# Patient Record
Sex: FEMALE | Race: BLACK OR AFRICAN AMERICAN | NOT HISPANIC OR LATINO | Employment: FULL TIME | URBAN - METROPOLITAN AREA
[De-identification: names, ages, dates, MRNs, and addresses within clinical notes are randomized per-mention and may not be internally consistent; named-entity substitution may affect disease eponyms.]

---

## 2018-09-05 ENCOUNTER — HOSPITAL ENCOUNTER (EMERGENCY)
Facility: HOSPITAL | Age: 33
Discharge: HOME | End: 2018-09-05
Attending: SURGERY

## 2018-09-05 VITALS
BODY MASS INDEX: 44.27 KG/M2 | WEIGHT: 282.06 LBS | SYSTOLIC BLOOD PRESSURE: 137 MMHG | DIASTOLIC BLOOD PRESSURE: 73 MMHG | HEART RATE: 105 BPM | TEMPERATURE: 99 F | HEIGHT: 67 IN | OXYGEN SATURATION: 100 % | RESPIRATION RATE: 20 BRPM

## 2018-09-05 DIAGNOSIS — R42 DIZZINESS: ICD-10-CM

## 2018-09-05 DIAGNOSIS — Z86.2 HISTORY OF ANEMIA: Primary | ICD-10-CM

## 2018-09-05 LAB
ANISOCYTOSIS BLD QL SMEAR: ABNORMAL
B-HCG UR QL: NEGATIVE
BACTERIA #/AREA URNS AUTO: ABNORMAL /HPF
BASOPHILS # BLD AUTO: 0.03 K/UL
BASOPHILS NFR BLD: 0.4 %
BILIRUB UR QL STRIP: NEGATIVE
CLARITY UR REFRACT.AUTO: CLEAR
COLOR UR AUTO: YELLOW
DIFFERENTIAL METHOD: ABNORMAL
EOSINOPHIL # BLD AUTO: 0.1 K/UL
EOSINOPHIL NFR BLD: 1.8 %
ERYTHROCYTE [DISTWIDTH] IN BLOOD BY AUTOMATED COUNT: 22.2 %
GLUCOSE UR QL STRIP: NEGATIVE
HCT VFR BLD AUTO: 26.3 %
HGB BLD-MCNC: 7.2 G/DL
HGB UR QL STRIP: ABNORMAL
HYALINE CASTS UR QL AUTO: 0 /LPF
HYPOCHROMIA BLD QL SMEAR: ABNORMAL
KETONES UR QL STRIP: ABNORMAL
LEUKOCYTE ESTERASE UR QL STRIP: ABNORMAL
LYMPHOCYTES # BLD AUTO: 2.8 K/UL
LYMPHOCYTES NFR BLD: 36.5 %
MCH RBC QN AUTO: 15.5 PG
MCHC RBC AUTO-ENTMCNC: 27.4 G/DL
MCV RBC AUTO: 57 FL
MICROSCOPIC COMMENT: ABNORMAL
MONOCYTES # BLD AUTO: 0.6 K/UL
MONOCYTES NFR BLD: 7.1 %
NEUTROPHILS # BLD AUTO: 4.2 K/UL
NEUTROPHILS NFR BLD: 54.2 %
NITRITE UR QL STRIP: NEGATIVE
OVALOCYTES BLD QL SMEAR: ABNORMAL
PH UR STRIP: 5 [PH] (ref 5–8)
PLATELET # BLD AUTO: 589 K/UL
PLATELET BLD QL SMEAR: ABNORMAL
PMV BLD AUTO: 9.3 FL
POIKILOCYTOSIS BLD QL SMEAR: ABNORMAL
PROT UR QL STRIP: ABNORMAL
RBC # BLD AUTO: 4.65 M/UL
RBC #/AREA URNS AUTO: 100 /HPF (ref 0–4)
SP GR UR STRIP: 1.02 (ref 1–1.03)
URN SPEC COLLECT METH UR: ABNORMAL
UROBILINOGEN UR STRIP-ACNC: NEGATIVE EU/DL
WBC # BLD AUTO: 7.72 K/UL
WBC #/AREA URNS AUTO: 4 /HPF (ref 0–5)

## 2018-09-05 PROCEDURE — 85025 COMPLETE CBC W/AUTO DIFF WBC: CPT

## 2018-09-05 PROCEDURE — 99283 EMERGENCY DEPT VISIT LOW MDM: CPT | Mod: 25

## 2018-09-05 PROCEDURE — 96361 HYDRATE IV INFUSION ADD-ON: CPT

## 2018-09-05 PROCEDURE — 81000 URINALYSIS NONAUTO W/SCOPE: CPT

## 2018-09-05 PROCEDURE — 81025 URINE PREGNANCY TEST: CPT

## 2018-09-05 PROCEDURE — 96360 HYDRATION IV INFUSION INIT: CPT

## 2018-09-05 PROCEDURE — 25000003 PHARM REV CODE 250: Performed by: PHYSICIAN ASSISTANT

## 2018-09-05 RX ADMIN — SODIUM CHLORIDE 1000 ML: 0.9 INJECTION, SOLUTION INTRAVENOUS at 05:09

## 2018-09-05 RX ADMIN — SODIUM CHLORIDE 1000 ML: 0.9 INJECTION, SOLUTION INTRAVENOUS at 06:09

## 2018-09-05 NOTE — ED PROVIDER NOTES
"Encounter Date: 9/5/2018       History     Chief Complaint   Patient presents with    Dysmenorrhea     "I've usesd 11 pads today, Kayleen been changed about 2-3 pads about an hour and my period been like this for the past 3 days"; dizziness and lightheadedness     Patient presents with heavy menstrual cycle which is typical for her but reports today is worse. She reports she tried to go to her OB/GYN but was told she needed to come to the ED due to the fact that she was complaining about feeling light headed and dizzy. Patient reports going through 12 pads in the past 8 hours. She denies syncope, nausea, or vomiting.           Review of patient's allergies indicates:  No Known Allergies  History reviewed. No pertinent past medical history.  History reviewed. No pertinent surgical history.  History reviewed. No pertinent family history.  Social History     Tobacco Use    Smoking status: Never Smoker    Smokeless tobacco: Never Used   Substance Use Topics    Alcohol use: No     Frequency: Never    Drug use: No     Review of Systems   Constitutional: Negative for chills and fever.   Respiratory: Negative for cough and shortness of breath.    Cardiovascular: Negative for chest pain.   Gastrointestinal: Negative for abdominal pain, nausea and vomiting.   Genitourinary: Positive for menstrual problem. Negative for dysuria, hematuria, vaginal bleeding and vaginal discharge.        Heavy menstruation   Musculoskeletal: Negative for back pain.   Neurological: Positive for dizziness and light-headedness. Negative for syncope.       Physical Exam     Initial Vitals [09/05/18 1705]   BP Pulse Resp Temp SpO2   (!) 166/81 103 20 98.5 °F (36.9 °C) --      MAP       --         Physical Exam    Nursing note and vitals reviewed.  Constitutional: She appears well-developed and well-nourished.   HENT:   Head: Normocephalic and atraumatic.   Nose: Nose normal.   Mouth/Throat: Oropharynx is clear and moist.   Eyes: Conjunctivae and EOM " are normal. Pupils are equal, round, and reactive to light.   Neck: Normal range of motion. Neck supple.   Cardiovascular: Normal rate, regular rhythm and normal heart sounds.   Pulmonary/Chest: Breath sounds normal. No respiratory distress.   Abdominal: Soft. Bowel sounds are normal. There is no tenderness.   Musculoskeletal: Normal range of motion.   Neurological: She is alert and oriented to person, place, and time. She has normal strength. No cranial nerve deficit or sensory deficit. GCS score is 15. GCS eye subscore is 4. GCS verbal subscore is 5. GCS motor subscore is 6.   Skin: Skin is warm. Capillary refill takes less than 2 seconds.         ED Course   Procedures  Labs Reviewed - No data to display       Imaging Results    None          Medical Decision Making:   Initial Assessment:   Patient presents with heavy menstrual cycle which is typical for her but reports today is worse. She reports she tried to go to her OB/GYN but was told she needed to come to the ED due to the fact that she was complaining about feeling light headed and dizzy. Patient reports going through 12 pads in the past 8 hours. She denies syncope, nausea, or vomiting. On exam, patient is well appearing in NAD. No pain to back or abdomen upon palpation.   Differential Diagnosis:   Dizziness, anemia  ED Management:  Orthostatics negative after 2L fluids. Patient reports dizziness has resolved. Will discharge and instructed to follow up with PCP in 1-2 days. Patient states she has an appointment with her OB/GYN in 2 days. Encouraged to follow up. Patient is ambulatory without ataxia. She is in NAD with VSS, non toxic in appearance.                       Clinical Impression:   The primary encounter diagnosis was History of anemia. A diagnosis of Dizziness was also pertinent to this visit.                             Janine Corona PA-C  09/05/18 1952

## 2018-10-19 ENCOUNTER — CLINICAL SUPPORT (OUTPATIENT)
Dept: FAMILY MEDICINE | Facility: CLINIC | Age: 33
End: 2018-10-19

## 2018-10-19 DIAGNOSIS — Z00.00 ROUTINE GENERAL MEDICAL EXAMINATION AT A HEALTH CARE FACILITY: ICD-10-CM

## 2018-10-19 PROCEDURE — 99201 PR OFFICE/OUTPT VISIT,NEW,LEVL I: CPT | Mod: S$GLB,,, | Performed by: FAMILY MEDICINE

## 2018-10-19 PROCEDURE — 80305 DRUG TEST PRSMV DIR OPT OBS: CPT | Mod: S$GLB,,, | Performed by: FAMILY MEDICINE

## 2018-10-25 NOTE — PROGRESS NOTES
Ursula has presented today for Pre-Hire screening on behalf of [facility]. Ursula Smallwood has completed Non-DOT Physical. Ursula has presented today for Pre-Hire screening on behalf of [facility]. Ursula Smallwood has completed Non-DOT Drug Screen .     $60.00 physical   $55.00 Drug screening     Acacia Lan

## 2018-12-29 ENCOUNTER — HOSPITAL ENCOUNTER (EMERGENCY)
Facility: HOSPITAL | Age: 33
Discharge: HOME | End: 2018-12-29
Attending: EMERGENCY MEDICINE
Payer: MEDICAID

## 2018-12-29 VITALS
BODY MASS INDEX: 47.46 KG/M2 | RESPIRATION RATE: 20 BRPM | OXYGEN SATURATION: 98 % | DIASTOLIC BLOOD PRESSURE: 85 MMHG | SYSTOLIC BLOOD PRESSURE: 179 MMHG | HEIGHT: 64 IN | WEIGHT: 278 LBS | TEMPERATURE: 99 F | HEART RATE: 92 BPM

## 2018-12-29 DIAGNOSIS — J06.9 UPPER RESPIRATORY TRACT INFECTION, UNSPECIFIED TYPE: ICD-10-CM

## 2018-12-29 DIAGNOSIS — H10.9 CONJUNCTIVITIS, UNSPECIFIED CONJUNCTIVITIS TYPE, UNSPECIFIED LATERALITY: Primary | ICD-10-CM

## 2018-12-29 LAB
B-HCG UR QL: NEGATIVE
CTP QC/QA: YES
DEPRECATED S PYO AG THROAT QL EIA: NEGATIVE

## 2018-12-29 PROCEDURE — 25000003 PHARM REV CODE 250: Performed by: NURSE PRACTITIONER

## 2018-12-29 PROCEDURE — 63600175 PHARM REV CODE 636 W HCPCS: Performed by: NURSE PRACTITIONER

## 2018-12-29 PROCEDURE — 99284 EMERGENCY DEPT VISIT MOD MDM: CPT | Mod: 25

## 2018-12-29 PROCEDURE — 81025 URINE PREGNANCY TEST: CPT | Performed by: NURSE PRACTITIONER

## 2018-12-29 PROCEDURE — 87147 CULTURE TYPE IMMUNOLOGIC: CPT | Mod: 59

## 2018-12-29 PROCEDURE — 63600175 PHARM REV CODE 636 W HCPCS: Performed by: EMERGENCY MEDICINE

## 2018-12-29 PROCEDURE — 87081 CULTURE SCREEN ONLY: CPT

## 2018-12-29 PROCEDURE — 87880 STREP A ASSAY W/OPTIC: CPT | Mod: 59

## 2018-12-29 PROCEDURE — 96372 THER/PROPH/DIAG INJ SC/IM: CPT

## 2018-12-29 RX ORDER — ERYTHROMYCIN 5 MG/G
OINTMENT OPHTHALMIC
Qty: 1 TUBE | Refills: 0 | Status: SHIPPED | OUTPATIENT
Start: 2018-12-29 | End: 2021-03-25 | Stop reason: CLARIF

## 2018-12-29 RX ORDER — FLUTICASONE PROPIONATE 50 MCG
1 SPRAY, SUSPENSION (ML) NASAL 2 TIMES DAILY PRN
Qty: 15 G | Refills: 0 | Status: ON HOLD | OUTPATIENT
Start: 2018-12-29 | End: 2021-03-25

## 2018-12-29 RX ORDER — FLUORESCEIN SODIUM AND PROPARACAINE HYDROCHLORIDE 2.5; 5 MG/ML; MG/ML
1 SOLUTION/ DROPS OPHTHALMIC
Status: COMPLETED | OUTPATIENT
Start: 2018-12-29 | End: 2018-12-29

## 2018-12-29 RX ORDER — DEXAMETHASONE SODIUM PHOSPHATE 4 MG/ML
8 INJECTION, SOLUTION INTRA-ARTICULAR; INTRALESIONAL; INTRAMUSCULAR; INTRAVENOUS; SOFT TISSUE
Status: COMPLETED | OUTPATIENT
Start: 2018-12-29 | End: 2018-12-29

## 2018-12-29 RX ORDER — IBUPROFEN 600 MG/1
600 TABLET ORAL
Status: COMPLETED | OUTPATIENT
Start: 2018-12-29 | End: 2018-12-29

## 2018-12-29 RX ORDER — PROPARACAINE HYDROCHLORIDE 5 MG/ML
1 SOLUTION/ DROPS OPHTHALMIC
Status: DISCONTINUED | OUTPATIENT
Start: 2018-12-29 | End: 2018-12-29

## 2018-12-29 RX ORDER — CETIRIZINE HYDROCHLORIDE 10 MG/1
10 TABLET ORAL DAILY
Qty: 14 TABLET | Refills: 0 | Status: ON HOLD | OUTPATIENT
Start: 2018-12-29 | End: 2021-03-26

## 2018-12-29 RX ADMIN — IBUPROFEN 600 MG: 600 TABLET, FILM COATED ORAL at 11:12

## 2018-12-29 RX ADMIN — DEXAMETHASONE SODIUM PHOSPHATE 8 MG: 4 INJECTION, SOLUTION INTRAMUSCULAR; INTRAVENOUS at 10:12

## 2018-12-29 RX ADMIN — FLUORESCEIN SODIUM AND PROPARACAINE HYDROCHLORIDE 1 DROP: 2.5; 5 SOLUTION/ DROPS OPHTHALMIC at 10:12

## 2018-12-30 NOTE — DISCHARGE INSTRUCTIONS
Take prescribed medications as labeled as needed.  Increase oral intake.  Wash hands thoroughly.  Take Tylenol or ibuprofen as labeled as needed for pain or fever.  Follow up with PCP on Monday and return to ED for any concerns or worsening symptoms.

## 2018-12-30 NOTE — ED NOTES
Patient educated on medication at this time and to f/u with PCP Monday; patient verbalized understanding.

## 2018-12-30 NOTE — ED PROVIDER NOTES
Encounter Date: 12/29/2018       History     Chief Complaint   Patient presents with    Sore Throat     Patient presents to the ED with reports of having sore throat and left side eye redness and draining.     Conjunctivitis     Patient is a 33-year-old female who denies past medical history who presents to ED for evaluation of sore throat and left eye redness, itching, and draining since yesterday.  Patient reports that her children have recently had pinkeye.  Patient associates appetite change, cough/congestion, and postnasal drip.  Patient reports taking TheraFlu with no improvement in symptoms.  Patient denies difficulty breathing or tolerating secretions.  Patient denies any alleviating or exacerbating factors.  Patient denies fever, chills, neck pain/stiffness, drooling, ear pain, facial swelling, mouth sores, voice change, photophobia, visual disturbance, nausea, vomiting, diarrhea, and abdominal pain. Denies any complaints at this time.      The history is provided by the patient.     Review of patient's allergies indicates:  No Known Allergies  History reviewed. No pertinent past medical history.  History reviewed. No pertinent surgical history.  History reviewed. No pertinent family history.  Social History     Tobacco Use    Smoking status: Never Smoker    Smokeless tobacco: Never Used   Substance Use Topics    Alcohol use: No     Frequency: Never    Drug use: No     Review of Systems   Constitutional: Positive for appetite change. Negative for chills and fever.   HENT: Positive for congestion, postnasal drip and sore throat. Negative for drooling, ear discharge, ear pain, facial swelling, mouth sores, nosebleeds, rhinorrhea, sinus pain, sneezing, trouble swallowing and voice change.    Eyes: Positive for discharge, redness and itching. Negative for photophobia, pain and visual disturbance.   Respiratory: Positive for cough. Negative for shortness of breath.    Cardiovascular: Negative for chest  pain.   Gastrointestinal: Negative for abdominal pain, diarrhea, nausea and vomiting.   Musculoskeletal: Negative for back pain, neck pain and neck stiffness.   Skin: Negative for rash.   Neurological: Negative for weakness.   Hematological: Does not bruise/bleed easily.   All other systems reviewed and are negative.      Physical Exam     Initial Vitals [12/29/18 2122]   BP Pulse Resp Temp SpO2   (!) 158/93 98 20 98.8 °F (37.1 °C) 100 %      MAP       --         Physical Exam    Vitals reviewed.  Constitutional: She appears well-developed and well-nourished. She is not diaphoretic. She is cooperative.  Non-toxic appearance. She does not have a sickly appearance.   HENT:   Head: Atraumatic.   Right Ear: Hearing normal.   Left Ear: Hearing normal.   Nose: Mucosal edema present.   MM moist.  Oropharynx with mild erythema and edema.  No exudate.  Uvula midline.  No Giuseppe's or trismus.  Tolerating secretions.   Eyes: Conjunctivae, EOM and lids are normal. Pupils are equal, round, and reactive to light.   No corneal abrasion or ulcer noted.  No foreign body noted.  Erythematous left conjunctiva.   Neck: Normal range of motion, full passive range of motion without pain and phonation normal. Neck supple.   No meningismus.   Cardiovascular: Regular rhythm.   Asymptomatic hypertension.   Pulmonary/Chest: No respiratory distress.   Abdominal: Bowel sounds are normal.   Neurological: She is alert and oriented to person, place, and time.   Skin: Skin is warm, dry and intact. No rash noted.   Psychiatric: She has a normal mood and affect.         ED Course   Procedures  Labs Reviewed   THROAT SCREEN, RAPID   CULTURE, STREP A,  THROAT   POCT URINE PREGNANCY          Imaging Results    None          Medical Decision Making:   History:   Old Medical Records: I decided to obtain old medical records.  Initial Assessment:   Patient presents to ED for evaluation sore throat and left eye redness, itching, and draining since yesterday.   Patient associates appetite change, cough/congestion, and postnasal drip.  Appears well, nontoxic.  Afebrile.  No meningismus.  Erythematous left conjunctiva.  No corneal abrasion or ulcer noted. No foreign body noted.  Mucosal edema present.  Mm moist.  Oropharynx with mild erythema and edema.  No exudate.  Uvula midline. No Giuseppe's or trismus.  Tolerating secretions.  Clinical Tests:   Lab Tests: Reviewed and Ordered  ED Management:  UPT pregnant, strep swab, eye exam with Woods lamp, visual acuity, p.o. Ibuprofen, IM Decadron  Other:   I discussed test(s) with the performing physician.       <> Summary of the Findings: Care this patient discussed with Dr. Gallagher who agrees with this patient's ED course and disposition.   UPT negative.  Strep negative.  Asymptomatic hypertension.  Patient's signs and symptoms most likely due to conjunctivitis and upper respiratory infection.  Patient is hemodynamically stable and will be DC home with prescriptions for erythromycin ointment, Flonase, and Zyrtec.  Patient instructed to take prescribed medications as labeled as needed, increase oral intake, wash hands thoroughly, and take Tylenol or ibuprofen as labeled as needed for pain or fever.  Instructed to follow up with PCP on Monday and return to ED for any concerns or worsening symptoms.  Patient verbalized understanding, compliance, and agreement with treatment plan.                      Clinical Impression:   The primary encounter diagnosis was Conjunctivitis, unspecified conjunctivitis type, unspecified laterality. A diagnosis of Upper respiratory tract infection, unspecified type was also pertinent to this visit.                             Buddy Shultz NP  12/31/18 0044

## 2018-12-31 LAB
BACTERIA THROAT CULT: NORMAL
BACTERIA THROAT CULT: NORMAL

## 2019-07-16 ENCOUNTER — HOSPITAL ENCOUNTER (EMERGENCY)
Facility: HOSPITAL | Age: 34
Discharge: HOME | End: 2019-07-16
Attending: SURGERY
Payer: MEDICAID

## 2019-07-16 VITALS
WEIGHT: 265 LBS | SYSTOLIC BLOOD PRESSURE: 150 MMHG | DIASTOLIC BLOOD PRESSURE: 88 MMHG | HEIGHT: 67 IN | OXYGEN SATURATION: 99 % | HEART RATE: 96 BPM | RESPIRATION RATE: 18 BRPM | BODY MASS INDEX: 41.59 KG/M2 | TEMPERATURE: 99 F

## 2019-07-16 DIAGNOSIS — J02.0 ACUTE STREPTOCOCCAL PHARYNGITIS: Primary | ICD-10-CM

## 2019-07-16 LAB
DEPRECATED S PYO AG THROAT QL EIA: POSITIVE
INFLUENZA A, MOLECULAR: NEGATIVE
INFLUENZA B, MOLECULAR: NEGATIVE
SPECIMEN SOURCE: NORMAL

## 2019-07-16 PROCEDURE — 99284 EMERGENCY DEPT VISIT MOD MDM: CPT | Mod: 25,ER

## 2019-07-16 PROCEDURE — 87880 STREP A ASSAY W/OPTIC: CPT | Mod: ER

## 2019-07-16 PROCEDURE — 63600175 PHARM REV CODE 636 W HCPCS: Mod: ER | Performed by: PHYSICIAN ASSISTANT

## 2019-07-16 PROCEDURE — 87502 INFLUENZA DNA AMP PROBE: CPT | Mod: ER

## 2019-07-16 PROCEDURE — 96372 THER/PROPH/DIAG INJ SC/IM: CPT | Mod: ER

## 2019-07-16 RX ORDER — AMOXICILLIN 875 MG/1
875 TABLET, FILM COATED ORAL 2 TIMES DAILY
Qty: 14 TABLET | Refills: 0 | Status: SHIPPED | OUTPATIENT
Start: 2019-07-16 | End: 2021-03-25 | Stop reason: CLARIF

## 2019-07-16 RX ORDER — KETOROLAC TROMETHAMINE 30 MG/ML
30 INJECTION, SOLUTION INTRAMUSCULAR; INTRAVENOUS
Status: COMPLETED | OUTPATIENT
Start: 2019-07-16 | End: 2019-07-16

## 2019-07-16 RX ADMIN — KETOROLAC TROMETHAMINE 30 MG: 30 INJECTION, SOLUTION INTRAMUSCULAR at 10:07

## 2019-07-16 NOTE — ED TRIAGE NOTES
Pt presents to ED c/o sore throat x 2 days with ear pain, dry cough, sinus headache. Taking Dayquil with no relief-last dose last night. Pt with subjective fever. Breath sounds clear. Tonsils +3 and reddened. No exudate or visible pus. Airway clear/patent.

## 2019-07-16 NOTE — ED PROVIDER NOTES
Encounter Date: 7/16/2019       History     Chief Complaint   Patient presents with    Sore Throat     Pt c/o congestion, fever and sore throat x 3 days.      34-year-old female presents to the emergency department for evaluation of 2 day history of sore throat, nasal congestion, mild cough and generalized body aches.  She reports that the symptoms began gradually yesterday morning and have been constant since onset.  She reports that she has felt as though she has had a fever, but does not have a thermometer at home.  She reports that she has associated nasal congestion with thick yellow discharge. She reports that she has had a mild nonproductive cough.  She denies any headache, dizziness, vision changes, neck pain, chest pain, abdominal pain, nausea, vomiting or dysuria.  She has been attempting treatment at home with DayQuil, last taken yesterday.  She reports her last menstrual period was on 06/20/2019.  She denies risk of pregnancy.        Review of patient's allergies indicates:  No Known Allergies  History reviewed. No pertinent past medical history.  History reviewed. No pertinent surgical history.  History reviewed. No pertinent family history.  Social History     Tobacco Use    Smoking status: Never Smoker    Smokeless tobacco: Never Used   Substance Use Topics    Alcohol use: No     Frequency: Never    Drug use: No     Review of Systems   Constitutional: Positive for fever. Negative for activity change and appetite change.   HENT: Positive for congestion, rhinorrhea, sinus pressure and sore throat. Negative for ear discharge, ear pain, sinus pain, trouble swallowing and voice change.    Eyes: Negative for photophobia and visual disturbance.   Respiratory: Positive for cough. Negative for chest tightness, shortness of breath and wheezing.    Cardiovascular: Negative for chest pain.   Gastrointestinal: Negative for abdominal pain, diarrhea, nausea and vomiting.   Genitourinary: Negative for decreased  urine volume, dysuria, vaginal bleeding and vaginal discharge.   Musculoskeletal: Negative for back pain and neck pain.   Skin: Negative for rash.   Neurological: Negative for dizziness, syncope, weakness, light-headedness, numbness and headaches.       Physical Exam     Initial Vitals [07/16/19 0901]   BP Pulse Resp Temp SpO2   (!) 170/92 (!) 112 18 98.5 °F (36.9 °C) 98 %      MAP       --         Physical Exam    Nursing note and vitals reviewed.  Constitutional: She appears well-developed and well-nourished. She is not diaphoretic. No distress.   HENT:   Head: Normocephalic and atraumatic.   Right Ear: Tympanic membrane, external ear and ear canal normal.   Left Ear: Tympanic membrane, external ear and ear canal normal.   Nose: Nose normal. Right sinus exhibits no maxillary sinus tenderness and no frontal sinus tenderness. Left sinus exhibits no maxillary sinus tenderness and no frontal sinus tenderness.   Mouth/Throat: Uvula is midline. No uvula swelling. Posterior oropharyngeal erythema present. No oropharyngeal exudate or posterior oropharyngeal edema.   Eyes: Conjunctivae and EOM are normal. Pupils are equal, round, and reactive to light.   Neck: Normal range of motion. Neck supple.   Cardiovascular: Normal rate, regular rhythm and normal heart sounds.   Pulmonary/Chest: Breath sounds normal. No respiratory distress. She has no wheezes. She has no rhonchi. She has no rales. She exhibits no tenderness.   Abdominal: Soft. There is no tenderness.   Lymphadenopathy:     She has no cervical adenopathy.   Neurological: She is alert and oriented to person, place, and time.   Skin: Skin is warm and dry.   Psychiatric: She has a normal mood and affect.         ED Course   Procedures  Labs Reviewed   THROAT SCREEN, RAPID - Abnormal; Notable for the following components:       Result Value    Rapid Strep A Screen Positive (*)     All other components within normal limits   INFLUENZA A & B BY MOLECULAR          Imaging  Results    None          Medical Decision Making:   Initial Assessment:   34-year-old female presents for evaluation of congestion, subjective fever, nasal congestion, nonproductive cough and pharyngitis.  Physical exam reveals a nontoxic-appearing female in no acute distress.  Patient is afebrile, mildly tachycardic but other vital signs are within normal limits.  Neurological exam reveals an alert and oriented patient.  TMs reveal no erythema.  Posterior pharynx reveals mild erythema and edema, no tonsillar exudate noted.  No uvular edema or deviation noted. No trismus, stridor or drooling noted. No anterior cervical adenopathy noted.  Neck is supple, nontender to palpation.  Lungs clear to auscultation bilaterally. No respiratory distress or accessory muscle use noted.   Differential Diagnosis:   Streptococcal pharyngitis  Viral URI  Influenza  I carefully considered but doubt serious intrathoracic etiology including pneumonia or consolidation.  ED Management:  Influenza negative. Rapid strep test positive. Discussed this finding at length patient verbalizes understanding and agreement with course of treatment.  Instructed the patient to follow up with her primary care provider for re-evaluation within 3 days.                      Clinical Impression:       ICD-10-CM ICD-9-CM   1. Acute streptococcal pharyngitis J02.0 034.0                                Deb Wells PA-C  07/16/19 1004

## 2019-07-16 NOTE — DISCHARGE INSTRUCTIONS
YouTested positive for strep throat.   You are advised to follow up with your primary care provider for re-evaluation within 3 days.  You are instructed to return to the emergency department immediately for any new or worsening symptoms.

## 2021-03-25 ENCOUNTER — HOSPITAL ENCOUNTER (OUTPATIENT)
Facility: HOSPITAL | Age: 36
Discharge: HOME | End: 2021-03-26
Attending: EMERGENCY MEDICINE | Admitting: EMERGENCY MEDICINE
Payer: COMMERCIAL

## 2021-03-25 DIAGNOSIS — B37.49 CANDIDA UTI: ICD-10-CM

## 2021-03-25 DIAGNOSIS — R06.02 SOB (SHORTNESS OF BREATH): ICD-10-CM

## 2021-03-25 DIAGNOSIS — D64.9 SYMPTOMATIC ANEMIA: Primary | ICD-10-CM

## 2021-03-25 DIAGNOSIS — R00.0 TACHYCARDIA: ICD-10-CM

## 2021-03-25 LAB
ALBUMIN SERPL BCP-MCNC: 4.2 G/DL (ref 3.5–5.2)
ALP SERPL-CCNC: 66 U/L (ref 38–126)
ALT SERPL W/O P-5'-P-CCNC: 9 U/L (ref 10–44)
ANION GAP SERPL CALC-SCNC: 8 MMOL/L (ref 8–16)
AST SERPL-CCNC: 12 U/L (ref 15–46)
B-HCG UR QL: NEGATIVE
BASOPHILS # BLD AUTO: 0.05 K/UL (ref 0–0.2)
BASOPHILS NFR BLD: 0.5 % (ref 0–1.9)
BILIRUB SERPL-MCNC: 0.2 MG/DL (ref 0.1–1)
BILIRUB UR QL STRIP: NEGATIVE
CALCIUM SERPL-MCNC: 9.2 MG/DL (ref 8.7–10.5)
CHLORIDE SERPL-SCNC: 107 MMOL/L (ref 95–110)
CLARITY UR REFRACT.AUTO: ABNORMAL
CO2 SERPL-SCNC: 26 MMOL/L (ref 23–29)
COLOR UR AUTO: YELLOW
CREAT SERPL-MCNC: 0.93 MG/DL (ref 0.5–1.4)
CTP QC/QA: YES
D DIMER PPP IA.FEU-MCNC: 0.62 MG/L FEU
DIFFERENTIAL METHOD: ABNORMAL
EOSINOPHIL # BLD AUTO: 0.2 K/UL (ref 0–0.5)
EOSINOPHIL NFR BLD: 1.8 % (ref 0–8)
ERYTHROCYTE [DISTWIDTH] IN BLOOD BY AUTOMATED COUNT: 22.7 % (ref 11.5–14.5)
EST. GFR  (AFRICAN AMERICAN): >60 ML/MIN/1.73 M^2
EST. GFR  (NON AFRICAN AMERICAN): >60 ML/MIN/1.73 M^2
GLUCOSE SERPL-MCNC: 109 MG/DL (ref 70–110)
GLUCOSE UR QL STRIP: NEGATIVE
HCT VFR BLD AUTO: 25.4 % (ref 37–48.5)
HGB BLD-MCNC: 6.3 G/DL (ref 12–16)
HGB UR QL STRIP: NEGATIVE
IMM GRANULOCYTES # BLD AUTO: 0.06 K/UL (ref 0–0.04)
IMM GRANULOCYTES NFR BLD AUTO: 0.6 % (ref 0–0.5)
KETONES UR QL STRIP: NEGATIVE
LEUKOCYTE ESTERASE UR QL STRIP: ABNORMAL
LYMPHOCYTES # BLD AUTO: 3 K/UL (ref 1–4.8)
LYMPHOCYTES NFR BLD: 31.2 % (ref 18–48)
MCH RBC QN AUTO: 14 PG (ref 27–31)
MCHC RBC AUTO-ENTMCNC: 24.8 G/DL (ref 32–36)
MCV RBC AUTO: 57 FL (ref 82–98)
MICROSCOPIC COMMENT: ABNORMAL
MONOCYTES # BLD AUTO: 0.6 K/UL (ref 0.3–1)
MONOCYTES NFR BLD: 6.1 % (ref 4–15)
NEUTROPHILS # BLD AUTO: 5.7 K/UL (ref 1.8–7.7)
NEUTROPHILS NFR BLD: 59.8 % (ref 38–73)
NITRITE UR QL STRIP: NEGATIVE
NRBC BLD-RTO: 0 /100 WBC
PH UR STRIP: 7 [PH] (ref 5–8)
PLATELET # BLD AUTO: 454 K/UL (ref 150–350)
PMV BLD AUTO: 8.7 FL (ref 9.2–12.9)
POCT GLUCOSE: 117 MG/DL (ref 70–110)
POTASSIUM SERPL-SCNC: 3.7 MMOL/L (ref 3.5–5.1)
PROT SERPL-MCNC: 8 G/DL (ref 6–8.4)
PROT UR QL STRIP: NEGATIVE
RBC # BLD AUTO: 4.49 M/UL (ref 4–5.4)
SARS-COV-2 RDRP RESP QL NAA+PROBE: NEGATIVE
SODIUM SERPL-SCNC: 141 MMOL/L (ref 136–145)
SP GR UR STRIP: 1 (ref 1–1.03)
TROPONIN I SERPL-MCNC: <0.012 NG/ML (ref 0.01–0.03)
TSH SERPL DL<=0.005 MIU/L-ACNC: 1.87 UIU/ML (ref 0.4–4)
URN SPEC COLLECT METH UR: ABNORMAL
UROBILINOGEN UR STRIP-ACNC: NEGATIVE EU/DL
UUN UR-MCNC: 9 MG/DL (ref 7–17)
WBC # BLD AUTO: 9.49 K/UL (ref 3.9–12.7)
WBC #/AREA URNS AUTO: 6 /HPF (ref 0–5)
YEAST UR QL AUTO: ABNORMAL

## 2021-03-25 PROCEDURE — 85379 FIBRIN DEGRADATION QUANT: CPT | Mod: ER | Performed by: PHYSICIAN ASSISTANT

## 2021-03-25 PROCEDURE — U0002 COVID-19 LAB TEST NON-CDC: HCPCS | Mod: ER | Performed by: PHYSICIAN ASSISTANT

## 2021-03-25 PROCEDURE — 84484 ASSAY OF TROPONIN QUANT: CPT | Mod: ER | Performed by: PHYSICIAN ASSISTANT

## 2021-03-25 PROCEDURE — G0378 HOSPITAL OBSERVATION PER HR: HCPCS

## 2021-03-25 PROCEDURE — 93005 ELECTROCARDIOGRAM TRACING: CPT | Mod: ER

## 2021-03-25 PROCEDURE — 94760 N-INVAS EAR/PLS OXIMETRY 1: CPT | Mod: ER

## 2021-03-25 PROCEDURE — 99285 EMERGENCY DEPT VISIT HI MDM: CPT | Mod: 25,ER

## 2021-03-25 PROCEDURE — 94761 N-INVAS EAR/PLS OXIMETRY MLT: CPT

## 2021-03-25 PROCEDURE — 84443 ASSAY THYROID STIM HORMONE: CPT | Mod: ER | Performed by: PHYSICIAN ASSISTANT

## 2021-03-25 PROCEDURE — 93010 EKG 12-LEAD: ICD-10-PCS | Mod: ,,, | Performed by: INTERNAL MEDICINE

## 2021-03-25 PROCEDURE — 80053 COMPREHEN METABOLIC PANEL: CPT | Mod: ER | Performed by: PHYSICIAN ASSISTANT

## 2021-03-25 PROCEDURE — 81025 URINE PREGNANCY TEST: CPT | Mod: ER | Performed by: PHYSICIAN ASSISTANT

## 2021-03-25 PROCEDURE — 85025 COMPLETE CBC W/AUTO DIFF WBC: CPT | Mod: ER | Performed by: PHYSICIAN ASSISTANT

## 2021-03-25 PROCEDURE — 81000 URINALYSIS NONAUTO W/SCOPE: CPT | Mod: ER | Performed by: PHYSICIAN ASSISTANT

## 2021-03-25 PROCEDURE — 93010 ELECTROCARDIOGRAM REPORT: CPT | Mod: ,,, | Performed by: INTERNAL MEDICINE

## 2021-03-25 RX ORDER — CETIRIZINE HYDROCHLORIDE 10 MG/1
10 TABLET ORAL DAILY
Status: DISCONTINUED | OUTPATIENT
Start: 2021-03-26 | End: 2021-03-26 | Stop reason: HOSPADM

## 2021-03-25 RX ORDER — HYDROCODONE BITARTRATE AND ACETAMINOPHEN 500; 5 MG/1; MG/1
TABLET ORAL
Status: DISCONTINUED | OUTPATIENT
Start: 2021-03-26 | End: 2021-03-26 | Stop reason: HOSPADM

## 2021-03-25 RX ORDER — SODIUM CHLORIDE 0.9 % (FLUSH) 0.9 %
10 SYRINGE (ML) INJECTION
Status: DISCONTINUED | OUTPATIENT
Start: 2021-03-25 | End: 2021-03-26 | Stop reason: HOSPADM

## 2021-03-25 RX ORDER — TALC
6 POWDER (GRAM) TOPICAL NIGHTLY PRN
Status: DISCONTINUED | OUTPATIENT
Start: 2021-03-25 | End: 2021-03-26 | Stop reason: HOSPADM

## 2021-03-25 RX ORDER — GLUCAGON 1 MG
1 KIT INJECTION
Status: DISCONTINUED | OUTPATIENT
Start: 2021-03-25 | End: 2021-03-26 | Stop reason: HOSPADM

## 2021-03-25 RX ORDER — IBUPROFEN 200 MG
24 TABLET ORAL
Status: DISCONTINUED | OUTPATIENT
Start: 2021-03-25 | End: 2021-03-26 | Stop reason: HOSPADM

## 2021-03-25 RX ORDER — IBUPROFEN 200 MG
16 TABLET ORAL
Status: DISCONTINUED | OUTPATIENT
Start: 2021-03-25 | End: 2021-03-26 | Stop reason: HOSPADM

## 2021-03-26 ENCOUNTER — TELEPHONE (OUTPATIENT)
Dept: OBSTETRICS AND GYNECOLOGY | Facility: CLINIC | Age: 36
End: 2021-03-26

## 2021-03-26 VITALS
BODY MASS INDEX: 46.86 KG/M2 | OXYGEN SATURATION: 99 % | DIASTOLIC BLOOD PRESSURE: 80 MMHG | WEIGHT: 274.5 LBS | TEMPERATURE: 99 F | RESPIRATION RATE: 18 BRPM | HEIGHT: 64 IN | HEART RATE: 96 BPM | SYSTOLIC BLOOD PRESSURE: 131 MMHG

## 2021-03-26 LAB
ABO + RH BLD: NORMAL
ALBUMIN SERPL BCP-MCNC: 3.4 G/DL (ref 3.5–5.2)
ALP SERPL-CCNC: 58 U/L (ref 55–135)
ALT SERPL W/O P-5'-P-CCNC: 7 U/L (ref 10–44)
ANION GAP SERPL CALC-SCNC: 10 MMOL/L (ref 8–16)
ANISOCYTOSIS BLD QL SMEAR: SLIGHT
AST SERPL-CCNC: 7 U/L (ref 10–40)
BASOPHILS # BLD AUTO: 0.05 K/UL (ref 0–0.2)
BASOPHILS # BLD AUTO: 0.06 K/UL (ref 0–0.2)
BASOPHILS NFR BLD: 0.4 % (ref 0–1.9)
BASOPHILS NFR BLD: 0.6 % (ref 0–1.9)
BILIRUB SERPL-MCNC: 1.1 MG/DL (ref 0.1–1)
BLD GP AB SCN CELLS X3 SERPL QL: NORMAL
BLD PROD TYP BPU: NORMAL
BLD PROD TYP BPU: NORMAL
BLOOD UNIT EXPIRATION DATE: NORMAL
BLOOD UNIT EXPIRATION DATE: NORMAL
BLOOD UNIT TYPE CODE: 5100
BLOOD UNIT TYPE CODE: 5100
BLOOD UNIT TYPE: NORMAL
BLOOD UNIT TYPE: NORMAL
BUN SERPL-MCNC: 9 MG/DL (ref 6–20)
CALCIUM SERPL-MCNC: 8.3 MG/DL (ref 8.7–10.5)
CHLORIDE SERPL-SCNC: 108 MMOL/L (ref 95–110)
CO2 SERPL-SCNC: 20 MMOL/L (ref 23–29)
CODING SYSTEM: NORMAL
CODING SYSTEM: NORMAL
CREAT SERPL-MCNC: 0.9 MG/DL (ref 0.5–1.4)
DACRYOCYTES BLD QL SMEAR: ABNORMAL
DIFFERENTIAL METHOD: ABNORMAL
DIFFERENTIAL METHOD: ABNORMAL
DISPENSE STATUS: NORMAL
DISPENSE STATUS: NORMAL
EOSINOPHIL # BLD AUTO: 0.1 K/UL (ref 0–0.5)
EOSINOPHIL # BLD AUTO: 0.2 K/UL (ref 0–0.5)
EOSINOPHIL NFR BLD: 1 % (ref 0–8)
EOSINOPHIL NFR BLD: 1.9 % (ref 0–8)
ERYTHROCYTE [DISTWIDTH] IN BLOOD BY AUTOMATED COUNT: 22.5 % (ref 11.5–14.5)
ERYTHROCYTE [DISTWIDTH] IN BLOOD BY AUTOMATED COUNT: 30.1 % (ref 11.5–14.5)
EST. GFR  (AFRICAN AMERICAN): >60 ML/MIN/1.73 M^2
EST. GFR  (NON AFRICAN AMERICAN): >60 ML/MIN/1.73 M^2
FERRITIN SERPL-MCNC: 1 NG/ML (ref 20–300)
FOLATE SERPL-MCNC: 6.8 NG/ML (ref 4–24)
GLUCOSE SERPL-MCNC: 88 MG/DL (ref 70–110)
HCT VFR BLD AUTO: 23 % (ref 37–48.5)
HCT VFR BLD AUTO: 27 % (ref 37–48.5)
HGB BLD-MCNC: 5.8 G/DL (ref 12–16)
HGB BLD-MCNC: 7.6 G/DL (ref 12–16)
HYPOCHROMIA BLD QL SMEAR: ABNORMAL
IMM GRANULOCYTES # BLD AUTO: 0.05 K/UL (ref 0–0.04)
IMM GRANULOCYTES # BLD AUTO: 0.06 K/UL (ref 0–0.04)
IMM GRANULOCYTES NFR BLD AUTO: 0.5 % (ref 0–0.5)
IMM GRANULOCYTES NFR BLD AUTO: 0.5 % (ref 0–0.5)
IRON SERPL-MCNC: 11 UG/DL (ref 30–160)
LYMPHOCYTES # BLD AUTO: 1.8 K/UL (ref 1–4.8)
LYMPHOCYTES # BLD AUTO: 3.3 K/UL (ref 1–4.8)
LYMPHOCYTES NFR BLD: 13.6 % (ref 18–48)
LYMPHOCYTES NFR BLD: 30.1 % (ref 18–48)
MAGNESIUM SERPL-MCNC: 1.9 MG/DL (ref 1.6–2.6)
MCH RBC QN AUTO: 14.1 PG (ref 27–31)
MCH RBC QN AUTO: 17.1 PG (ref 27–31)
MCHC RBC AUTO-ENTMCNC: 25.2 G/DL (ref 32–36)
MCHC RBC AUTO-ENTMCNC: 28.1 G/DL (ref 32–36)
MCV RBC AUTO: 56 FL (ref 82–98)
MCV RBC AUTO: 61 FL (ref 82–98)
MONOCYTES # BLD AUTO: 0.5 K/UL (ref 0.3–1)
MONOCYTES # BLD AUTO: 0.8 K/UL (ref 0.3–1)
MONOCYTES NFR BLD: 4.8 % (ref 4–15)
MONOCYTES NFR BLD: 6 % (ref 4–15)
NEUTROPHILS # BLD AUTO: 10.4 K/UL (ref 1.8–7.7)
NEUTROPHILS # BLD AUTO: 6.8 K/UL (ref 1.8–7.7)
NEUTROPHILS NFR BLD: 62.1 % (ref 38–73)
NEUTROPHILS NFR BLD: 78.5 % (ref 38–73)
NRBC BLD-RTO: 0 /100 WBC
NRBC BLD-RTO: 0 /100 WBC
OVALOCYTES BLD QL SMEAR: ABNORMAL
PHOSPHATE SERPL-MCNC: 3.3 MG/DL (ref 2.7–4.5)
PLATELET # BLD AUTO: 395 K/UL (ref 150–350)
PLATELET # BLD AUTO: 487 K/UL (ref 150–350)
PLATELET BLD QL SMEAR: ABNORMAL
PMV BLD AUTO: 9.4 FL (ref 9.2–12.9)
PMV BLD AUTO: 9.6 FL (ref 9.2–12.9)
POIKILOCYTOSIS BLD QL SMEAR: SLIGHT
POTASSIUM SERPL-SCNC: 4 MMOL/L (ref 3.5–5.1)
PROT SERPL-MCNC: 7 G/DL (ref 6–8.4)
RBC # BLD AUTO: 4.12 M/UL (ref 4–5.4)
RBC # BLD AUTO: 4.44 M/UL (ref 4–5.4)
SATURATED IRON: 2 % (ref 20–50)
SODIUM SERPL-SCNC: 138 MMOL/L (ref 136–145)
SPHEROCYTES BLD QL SMEAR: ABNORMAL
TARGETS BLD QL SMEAR: ABNORMAL
TOTAL IRON BINDING CAPACITY: 478 UG/DL (ref 250–450)
TRANS ERYTHROCYTES VOL PATIENT: NORMAL ML
TRANS ERYTHROCYTES VOL PATIENT: NORMAL ML
TRANSFERRIN SERPL-MCNC: 323 MG/DL (ref 200–375)
VIT B12 SERPL-MCNC: 268 PG/ML (ref 210–950)
WBC # BLD AUTO: 10.88 K/UL (ref 3.9–12.7)
WBC # BLD AUTO: 13.23 K/UL (ref 3.9–12.7)

## 2021-03-26 PROCEDURE — 85025 COMPLETE CBC W/AUTO DIFF WBC: CPT | Performed by: STUDENT IN AN ORGANIZED HEALTH CARE EDUCATION/TRAINING PROGRAM

## 2021-03-26 PROCEDURE — 86920 COMPATIBILITY TEST SPIN: CPT | Performed by: STUDENT IN AN ORGANIZED HEALTH CARE EDUCATION/TRAINING PROGRAM

## 2021-03-26 PROCEDURE — 96372 THER/PROPH/DIAG INJ SC/IM: CPT

## 2021-03-26 PROCEDURE — 86900 BLOOD TYPING SEROLOGIC ABO: CPT | Performed by: STUDENT IN AN ORGANIZED HEALTH CARE EDUCATION/TRAINING PROGRAM

## 2021-03-26 PROCEDURE — 94761 N-INVAS EAR/PLS OXIMETRY MLT: CPT

## 2021-03-26 PROCEDURE — 82728 ASSAY OF FERRITIN: CPT | Performed by: STUDENT IN AN ORGANIZED HEALTH CARE EDUCATION/TRAINING PROGRAM

## 2021-03-26 PROCEDURE — 84100 ASSAY OF PHOSPHORUS: CPT | Performed by: INTERNAL MEDICINE

## 2021-03-26 PROCEDURE — 82607 VITAMIN B-12: CPT | Performed by: STUDENT IN AN ORGANIZED HEALTH CARE EDUCATION/TRAINING PROGRAM

## 2021-03-26 PROCEDURE — P9021 RED BLOOD CELLS UNIT: HCPCS | Performed by: STUDENT IN AN ORGANIZED HEALTH CARE EDUCATION/TRAINING PROGRAM

## 2021-03-26 PROCEDURE — 83540 ASSAY OF IRON: CPT | Performed by: STUDENT IN AN ORGANIZED HEALTH CARE EDUCATION/TRAINING PROGRAM

## 2021-03-26 PROCEDURE — 83735 ASSAY OF MAGNESIUM: CPT | Performed by: INTERNAL MEDICINE

## 2021-03-26 PROCEDURE — 36415 COLL VENOUS BLD VENIPUNCTURE: CPT | Performed by: STUDENT IN AN ORGANIZED HEALTH CARE EDUCATION/TRAINING PROGRAM

## 2021-03-26 PROCEDURE — 99243 OFF/OP CNSLTJ NEW/EST LOW 30: CPT | Mod: ,,, | Performed by: OBSTETRICS & GYNECOLOGY

## 2021-03-26 PROCEDURE — 80053 COMPREHEN METABOLIC PANEL: CPT | Performed by: INTERNAL MEDICINE

## 2021-03-26 PROCEDURE — 82746 ASSAY OF FOLIC ACID SERUM: CPT | Performed by: STUDENT IN AN ORGANIZED HEALTH CARE EDUCATION/TRAINING PROGRAM

## 2021-03-26 PROCEDURE — 63600175 PHARM REV CODE 636 W HCPCS: Performed by: OBSTETRICS & GYNECOLOGY

## 2021-03-26 PROCEDURE — 36415 COLL VENOUS BLD VENIPUNCTURE: CPT | Performed by: INTERNAL MEDICINE

## 2021-03-26 PROCEDURE — G0378 HOSPITAL OBSERVATION PER HR: HCPCS

## 2021-03-26 PROCEDURE — 85025 COMPLETE CBC W/AUTO DIFF WBC: CPT | Mod: 91 | Performed by: INTERNAL MEDICINE

## 2021-03-26 PROCEDURE — 36430 TRANSFUSION BLD/BLD COMPNT: CPT

## 2021-03-26 PROCEDURE — 99243 PR OFFICE CONSULTATION,LEVEL III: ICD-10-PCS | Mod: ,,, | Performed by: OBSTETRICS & GYNECOLOGY

## 2021-03-26 RX ORDER — FERROUS SULFATE 325(65) MG
325 TABLET ORAL
Qty: 90 TABLET | Refills: 3 | Status: SHIPPED | OUTPATIENT
Start: 2021-03-26 | End: 2022-02-26

## 2021-03-26 RX ORDER — FLUCONAZOLE 100 MG/1
100 TABLET ORAL
Qty: 3 TABLET | Refills: 0 | Status: SHIPPED | OUTPATIENT
Start: 2021-03-26 | End: 2021-04-04

## 2021-03-26 RX ORDER — MEDROXYPROGESTERONE ACETATE 150 MG/ML
150 INJECTION, SUSPENSION INTRAMUSCULAR ONCE
Status: COMPLETED | OUTPATIENT
Start: 2021-03-26 | End: 2021-03-26

## 2021-03-26 RX ORDER — HYDROCODONE BITARTRATE AND ACETAMINOPHEN 500; 5 MG/1; MG/1
TABLET ORAL
Status: DISCONTINUED | OUTPATIENT
Start: 2021-03-26 | End: 2021-03-26 | Stop reason: HOSPADM

## 2021-03-26 RX ORDER — CLOTRIMAZOLE AND BETAMETHASONE DIPROPIONATE 10; .64 MG/G; MG/G
CREAM TOPICAL
Qty: 15 G | Refills: 0 | Status: SHIPPED | OUTPATIENT
Start: 2021-03-26 | End: 2021-04-22

## 2021-03-26 RX ADMIN — MEDROXYPROGESTERONE ACETATE 150 MG: 150 INJECTION, SUSPENSION, EXTENDED RELEASE INTRAMUSCULAR at 04:03

## 2021-04-06 ENCOUNTER — HOSPITAL ENCOUNTER (EMERGENCY)
Facility: HOSPITAL | Age: 36
Discharge: HOME | End: 2021-04-06
Attending: EMERGENCY MEDICINE
Payer: COMMERCIAL

## 2021-04-06 VITALS
RESPIRATION RATE: 22 BRPM | HEART RATE: 110 BPM | DIASTOLIC BLOOD PRESSURE: 73 MMHG | SYSTOLIC BLOOD PRESSURE: 134 MMHG | OXYGEN SATURATION: 100 % | BODY MASS INDEX: 47.03 KG/M2 | WEIGHT: 274 LBS

## 2021-04-06 DIAGNOSIS — I47.10 SVT (SUPRAVENTRICULAR TACHYCARDIA): Primary | ICD-10-CM

## 2021-04-06 DIAGNOSIS — R00.2 PALPITATIONS: ICD-10-CM

## 2021-04-06 DIAGNOSIS — R00.0 TACHYCARDIA: ICD-10-CM

## 2021-04-06 LAB
ALBUMIN SERPL BCP-MCNC: 4 G/DL (ref 3.5–5.2)
ALP SERPL-CCNC: 73 U/L (ref 38–126)
ALT SERPL W/O P-5'-P-CCNC: 11 U/L (ref 10–44)
ANION GAP SERPL CALC-SCNC: 12 MMOL/L (ref 8–16)
ANISOCYTOSIS BLD QL SMEAR: SLIGHT
AST SERPL-CCNC: 12 U/L (ref 15–46)
BASOPHILS # BLD AUTO: 0.06 K/UL (ref 0–0.2)
BASOPHILS NFR BLD: 0.6 % (ref 0–1.9)
BILIRUB SERPL-MCNC: 0.4 MG/DL (ref 0.1–1)
CALCIUM SERPL-MCNC: 9 MG/DL (ref 8.7–10.5)
CHLORIDE SERPL-SCNC: 113 MMOL/L (ref 95–110)
CO2 SERPL-SCNC: 17 MMOL/L (ref 23–29)
CREAT SERPL-MCNC: 0.97 MG/DL (ref 0.5–1.4)
D DIMER PPP IA.FEU-MCNC: 0.67 MG/L FEU
DACRYOCYTES BLD QL SMEAR: ABNORMAL
DIFFERENTIAL METHOD: ABNORMAL
EOSINOPHIL # BLD AUTO: 0.1 K/UL (ref 0–0.5)
EOSINOPHIL NFR BLD: 1 % (ref 0–8)
ERYTHROCYTE [DISTWIDTH] IN BLOOD BY AUTOMATED COUNT: 29.7 % (ref 11.5–14.5)
EST. GFR  (AFRICAN AMERICAN): >60 ML/MIN/1.73 M^2
EST. GFR  (NON AFRICAN AMERICAN): >60 ML/MIN/1.73 M^2
GLUCOSE SERPL-MCNC: 117 MG/DL (ref 70–110)
HCT VFR BLD AUTO: 34 % (ref 37–48.5)
HGB BLD-MCNC: 9.3 G/DL (ref 12–16)
HYPOCHROMIA BLD QL SMEAR: ABNORMAL
IMM GRANULOCYTES # BLD AUTO: 0.03 K/UL (ref 0–0.04)
IMM GRANULOCYTES NFR BLD AUTO: 0.3 % (ref 0–0.5)
INR PPP: 1.1 (ref 0.8–1.2)
LYMPHOCYTES # BLD AUTO: 3 K/UL (ref 1–4.8)
LYMPHOCYTES NFR BLD: 28.5 % (ref 18–48)
MCH RBC QN AUTO: 16.7 PG (ref 27–31)
MCHC RBC AUTO-ENTMCNC: 27.4 G/DL (ref 32–36)
MCV RBC AUTO: 61 FL (ref 82–98)
MONOCYTES # BLD AUTO: 0.7 K/UL (ref 0.3–1)
MONOCYTES NFR BLD: 6.9 % (ref 4–15)
NEUTROPHILS # BLD AUTO: 6.7 K/UL (ref 1.8–7.7)
NEUTROPHILS NFR BLD: 62.7 % (ref 38–73)
NRBC BLD-RTO: 0 /100 WBC
OVALOCYTES BLD QL SMEAR: ABNORMAL
PLATELET # BLD AUTO: 872 K/UL (ref 150–450)
PMV BLD AUTO: 9.2 FL (ref 9.2–12.9)
POIKILOCYTOSIS BLD QL SMEAR: SLIGHT
POLYCHROMASIA BLD QL SMEAR: ABNORMAL
POTASSIUM SERPL-SCNC: 3.9 MMOL/L (ref 3.5–5.1)
PROT SERPL-MCNC: 7.6 G/DL (ref 6–8.4)
PROTHROMBIN TIME: 11.9 SEC (ref 9–12.5)
RBC # BLD AUTO: 5.56 M/UL (ref 4–5.4)
SCHISTOCYTES BLD QL SMEAR: PRESENT
SODIUM SERPL-SCNC: 142 MMOL/L (ref 136–145)
TARGETS BLD QL SMEAR: ABNORMAL
TROPONIN I SERPL-MCNC: 0.03 NG/ML (ref 0.01–0.03)
UUN UR-MCNC: 11 MG/DL (ref 7–17)
WBC # BLD AUTO: 10.62 K/UL (ref 3.9–12.7)

## 2021-04-06 PROCEDURE — 93005 ELECTROCARDIOGRAM TRACING: CPT | Mod: ER

## 2021-04-06 PROCEDURE — 80053 COMPREHEN METABOLIC PANEL: CPT | Mod: ER | Performed by: EMERGENCY MEDICINE

## 2021-04-06 PROCEDURE — 85379 FIBRIN DEGRADATION QUANT: CPT | Mod: ER | Performed by: EMERGENCY MEDICINE

## 2021-04-06 PROCEDURE — 96361 HYDRATE IV INFUSION ADD-ON: CPT | Mod: ER

## 2021-04-06 PROCEDURE — 99285 EMERGENCY DEPT VISIT HI MDM: CPT | Mod: 25,ER

## 2021-04-06 PROCEDURE — 99291 CRITICAL CARE FIRST HOUR: CPT | Mod: 25,ER

## 2021-04-06 PROCEDURE — 93010 ELECTROCARDIOGRAM REPORT: CPT | Mod: ,,, | Performed by: INTERNAL MEDICINE

## 2021-04-06 PROCEDURE — 93010 EKG 12-LEAD: ICD-10-PCS | Mod: ,,, | Performed by: INTERNAL MEDICINE

## 2021-04-06 PROCEDURE — 84484 ASSAY OF TROPONIN QUANT: CPT | Mod: ER | Performed by: EMERGENCY MEDICINE

## 2021-04-06 PROCEDURE — 96374 THER/PROPH/DIAG INJ IV PUSH: CPT | Mod: ER

## 2021-04-06 PROCEDURE — 85610 PROTHROMBIN TIME: CPT | Mod: ER | Performed by: EMERGENCY MEDICINE

## 2021-04-06 PROCEDURE — 25500020 PHARM REV CODE 255: Mod: ER | Performed by: EMERGENCY MEDICINE

## 2021-04-06 PROCEDURE — 25000003 PHARM REV CODE 250: Mod: ER | Performed by: EMERGENCY MEDICINE

## 2021-04-06 PROCEDURE — 93041 RHYTHM ECG TRACING: CPT | Mod: ER

## 2021-04-06 PROCEDURE — 63600175 PHARM REV CODE 636 W HCPCS: Mod: ER | Performed by: EMERGENCY MEDICINE

## 2021-04-06 PROCEDURE — 85025 COMPLETE CBC W/AUTO DIFF WBC: CPT | Mod: ER | Performed by: EMERGENCY MEDICINE

## 2021-04-06 RX ORDER — SODIUM CHLORIDE 9 MG/ML
INJECTION, SOLUTION INTRAVENOUS
Status: COMPLETED | OUTPATIENT
Start: 2021-04-06 | End: 2021-04-06

## 2021-04-06 RX ORDER — ADENOSINE 3 MG/ML
6 INJECTION, SOLUTION INTRAVENOUS
Status: COMPLETED | OUTPATIENT
Start: 2021-04-06 | End: 2021-04-06

## 2021-04-06 RX ORDER — METOPROLOL SUCCINATE 25 MG/1
12.5 TABLET, EXTENDED RELEASE ORAL DAILY
Qty: 15 TABLET | Refills: 0 | OUTPATIENT
Start: 2021-04-06 | End: 2021-04-16

## 2021-04-06 RX ADMIN — SODIUM CHLORIDE 1000 ML: 0.9 INJECTION, SOLUTION INTRAVENOUS at 11:04

## 2021-04-06 RX ADMIN — IOHEXOL 100 ML: 350 INJECTION, SOLUTION INTRAVENOUS at 01:04

## 2021-04-06 RX ADMIN — ADENOSINE 6 MG: 3 INJECTION, SOLUTION INTRAVENOUS at 02:04

## 2021-04-06 RX ADMIN — SODIUM CHLORIDE 500 ML/HR: 0.9 INJECTION, SOLUTION INTRAVENOUS at 02:04

## 2021-04-14 DIAGNOSIS — I47.10 SVT (SUPRAVENTRICULAR TACHYCARDIA): ICD-10-CM

## 2021-04-16 ENCOUNTER — HOSPITAL ENCOUNTER (EMERGENCY)
Facility: HOSPITAL | Age: 36
Discharge: HOME | End: 2021-04-16
Attending: EMERGENCY MEDICINE
Payer: COMMERCIAL

## 2021-04-16 VITALS
HEIGHT: 64 IN | SYSTOLIC BLOOD PRESSURE: 152 MMHG | HEART RATE: 96 BPM | BODY MASS INDEX: 45.93 KG/M2 | WEIGHT: 269 LBS | DIASTOLIC BLOOD PRESSURE: 93 MMHG | OXYGEN SATURATION: 100 % | RESPIRATION RATE: 16 BRPM | TEMPERATURE: 99 F

## 2021-04-16 DIAGNOSIS — I47.10 SVT (SUPRAVENTRICULAR TACHYCARDIA): ICD-10-CM

## 2021-04-16 DIAGNOSIS — R00.0 TACHYCARDIA: ICD-10-CM

## 2021-04-16 LAB
ALBUMIN SERPL BCP-MCNC: 4.4 G/DL (ref 3.5–5.2)
ALP SERPL-CCNC: 69 U/L (ref 38–126)
ALT SERPL W/O P-5'-P-CCNC: 11 U/L (ref 10–44)
ANION GAP SERPL CALC-SCNC: 10 MMOL/L (ref 8–16)
ANISOCYTOSIS BLD QL SMEAR: SLIGHT
AST SERPL-CCNC: 16 U/L (ref 15–46)
B-HCG UR QL: NEGATIVE
BASOPHILS # BLD AUTO: 0.04 K/UL (ref 0–0.2)
BASOPHILS NFR BLD: 0.5 % (ref 0–1.9)
BILIRUB SERPL-MCNC: 0.7 MG/DL (ref 0.1–1)
CALCIUM SERPL-MCNC: 9.5 MG/DL (ref 8.7–10.5)
CHLORIDE SERPL-SCNC: 107 MMOL/L (ref 95–110)
CO2 SERPL-SCNC: 26 MMOL/L (ref 23–29)
CREAT SERPL-MCNC: 1.07 MG/DL (ref 0.5–1.4)
DIFFERENTIAL METHOD: ABNORMAL
EOSINOPHIL # BLD AUTO: 0.1 K/UL (ref 0–0.5)
EOSINOPHIL NFR BLD: 1.4 % (ref 0–8)
ERYTHROCYTE [DISTWIDTH] IN BLOOD BY AUTOMATED COUNT: 31.3 % (ref 11.5–14.5)
EST. GFR  (AFRICAN AMERICAN): >60 ML/MIN/1.73 M^2
EST. GFR  (NON AFRICAN AMERICAN): >60 ML/MIN/1.73 M^2
GLUCOSE SERPL-MCNC: 132 MG/DL (ref 70–110)
HCT VFR BLD AUTO: 31.6 % (ref 37–48.5)
HGB BLD-MCNC: 8.4 G/DL (ref 12–16)
IMM GRANULOCYTES # BLD AUTO: 0.03 K/UL (ref 0–0.04)
IMM GRANULOCYTES NFR BLD AUTO: 0.4 % (ref 0–0.5)
LYMPHOCYTES # BLD AUTO: 2.5 K/UL (ref 1–4.8)
LYMPHOCYTES NFR BLD: 29.1 % (ref 18–48)
MCH RBC QN AUTO: 16.8 PG (ref 27–31)
MCHC RBC AUTO-ENTMCNC: 26.6 G/DL (ref 32–36)
MCV RBC AUTO: 63 FL (ref 82–98)
MONOCYTES # BLD AUTO: 0.7 K/UL (ref 0.3–1)
MONOCYTES NFR BLD: 7.7 % (ref 4–15)
NEUTROPHILS # BLD AUTO: 5.2 K/UL (ref 1.8–7.7)
NEUTROPHILS NFR BLD: 60.9 % (ref 38–73)
NRBC BLD-RTO: 0 /100 WBC
OVALOCYTES BLD QL SMEAR: ABNORMAL
PLATELET # BLD AUTO: 697 K/UL (ref 150–450)
PMV BLD AUTO: 9.4 FL (ref 9.2–12.9)
POIKILOCYTOSIS BLD QL SMEAR: ABNORMAL
POTASSIUM SERPL-SCNC: 3.5 MMOL/L (ref 3.5–5.1)
PROT SERPL-MCNC: 8.2 G/DL (ref 6–8.4)
RBC # BLD AUTO: 5 M/UL (ref 4–5.4)
SODIUM SERPL-SCNC: 143 MMOL/L (ref 136–145)
TARGETS BLD QL SMEAR: ABNORMAL
TROPONIN I SERPL-MCNC: <0.012 NG/ML (ref 0.01–0.03)
UUN UR-MCNC: 14 MG/DL (ref 7–17)
WBC # BLD AUTO: 8.49 K/UL (ref 3.9–12.7)

## 2021-04-16 PROCEDURE — 93010 ELECTROCARDIOGRAM REPORT: CPT | Mod: ,,, | Performed by: INTERNAL MEDICINE

## 2021-04-16 PROCEDURE — 99291 CRITICAL CARE FIRST HOUR: CPT | Mod: 25,ER

## 2021-04-16 PROCEDURE — 96361 HYDRATE IV INFUSION ADD-ON: CPT | Mod: ER

## 2021-04-16 PROCEDURE — 81025 URINE PREGNANCY TEST: CPT | Mod: ER | Performed by: EMERGENCY MEDICINE

## 2021-04-16 PROCEDURE — 85025 COMPLETE CBC W/AUTO DIFF WBC: CPT | Mod: ER | Performed by: EMERGENCY MEDICINE

## 2021-04-16 PROCEDURE — 80053 COMPREHEN METABOLIC PANEL: CPT | Mod: ER | Performed by: EMERGENCY MEDICINE

## 2021-04-16 PROCEDURE — 93005 ELECTROCARDIOGRAM TRACING: CPT | Mod: ER

## 2021-04-16 PROCEDURE — 93005 ELECTROCARDIOGRAM TRACING: CPT | Mod: PO,ER

## 2021-04-16 PROCEDURE — 96374 THER/PROPH/DIAG INJ IV PUSH: CPT | Mod: ER

## 2021-04-16 PROCEDURE — 93010 RHYTHM STRIP: ICD-10-PCS | Mod: ,,, | Performed by: INTERNAL MEDICINE

## 2021-04-16 PROCEDURE — 99285 EMERGENCY DEPT VISIT HI MDM: CPT | Mod: 25,ER

## 2021-04-16 PROCEDURE — 84484 ASSAY OF TROPONIN QUANT: CPT | Mod: ER | Performed by: EMERGENCY MEDICINE

## 2021-04-16 PROCEDURE — 25000003 PHARM REV CODE 250: Mod: ER | Performed by: EMERGENCY MEDICINE

## 2021-04-16 PROCEDURE — 93010 EKG 12-LEAD: ICD-10-PCS | Mod: ,,, | Performed by: INTERNAL MEDICINE

## 2021-04-16 PROCEDURE — 63600175 PHARM REV CODE 636 W HCPCS: Mod: ER | Performed by: EMERGENCY MEDICINE

## 2021-04-16 RX ORDER — ADENOSINE 3 MG/ML
6 INJECTION, SOLUTION INTRAVENOUS
Status: COMPLETED | OUTPATIENT
Start: 2021-04-16 | End: 2021-04-16

## 2021-04-16 RX ORDER — METOPROLOL SUCCINATE 25 MG/1
25 TABLET, EXTENDED RELEASE ORAL DAILY
Qty: 30 TABLET | Refills: 0 | Status: SHIPPED | OUTPATIENT
Start: 2021-04-16 | End: 2022-01-23

## 2021-04-16 RX ADMIN — SODIUM CHLORIDE 500 ML: 0.9 INJECTION, SOLUTION INTRAVENOUS at 11:04

## 2021-04-16 RX ADMIN — ADENOSINE 6 MG: 3 INJECTION, SOLUTION INTRAVENOUS at 11:04

## 2021-04-22 ENCOUNTER — TELEPHONE (OUTPATIENT)
Dept: CARDIOLOGY | Facility: CLINIC | Age: 36
End: 2021-04-22

## 2021-04-22 ENCOUNTER — OFFICE VISIT (OUTPATIENT)
Dept: CARDIOLOGY | Facility: CLINIC | Age: 36
End: 2021-04-22
Payer: COMMERCIAL

## 2021-04-22 VITALS
HEIGHT: 64 IN | OXYGEN SATURATION: 99 % | DIASTOLIC BLOOD PRESSURE: 79 MMHG | SYSTOLIC BLOOD PRESSURE: 130 MMHG | WEIGHT: 266.56 LBS | BODY MASS INDEX: 45.51 KG/M2 | HEART RATE: 100 BPM

## 2021-04-22 DIAGNOSIS — D64.9 SYMPTOMATIC ANEMIA: ICD-10-CM

## 2021-04-22 DIAGNOSIS — I47.10 SVT (SUPRAVENTRICULAR TACHYCARDIA): Primary | ICD-10-CM

## 2021-04-22 DIAGNOSIS — E66.01 MORBID OBESITY: Chronic | ICD-10-CM

## 2021-04-22 PROCEDURE — 3008F BODY MASS INDEX DOCD: CPT | Mod: CPTII,S$GLB,, | Performed by: INTERNAL MEDICINE

## 2021-04-22 PROCEDURE — 99204 PR OFFICE/OUTPT VISIT, NEW, LEVL IV, 45-59 MIN: ICD-10-PCS | Mod: S$GLB,,, | Performed by: INTERNAL MEDICINE

## 2021-04-22 PROCEDURE — 1126F PR PAIN SEVERITY QUANTIFIED, NO PAIN PRESENT: ICD-10-PCS | Mod: S$GLB,,, | Performed by: INTERNAL MEDICINE

## 2021-04-22 PROCEDURE — 99204 OFFICE O/P NEW MOD 45 MIN: CPT | Mod: S$GLB,,, | Performed by: INTERNAL MEDICINE

## 2021-04-22 PROCEDURE — 3008F PR BODY MASS INDEX (BMI) DOCUMENTED: ICD-10-PCS | Mod: CPTII,S$GLB,, | Performed by: INTERNAL MEDICINE

## 2021-04-22 PROCEDURE — 99999 PR PBB SHADOW E&M-EST. PATIENT-LVL IV: ICD-10-PCS | Mod: PBBFAC,,, | Performed by: INTERNAL MEDICINE

## 2021-04-22 PROCEDURE — 99999 PR PBB SHADOW E&M-EST. PATIENT-LVL IV: CPT | Mod: PBBFAC,,, | Performed by: INTERNAL MEDICINE

## 2021-04-22 PROCEDURE — 1126F AMNT PAIN NOTED NONE PRSNT: CPT | Mod: S$GLB,,, | Performed by: INTERNAL MEDICINE

## 2021-04-26 ENCOUNTER — OFFICE VISIT (OUTPATIENT)
Dept: ELECTROPHYSIOLOGY | Facility: CLINIC | Age: 36
End: 2021-04-26
Payer: COMMERCIAL

## 2021-04-26 ENCOUNTER — HOSPITAL ENCOUNTER (OUTPATIENT)
Dept: CARDIOLOGY | Facility: CLINIC | Age: 36
Discharge: HOME | End: 2021-04-26
Payer: COMMERCIAL

## 2021-04-26 VITALS
HEART RATE: 85 BPM | BODY MASS INDEX: 45.81 KG/M2 | HEIGHT: 64 IN | SYSTOLIC BLOOD PRESSURE: 130 MMHG | DIASTOLIC BLOOD PRESSURE: 84 MMHG | WEIGHT: 268.31 LBS

## 2021-04-26 DIAGNOSIS — I47.10 SVT (SUPRAVENTRICULAR TACHYCARDIA): Primary | ICD-10-CM

## 2021-04-26 DIAGNOSIS — N92.1 MENORRHAGIA WITH IRREGULAR CYCLE: ICD-10-CM

## 2021-04-26 DIAGNOSIS — D50.0 IRON DEFICIENCY ANEMIA DUE TO CHRONIC BLOOD LOSS: ICD-10-CM

## 2021-04-26 DIAGNOSIS — I47.10 SVT (SUPRAVENTRICULAR TACHYCARDIA): ICD-10-CM

## 2021-04-26 DIAGNOSIS — E66.01 CLASS 3 SEVERE OBESITY WITH BODY MASS INDEX (BMI) OF 40.0 TO 44.9 IN ADULT, UNSPECIFIED OBESITY TYPE, UNSPECIFIED WHETHER SERIOUS COMORBIDITY PRESENT: ICD-10-CM

## 2021-04-26 DIAGNOSIS — D25.9 UTERINE LEIOMYOMA, UNSPECIFIED LOCATION: ICD-10-CM

## 2021-04-26 PROCEDURE — 1126F PR PAIN SEVERITY QUANTIFIED, NO PAIN PRESENT: ICD-10-PCS | Mod: S$GLB,,, | Performed by: STUDENT IN AN ORGANIZED HEALTH CARE EDUCATION/TRAINING PROGRAM

## 2021-04-26 PROCEDURE — 3008F BODY MASS INDEX DOCD: CPT | Mod: CPTII,S$GLB,, | Performed by: STUDENT IN AN ORGANIZED HEALTH CARE EDUCATION/TRAINING PROGRAM

## 2021-04-26 PROCEDURE — 93010 RHYTHM STRIP: ICD-10-PCS | Mod: S$GLB,,, | Performed by: INTERNAL MEDICINE

## 2021-04-26 PROCEDURE — 99999 PR PBB SHADOW E&M-EST. PATIENT-LVL III: CPT | Mod: PBBFAC,,, | Performed by: STUDENT IN AN ORGANIZED HEALTH CARE EDUCATION/TRAINING PROGRAM

## 2021-04-26 PROCEDURE — 99999 PR PBB SHADOW E&M-EST. PATIENT-LVL III: ICD-10-PCS | Mod: PBBFAC,,, | Performed by: STUDENT IN AN ORGANIZED HEALTH CARE EDUCATION/TRAINING PROGRAM

## 2021-04-26 PROCEDURE — 99214 OFFICE O/P EST MOD 30 MIN: CPT | Mod: S$GLB,,, | Performed by: STUDENT IN AN ORGANIZED HEALTH CARE EDUCATION/TRAINING PROGRAM

## 2021-04-26 PROCEDURE — 99214 PR OFFICE/OUTPT VISIT, EST, LEVL IV, 30-39 MIN: ICD-10-PCS | Mod: S$GLB,,, | Performed by: STUDENT IN AN ORGANIZED HEALTH CARE EDUCATION/TRAINING PROGRAM

## 2021-04-26 PROCEDURE — 93005 ELECTROCARDIOGRAM TRACING: CPT | Mod: S$GLB,,, | Performed by: STUDENT IN AN ORGANIZED HEALTH CARE EDUCATION/TRAINING PROGRAM

## 2021-04-26 PROCEDURE — 3008F PR BODY MASS INDEX (BMI) DOCUMENTED: ICD-10-PCS | Mod: CPTII,S$GLB,, | Performed by: STUDENT IN AN ORGANIZED HEALTH CARE EDUCATION/TRAINING PROGRAM

## 2021-04-26 PROCEDURE — 1126F AMNT PAIN NOTED NONE PRSNT: CPT | Mod: S$GLB,,, | Performed by: STUDENT IN AN ORGANIZED HEALTH CARE EDUCATION/TRAINING PROGRAM

## 2021-04-26 PROCEDURE — 93005 RHYTHM STRIP: ICD-10-PCS | Mod: S$GLB,,, | Performed by: STUDENT IN AN ORGANIZED HEALTH CARE EDUCATION/TRAINING PROGRAM

## 2021-04-26 PROCEDURE — 93010 ELECTROCARDIOGRAM REPORT: CPT | Mod: S$GLB,,, | Performed by: INTERNAL MEDICINE

## 2021-04-30 DIAGNOSIS — I47.10 SVT (SUPRAVENTRICULAR TACHYCARDIA): Primary | ICD-10-CM

## 2021-05-03 ENCOUNTER — HOSPITAL ENCOUNTER (OUTPATIENT)
Dept: CARDIOLOGY | Facility: HOSPITAL | Age: 36
Discharge: HOME | End: 2021-05-03
Attending: INTERNAL MEDICINE
Payer: COMMERCIAL

## 2021-05-03 VITALS — HEIGHT: 64 IN | BODY MASS INDEX: 45.75 KG/M2 | WEIGHT: 268 LBS

## 2021-05-03 DIAGNOSIS — I47.10 SVT (SUPRAVENTRICULAR TACHYCARDIA): ICD-10-CM

## 2021-05-03 LAB
AORTIC ROOT ANNULUS: 3.11 CM
AV INDEX (PROSTH): 0.49
AV MEAN GRADIENT: 7 MMHG
AV PEAK GRADIENT: 11 MMHG
AV VALVE AREA: 2.23 CM2
AV VELOCITY RATIO: 0.43
BSA FOR ECHO PROCEDURE: 2.34 M2
CV ECHO LV RWT: 0.47 CM
DOP CALC AO PEAK VEL: 1.69 M/S
DOP CALC AO VTI: 27.88 CM
DOP CALC LVOT AREA: 4.6 CM2
DOP CALC LVOT DIAMETER: 2.41 CM
DOP CALC LVOT PEAK VEL: 0.72 M/S
DOP CALC LVOT STROKE VOLUME: 62.1 CM3
DOP CALCLVOT PEAK VEL VTI: 13.62 CM
E WAVE DECELERATION TIME: 208.72 MSEC
E/A RATIO: 1.27
E/E' RATIO: 11 M/S
ECHO LV POSTERIOR WALL: 1.32 CM (ref 0.6–1.1)
EJECTION FRACTION: 45 %
FRACTIONAL SHORTENING: 25 % (ref 28–44)
INTERVENTRICULAR SEPTUM: 1.18 CM (ref 0.6–1.1)
LA MAJOR: 5.92 CM
LA MINOR: 4.29 CM
LA WIDTH: 4.26 CM
LEFT ATRIUM SIZE: 3.92 CM
LEFT ATRIUM VOLUME INDEX MOD: 24.1 ML/M2
LEFT ATRIUM VOLUME INDEX: 31.8 ML/M2
LEFT ATRIUM VOLUME MOD: 53.57 CM3
LEFT ATRIUM VOLUME: 70.62 CM3
LEFT INTERNAL DIMENSION IN SYSTOLE: 4.22 CM (ref 2.1–4)
LEFT VENTRICLE DIASTOLIC VOLUME INDEX: 69.57 ML/M2
LEFT VENTRICLE DIASTOLIC VOLUME: 154.45 ML
LEFT VENTRICLE MASS INDEX: 134 G/M2
LEFT VENTRICLE SYSTOLIC VOLUME INDEX: 35.8 ML/M2
LEFT VENTRICLE SYSTOLIC VOLUME: 79.5 ML
LEFT VENTRICULAR INTERNAL DIMENSION IN DIASTOLE: 5.61 CM (ref 3.5–6)
LEFT VENTRICULAR MASS: 297.5 G
LV LATERAL E/E' RATIO: 9.9 M/S
LV SEPTAL E/E' RATIO: 12.38 M/S
MV A" WAVE DURATION": 8.85 MSEC
MV MEAN GRADIENT: 1 MMHG
MV PEAK A VEL: 0.78 M/S
MV PEAK E VEL: 0.99 M/S
MV PEAK GRADIENT: 4 MMHG
MV STENOSIS PRESSURE HALF TIME: 60.53 MS
MV VALVE AREA P 1/2 METHOD: 3.63 CM2
PISA TR MAX VEL: 2.66 M/S
PULM VEIN S/D RATIO: 1.38
PV PEAK D VEL: 0.47 M/S
PV PEAK S VEL: 0.65 M/S
RA MAJOR: 4.2 CM
RA PRESSURE: 3 MMHG
RA WIDTH: 3.28 CM
RIGHT VENTRICULAR END-DIASTOLIC DIMENSION: 2.76 CM
TDI LATERAL: 0.1 M/S
TDI SEPTAL: 0.08 M/S
TDI: 0.09 M/S
TR MAX PG: 28 MMHG
TV REST PULMONARY ARTERY PRESSURE: 31 MMHG

## 2021-05-03 PROCEDURE — 93306 TTE W/DOPPLER COMPLETE: CPT | Mod: 26,,, | Performed by: INTERNAL MEDICINE

## 2021-05-03 PROCEDURE — 93306 TTE W/DOPPLER COMPLETE: CPT

## 2021-05-03 PROCEDURE — 93306 ECHO (CUPID ONLY): ICD-10-PCS | Mod: 26,,, | Performed by: INTERNAL MEDICINE

## 2021-05-05 DIAGNOSIS — I42.8 OTHER CARDIOMYOPATHIES: Primary | ICD-10-CM

## 2021-05-05 RX ORDER — LISINOPRIL 5 MG/1
5 TABLET ORAL DAILY
Qty: 90 TABLET | Refills: 3 | Status: SHIPPED | OUTPATIENT
Start: 2021-05-05 | End: 2022-03-02

## 2021-05-06 ENCOUNTER — TELEPHONE (OUTPATIENT)
Dept: CARDIOLOGY | Facility: CLINIC | Age: 36
End: 2021-05-06

## 2021-05-10 ENCOUNTER — PATIENT MESSAGE (OUTPATIENT)
Dept: ELECTROPHYSIOLOGY | Facility: CLINIC | Age: 36
End: 2021-05-10

## 2021-05-12 ENCOUNTER — TELEPHONE (OUTPATIENT)
Dept: ELECTROPHYSIOLOGY | Facility: CLINIC | Age: 36
End: 2021-05-12

## 2021-05-13 ENCOUNTER — TELEPHONE (OUTPATIENT)
Dept: ELECTROPHYSIOLOGY | Facility: CLINIC | Age: 36
End: 2021-05-13

## 2021-05-17 ENCOUNTER — PATIENT MESSAGE (OUTPATIENT)
Dept: ELECTROPHYSIOLOGY | Facility: CLINIC | Age: 36
End: 2021-05-17

## 2021-05-19 ENCOUNTER — TELEPHONE (OUTPATIENT)
Dept: CARDIOLOGY | Facility: CLINIC | Age: 36
End: 2021-05-19

## 2021-05-21 ENCOUNTER — HOSPITAL ENCOUNTER (OUTPATIENT)
Dept: CARDIOLOGY | Facility: HOSPITAL | Age: 36
Discharge: HOME | End: 2021-05-21
Attending: INTERNAL MEDICINE
Payer: COMMERCIAL

## 2021-05-21 VITALS — WEIGHT: 268 LBS | BODY MASS INDEX: 45.75 KG/M2 | HEIGHT: 64 IN

## 2021-05-21 DIAGNOSIS — I42.8 OTHER CARDIOMYOPATHIES: ICD-10-CM

## 2021-05-21 LAB
CFR FLOW - ANTERIOR: 1.37
CFR FLOW - INFERIOR: 1.36
CFR FLOW - LATERAL: 1.35
CFR FLOW - MAX: 2.14
CFR FLOW - MIN: 0.86
CFR FLOW - SEPTAL: 1.43
CFR FLOW - WHOLE HEART: 1.38
CV PHARM DOSE: 60 MG
CV STRESS BASE HR: 92 BPM
DIASTOLIC BLOOD PRESSURE: 85 MMHG
END DIASTOLIC INDEX-HIGH: 170 ML/M2
END SYSTOLIC INDEX-HIGH: 70 ML/M2
NUC REST DIASTOLIC VOLUME INDEX: 140
NUC REST EJECTION FRACTION: 39
NUC REST SYSTOLIC VOLUME INDEX: 85
NUC STRESS DIASTOLIC VOLUME INDEX: 110
NUC STRESS EJECTION FRACTION: 42 %
NUC STRESS SYSTOLIC VOLUME INDEX: 64
OHS CV CPX 85 PERCENT MAX PREDICTED HEART RATE MALE: 147
OHS CV CPX MAX PREDICTED HEART RATE: 173
OHS CV CPX PATIENT IS FEMALE: 1
OHS CV CPX PATIENT IS MALE: 0
OHS CV CPX PEAK DIASTOLIC BLOOD PRESSURE: 71 MMHG
OHS CV CPX PEAK HEAR RATE: 99 BPM
OHS CV CPX PEAK RATE PRESSURE PRODUCT: NORMAL
OHS CV CPX PEAK SYSTOLIC BLOOD PRESSURE: 128 MMHG
OHS CV CPX PERCENT MAX PREDICTED HEART RATE ACHIEVED: 57
OHS CV CPX RATE PRESSURE PRODUCT PRESENTING: NORMAL
REST FLOW - ANTERIOR: 1.27 CC/MIN/G
REST FLOW - INFERIOR: 1.3 CC/MIN/G
REST FLOW - LATERAL: 1.68 CC/MIN/G
REST FLOW - MAX: 2.13 CC/MIN/G
REST FLOW - MIN: 0.77 CC/MIN/G
REST FLOW - SEPTAL: 1.18 CC/MIN/G
REST FLOW - WHOLE HEART: 1.36 CC/MIN/G
RETIRED EF AND QEF - SEE NOTES: 51 %
STRESS FLOW - ANTERIOR: 1.72 CC/MIN/G
STRESS FLOW - INFERIOR: 1.75 CC/MIN/G
STRESS FLOW - LATERAL: 2.26 CC/MIN/G
STRESS FLOW - MAX: 2.77 CC/MIN/G
STRESS FLOW - MIN: 0.83 CC/MIN/G
STRESS FLOW - SEPTAL: 1.67 CC/MIN/G
STRESS FLOW - WHOLE HEART: 1.85 CC/MIN/G
SYSTOLIC BLOOD PRESSURE: 121 MMHG

## 2021-05-21 PROCEDURE — 78431 PR PET, MYOCARDIAL IMG, PERFUSION, MULT STUDIES, REST/STRESS, W/CONCURRENT CT: ICD-10-PCS | Mod: 26,,, | Performed by: INTERNAL MEDICINE

## 2021-05-21 PROCEDURE — 93016 CV STRESS TEST SUPVJ ONLY: CPT | Mod: ,,, | Performed by: INTERNAL MEDICINE

## 2021-05-21 PROCEDURE — 93016 CARDIAC PET SCAN STRESS (CUPID ONLY): ICD-10-PCS | Mod: ,,, | Performed by: INTERNAL MEDICINE

## 2021-05-21 PROCEDURE — 93018 PR CARDIAC STRESS TST,INTERP/REPT ONLY: ICD-10-PCS | Mod: ,,, | Performed by: INTERNAL MEDICINE

## 2021-05-21 PROCEDURE — 93018 CV STRESS TEST I&R ONLY: CPT | Mod: ,,, | Performed by: INTERNAL MEDICINE

## 2021-05-21 PROCEDURE — 78431 MYOCRD IMG PET RST&STRS CT: CPT | Mod: 26,,, | Performed by: INTERNAL MEDICINE

## 2021-05-21 PROCEDURE — 63600175 PHARM REV CODE 636 W HCPCS: Performed by: INTERNAL MEDICINE

## 2021-05-21 PROCEDURE — 78434 AQMBF PET REST & RX STRESS: CPT

## 2021-05-21 PROCEDURE — 78434 AQMBF PET REST & RX STRESS: CPT | Mod: 26,,, | Performed by: INTERNAL MEDICINE

## 2021-05-21 PROCEDURE — 78434 PR PET, ABS QUANT MYOCARD BLOOD FLOW, REST/STRESS: ICD-10-PCS | Mod: 26,,, | Performed by: INTERNAL MEDICINE

## 2021-05-21 RX ORDER — DIPYRIDAMOLE 5 MG/ML
60 INJECTION INTRAVENOUS ONCE
Status: COMPLETED | OUTPATIENT
Start: 2021-05-21 | End: 2021-05-21

## 2021-05-21 RX ADMIN — DIPYRIDAMOLE 60 MG: 5 INJECTION INTRAVENOUS at 09:05

## 2021-06-01 ENCOUNTER — TELEPHONE (OUTPATIENT)
Dept: ELECTROPHYSIOLOGY | Facility: CLINIC | Age: 36
End: 2021-06-01

## 2021-06-01 ENCOUNTER — LAB VISIT (OUTPATIENT)
Dept: LAB | Facility: HOSPITAL | Age: 36
End: 2021-06-01
Attending: STUDENT IN AN ORGANIZED HEALTH CARE EDUCATION/TRAINING PROGRAM
Payer: COMMERCIAL

## 2021-06-01 ENCOUNTER — LAB VISIT (OUTPATIENT)
Dept: FAMILY MEDICINE | Facility: CLINIC | Age: 36
End: 2021-06-01
Payer: COMMERCIAL

## 2021-06-01 DIAGNOSIS — I47.10 SVT (SUPRAVENTRICULAR TACHYCARDIA): ICD-10-CM

## 2021-06-01 DIAGNOSIS — Z01.818 PRE-OP TESTING: ICD-10-CM

## 2021-06-01 LAB
ANION GAP SERPL CALC-SCNC: 6 MMOL/L (ref 8–16)
APTT BLDCRRT: 25.7 SEC (ref 21–32)
BASOPHILS # BLD AUTO: 0.03 K/UL (ref 0–0.2)
BASOPHILS NFR BLD: 0.3 % (ref 0–1.9)
BUN SERPL-MCNC: 11 MG/DL (ref 6–30)
BUN SERPL-MCNC: 7 MG/DL (ref 6–20)
CALCIUM SERPL-MCNC: 8.3 MG/DL (ref 8.7–10.5)
CHLORIDE SERPL-SCNC: 107 MMOL/L (ref 95–110)
CHLORIDE SERPL-SCNC: 110 MMOL/L (ref 95–110)
CO2 SERPL-SCNC: 22 MMOL/L (ref 23–29)
CREAT SERPL-MCNC: 0.9 MG/DL (ref 0.5–1.4)
CREAT SERPL-MCNC: 1.1 MG/DL (ref 0.5–1.4)
DIFFERENTIAL METHOD: ABNORMAL
EOSINOPHIL # BLD AUTO: 0.2 K/UL (ref 0–0.5)
EOSINOPHIL NFR BLD: 2.1 % (ref 0–8)
ERYTHROCYTE [DISTWIDTH] IN BLOOD BY AUTOMATED COUNT: 24.6 % (ref 11.5–14.5)
EST. GFR  (AFRICAN AMERICAN): >60 ML/MIN/1.73 M^2
EST. GFR  (NON AFRICAN AMERICAN): >60 ML/MIN/1.73 M^2
GLUCOSE SERPL-MCNC: 112 MG/DL (ref 70–110)
GLUCOSE SERPL-MCNC: 91 MG/DL (ref 70–110)
HCT VFR BLD AUTO: 23.9 % (ref 37–48.5)
HCT VFR BLD CALC: 23 %PCV (ref 36–54)
HGB BLD-MCNC: 6.5 G/DL (ref 12–16)
IMM GRANULOCYTES # BLD AUTO: 0.04 K/UL (ref 0–0.04)
IMM GRANULOCYTES NFR BLD AUTO: 0.4 % (ref 0–0.5)
INR PPP: 1.1 (ref 0.8–1.2)
LYMPHOCYTES # BLD AUTO: 2.8 K/UL (ref 1–4.8)
LYMPHOCYTES NFR BLD: 31.8 % (ref 18–48)
MCH RBC QN AUTO: 16.5 PG (ref 27–31)
MCHC RBC AUTO-ENTMCNC: 27.2 G/DL (ref 32–36)
MCV RBC AUTO: 61 FL (ref 82–98)
MONOCYTES # BLD AUTO: 0.5 K/UL (ref 0.3–1)
MONOCYTES NFR BLD: 5.8 % (ref 4–15)
NEUTROPHILS # BLD AUTO: 5.3 K/UL (ref 1.8–7.7)
NEUTROPHILS NFR BLD: 59.6 % (ref 38–73)
NRBC BLD-RTO: 0 /100 WBC
PLATELET # BLD AUTO: 577 K/UL (ref 150–450)
PMV BLD AUTO: 8.7 FL (ref 9.2–12.9)
POC IONIZED CALCIUM: 1.24 MMOL/L (ref 1.06–1.42)
POC TCO2 (MEASURED): 23 MMOL/L (ref 23–29)
POTASSIUM BLD-SCNC: 3.9 MMOL/L (ref 3.5–5.1)
POTASSIUM SERPL-SCNC: 4.1 MMOL/L (ref 3.5–5.1)
PROTHROMBIN TIME: 11.8 SEC (ref 9–12.5)
RBC # BLD AUTO: 3.93 M/UL (ref 4–5.4)
SAMPLE: ABNORMAL
SODIUM BLD-SCNC: 141 MMOL/L (ref 136–145)
SODIUM SERPL-SCNC: 138 MMOL/L (ref 136–145)
WBC # BLD AUTO: 8.93 K/UL (ref 3.9–12.7)

## 2021-06-01 PROCEDURE — 99285 EMERGENCY DEPT VISIT HI MDM: CPT | Mod: ,,, | Performed by: EMERGENCY MEDICINE

## 2021-06-01 PROCEDURE — 85610 PROTHROMBIN TIME: CPT | Performed by: STUDENT IN AN ORGANIZED HEALTH CARE EDUCATION/TRAINING PROGRAM

## 2021-06-01 PROCEDURE — 93005 ELECTROCARDIOGRAM TRACING: CPT

## 2021-06-01 PROCEDURE — 99284 EMERGENCY DEPT VISIT MOD MDM: CPT | Mod: 25

## 2021-06-01 PROCEDURE — 99285 PR EMERGENCY DEPT VISIT,LEVEL V: ICD-10-PCS | Mod: ,,, | Performed by: EMERGENCY MEDICINE

## 2021-06-01 PROCEDURE — 86905 BLOOD TYPING RBC ANTIGENS: CPT | Mod: 91 | Performed by: NURSE PRACTITIONER

## 2021-06-01 PROCEDURE — 36415 COLL VENOUS BLD VENIPUNCTURE: CPT | Performed by: STUDENT IN AN ORGANIZED HEALTH CARE EDUCATION/TRAINING PROGRAM

## 2021-06-01 PROCEDURE — 85025 COMPLETE CBC W/AUTO DIFF WBC: CPT | Mod: 91 | Performed by: EMERGENCY MEDICINE

## 2021-06-01 PROCEDURE — 86870 RBC ANTIBODY IDENTIFICATION: CPT | Performed by: NURSE PRACTITIONER

## 2021-06-01 PROCEDURE — U0003 INFECTIOUS AGENT DETECTION BY NUCLEIC ACID (DNA OR RNA); SEVERE ACUTE RESPIRATORY SYNDROME CORONAVIRUS 2 (SARS-COV-2) (CORONAVIRUS DISEASE [COVID-19]), AMPLIFIED PROBE TECHNIQUE, MAKING USE OF HIGH THROUGHPUT TECHNOLOGIES AS DESCRIBED BY CMS-2020-01-R: HCPCS | Performed by: STUDENT IN AN ORGANIZED HEALTH CARE EDUCATION/TRAINING PROGRAM

## 2021-06-01 PROCEDURE — 85025 COMPLETE CBC W/AUTO DIFF WBC: CPT | Performed by: STUDENT IN AN ORGANIZED HEALTH CARE EDUCATION/TRAINING PROGRAM

## 2021-06-01 PROCEDURE — 36430 TRANSFUSION BLD/BLD COMPNT: CPT

## 2021-06-01 PROCEDURE — U0005 INFEC AGEN DETEC AMPLI PROBE: HCPCS | Performed by: STUDENT IN AN ORGANIZED HEALTH CARE EDUCATION/TRAINING PROGRAM

## 2021-06-01 PROCEDURE — 85730 THROMBOPLASTIN TIME PARTIAL: CPT | Performed by: STUDENT IN AN ORGANIZED HEALTH CARE EDUCATION/TRAINING PROGRAM

## 2021-06-01 PROCEDURE — 93010 EKG 12-LEAD: ICD-10-PCS | Mod: ,,, | Performed by: INTERNAL MEDICINE

## 2021-06-01 PROCEDURE — 86900 BLOOD TYPING SEROLOGIC ABO: CPT | Performed by: NURSE PRACTITIONER

## 2021-06-01 PROCEDURE — 93010 ELECTROCARDIOGRAM REPORT: CPT | Mod: ,,, | Performed by: INTERNAL MEDICINE

## 2021-06-01 PROCEDURE — 80048 BASIC METABOLIC PNL TOTAL CA: CPT | Performed by: STUDENT IN AN ORGANIZED HEALTH CARE EDUCATION/TRAINING PROGRAM

## 2021-06-01 PROCEDURE — 86922 COMPATIBILITY TEST ANTIGLOB: CPT | Performed by: EMERGENCY MEDICINE

## 2021-06-02 ENCOUNTER — HOSPITAL ENCOUNTER (EMERGENCY)
Facility: HOSPITAL | Age: 36
Discharge: HOME | End: 2021-06-02
Attending: EMERGENCY MEDICINE
Payer: COMMERCIAL

## 2021-06-02 VITALS
DIASTOLIC BLOOD PRESSURE: 80 MMHG | TEMPERATURE: 98 F | BODY MASS INDEX: 46 KG/M2 | HEART RATE: 95 BPM | WEIGHT: 268 LBS | RESPIRATION RATE: 16 BRPM | SYSTOLIC BLOOD PRESSURE: 138 MMHG | OXYGEN SATURATION: 99 %

## 2021-06-02 DIAGNOSIS — R00.0 TACHYCARDIA: ICD-10-CM

## 2021-06-02 DIAGNOSIS — R07.9 CHEST PAIN: ICD-10-CM

## 2021-06-02 DIAGNOSIS — D64.9 ANEMIA, UNSPECIFIED TYPE: Primary | ICD-10-CM

## 2021-06-02 LAB
ABO + RH BLD: NORMAL
BASOPHILS # BLD AUTO: 0.07 K/UL (ref 0–0.2)
BASOPHILS NFR BLD: 0.6 % (ref 0–1.9)
BLD GP AB SCN CELLS X3 SERPL QL: NORMAL
BLD PROD TYP BPU: NORMAL
BLD PROD TYP BPU: NORMAL
BLOOD GROUP ANTIBODIES SERPL: NORMAL
BLOOD UNIT EXPIRATION DATE: NORMAL
BLOOD UNIT EXPIRATION DATE: NORMAL
BLOOD UNIT TYPE CODE: 5100
BLOOD UNIT TYPE CODE: 5100
BLOOD UNIT TYPE: NORMAL
BLOOD UNIT TYPE: NORMAL
CODING SYSTEM: NORMAL
CODING SYSTEM: NORMAL
DIFFERENTIAL METHOD: ABNORMAL
DISPENSE STATUS: NORMAL
DISPENSE STATUS: NORMAL
EOSINOPHIL # BLD AUTO: 0.2 K/UL (ref 0–0.5)
EOSINOPHIL NFR BLD: 1.4 % (ref 0–8)
ERYTHROCYTE [DISTWIDTH] IN BLOOD BY AUTOMATED COUNT: 24.9 % (ref 11.5–14.5)
HCT VFR BLD AUTO: 24 % (ref 37–48.5)
HGB BLD-MCNC: 6.5 G/DL (ref 12–16)
IMM GRANULOCYTES # BLD AUTO: 0.04 K/UL (ref 0–0.04)
IMM GRANULOCYTES NFR BLD AUTO: 0.4 % (ref 0–0.5)
LYMPHOCYTES # BLD AUTO: 3.2 K/UL (ref 1–4.8)
LYMPHOCYTES NFR BLD: 28.2 % (ref 18–48)
MCH RBC QN AUTO: 16.5 PG (ref 27–31)
MCHC RBC AUTO-ENTMCNC: 27.1 G/DL (ref 32–36)
MCV RBC AUTO: 61 FL (ref 82–98)
MONOCYTES # BLD AUTO: 0.5 K/UL (ref 0.3–1)
MONOCYTES NFR BLD: 4.6 % (ref 4–15)
NEUTROPHILS # BLD AUTO: 7.4 K/UL (ref 1.8–7.7)
NEUTROPHILS NFR BLD: 64.8 % (ref 38–73)
NRBC BLD-RTO: 0 /100 WBC
PLATELET # BLD AUTO: 597 K/UL (ref 150–450)
PMV BLD AUTO: 9.3 FL (ref 9.2–12.9)
RBC # BLD AUTO: 3.94 M/UL (ref 4–5.4)
SARS-COV-2 RNA RESP QL NAA+PROBE: NOT DETECTED
TRANS ERYTHROCYTES VOL PATIENT: NORMAL ML
TRANS ERYTHROCYTES VOL PATIENT: NORMAL ML
WBC # BLD AUTO: 11.33 K/UL (ref 3.9–12.7)

## 2021-06-02 PROCEDURE — P9021 RED BLOOD CELLS UNIT: HCPCS | Performed by: EMERGENCY MEDICINE

## 2021-06-02 PROCEDURE — 86902 BLOOD TYPE ANTIGEN DONOR EA: CPT | Performed by: NURSE PRACTITIONER

## 2021-06-02 RX ORDER — HYDROCODONE BITARTRATE AND ACETAMINOPHEN 500; 5 MG/1; MG/1
TABLET ORAL
Status: DISCONTINUED | OUTPATIENT
Start: 2021-06-02 | End: 2021-06-02 | Stop reason: HOSPADM

## 2021-06-02 RX ORDER — TRANEXAMIC ACID 650 MG/1
1300 TABLET ORAL 3 TIMES DAILY
Qty: 30 TABLET | Refills: 0 | Status: SHIPPED | OUTPATIENT
Start: 2021-06-02 | End: 2021-06-07

## 2021-06-03 ENCOUNTER — TELEPHONE (OUTPATIENT)
Dept: ELECTROPHYSIOLOGY | Facility: CLINIC | Age: 36
End: 2021-06-03

## 2021-06-04 ENCOUNTER — HOSPITAL ENCOUNTER (OUTPATIENT)
Facility: HOSPITAL | Age: 36
Discharge: HOME | End: 2021-06-04
Attending: STUDENT IN AN ORGANIZED HEALTH CARE EDUCATION/TRAINING PROGRAM | Admitting: STUDENT IN AN ORGANIZED HEALTH CARE EDUCATION/TRAINING PROGRAM
Payer: COMMERCIAL

## 2021-06-04 ENCOUNTER — ANESTHESIA EVENT (OUTPATIENT)
Dept: MEDSURG UNIT | Facility: HOSPITAL | Age: 36
End: 2021-06-04
Payer: COMMERCIAL

## 2021-06-04 ENCOUNTER — ANESTHESIA (OUTPATIENT)
Dept: MEDSURG UNIT | Facility: HOSPITAL | Age: 36
End: 2021-06-04
Payer: COMMERCIAL

## 2021-06-04 VITALS
SYSTOLIC BLOOD PRESSURE: 161 MMHG | RESPIRATION RATE: 18 BRPM | HEIGHT: 64 IN | OXYGEN SATURATION: 97 % | HEART RATE: 100 BPM | TEMPERATURE: 98 F | BODY MASS INDEX: 45.07 KG/M2 | DIASTOLIC BLOOD PRESSURE: 94 MMHG | WEIGHT: 264 LBS

## 2021-06-04 DIAGNOSIS — N92.1 MENORRHAGIA WITH IRREGULAR CYCLE: Primary | ICD-10-CM

## 2021-06-04 DIAGNOSIS — I49.9 ARRHYTHMIA: ICD-10-CM

## 2021-06-04 DIAGNOSIS — I47.10 SVT (SUPRAVENTRICULAR TACHYCARDIA): ICD-10-CM

## 2021-06-04 LAB
B-HCG UR QL: NEGATIVE
CTP QC/QA: YES
HCT VFR BLD AUTO: 27 % (ref 37–48.5)
HGB BLD-MCNC: 7.8 G/DL (ref 12–16)

## 2021-06-04 PROCEDURE — 63600175 PHARM REV CODE 636 W HCPCS: Performed by: NURSE ANESTHETIST, CERTIFIED REGISTERED

## 2021-06-04 PROCEDURE — D9220A PRA ANESTHESIA: Mod: CRNA,,, | Performed by: NURSE ANESTHETIST, CERTIFIED REGISTERED

## 2021-06-04 PROCEDURE — 93613 PR INTRACARD ELECTROPHYS 3-DIMENS MAPPING: ICD-10-PCS | Mod: ,,, | Performed by: STUDENT IN AN ORGANIZED HEALTH CARE EDUCATION/TRAINING PROGRAM

## 2021-06-04 PROCEDURE — 93010 ELECTROCARDIOGRAM REPORT: CPT | Mod: ,,, | Performed by: INTERNAL MEDICINE

## 2021-06-04 PROCEDURE — 37000009 HC ANESTHESIA EA ADD 15 MINS: Performed by: STUDENT IN AN ORGANIZED HEALTH CARE EDUCATION/TRAINING PROGRAM

## 2021-06-04 PROCEDURE — 25000003 PHARM REV CODE 250: Performed by: NURSE ANESTHETIST, CERTIFIED REGISTERED

## 2021-06-04 PROCEDURE — 93613 INTRACARDIAC EPHYS 3D MAPG: CPT | Performed by: STUDENT IN AN ORGANIZED HEALTH CARE EDUCATION/TRAINING PROGRAM

## 2021-06-04 PROCEDURE — 93613 INTRACARDIAC EPHYS 3D MAPG: CPT | Mod: ,,, | Performed by: STUDENT IN AN ORGANIZED HEALTH CARE EDUCATION/TRAINING PROGRAM

## 2021-06-04 PROCEDURE — 93621 COMP EP EVL L PAC&REC C SINS: CPT | Mod: 26,,, | Performed by: STUDENT IN AN ORGANIZED HEALTH CARE EDUCATION/TRAINING PROGRAM

## 2021-06-04 PROCEDURE — 93623 PR STIM/PACING HEART POST IV DRUG INFU: ICD-10-PCS | Mod: 26,,, | Performed by: STUDENT IN AN ORGANIZED HEALTH CARE EDUCATION/TRAINING PROGRAM

## 2021-06-04 PROCEDURE — 93010 EKG 12-LEAD: ICD-10-PCS | Mod: ,,, | Performed by: INTERNAL MEDICINE

## 2021-06-04 PROCEDURE — 93005 ELECTROCARDIOGRAM TRACING: CPT

## 2021-06-04 PROCEDURE — D9220A PRA ANESTHESIA: ICD-10-PCS | Mod: CRNA,,, | Performed by: NURSE ANESTHETIST, CERTIFIED REGISTERED

## 2021-06-04 PROCEDURE — 51798 US URINE CAPACITY MEASURE: CPT | Performed by: STUDENT IN AN ORGANIZED HEALTH CARE EDUCATION/TRAINING PROGRAM

## 2021-06-04 PROCEDURE — 93623 PRGRMD STIMJ&PACG IV RX NFS: CPT | Mod: 26,,, | Performed by: STUDENT IN AN ORGANIZED HEALTH CARE EDUCATION/TRAINING PROGRAM

## 2021-06-04 PROCEDURE — D9220A PRA ANESTHESIA: Mod: ANES,,, | Performed by: ANESTHESIOLOGY

## 2021-06-04 PROCEDURE — 93653 COMPRE EP EVAL TX SVT: CPT | Performed by: STUDENT IN AN ORGANIZED HEALTH CARE EDUCATION/TRAINING PROGRAM

## 2021-06-04 PROCEDURE — 93653 COMPRE EP EVAL TX SVT: CPT | Mod: ,,, | Performed by: STUDENT IN AN ORGANIZED HEALTH CARE EDUCATION/TRAINING PROGRAM

## 2021-06-04 PROCEDURE — 37000008 HC ANESTHESIA 1ST 15 MINUTES: Performed by: STUDENT IN AN ORGANIZED HEALTH CARE EDUCATION/TRAINING PROGRAM

## 2021-06-04 PROCEDURE — C1894 INTRO/SHEATH, NON-LASER: HCPCS | Performed by: STUDENT IN AN ORGANIZED HEALTH CARE EDUCATION/TRAINING PROGRAM

## 2021-06-04 PROCEDURE — D9220A PRA ANESTHESIA: ICD-10-PCS | Mod: ANES,,, | Performed by: ANESTHESIOLOGY

## 2021-06-04 PROCEDURE — C1732 CATH, EP, DIAG/ABL, 3D/VECT: HCPCS | Performed by: STUDENT IN AN ORGANIZED HEALTH CARE EDUCATION/TRAINING PROGRAM

## 2021-06-04 PROCEDURE — 25000003 PHARM REV CODE 250: Performed by: STUDENT IN AN ORGANIZED HEALTH CARE EDUCATION/TRAINING PROGRAM

## 2021-06-04 PROCEDURE — 93005 ELECTROCARDIOGRAM TRACING: CPT | Mod: 59

## 2021-06-04 PROCEDURE — C1730 CATH, EP, 19 OR FEW ELECT: HCPCS | Performed by: STUDENT IN AN ORGANIZED HEALTH CARE EDUCATION/TRAINING PROGRAM

## 2021-06-04 PROCEDURE — 93653 PR ELECTROPHYS EVAL, COMPREHEN, W/SUPRAVENT TACHYCARD TRMT: ICD-10-PCS | Mod: ,,, | Performed by: STUDENT IN AN ORGANIZED HEALTH CARE EDUCATION/TRAINING PROGRAM

## 2021-06-04 PROCEDURE — 93621: ICD-10-PCS | Mod: 26,,, | Performed by: STUDENT IN AN ORGANIZED HEALTH CARE EDUCATION/TRAINING PROGRAM

## 2021-06-04 PROCEDURE — 85014 HEMATOCRIT: CPT | Performed by: STUDENT IN AN ORGANIZED HEALTH CARE EDUCATION/TRAINING PROGRAM

## 2021-06-04 PROCEDURE — 93621 COMP EP EVL L PAC&REC C SINS: CPT | Performed by: STUDENT IN AN ORGANIZED HEALTH CARE EDUCATION/TRAINING PROGRAM

## 2021-06-04 PROCEDURE — 85018 HEMOGLOBIN: CPT | Performed by: STUDENT IN AN ORGANIZED HEALTH CARE EDUCATION/TRAINING PROGRAM

## 2021-06-04 PROCEDURE — 81025 URINE PREGNANCY TEST: CPT | Performed by: NURSE PRACTITIONER

## 2021-06-04 PROCEDURE — 93623 PRGRMD STIMJ&PACG IV RX NFS: CPT | Performed by: STUDENT IN AN ORGANIZED HEALTH CARE EDUCATION/TRAINING PROGRAM

## 2021-06-04 PROCEDURE — 27201423 OPTIME MED/SURG SUP & DEVICES STERILE SUPPLY: Performed by: STUDENT IN AN ORGANIZED HEALTH CARE EDUCATION/TRAINING PROGRAM

## 2021-06-04 RX ORDER — HEPARIN SOD,PORCINE/0.9 % NACL 1000/500ML
INTRAVENOUS SOLUTION INTRAVENOUS
Status: DISCONTINUED | OUTPATIENT
Start: 2021-06-04 | End: 2021-06-04 | Stop reason: HOSPADM

## 2021-06-04 RX ORDER — SUCCINYLCHOLINE CHLORIDE 20 MG/ML
INJECTION INTRAMUSCULAR; INTRAVENOUS
Status: DISCONTINUED | OUTPATIENT
Start: 2021-06-04 | End: 2021-06-04

## 2021-06-04 RX ORDER — PHENYLEPHRINE HYDROCHLORIDE 10 MG/ML
INJECTION INTRAVENOUS CONTINUOUS PRN
Status: DISCONTINUED | OUTPATIENT
Start: 2021-06-04 | End: 2021-06-04

## 2021-06-04 RX ORDER — ONDANSETRON 2 MG/ML
INJECTION INTRAMUSCULAR; INTRAVENOUS
Status: DISCONTINUED | OUTPATIENT
Start: 2021-06-04 | End: 2021-06-04

## 2021-06-04 RX ORDER — FENTANYL CITRATE 50 UG/ML
INJECTION, SOLUTION INTRAMUSCULAR; INTRAVENOUS
Status: DISCONTINUED | OUTPATIENT
Start: 2021-06-04 | End: 2021-06-04

## 2021-06-04 RX ORDER — ISOPROTERENOL HYDROCHLORIDE 0.2 MG/ML
INJECTION, SOLUTION INTRAVENOUS CONTINUOUS PRN
Status: DISCONTINUED | OUTPATIENT
Start: 2021-06-04 | End: 2021-06-04

## 2021-06-04 RX ORDER — FENTANYL CITRATE 50 UG/ML
25 INJECTION, SOLUTION INTRAMUSCULAR; INTRAVENOUS EVERY 5 MIN PRN
Status: DISCONTINUED | OUTPATIENT
Start: 2021-06-04 | End: 2021-06-04 | Stop reason: HOSPADM

## 2021-06-04 RX ORDER — PROPOFOL 10 MG/ML
VIAL (ML) INTRAVENOUS
Status: DISCONTINUED | OUTPATIENT
Start: 2021-06-04 | End: 2021-06-04

## 2021-06-04 RX ORDER — PHENYLEPHRINE HCL IN 0.9% NACL 1 MG/10 ML
SYRINGE (ML) INTRAVENOUS
Status: DISCONTINUED | OUTPATIENT
Start: 2021-06-04 | End: 2021-06-04

## 2021-06-04 RX ORDER — ROCURONIUM BROMIDE 10 MG/ML
INJECTION, SOLUTION INTRAVENOUS
Status: DISCONTINUED | OUTPATIENT
Start: 2021-06-04 | End: 2021-06-04

## 2021-06-04 RX ORDER — ONDANSETRON 2 MG/ML
4 INJECTION INTRAMUSCULAR; INTRAVENOUS ONCE AS NEEDED
Status: DISCONTINUED | OUTPATIENT
Start: 2021-06-04 | End: 2021-06-04 | Stop reason: HOSPADM

## 2021-06-04 RX ORDER — IBUPROFEN 600 MG/1
600 TABLET ORAL EVERY 6 HOURS PRN
Status: DISCONTINUED | OUTPATIENT
Start: 2021-06-04 | End: 2021-06-04 | Stop reason: HOSPADM

## 2021-06-04 RX ORDER — LIDOCAINE HYDROCHLORIDE 20 MG/ML
INJECTION, SOLUTION INFILTRATION; PERINEURAL
Status: DISCONTINUED | OUTPATIENT
Start: 2021-06-04 | End: 2021-06-04 | Stop reason: HOSPADM

## 2021-06-04 RX ADMIN — SUCCINYLCHOLINE CHLORIDE 200 MG: 20 INJECTION, SOLUTION INTRAMUSCULAR; INTRAVENOUS; PARENTERAL at 10:06

## 2021-06-04 RX ADMIN — FENTANYL CITRATE 100 MCG: 50 INJECTION INTRAMUSCULAR; INTRAVENOUS at 10:06

## 2021-06-04 RX ADMIN — ISOPROTERENOL HYDROCHLORIDE 2 MCG/MIN: 0.2 INJECTION, SOLUTION INTRACARDIAC; INTRAMUSCULAR; INTRAVENOUS; SUBCUTANEOUS at 11:06

## 2021-06-04 RX ADMIN — PROPOFOL 30 MG: 10 INJECTION, EMULSION INTRAVENOUS at 10:06

## 2021-06-04 RX ADMIN — PROPOFOL 50 MG: 10 INJECTION, EMULSION INTRAVENOUS at 10:06

## 2021-06-04 RX ADMIN — FENTANYL CITRATE 25 MCG: 50 INJECTION INTRAMUSCULAR; INTRAVENOUS at 12:06

## 2021-06-04 RX ADMIN — PROPOFOL 20 MG: 10 INJECTION, EMULSION INTRAVENOUS at 10:06

## 2021-06-04 RX ADMIN — ROCURONIUM BROMIDE 5 MG: 10 INJECTION, SOLUTION INTRAVENOUS at 10:06

## 2021-06-04 RX ADMIN — PHENYLEPHRINE HYDROCHLORIDE 0.2 MCG/KG/MIN: 10 INJECTION, SOLUTION INTRAMUSCULAR; INTRAVENOUS; SUBCUTANEOUS at 11:06

## 2021-06-04 RX ADMIN — Medication 100 MCG: at 11:06

## 2021-06-04 RX ADMIN — Medication 100 MCG: at 10:06

## 2021-06-04 RX ADMIN — PROPOFOL 200 MG: 10 INJECTION, EMULSION INTRAVENOUS at 10:06

## 2021-06-04 RX ADMIN — SODIUM CHLORIDE: 0.9 INJECTION, SOLUTION INTRAVENOUS at 09:06

## 2021-06-04 RX ADMIN — FENTANYL CITRATE 25 MCG: 50 INJECTION INTRAMUSCULAR; INTRAVENOUS at 01:06

## 2021-06-04 RX ADMIN — ONDANSETRON 4 MG: 2 INJECTION INTRAMUSCULAR; INTRAVENOUS at 01:06

## 2021-06-08 ENCOUNTER — TELEPHONE (OUTPATIENT)
Dept: ELECTROPHYSIOLOGY | Facility: CLINIC | Age: 36
End: 2021-06-08

## 2021-06-15 ENCOUNTER — HOSPITAL ENCOUNTER (OUTPATIENT)
Dept: RADIOLOGY | Facility: HOSPITAL | Age: 36
Discharge: HOME | End: 2021-06-15
Attending: OBSTETRICS & GYNECOLOGY
Payer: COMMERCIAL

## 2021-06-15 ENCOUNTER — OFFICE VISIT (OUTPATIENT)
Dept: OBSTETRICS AND GYNECOLOGY | Facility: CLINIC | Age: 36
End: 2021-06-15
Payer: COMMERCIAL

## 2021-06-15 ENCOUNTER — TELEPHONE (OUTPATIENT)
Dept: OBSTETRICS AND GYNECOLOGY | Facility: CLINIC | Age: 36
End: 2021-06-15

## 2021-06-15 VITALS
WEIGHT: 268.88 LBS | SYSTOLIC BLOOD PRESSURE: 140 MMHG | BODY MASS INDEX: 46.16 KG/M2 | DIASTOLIC BLOOD PRESSURE: 80 MMHG

## 2021-06-15 DIAGNOSIS — N92.1 MENORRHAGIA WITH IRREGULAR CYCLE: Primary | ICD-10-CM

## 2021-06-15 DIAGNOSIS — D25.0 SUBMUCOUS LEIOMYOMA OF UTERUS: ICD-10-CM

## 2021-06-15 DIAGNOSIS — D50.0 ANEMIA DUE TO BLOOD LOSS: ICD-10-CM

## 2021-06-15 DIAGNOSIS — N92.1 MENORRHAGIA WITH IRREGULAR CYCLE: ICD-10-CM

## 2021-06-15 PROCEDURE — 58100 BIOPSY OF UTERUS LINING: CPT | Mod: S$GLB,,, | Performed by: OBSTETRICS & GYNECOLOGY

## 2021-06-15 PROCEDURE — 99214 PR OFFICE/OUTPT VISIT, EST, LEVL IV, 30-39 MIN: ICD-10-PCS | Mod: 25,S$GLB,, | Performed by: OBSTETRICS & GYNECOLOGY

## 2021-06-15 PROCEDURE — 76856 US EXAM PELVIC COMPLETE: CPT | Mod: TC,PO

## 2021-06-15 PROCEDURE — 88305 TISSUE EXAM BY PATHOLOGIST: ICD-10-PCS | Mod: 26,,, | Performed by: PATHOLOGY

## 2021-06-15 PROCEDURE — 3008F PR BODY MASS INDEX (BMI) DOCUMENTED: ICD-10-PCS | Mod: CPTII,S$GLB,, | Performed by: OBSTETRICS & GYNECOLOGY

## 2021-06-15 PROCEDURE — 88305 TISSUE EXAM BY PATHOLOGIST: CPT | Mod: 26,,, | Performed by: PATHOLOGY

## 2021-06-15 PROCEDURE — 58100 PR BIOPSY OF UTERUS LINING: ICD-10-PCS | Mod: S$GLB,,, | Performed by: OBSTETRICS & GYNECOLOGY

## 2021-06-15 PROCEDURE — 3008F BODY MASS INDEX DOCD: CPT | Mod: CPTII,S$GLB,, | Performed by: OBSTETRICS & GYNECOLOGY

## 2021-06-15 PROCEDURE — 99999 PR PBB SHADOW E&M-EST. PATIENT-LVL II: CPT | Mod: PBBFAC,,, | Performed by: OBSTETRICS & GYNECOLOGY

## 2021-06-15 PROCEDURE — 99999 PR PBB SHADOW E&M-EST. PATIENT-LVL II: ICD-10-PCS | Mod: PBBFAC,,, | Performed by: OBSTETRICS & GYNECOLOGY

## 2021-06-15 PROCEDURE — 99214 OFFICE O/P EST MOD 30 MIN: CPT | Mod: 25,S$GLB,, | Performed by: OBSTETRICS & GYNECOLOGY

## 2021-06-15 PROCEDURE — 88305 TISSUE EXAM BY PATHOLOGIST: CPT | Performed by: PATHOLOGY

## 2021-06-16 ENCOUNTER — OFFICE VISIT (OUTPATIENT)
Dept: OBSTETRICS AND GYNECOLOGY | Facility: CLINIC | Age: 36
End: 2021-06-16
Payer: COMMERCIAL

## 2021-06-16 ENCOUNTER — LAB VISIT (OUTPATIENT)
Dept: LAB | Facility: HOSPITAL | Age: 36
End: 2021-06-16
Attending: OBSTETRICS & GYNECOLOGY
Payer: COMMERCIAL

## 2021-06-16 DIAGNOSIS — N92.1 MENORRHAGIA WITH IRREGULAR CYCLE: ICD-10-CM

## 2021-06-16 DIAGNOSIS — D50.0 ANEMIA DUE TO BLOOD LOSS: Primary | ICD-10-CM

## 2021-06-16 DIAGNOSIS — D50.0 ANEMIA DUE TO BLOOD LOSS: ICD-10-CM

## 2021-06-16 LAB
ANISOCYTOSIS BLD QL SMEAR: SLIGHT
BASOPHILS # BLD AUTO: 0.03 K/UL (ref 0–0.2)
BASOPHILS NFR BLD: 0.4 % (ref 0–1.9)
DACRYOCYTES BLD QL SMEAR: ABNORMAL
DIFFERENTIAL METHOD: ABNORMAL
EOSINOPHIL # BLD AUTO: 0.1 K/UL (ref 0–0.5)
EOSINOPHIL NFR BLD: 1.6 % (ref 0–8)
ERYTHROCYTE [DISTWIDTH] IN BLOOD BY AUTOMATED COUNT: 27.9 % (ref 11.5–14.5)
GIANT PLATELETS BLD QL SMEAR: PRESENT
HCT VFR BLD AUTO: 25.6 % (ref 37–48.5)
HGB BLD-MCNC: 6.6 G/DL (ref 12–16)
HYPOCHROMIA BLD QL SMEAR: ABNORMAL
IMM GRANULOCYTES # BLD AUTO: 0.05 K/UL (ref 0–0.04)
IMM GRANULOCYTES NFR BLD AUTO: 0.7 % (ref 0–0.5)
LYMPHOCYTES # BLD AUTO: 2.4 K/UL (ref 1–4.8)
LYMPHOCYTES NFR BLD: 35.5 % (ref 18–48)
MCH RBC QN AUTO: 17.2 PG (ref 27–31)
MCHC RBC AUTO-ENTMCNC: 25.8 G/DL (ref 32–36)
MCV RBC AUTO: 67 FL (ref 82–98)
MONOCYTES # BLD AUTO: 0.6 K/UL (ref 0.3–1)
MONOCYTES NFR BLD: 8.9 % (ref 4–15)
NEUTROPHILS # BLD AUTO: 3.6 K/UL (ref 1.8–7.7)
NEUTROPHILS NFR BLD: 52.9 % (ref 38–73)
NRBC BLD-RTO: 1 /100 WBC
OVALOCYTES BLD QL SMEAR: ABNORMAL
PLATELET # BLD AUTO: 482 K/UL (ref 150–450)
PLATELET BLD QL SMEAR: ABNORMAL
PMV BLD AUTO: 9.4 FL (ref 9.2–12.9)
POIKILOCYTOSIS BLD QL SMEAR: SLIGHT
POLYCHROMASIA BLD QL SMEAR: ABNORMAL
RBC # BLD AUTO: 3.84 M/UL (ref 4–5.4)
TARGETS BLD QL SMEAR: ABNORMAL
TSH SERPL DL<=0.005 MIU/L-ACNC: 2.19 UIU/ML (ref 0.4–4)
WBC # BLD AUTO: 6.88 K/UL (ref 3.9–12.7)

## 2021-06-16 PROCEDURE — 36415 COLL VENOUS BLD VENIPUNCTURE: CPT | Performed by: OBSTETRICS & GYNECOLOGY

## 2021-06-16 PROCEDURE — 99215 OFFICE O/P EST HI 40 MIN: CPT | Mod: S$GLB,,, | Performed by: OBSTETRICS & GYNECOLOGY

## 2021-06-16 PROCEDURE — 99215 PR OFFICE/OUTPT VISIT, EST, LEVL V, 40-54 MIN: ICD-10-PCS | Mod: S$GLB,,, | Performed by: OBSTETRICS & GYNECOLOGY

## 2021-06-16 PROCEDURE — 84443 ASSAY THYROID STIM HORMONE: CPT | Performed by: OBSTETRICS & GYNECOLOGY

## 2021-06-16 PROCEDURE — 85025 COMPLETE CBC W/AUTO DIFF WBC: CPT | Performed by: OBSTETRICS & GYNECOLOGY

## 2021-06-17 ENCOUNTER — TELEPHONE (OUTPATIENT)
Dept: OBSTETRICS AND GYNECOLOGY | Facility: CLINIC | Age: 36
End: 2021-06-17

## 2021-06-23 LAB
FINAL PATHOLOGIC DIAGNOSIS: NORMAL
GROSS: NORMAL
Lab: NORMAL

## 2021-07-07 ENCOUNTER — TELEPHONE (OUTPATIENT)
Dept: ELECTROPHYSIOLOGY | Facility: CLINIC | Age: 36
End: 2021-07-07

## 2021-07-18 ENCOUNTER — HOSPITAL ENCOUNTER (EMERGENCY)
Facility: HOSPITAL | Age: 36
Discharge: HOME | End: 2021-07-19
Attending: EMERGENCY MEDICINE
Payer: COMMERCIAL

## 2021-07-18 DIAGNOSIS — D64.9 SYMPTOMATIC ANEMIA: ICD-10-CM

## 2021-07-18 DIAGNOSIS — R06.02 SHORTNESS OF BREATH: ICD-10-CM

## 2021-07-18 DIAGNOSIS — N93.8 DUB (DYSFUNCTIONAL UTERINE BLEEDING): Primary | ICD-10-CM

## 2021-07-18 LAB
ABO + RH BLD: NORMAL
ALBUMIN SERPL BCP-MCNC: 3.5 G/DL (ref 3.5–5.2)
ALP SERPL-CCNC: 62 U/L (ref 55–135)
ALT SERPL W/O P-5'-P-CCNC: 7 U/L (ref 10–44)
ANION GAP SERPL CALC-SCNC: 9 MMOL/L (ref 8–16)
ANISOCYTOSIS BLD QL SMEAR: SLIGHT
APTT BLDCRRT: 25.4 SEC (ref 21–32)
AST SERPL-CCNC: 5 U/L (ref 10–40)
BACTERIA #/AREA URNS HPF: ABNORMAL /HPF
BASOPHILS # BLD AUTO: 0.05 K/UL (ref 0–0.2)
BASOPHILS NFR BLD: 0.6 % (ref 0–1.9)
BILIRUB SERPL-MCNC: 0.2 MG/DL (ref 0.1–1)
BILIRUB UR QL STRIP: NEGATIVE
BLD GP AB SCN CELLS X3 SERPL QL: NORMAL
BUN SERPL-MCNC: 8 MG/DL (ref 6–20)
CALCIUM SERPL-MCNC: 9 MG/DL (ref 8.7–10.5)
CHLORIDE SERPL-SCNC: 106 MMOL/L (ref 95–110)
CLARITY UR: CLEAR
CO2 SERPL-SCNC: 21 MMOL/L (ref 23–29)
COLOR UR: YELLOW
CREAT SERPL-MCNC: 1 MG/DL (ref 0.5–1.4)
DAT IGG-SP REAG RBC-IMP: NORMAL
DIFFERENTIAL METHOD: ABNORMAL
EOSINOPHIL # BLD AUTO: 0.2 K/UL (ref 0–0.5)
EOSINOPHIL NFR BLD: 2.4 % (ref 0–8)
ERYTHROCYTE [DISTWIDTH] IN BLOOD BY AUTOMATED COUNT: 22.2 % (ref 11.5–14.5)
EST. GFR  (AFRICAN AMERICAN): >60 ML/MIN/1.73 M^2
EST. GFR  (NON AFRICAN AMERICAN): >60 ML/MIN/1.73 M^2
GLUCOSE SERPL-MCNC: 113 MG/DL (ref 70–110)
GLUCOSE UR QL STRIP: NEGATIVE
HCT VFR BLD AUTO: 25.6 % (ref 37–48.5)
HGB BLD-MCNC: 6.7 G/DL (ref 12–16)
HGB UR QL STRIP: ABNORMAL
HYALINE CASTS #/AREA URNS LPF: 10 /LPF
HYPOCHROMIA BLD QL SMEAR: ABNORMAL
IMM GRANULOCYTES # BLD AUTO: 0.01 K/UL (ref 0–0.04)
IMM GRANULOCYTES NFR BLD AUTO: 0.1 % (ref 0–0.5)
INR PPP: 1.1 (ref 0.8–1.2)
KETONES UR QL STRIP: NEGATIVE
LEUKOCYTE ESTERASE UR QL STRIP: ABNORMAL
LYMPHOCYTES # BLD AUTO: 2.6 K/UL (ref 1–4.8)
LYMPHOCYTES NFR BLD: 31.3 % (ref 18–48)
MCH RBC QN AUTO: 16 PG (ref 27–31)
MCHC RBC AUTO-ENTMCNC: 26.2 G/DL (ref 32–36)
MCV RBC AUTO: 61 FL (ref 82–98)
MICROSCOPIC COMMENT: ABNORMAL
MONOCYTES # BLD AUTO: 0.7 K/UL (ref 0.3–1)
MONOCYTES NFR BLD: 7.9 % (ref 4–15)
NEUTROPHILS # BLD AUTO: 4.7 K/UL (ref 1.8–7.7)
NEUTROPHILS NFR BLD: 57.7 % (ref 38–73)
NITRITE UR QL STRIP: NEGATIVE
NRBC BLD-RTO: 0 /100 WBC
PH UR STRIP: 6 [PH] (ref 5–8)
PLATELET # BLD AUTO: 672 K/UL (ref 150–450)
PLATELET BLD QL SMEAR: ABNORMAL
PMV BLD AUTO: 9.3 FL (ref 9.2–12.9)
POIKILOCYTOSIS BLD QL SMEAR: SLIGHT
POLYCHROMASIA BLD QL SMEAR: ABNORMAL
POTASSIUM SERPL-SCNC: 3.4 MMOL/L (ref 3.5–5.1)
PROT SERPL-MCNC: 7.5 G/DL (ref 6–8.4)
PROT UR QL STRIP: ABNORMAL
PROTHROMBIN TIME: 11.6 SEC (ref 9–12.5)
RBC # BLD AUTO: 4.19 M/UL (ref 4–5.4)
RBC #/AREA URNS HPF: >100 /HPF (ref 0–4)
SODIUM SERPL-SCNC: 136 MMOL/L (ref 136–145)
SP GR UR STRIP: >1.03 (ref 1–1.03)
SQUAMOUS #/AREA URNS HPF: 1 /HPF
URN SPEC COLLECT METH UR: ABNORMAL
UROBILINOGEN UR STRIP-ACNC: NEGATIVE EU/DL
WBC # BLD AUTO: 8.2 K/UL (ref 3.9–12.7)
WBC #/AREA URNS HPF: 35 /HPF (ref 0–5)

## 2021-07-18 PROCEDURE — 93010 EKG 12-LEAD: ICD-10-PCS | Mod: ,,, | Performed by: INTERNAL MEDICINE

## 2021-07-18 PROCEDURE — 80053 COMPREHEN METABOLIC PANEL: CPT | Performed by: PHYSICIAN ASSISTANT

## 2021-07-18 PROCEDURE — 87086 URINE CULTURE/COLONY COUNT: CPT | Performed by: EMERGENCY MEDICINE

## 2021-07-18 PROCEDURE — 63600175 PHARM REV CODE 636 W HCPCS: Performed by: EMERGENCY MEDICINE

## 2021-07-18 PROCEDURE — 93010 ELECTROCARDIOGRAM REPORT: CPT | Mod: ,,, | Performed by: INTERNAL MEDICINE

## 2021-07-18 PROCEDURE — 85025 COMPLETE CBC W/AUTO DIFF WBC: CPT | Performed by: PHYSICIAN ASSISTANT

## 2021-07-18 PROCEDURE — 93005 ELECTROCARDIOGRAM TRACING: CPT

## 2021-07-18 PROCEDURE — 81000 URINALYSIS NONAUTO W/SCOPE: CPT | Performed by: EMERGENCY MEDICINE

## 2021-07-18 PROCEDURE — 25000003 PHARM REV CODE 250: Performed by: EMERGENCY MEDICINE

## 2021-07-18 PROCEDURE — 85730 THROMBOPLASTIN TIME PARTIAL: CPT | Performed by: PHYSICIAN ASSISTANT

## 2021-07-18 PROCEDURE — 86922 COMPATIBILITY TEST ANTIGLOB: CPT | Performed by: EMERGENCY MEDICINE

## 2021-07-18 PROCEDURE — 96361 HYDRATE IV INFUSION ADD-ON: CPT

## 2021-07-18 PROCEDURE — 96375 TX/PRO/DX INJ NEW DRUG ADDON: CPT

## 2021-07-18 PROCEDURE — 96374 THER/PROPH/DIAG INJ IV PUSH: CPT

## 2021-07-18 PROCEDURE — 36430 TRANSFUSION BLD/BLD COMPNT: CPT | Mod: 59

## 2021-07-18 PROCEDURE — 85610 PROTHROMBIN TIME: CPT | Performed by: PHYSICIAN ASSISTANT

## 2021-07-18 PROCEDURE — 99284 EMERGENCY DEPT VISIT MOD MDM: CPT | Mod: 25

## 2021-07-18 PROCEDURE — 86900 BLOOD TYPING SEROLOGIC ABO: CPT | Performed by: PHYSICIAN ASSISTANT

## 2021-07-18 RX ORDER — KETOROLAC TROMETHAMINE 30 MG/ML
15 INJECTION, SOLUTION INTRAMUSCULAR; INTRAVENOUS
Status: COMPLETED | OUTPATIENT
Start: 2021-07-18 | End: 2021-07-18

## 2021-07-18 RX ADMIN — SODIUM CHLORIDE 1000 ML: 0.9 INJECTION, SOLUTION INTRAVENOUS at 10:07

## 2021-07-18 RX ADMIN — KETOROLAC TROMETHAMINE 15 MG: 30 INJECTION, SOLUTION INTRAMUSCULAR; INTRAVENOUS at 10:07

## 2021-07-19 ENCOUNTER — TELEPHONE (OUTPATIENT)
Dept: OBSTETRICS AND GYNECOLOGY | Facility: CLINIC | Age: 36
End: 2021-07-19

## 2021-07-19 VITALS
TEMPERATURE: 99 F | OXYGEN SATURATION: 100 % | RESPIRATION RATE: 18 BRPM | BODY MASS INDEX: 45.07 KG/M2 | DIASTOLIC BLOOD PRESSURE: 90 MMHG | WEIGHT: 264 LBS | HEIGHT: 64 IN | HEART RATE: 95 BPM | SYSTOLIC BLOOD PRESSURE: 147 MMHG

## 2021-07-19 LAB
BLD PROD TYP BPU: NORMAL
BLD PROD TYP BPU: NORMAL
BLOOD UNIT EXPIRATION DATE: NORMAL
BLOOD UNIT EXPIRATION DATE: NORMAL
BLOOD UNIT TYPE CODE: 9500
BLOOD UNIT TYPE CODE: 9500
BLOOD UNIT TYPE: NORMAL
BLOOD UNIT TYPE: NORMAL
CODING SYSTEM: NORMAL
CODING SYSTEM: NORMAL
DISPENSE STATUS: NORMAL
DISPENSE STATUS: NORMAL
HCT VFR BLD AUTO: 24.7 % (ref 37–48.5)
HGB BLD-MCNC: 6.9 G/DL (ref 12–16)
NUM UNITS TRANS PACKED RBC: NORMAL
TRANS ERYTHROCYTES VOL PATIENT: NORMAL ML

## 2021-07-19 PROCEDURE — P9021 RED BLOOD CELLS UNIT: HCPCS | Performed by: EMERGENCY MEDICINE

## 2021-07-19 PROCEDURE — 85018 HEMOGLOBIN: CPT | Performed by: EMERGENCY MEDICINE

## 2021-07-19 PROCEDURE — 63600175 PHARM REV CODE 636 W HCPCS: Performed by: EMERGENCY MEDICINE

## 2021-07-19 PROCEDURE — 85014 HEMATOCRIT: CPT | Performed by: EMERGENCY MEDICINE

## 2021-07-19 PROCEDURE — 25000003 PHARM REV CODE 250: Performed by: EMERGENCY MEDICINE

## 2021-07-19 PROCEDURE — P9016 RBC LEUKOCYTES REDUCED: HCPCS | Performed by: EMERGENCY MEDICINE

## 2021-07-19 RX ORDER — ONDANSETRON 2 MG/ML
4 INJECTION INTRAMUSCULAR; INTRAVENOUS
Status: COMPLETED | OUTPATIENT
Start: 2021-07-19 | End: 2021-07-19

## 2021-07-19 RX ORDER — HYDROCODONE BITARTRATE AND ACETAMINOPHEN 500; 5 MG/1; MG/1
TABLET ORAL
Status: DISCONTINUED | OUTPATIENT
Start: 2021-07-19 | End: 2021-07-19 | Stop reason: HOSPADM

## 2021-07-19 RX ADMIN — ONDANSETRON 4 MG: 2 INJECTION INTRAMUSCULAR; INTRAVENOUS at 03:07

## 2021-07-19 RX ADMIN — SODIUM CHLORIDE: 0.9 INJECTION, SOLUTION INTRAVENOUS at 08:07

## 2021-07-20 LAB — BACTERIA UR CULT: NORMAL

## 2021-07-21 ENCOUNTER — PATIENT MESSAGE (OUTPATIENT)
Dept: OBSTETRICS AND GYNECOLOGY | Facility: CLINIC | Age: 36
End: 2021-07-21

## 2021-07-27 ENCOUNTER — OFFICE VISIT (OUTPATIENT)
Dept: OBSTETRICS AND GYNECOLOGY | Facility: CLINIC | Age: 36
End: 2021-07-27
Payer: COMMERCIAL

## 2021-07-27 ENCOUNTER — HOSPITAL ENCOUNTER (OUTPATIENT)
Dept: PREADMISSION TESTING | Facility: HOSPITAL | Age: 36
Discharge: HOME | End: 2021-07-27
Payer: COMMERCIAL

## 2021-07-27 ENCOUNTER — ANESTHESIA EVENT (OUTPATIENT)
Dept: SURGERY | Facility: HOSPITAL | Age: 36
End: 2021-07-27
Payer: COMMERCIAL

## 2021-07-27 VITALS
HEIGHT: 64 IN | WEIGHT: 273.44 LBS | BODY MASS INDEX: 46.68 KG/M2 | DIASTOLIC BLOOD PRESSURE: 78 MMHG | SYSTOLIC BLOOD PRESSURE: 122 MMHG

## 2021-07-27 DIAGNOSIS — D25.9 UTERINE LEIOMYOMA, UNSPECIFIED LOCATION: Primary | ICD-10-CM

## 2021-07-27 DIAGNOSIS — D50.0 ANEMIA DUE TO BLOOD LOSS: ICD-10-CM

## 2021-07-27 DIAGNOSIS — N92.1 MENORRHAGIA WITH IRREGULAR CYCLE: ICD-10-CM

## 2021-07-27 PROCEDURE — 1126F AMNT PAIN NOTED NONE PRSNT: CPT | Mod: CPTII,S$GLB,, | Performed by: OBSTETRICS & GYNECOLOGY

## 2021-07-27 PROCEDURE — 99499 NO LOS: ICD-10-PCS | Mod: S$GLB,,, | Performed by: OBSTETRICS & GYNECOLOGY

## 2021-07-27 PROCEDURE — 1159F MED LIST DOCD IN RCRD: CPT | Mod: CPTII,S$GLB,, | Performed by: OBSTETRICS & GYNECOLOGY

## 2021-07-27 PROCEDURE — 99999 PR PBB SHADOW E&M-EST. PATIENT-LVL III: CPT | Mod: PBBFAC,,, | Performed by: OBSTETRICS & GYNECOLOGY

## 2021-07-27 PROCEDURE — 1159F PR MEDICATION LIST DOCUMENTED IN MEDICAL RECORD: ICD-10-PCS | Mod: CPTII,S$GLB,, | Performed by: OBSTETRICS & GYNECOLOGY

## 2021-07-27 PROCEDURE — 99999 PR PBB SHADOW E&M-EST. PATIENT-LVL III: ICD-10-PCS | Mod: PBBFAC,,, | Performed by: OBSTETRICS & GYNECOLOGY

## 2021-07-27 PROCEDURE — 99499 UNLISTED E&M SERVICE: CPT | Mod: S$GLB,,, | Performed by: OBSTETRICS & GYNECOLOGY

## 2021-07-27 PROCEDURE — 3008F BODY MASS INDEX DOCD: CPT | Mod: CPTII,S$GLB,, | Performed by: OBSTETRICS & GYNECOLOGY

## 2021-07-27 PROCEDURE — 3008F PR BODY MASS INDEX (BMI) DOCUMENTED: ICD-10-PCS | Mod: CPTII,S$GLB,, | Performed by: OBSTETRICS & GYNECOLOGY

## 2021-07-27 PROCEDURE — 1126F PR PAIN SEVERITY QUANTIFIED, NO PAIN PRESENT: ICD-10-PCS | Mod: CPTII,S$GLB,, | Performed by: OBSTETRICS & GYNECOLOGY

## 2021-07-27 RX ORDER — ONDANSETRON 4 MG/1
8 TABLET, ORALLY DISINTEGRATING ORAL EVERY 8 HOURS PRN
Status: CANCELLED | OUTPATIENT
Start: 2021-07-27

## 2021-07-27 RX ORDER — MUPIROCIN 20 MG/G
OINTMENT TOPICAL
Status: CANCELLED | OUTPATIENT
Start: 2021-07-27

## 2021-07-27 RX ORDER — LIDOCAINE HYDROCHLORIDE 10 MG/ML
1 INJECTION, SOLUTION EPIDURAL; INFILTRATION; INTRACAUDAL; PERINEURAL ONCE
Status: CANCELLED | OUTPATIENT
Start: 2021-07-27 | End: 2021-07-27

## 2021-07-30 DIAGNOSIS — Z01.818 PRE-OP TESTING: ICD-10-CM

## 2021-07-31 ENCOUNTER — LAB VISIT (OUTPATIENT)
Dept: SPORTS MEDICINE | Facility: CLINIC | Age: 36
End: 2021-07-31
Payer: COMMERCIAL

## 2021-07-31 DIAGNOSIS — Z01.818 PRE-OP TESTING: ICD-10-CM

## 2021-07-31 PROCEDURE — U0005 INFEC AGEN DETEC AMPLI PROBE: HCPCS | Performed by: OBSTETRICS & GYNECOLOGY

## 2021-07-31 PROCEDURE — U0003 INFECTIOUS AGENT DETECTION BY NUCLEIC ACID (DNA OR RNA); SEVERE ACUTE RESPIRATORY SYNDROME CORONAVIRUS 2 (SARS-COV-2) (CORONAVIRUS DISEASE [COVID-19]), AMPLIFIED PROBE TECHNIQUE, MAKING USE OF HIGH THROUGHPUT TECHNOLOGIES AS DESCRIBED BY CMS-2020-01-R: HCPCS | Performed by: OBSTETRICS & GYNECOLOGY

## 2021-08-02 LAB
SARS-COV-2 RNA RESP QL NAA+PROBE: NOT DETECTED
SARS-COV-2- CYCLE NUMBER: -1

## 2021-08-03 ENCOUNTER — HOSPITAL ENCOUNTER (OUTPATIENT)
Facility: HOSPITAL | Age: 36
Discharge: HOME | End: 2021-08-03
Attending: OBSTETRICS & GYNECOLOGY | Admitting: OBSTETRICS & GYNECOLOGY
Payer: COMMERCIAL

## 2021-08-03 ENCOUNTER — ANESTHESIA (OUTPATIENT)
Dept: SURGERY | Facility: HOSPITAL | Age: 36
End: 2021-08-03
Payer: COMMERCIAL

## 2021-08-03 VITALS
HEIGHT: 65 IN | BODY MASS INDEX: 43.99 KG/M2 | TEMPERATURE: 98 F | HEART RATE: 85 BPM | RESPIRATION RATE: 17 BRPM | WEIGHT: 264 LBS | SYSTOLIC BLOOD PRESSURE: 130 MMHG | DIASTOLIC BLOOD PRESSURE: 72 MMHG | OXYGEN SATURATION: 98 %

## 2021-08-03 DIAGNOSIS — D25.9 UTERINE LEIOMYOMA, UNSPECIFIED LOCATION: ICD-10-CM

## 2021-08-03 DIAGNOSIS — N92.1 MENORRHAGIA WITH IRREGULAR CYCLE: ICD-10-CM

## 2021-08-03 DIAGNOSIS — Z01.818 PRE-OP TESTING: Primary | ICD-10-CM

## 2021-08-03 DIAGNOSIS — D50.0 ANEMIA DUE TO BLOOD LOSS: ICD-10-CM

## 2021-08-03 LAB — POCT GLUCOSE: 111 MG/DL (ref 70–110)

## 2021-08-03 PROCEDURE — 88307 TISSUE EXAM BY PATHOLOGIST: CPT | Mod: 26,,, | Performed by: PATHOLOGY

## 2021-08-03 PROCEDURE — 71000015 HC POSTOP RECOV 1ST HR: Performed by: OBSTETRICS & GYNECOLOGY

## 2021-08-03 PROCEDURE — 58571 PR LAPAROSCOPY W TOT HYSTERECTUTERUS <=250 GRAM  W TUBE/OVARY: ICD-10-PCS | Mod: ,,, | Performed by: OBSTETRICS & GYNECOLOGY

## 2021-08-03 PROCEDURE — 25000003 PHARM REV CODE 250: Performed by: NURSE ANESTHETIST, CERTIFIED REGISTERED

## 2021-08-03 PROCEDURE — 25000003 PHARM REV CODE 250: Performed by: NURSE PRACTITIONER

## 2021-08-03 PROCEDURE — 63600175 PHARM REV CODE 636 W HCPCS: Performed by: OBSTETRICS & GYNECOLOGY

## 2021-08-03 PROCEDURE — 63600175 PHARM REV CODE 636 W HCPCS: Performed by: ANESTHESIOLOGY

## 2021-08-03 PROCEDURE — 88307 TISSUE EXAM BY PATHOLOGIST: CPT | Performed by: PATHOLOGY

## 2021-08-03 PROCEDURE — 36000713 HC OR TIME LEV V EA ADD 15 MIN: Performed by: OBSTETRICS & GYNECOLOGY

## 2021-08-03 PROCEDURE — 27201423 OPTIME MED/SURG SUP & DEVICES STERILE SUPPLY: Performed by: OBSTETRICS & GYNECOLOGY

## 2021-08-03 PROCEDURE — 71000039 HC RECOVERY, EACH ADD'L HOUR: Performed by: OBSTETRICS & GYNECOLOGY

## 2021-08-03 PROCEDURE — 37000008 HC ANESTHESIA 1ST 15 MINUTES: Performed by: OBSTETRICS & GYNECOLOGY

## 2021-08-03 PROCEDURE — 25000003 PHARM REV CODE 250: Performed by: OBSTETRICS & GYNECOLOGY

## 2021-08-03 PROCEDURE — 71000033 HC RECOVERY, INTIAL HOUR: Performed by: OBSTETRICS & GYNECOLOGY

## 2021-08-03 PROCEDURE — 36000712 HC OR TIME LEV V 1ST 15 MIN: Performed by: OBSTETRICS & GYNECOLOGY

## 2021-08-03 PROCEDURE — 37000009 HC ANESTHESIA EA ADD 15 MINS: Performed by: OBSTETRICS & GYNECOLOGY

## 2021-08-03 PROCEDURE — C2628 CATHETER, OCCLUSION: HCPCS | Performed by: OBSTETRICS & GYNECOLOGY

## 2021-08-03 PROCEDURE — 63600175 PHARM REV CODE 636 W HCPCS: Performed by: NURSE ANESTHETIST, CERTIFIED REGISTERED

## 2021-08-03 PROCEDURE — 71000016 HC POSTOP RECOV ADDL HR: Performed by: OBSTETRICS & GYNECOLOGY

## 2021-08-03 PROCEDURE — 88307 PR  SURG PATH,LEVEL V: ICD-10-PCS | Mod: 26,,, | Performed by: PATHOLOGY

## 2021-08-03 PROCEDURE — 58571 TLH W/T/O 250 G OR LESS: CPT | Mod: ,,, | Performed by: OBSTETRICS & GYNECOLOGY

## 2021-08-03 PROCEDURE — 63600175 PHARM REV CODE 636 W HCPCS: Performed by: NURSE PRACTITIONER

## 2021-08-03 RX ORDER — SCOLOPAMINE TRANSDERMAL SYSTEM 1 MG/1
1 PATCH, EXTENDED RELEASE TRANSDERMAL
Status: DISCONTINUED | OUTPATIENT
Start: 2021-08-03 | End: 2021-08-03 | Stop reason: HOSPADM

## 2021-08-03 RX ORDER — CEFAZOLIN SODIUM 2 G/50ML
2 SOLUTION INTRAVENOUS
Status: COMPLETED | OUTPATIENT
Start: 2021-08-03 | End: 2021-08-03

## 2021-08-03 RX ORDER — FENTANYL CITRATE 50 UG/ML
INJECTION, SOLUTION INTRAMUSCULAR; INTRAVENOUS
Status: DISCONTINUED | OUTPATIENT
Start: 2021-08-03 | End: 2021-08-03

## 2021-08-03 RX ORDER — BUPIVACAINE HYDROCHLORIDE 2.5 MG/ML
INJECTION, SOLUTION INFILTRATION; PERINEURAL
Status: DISCONTINUED | OUTPATIENT
Start: 2021-08-03 | End: 2021-08-03 | Stop reason: HOSPADM

## 2021-08-03 RX ORDER — ACETAMINOPHEN 10 MG/ML
1000 INJECTION, SOLUTION INTRAVENOUS ONCE
Status: COMPLETED | OUTPATIENT
Start: 2021-08-03 | End: 2021-08-03

## 2021-08-03 RX ORDER — DEXAMETHASONE SODIUM PHOSPHATE 4 MG/ML
INJECTION, SOLUTION INTRA-ARTICULAR; INTRALESIONAL; INTRAMUSCULAR; INTRAVENOUS; SOFT TISSUE
Status: DISCONTINUED | OUTPATIENT
Start: 2021-08-03 | End: 2021-08-03

## 2021-08-03 RX ORDER — MIDAZOLAM HYDROCHLORIDE 1 MG/ML
INJECTION INTRAMUSCULAR; INTRAVENOUS
Status: DISCONTINUED | OUTPATIENT
Start: 2021-08-03 | End: 2021-08-03

## 2021-08-03 RX ORDER — HYDROMORPHONE HYDROCHLORIDE 2 MG/ML
INJECTION, SOLUTION INTRAMUSCULAR; INTRAVENOUS; SUBCUTANEOUS
Status: DISCONTINUED | OUTPATIENT
Start: 2021-08-03 | End: 2021-08-03

## 2021-08-03 RX ORDER — LIDOCAINE HYDROCHLORIDE 10 MG/ML
1 INJECTION, SOLUTION EPIDURAL; INFILTRATION; INTRACAUDAL; PERINEURAL ONCE
Status: DISCONTINUED | OUTPATIENT
Start: 2021-08-03 | End: 2021-08-03 | Stop reason: HOSPADM

## 2021-08-03 RX ORDER — ROCURONIUM BROMIDE 10 MG/ML
INJECTION, SOLUTION INTRAVENOUS
Status: DISCONTINUED | OUTPATIENT
Start: 2021-08-03 | End: 2021-08-03

## 2021-08-03 RX ORDER — MUPIROCIN 20 MG/G
OINTMENT TOPICAL
Status: DISCONTINUED | OUTPATIENT
Start: 2021-08-03 | End: 2021-08-03 | Stop reason: HOSPADM

## 2021-08-03 RX ORDER — ONDANSETRON 8 MG/1
8 TABLET, ORALLY DISINTEGRATING ORAL EVERY 8 HOURS PRN
Status: DISCONTINUED | OUTPATIENT
Start: 2021-08-03 | End: 2021-08-03 | Stop reason: HOSPADM

## 2021-08-03 RX ORDER — SUCCINYLCHOLINE CHLORIDE 20 MG/ML
INJECTION INTRAMUSCULAR; INTRAVENOUS
Status: DISCONTINUED | OUTPATIENT
Start: 2021-08-03 | End: 2021-08-03

## 2021-08-03 RX ORDER — ONDANSETRON HYDROCHLORIDE 2 MG/ML
INJECTION, SOLUTION INTRAMUSCULAR; INTRAVENOUS
Status: DISCONTINUED | OUTPATIENT
Start: 2021-08-03 | End: 2021-08-03

## 2021-08-03 RX ORDER — ACETAMINOPHEN 10 MG/ML
INJECTION, SOLUTION INTRAVENOUS
Status: DISCONTINUED | OUTPATIENT
Start: 2021-08-03 | End: 2021-08-03

## 2021-08-03 RX ORDER — ONDANSETRON 2 MG/ML
4 INJECTION INTRAMUSCULAR; INTRAVENOUS DAILY PRN
Status: DISCONTINUED | OUTPATIENT
Start: 2021-08-03 | End: 2021-08-03 | Stop reason: HOSPADM

## 2021-08-03 RX ORDER — SODIUM CHLORIDE, SODIUM LACTATE, POTASSIUM CHLORIDE, CALCIUM CHLORIDE 600; 310; 30; 20 MG/100ML; MG/100ML; MG/100ML; MG/100ML
INJECTION, SOLUTION INTRAVENOUS CONTINUOUS
Status: DISCONTINUED | OUTPATIENT
Start: 2021-08-03 | End: 2021-08-03 | Stop reason: HOSPADM

## 2021-08-03 RX ORDER — NEOSTIGMINE METHYLSULFATE 1 MG/ML
INJECTION, SOLUTION INTRAVENOUS
Status: DISCONTINUED | OUTPATIENT
Start: 2021-08-03 | End: 2021-08-03

## 2021-08-03 RX ORDER — VECURONIUM BROMIDE FOR INJECTION 1 MG/ML
INJECTION, POWDER, LYOPHILIZED, FOR SOLUTION INTRAVENOUS
Status: DISCONTINUED | OUTPATIENT
Start: 2021-08-03 | End: 2021-08-03

## 2021-08-03 RX ORDER — PROPOFOL 10 MG/ML
VIAL (ML) INTRAVENOUS
Status: DISCONTINUED | OUTPATIENT
Start: 2021-08-03 | End: 2021-08-03

## 2021-08-03 RX ORDER — LIDOCAINE HCL/PF 100 MG/5ML
SYRINGE (ML) INTRAVENOUS
Status: DISCONTINUED | OUTPATIENT
Start: 2021-08-03 | End: 2021-08-03

## 2021-08-03 RX ORDER — HYDROMORPHONE HYDROCHLORIDE 2 MG/ML
0.4 INJECTION, SOLUTION INTRAMUSCULAR; INTRAVENOUS; SUBCUTANEOUS EVERY 5 MIN PRN
Status: DISCONTINUED | OUTPATIENT
Start: 2021-08-03 | End: 2021-08-03 | Stop reason: HOSPADM

## 2021-08-03 RX ADMIN — CEFAZOLIN SODIUM 3 G: 2 SOLUTION INTRAVENOUS at 12:08

## 2021-08-03 RX ADMIN — SODIUM CHLORIDE, SODIUM LACTATE, POTASSIUM CHLORIDE, AND CALCIUM CHLORIDE: .6; .31; .03; .02 INJECTION, SOLUTION INTRAVENOUS at 12:08

## 2021-08-03 RX ADMIN — NEOSTIGMINE METHYLSULFATE 5 MG: 1 INJECTION INTRAVENOUS at 02:08

## 2021-08-03 RX ADMIN — PROPOFOL 200 MG: 10 INJECTION, EMULSION INTRAVENOUS at 11:08

## 2021-08-03 RX ADMIN — SCOPALAMINE 1 PATCH: 1 PATCH, EXTENDED RELEASE TRANSDERMAL at 09:08

## 2021-08-03 RX ADMIN — DEXAMETHASONE SODIUM PHOSPHATE 8 MG: 4 INJECTION, SOLUTION INTRA-ARTICULAR; INTRALESIONAL; INTRAMUSCULAR; INTRAVENOUS; SOFT TISSUE at 12:08

## 2021-08-03 RX ADMIN — GLYCOPYRROLATE 0.6 MG: 0.2 INJECTION, SOLUTION INTRAMUSCULAR; INTRAVITREAL at 02:08

## 2021-08-03 RX ADMIN — HYDROMORPHONE HYDROCHLORIDE 0.4 MG: 2 INJECTION INTRAMUSCULAR; INTRAVENOUS; SUBCUTANEOUS at 02:08

## 2021-08-03 RX ADMIN — SODIUM CHLORIDE, SODIUM LACTATE, POTASSIUM CHLORIDE, AND CALCIUM CHLORIDE: .6; .31; .03; .02 INJECTION, SOLUTION INTRAVENOUS at 11:08

## 2021-08-03 RX ADMIN — HYDROMORPHONE HYDROCHLORIDE 0.6 MG: 2 INJECTION INTRAMUSCULAR; INTRAVENOUS; SUBCUTANEOUS at 02:08

## 2021-08-03 RX ADMIN — ROCURONIUM BROMIDE 5 MG: 10 INJECTION, SOLUTION INTRAVENOUS at 11:08

## 2021-08-03 RX ADMIN — FENTANYL CITRATE 150 MCG: 50 INJECTION, SOLUTION INTRAMUSCULAR; INTRAVENOUS at 11:08

## 2021-08-03 RX ADMIN — ESMOLOL HYDROCHLORIDE 30 MG: 10 INJECTION INTRAVENOUS at 12:08

## 2021-08-03 RX ADMIN — ONDANSETRON 8 MG: 2 INJECTION, SOLUTION INTRAMUSCULAR; INTRAVENOUS at 02:08

## 2021-08-03 RX ADMIN — ACETAMINOPHEN 1000 MG: 10 INJECTION INTRAVENOUS at 03:08

## 2021-08-03 RX ADMIN — MIDAZOLAM HYDROCHLORIDE 2 MG: 1 INJECTION, SOLUTION INTRAMUSCULAR; INTRAVENOUS at 11:08

## 2021-08-03 RX ADMIN — ACETAMINOPHEN 1000 MG: 10 INJECTION, SOLUTION INTRAVENOUS at 12:08

## 2021-08-03 RX ADMIN — SUCCINYLCHOLINE CHLORIDE 180 MG: 20 INJECTION, SOLUTION INTRAMUSCULAR; INTRAVENOUS at 11:08

## 2021-08-03 RX ADMIN — VECURONIUM BROMIDE 3 MG: 10 INJECTION, POWDER, LYOPHILIZED, FOR SOLUTION INTRAVENOUS at 01:08

## 2021-08-03 RX ADMIN — ROCURONIUM BROMIDE 45 MG: 10 INJECTION, SOLUTION INTRAVENOUS at 12:08

## 2021-08-03 RX ADMIN — LIDOCAINE HYDROCHLORIDE 100 MG: 20 INJECTION, SOLUTION INTRAVENOUS at 11:08

## 2021-08-03 RX ADMIN — FENTANYL CITRATE 100 MCG: 50 INJECTION, SOLUTION INTRAMUSCULAR; INTRAVENOUS at 12:08

## 2021-08-06 LAB
FINAL PATHOLOGIC DIAGNOSIS: NORMAL
GROSS: NORMAL
Lab: NORMAL

## 2021-08-10 ENCOUNTER — OFFICE VISIT (OUTPATIENT)
Dept: OBSTETRICS AND GYNECOLOGY | Facility: CLINIC | Age: 36
End: 2021-08-10
Payer: COMMERCIAL

## 2021-08-10 VITALS
WEIGHT: 272.81 LBS | DIASTOLIC BLOOD PRESSURE: 80 MMHG | BODY MASS INDEX: 45.45 KG/M2 | HEIGHT: 65 IN | SYSTOLIC BLOOD PRESSURE: 122 MMHG

## 2021-08-10 DIAGNOSIS — Z98.890 POSTOPERATIVE STATE: Primary | ICD-10-CM

## 2021-08-10 PROCEDURE — 99499 UNLISTED E&M SERVICE: CPT | Mod: S$GLB,,, | Performed by: OBSTETRICS & GYNECOLOGY

## 2021-08-10 PROCEDURE — 3074F PR MOST RECENT SYSTOLIC BLOOD PRESSURE < 130 MM HG: ICD-10-PCS | Mod: CPTII,S$GLB,, | Performed by: OBSTETRICS & GYNECOLOGY

## 2021-08-10 PROCEDURE — 3008F BODY MASS INDEX DOCD: CPT | Mod: CPTII,S$GLB,, | Performed by: OBSTETRICS & GYNECOLOGY

## 2021-08-10 PROCEDURE — 99999 PR PBB SHADOW E&M-EST. PATIENT-LVL III: ICD-10-PCS | Mod: PBBFAC,,, | Performed by: OBSTETRICS & GYNECOLOGY

## 2021-08-10 PROCEDURE — 99499 NO LOS: ICD-10-PCS | Mod: S$GLB,,, | Performed by: OBSTETRICS & GYNECOLOGY

## 2021-08-10 PROCEDURE — 3079F PR MOST RECENT DIASTOLIC BLOOD PRESSURE 80-89 MM HG: ICD-10-PCS | Mod: CPTII,S$GLB,, | Performed by: OBSTETRICS & GYNECOLOGY

## 2021-08-10 PROCEDURE — 3079F DIAST BP 80-89 MM HG: CPT | Mod: CPTII,S$GLB,, | Performed by: OBSTETRICS & GYNECOLOGY

## 2021-08-10 PROCEDURE — 1159F MED LIST DOCD IN RCRD: CPT | Mod: CPTII,S$GLB,, | Performed by: OBSTETRICS & GYNECOLOGY

## 2021-08-10 PROCEDURE — 99999 PR PBB SHADOW E&M-EST. PATIENT-LVL III: CPT | Mod: PBBFAC,,, | Performed by: OBSTETRICS & GYNECOLOGY

## 2021-08-10 PROCEDURE — 1126F PR PAIN SEVERITY QUANTIFIED, NO PAIN PRESENT: ICD-10-PCS | Mod: CPTII,S$GLB,, | Performed by: OBSTETRICS & GYNECOLOGY

## 2021-08-10 PROCEDURE — 3008F PR BODY MASS INDEX (BMI) DOCUMENTED: ICD-10-PCS | Mod: CPTII,S$GLB,, | Performed by: OBSTETRICS & GYNECOLOGY

## 2021-08-10 PROCEDURE — 1126F AMNT PAIN NOTED NONE PRSNT: CPT | Mod: CPTII,S$GLB,, | Performed by: OBSTETRICS & GYNECOLOGY

## 2021-08-10 PROCEDURE — 3074F SYST BP LT 130 MM HG: CPT | Mod: CPTII,S$GLB,, | Performed by: OBSTETRICS & GYNECOLOGY

## 2021-08-10 PROCEDURE — 1159F PR MEDICATION LIST DOCUMENTED IN MEDICAL RECORD: ICD-10-PCS | Mod: CPTII,S$GLB,, | Performed by: OBSTETRICS & GYNECOLOGY

## 2021-08-13 ENCOUNTER — HOSPITAL ENCOUNTER (OUTPATIENT)
Dept: CARDIOLOGY | Facility: CLINIC | Age: 36
Discharge: HOME | End: 2021-08-13
Payer: COMMERCIAL

## 2021-08-13 ENCOUNTER — TELEPHONE (OUTPATIENT)
Dept: ELECTROPHYSIOLOGY | Facility: CLINIC | Age: 36
End: 2021-08-13

## 2021-08-13 ENCOUNTER — TELEPHONE (OUTPATIENT)
Dept: OBSTETRICS AND GYNECOLOGY | Facility: CLINIC | Age: 36
End: 2021-08-13

## 2021-08-13 ENCOUNTER — OFFICE VISIT (OUTPATIENT)
Dept: ELECTROPHYSIOLOGY | Facility: CLINIC | Age: 36
End: 2021-08-13
Payer: COMMERCIAL

## 2021-08-13 VITALS
OXYGEN SATURATION: 100 % | DIASTOLIC BLOOD PRESSURE: 71 MMHG | HEART RATE: 99 BPM | BODY MASS INDEX: 45.95 KG/M2 | WEIGHT: 275.81 LBS | HEIGHT: 65 IN | SYSTOLIC BLOOD PRESSURE: 148 MMHG

## 2021-08-13 DIAGNOSIS — N92.1 MENORRHAGIA WITH IRREGULAR CYCLE: ICD-10-CM

## 2021-08-13 DIAGNOSIS — Z90.710 H/O ABDOMINAL HYSTERECTOMY: ICD-10-CM

## 2021-08-13 DIAGNOSIS — D64.9 ANEMIA, UNSPECIFIED TYPE: ICD-10-CM

## 2021-08-13 DIAGNOSIS — I47.19 AVNRT (AV NODAL RE-ENTRY TACHYCARDIA): Primary | ICD-10-CM

## 2021-08-13 DIAGNOSIS — D25.9 UTERINE LEIOMYOMA, UNSPECIFIED LOCATION: ICD-10-CM

## 2021-08-13 DIAGNOSIS — N93.8 DUB (DYSFUNCTIONAL UTERINE BLEEDING): ICD-10-CM

## 2021-08-13 DIAGNOSIS — I47.10 SVT (SUPRAVENTRICULAR TACHYCARDIA): ICD-10-CM

## 2021-08-13 DIAGNOSIS — Z98.890 H/O CARDIAC RADIOFREQUENCY ABLATION: ICD-10-CM

## 2021-08-13 DIAGNOSIS — E66.01 CLASS 3 SEVERE OBESITY WITH BODY MASS INDEX (BMI) OF 45.0 TO 49.9 IN ADULT, UNSPECIFIED OBESITY TYPE, UNSPECIFIED WHETHER SERIOUS COMORBIDITY PRESENT: ICD-10-CM

## 2021-08-13 PROBLEM — R00.0 TACHYCARDIA: Status: RESOLVED | Noted: 2021-03-25 | Resolved: 2021-08-13

## 2021-08-13 PROCEDURE — 99999 PR PBB SHADOW E&M-EST. PATIENT-LVL III: CPT | Mod: PBBFAC,,, | Performed by: STUDENT IN AN ORGANIZED HEALTH CARE EDUCATION/TRAINING PROGRAM

## 2021-08-13 PROCEDURE — 3008F BODY MASS INDEX DOCD: CPT | Mod: CPTII,S$GLB,, | Performed by: STUDENT IN AN ORGANIZED HEALTH CARE EDUCATION/TRAINING PROGRAM

## 2021-08-13 PROCEDURE — 3008F PR BODY MASS INDEX (BMI) DOCUMENTED: ICD-10-PCS | Mod: CPTII,S$GLB,, | Performed by: STUDENT IN AN ORGANIZED HEALTH CARE EDUCATION/TRAINING PROGRAM

## 2021-08-13 PROCEDURE — 3077F PR MOST RECENT SYSTOLIC BLOOD PRESSURE >= 140 MM HG: ICD-10-PCS | Mod: CPTII,S$GLB,, | Performed by: STUDENT IN AN ORGANIZED HEALTH CARE EDUCATION/TRAINING PROGRAM

## 2021-08-13 PROCEDURE — 3077F SYST BP >= 140 MM HG: CPT | Mod: CPTII,S$GLB,, | Performed by: STUDENT IN AN ORGANIZED HEALTH CARE EDUCATION/TRAINING PROGRAM

## 2021-08-13 PROCEDURE — 1159F MED LIST DOCD IN RCRD: CPT | Mod: CPTII,S$GLB,, | Performed by: STUDENT IN AN ORGANIZED HEALTH CARE EDUCATION/TRAINING PROGRAM

## 2021-08-13 PROCEDURE — 99214 PR OFFICE/OUTPT VISIT, EST, LEVL IV, 30-39 MIN: ICD-10-PCS | Mod: S$GLB,,, | Performed by: STUDENT IN AN ORGANIZED HEALTH CARE EDUCATION/TRAINING PROGRAM

## 2021-08-13 PROCEDURE — 3078F PR MOST RECENT DIASTOLIC BLOOD PRESSURE < 80 MM HG: ICD-10-PCS | Mod: CPTII,S$GLB,, | Performed by: STUDENT IN AN ORGANIZED HEALTH CARE EDUCATION/TRAINING PROGRAM

## 2021-08-13 PROCEDURE — 1159F PR MEDICATION LIST DOCUMENTED IN MEDICAL RECORD: ICD-10-PCS | Mod: CPTII,S$GLB,, | Performed by: STUDENT IN AN ORGANIZED HEALTH CARE EDUCATION/TRAINING PROGRAM

## 2021-08-13 PROCEDURE — 1126F AMNT PAIN NOTED NONE PRSNT: CPT | Mod: CPTII,S$GLB,, | Performed by: STUDENT IN AN ORGANIZED HEALTH CARE EDUCATION/TRAINING PROGRAM

## 2021-08-13 PROCEDURE — 93010 ELECTROCARDIOGRAM REPORT: CPT | Mod: S$GLB,,, | Performed by: INTERNAL MEDICINE

## 2021-08-13 PROCEDURE — 93005 ELECTROCARDIOGRAM TRACING: CPT | Mod: S$GLB,,, | Performed by: STUDENT IN AN ORGANIZED HEALTH CARE EDUCATION/TRAINING PROGRAM

## 2021-08-13 PROCEDURE — 93010 RHYTHM STRIP: ICD-10-PCS | Mod: S$GLB,,, | Performed by: INTERNAL MEDICINE

## 2021-08-13 PROCEDURE — 3078F DIAST BP <80 MM HG: CPT | Mod: CPTII,S$GLB,, | Performed by: STUDENT IN AN ORGANIZED HEALTH CARE EDUCATION/TRAINING PROGRAM

## 2021-08-13 PROCEDURE — 93005 RHYTHM STRIP: ICD-10-PCS | Mod: S$GLB,,, | Performed by: STUDENT IN AN ORGANIZED HEALTH CARE EDUCATION/TRAINING PROGRAM

## 2021-08-13 PROCEDURE — 1126F PR PAIN SEVERITY QUANTIFIED, NO PAIN PRESENT: ICD-10-PCS | Mod: CPTII,S$GLB,, | Performed by: STUDENT IN AN ORGANIZED HEALTH CARE EDUCATION/TRAINING PROGRAM

## 2021-08-13 PROCEDURE — 99214 OFFICE O/P EST MOD 30 MIN: CPT | Mod: S$GLB,,, | Performed by: STUDENT IN AN ORGANIZED HEALTH CARE EDUCATION/TRAINING PROGRAM

## 2021-08-13 PROCEDURE — 99999 PR PBB SHADOW E&M-EST. PATIENT-LVL III: ICD-10-PCS | Mod: PBBFAC,,, | Performed by: STUDENT IN AN ORGANIZED HEALTH CARE EDUCATION/TRAINING PROGRAM

## 2021-08-16 ENCOUNTER — TELEPHONE (OUTPATIENT)
Dept: OBSTETRICS AND GYNECOLOGY | Facility: CLINIC | Age: 36
End: 2021-08-16

## 2022-01-18 ENCOUNTER — TELEPHONE (OUTPATIENT)
Dept: ELECTROPHYSIOLOGY | Facility: CLINIC | Age: 37
End: 2022-01-18
Payer: COMMERCIAL

## 2022-01-18 DIAGNOSIS — I49.8 OTHER SPECIFIED CARDIAC ARRHYTHMIAS: Primary | ICD-10-CM

## 2022-01-18 NOTE — TELEPHONE ENCOUNTER
Spoke with pt to schedule 6mth f/u. Pt is scheduled 2/2/22 for 12:45 ekg w/ appt at 1:20. Pt verbalized understanding.

## 2022-01-23 ENCOUNTER — HOSPITAL ENCOUNTER (EMERGENCY)
Facility: HOSPITAL | Age: 37
Discharge: HOME | End: 2022-01-23
Attending: EMERGENCY MEDICINE
Payer: COMMERCIAL

## 2022-01-23 VITALS
RESPIRATION RATE: 20 BRPM | BODY MASS INDEX: 46.95 KG/M2 | SYSTOLIC BLOOD PRESSURE: 131 MMHG | DIASTOLIC BLOOD PRESSURE: 84 MMHG | WEIGHT: 275 LBS | OXYGEN SATURATION: 99 % | TEMPERATURE: 98 F | HEIGHT: 64 IN | HEART RATE: 109 BPM

## 2022-01-23 DIAGNOSIS — R07.9 CHEST PAIN: ICD-10-CM

## 2022-01-23 DIAGNOSIS — R06.02 SHORTNESS OF BREATH: ICD-10-CM

## 2022-01-23 DIAGNOSIS — R05.9 COUGH: ICD-10-CM

## 2022-01-23 DIAGNOSIS — I50.9 CONGESTIVE HEART FAILURE, UNSPECIFIED HF CHRONICITY, UNSPECIFIED HEART FAILURE TYPE: ICD-10-CM

## 2022-01-23 DIAGNOSIS — R06.02 SOB (SHORTNESS OF BREATH): Primary | ICD-10-CM

## 2022-01-23 LAB
ALBUMIN SERPL BCP-MCNC: 3.9 G/DL (ref 3.5–5.2)
ALP SERPL-CCNC: 62 U/L (ref 38–126)
ALT SERPL W/O P-5'-P-CCNC: 15 U/L (ref 10–44)
ANION GAP SERPL CALC-SCNC: 10 MMOL/L (ref 8–16)
AST SERPL-CCNC: 13 U/L (ref 15–46)
B-HCG UR QL: NEGATIVE
BASOPHILS # BLD AUTO: 0.05 K/UL (ref 0–0.2)
BASOPHILS NFR BLD: 0.5 % (ref 0–1.9)
BILIRUB SERPL-MCNC: 0.4 MG/DL (ref 0.1–1)
CALCIUM SERPL-MCNC: 9.2 MG/DL (ref 8.7–10.5)
CHLORIDE SERPL-SCNC: 104 MMOL/L (ref 95–110)
CO2 SERPL-SCNC: 27 MMOL/L (ref 23–29)
CREAT SERPL-MCNC: 1.15 MG/DL (ref 0.5–1.4)
D DIMER PPP IA.FEU-MCNC: 1.84 MG/L FEU
DIFFERENTIAL METHOD: ABNORMAL
EOSINOPHIL # BLD AUTO: 0.2 K/UL (ref 0–0.5)
EOSINOPHIL NFR BLD: 1.8 % (ref 0–8)
ERYTHROCYTE [DISTWIDTH] IN BLOOD BY AUTOMATED COUNT: 20.7 % (ref 11.5–14.5)
EST. GFR  (AFRICAN AMERICAN): >60 ML/MIN/1.73 M^2
EST. GFR  (NON AFRICAN AMERICAN): >60 ML/MIN/1.73 M^2
GLUCOSE SERPL-MCNC: 94 MG/DL (ref 70–110)
HCT VFR BLD AUTO: 37.2 % (ref 37–48.5)
HGB BLD-MCNC: 11.1 G/DL (ref 12–16)
IMM GRANULOCYTES # BLD AUTO: 0.04 K/UL (ref 0–0.04)
IMM GRANULOCYTES NFR BLD AUTO: 0.4 % (ref 0–0.5)
LYMPHOCYTES # BLD AUTO: 2.9 K/UL (ref 1–4.8)
LYMPHOCYTES NFR BLD: 27.6 % (ref 18–48)
MCH RBC QN AUTO: 22.1 PG (ref 27–31)
MCHC RBC AUTO-ENTMCNC: 29.8 G/DL (ref 32–36)
MCV RBC AUTO: 74 FL (ref 82–98)
MONOCYTES # BLD AUTO: 0.7 K/UL (ref 0.3–1)
MONOCYTES NFR BLD: 6.3 % (ref 4–15)
NEUTROPHILS # BLD AUTO: 6.6 K/UL (ref 1.8–7.7)
NEUTROPHILS NFR BLD: 63.4 % (ref 38–73)
NRBC BLD-RTO: 0 /100 WBC
NT-PROBNP SERPL-MCNC: 1580 PG/ML (ref 5–450)
PLATELET # BLD AUTO: 499 K/UL (ref 150–450)
PMV BLD AUTO: 9.7 FL (ref 9.2–12.9)
POTASSIUM SERPL-SCNC: 4.1 MMOL/L (ref 3.5–5.1)
PROT SERPL-MCNC: 7.5 G/DL (ref 6–8.4)
RBC # BLD AUTO: 5.02 M/UL (ref 4–5.4)
SODIUM SERPL-SCNC: 141 MMOL/L (ref 136–145)
TROPONIN I SERPL-MCNC: 0.01 NG/ML (ref 0.01–0.03)
UUN UR-MCNC: 11 MG/DL (ref 7–17)
WBC # BLD AUTO: 10.44 K/UL (ref 3.9–12.7)

## 2022-01-23 PROCEDURE — 94760 N-INVAS EAR/PLS OXIMETRY 1: CPT | Mod: ER

## 2022-01-23 PROCEDURE — 25000003 PHARM REV CODE 250: Mod: ER | Performed by: PHYSICIAN ASSISTANT

## 2022-01-23 PROCEDURE — 99285 EMERGENCY DEPT VISIT HI MDM: CPT | Mod: 25,ER

## 2022-01-23 PROCEDURE — 96374 THER/PROPH/DIAG INJ IV PUSH: CPT | Mod: ER,59

## 2022-01-23 PROCEDURE — 63600175 PHARM REV CODE 636 W HCPCS: Mod: ER | Performed by: PHYSICIAN ASSISTANT

## 2022-01-23 PROCEDURE — 93005 ELECTROCARDIOGRAM TRACING: CPT | Mod: ER

## 2022-01-23 PROCEDURE — 96361 HYDRATE IV INFUSION ADD-ON: CPT | Mod: ER

## 2022-01-23 PROCEDURE — 80053 COMPREHEN METABOLIC PANEL: CPT | Mod: ER | Performed by: PHYSICIAN ASSISTANT

## 2022-01-23 PROCEDURE — 81025 URINE PREGNANCY TEST: CPT | Mod: ER | Performed by: PHYSICIAN ASSISTANT

## 2022-01-23 PROCEDURE — 93010 EKG 12-LEAD: ICD-10-PCS | Mod: ,,, | Performed by: INTERNAL MEDICINE

## 2022-01-23 PROCEDURE — 96375 TX/PRO/DX INJ NEW DRUG ADDON: CPT | Mod: ER,59

## 2022-01-23 PROCEDURE — 93010 ELECTROCARDIOGRAM REPORT: CPT | Mod: ,,, | Performed by: INTERNAL MEDICINE

## 2022-01-23 PROCEDURE — 25500020 PHARM REV CODE 255: Mod: ER | Performed by: EMERGENCY MEDICINE

## 2022-01-23 PROCEDURE — 85025 COMPLETE CBC W/AUTO DIFF WBC: CPT | Mod: ER | Performed by: PHYSICIAN ASSISTANT

## 2022-01-23 PROCEDURE — 85379 FIBRIN DEGRADATION QUANT: CPT | Mod: ER | Performed by: PHYSICIAN ASSISTANT

## 2022-01-23 PROCEDURE — 84484 ASSAY OF TROPONIN QUANT: CPT | Mod: ER | Performed by: PHYSICIAN ASSISTANT

## 2022-01-23 PROCEDURE — 83880 ASSAY OF NATRIURETIC PEPTIDE: CPT | Mod: ER | Performed by: PHYSICIAN ASSISTANT

## 2022-01-23 RX ORDER — LISINOPRIL AND HYDROCHLOROTHIAZIDE 10; 12.5 MG/1; MG/1
1 TABLET ORAL DAILY
Qty: 30 TABLET | Refills: 0 | Status: SHIPPED | OUTPATIENT
Start: 2022-01-23 | End: 2022-01-23 | Stop reason: SDUPTHER

## 2022-01-23 RX ORDER — CODEINE PHOSPHATE AND GUAIFENESIN 10; 100 MG/5ML; MG/5ML
5 SOLUTION ORAL
Status: COMPLETED | OUTPATIENT
Start: 2022-01-23 | End: 2022-01-23

## 2022-01-23 RX ORDER — METOPROLOL SUCCINATE 50 MG/1
50 TABLET, EXTENDED RELEASE ORAL DAILY
Qty: 30 TABLET | Refills: 0 | Status: SHIPPED | OUTPATIENT
Start: 2022-01-23 | End: 2022-01-23 | Stop reason: SDUPTHER

## 2022-01-23 RX ORDER — LISINOPRIL AND HYDROCHLOROTHIAZIDE 10; 12.5 MG/1; MG/1
1 TABLET ORAL DAILY
Qty: 30 TABLET | Refills: 0 | Status: SHIPPED | OUTPATIENT
Start: 2022-01-23 | End: 2022-02-23 | Stop reason: SDUPTHER

## 2022-01-23 RX ORDER — FUROSEMIDE 10 MG/ML
40 INJECTION INTRAMUSCULAR; INTRAVENOUS
Status: COMPLETED | OUTPATIENT
Start: 2022-01-23 | End: 2022-01-23

## 2022-01-23 RX ORDER — METOPROLOL SUCCINATE 50 MG/1
50 TABLET, EXTENDED RELEASE ORAL DAILY
Qty: 30 TABLET | Refills: 0 | Status: SHIPPED | OUTPATIENT
Start: 2022-01-23 | End: 2022-02-23 | Stop reason: SDUPTHER

## 2022-01-23 RX ORDER — METOPROLOL TARTRATE 50 MG/1
50 TABLET ORAL DAILY
Qty: 30 TABLET | Refills: 0 | Status: SHIPPED | OUTPATIENT
Start: 2022-01-23 | End: 2022-01-23 | Stop reason: SDUPTHER

## 2022-01-23 RX ORDER — METOPROLOL TARTRATE 50 MG/1
50 TABLET ORAL
Status: COMPLETED | OUTPATIENT
Start: 2022-01-23 | End: 2022-01-23

## 2022-01-23 RX ORDER — CODEINE PHOSPHATE AND GUAIFENESIN 10; 100 MG/5ML; MG/5ML
5 SOLUTION ORAL 3 TIMES DAILY PRN
Qty: 100 ML | Refills: 0 | Status: SHIPPED | OUTPATIENT
Start: 2022-01-23 | End: 2022-02-02

## 2022-01-23 RX ADMIN — METOPROLOL TARTRATE 50 MG: 50 TABLET, FILM COATED ORAL at 06:01

## 2022-01-23 RX ADMIN — LORAZEPAM 1 MG: 2 INJECTION INTRAMUSCULAR; INTRAVENOUS at 07:01

## 2022-01-23 RX ADMIN — IOHEXOL 100 ML: 350 INJECTION, SOLUTION INTRAVENOUS at 07:01

## 2022-01-23 RX ADMIN — FUROSEMIDE 40 MG: 10 INJECTION, SOLUTION INTRAMUSCULAR; INTRAVENOUS at 08:01

## 2022-01-23 RX ADMIN — GUAIFENESIN AND CODEINE PHOSPHATE 5 ML: 100; 10 SOLUTION ORAL at 06:01

## 2022-01-23 RX ADMIN — GUAIFENESIN AND CODEINE PHOSPHATE 5 ML: 100; 10 SOLUTION ORAL at 10:01

## 2022-01-23 RX ADMIN — SODIUM CHLORIDE 500 ML: 0.9 INJECTION, SOLUTION INTRAVENOUS at 07:01

## 2022-01-23 NOTE — Clinical Note
"Ursula Mckinley" Cl was seen and treated in our emergency department on 1/23/2022.  She may return to work on 01/25/2022.       If you have any questions or concerns, please don't hesitate to call.      Marj Cao PA-C"

## 2022-01-24 ENCOUNTER — HOSPITAL ENCOUNTER (OUTPATIENT)
Dept: CARDIOLOGY | Facility: CLINIC | Age: 37
Discharge: HOME | End: 2022-01-24
Payer: COMMERCIAL

## 2022-01-24 ENCOUNTER — OFFICE VISIT (OUTPATIENT)
Dept: ELECTROPHYSIOLOGY | Facility: CLINIC | Age: 37
End: 2022-01-24
Payer: COMMERCIAL

## 2022-01-24 DIAGNOSIS — I47.19 AVNRT (AV NODAL RE-ENTRY TACHYCARDIA): ICD-10-CM

## 2022-01-24 DIAGNOSIS — D25.9 UTERINE LEIOMYOMA, UNSPECIFIED LOCATION: ICD-10-CM

## 2022-01-24 DIAGNOSIS — N92.1 MENORRHAGIA WITH IRREGULAR CYCLE: ICD-10-CM

## 2022-01-24 DIAGNOSIS — U07.1 COVID-19: Primary | ICD-10-CM

## 2022-01-24 DIAGNOSIS — Z90.710 H/O ABDOMINAL HYSTERECTOMY: ICD-10-CM

## 2022-01-24 DIAGNOSIS — R06.02 SOB (SHORTNESS OF BREATH): ICD-10-CM

## 2022-01-24 DIAGNOSIS — I49.8 OTHER SPECIFIED CARDIAC ARRHYTHMIAS: ICD-10-CM

## 2022-01-24 DIAGNOSIS — R00.0 SINUS TACHYCARDIA: ICD-10-CM

## 2022-01-24 DIAGNOSIS — E66.01 CLASS 3 SEVERE OBESITY WITH BODY MASS INDEX (BMI) OF 45.0 TO 49.9 IN ADULT, UNSPECIFIED OBESITY TYPE, UNSPECIFIED WHETHER SERIOUS COMORBIDITY PRESENT: ICD-10-CM

## 2022-01-24 DIAGNOSIS — Z98.890 H/O CARDIAC RADIOFREQUENCY ABLATION: ICD-10-CM

## 2022-01-24 DIAGNOSIS — D64.9 SYMPTOMATIC ANEMIA: ICD-10-CM

## 2022-01-24 PROCEDURE — 93005 ELECTROCARDIOGRAM TRACING: CPT | Mod: PBBFAC | Performed by: INTERNAL MEDICINE

## 2022-01-24 PROCEDURE — 99999 PR PBB SHADOW E&M-EST. PATIENT-LVL II: CPT | Mod: PBBFAC,,, | Performed by: STUDENT IN AN ORGANIZED HEALTH CARE EDUCATION/TRAINING PROGRAM

## 2022-01-24 PROCEDURE — 99214 OFFICE O/P EST MOD 30 MIN: CPT | Mod: S$GLB,,, | Performed by: STUDENT IN AN ORGANIZED HEALTH CARE EDUCATION/TRAINING PROGRAM

## 2022-01-24 PROCEDURE — 93010 ELECTROCARDIOGRAM REPORT: CPT | Mod: S$GLB,,, | Performed by: INTERNAL MEDICINE

## 2022-01-24 PROCEDURE — 93005 ELECTROCARDIOGRAM TRACING: CPT | Mod: S$GLB,,, | Performed by: STUDENT IN AN ORGANIZED HEALTH CARE EDUCATION/TRAINING PROGRAM

## 2022-01-24 PROCEDURE — 99212 OFFICE O/P EST SF 10 MIN: CPT | Mod: PBBFAC | Performed by: STUDENT IN AN ORGANIZED HEALTH CARE EDUCATION/TRAINING PROGRAM

## 2022-01-24 PROCEDURE — 99214 PR OFFICE/OUTPT VISIT, EST, LEVL IV, 30-39 MIN: ICD-10-PCS | Mod: S$GLB,,, | Performed by: STUDENT IN AN ORGANIZED HEALTH CARE EDUCATION/TRAINING PROGRAM

## 2022-01-24 PROCEDURE — 93005 RHYTHM STRIP: ICD-10-PCS | Mod: S$GLB,,, | Performed by: STUDENT IN AN ORGANIZED HEALTH CARE EDUCATION/TRAINING PROGRAM

## 2022-01-24 PROCEDURE — 99999 PR PBB SHADOW E&M-EST. PATIENT-LVL II: ICD-10-PCS | Mod: PBBFAC,,, | Performed by: STUDENT IN AN ORGANIZED HEALTH CARE EDUCATION/TRAINING PROGRAM

## 2022-01-24 PROCEDURE — 93010 RHYTHM STRIP: ICD-10-PCS | Mod: S$GLB,,, | Performed by: INTERNAL MEDICINE

## 2022-01-24 NOTE — ED PROVIDER NOTES
Encounter Date: 1/23/2022       History     Chief Complaint   Patient presents with    Shortness of Breath     Pt was diagnosed with Covid x 2 weeks ago. Took a home Covid test which was negative. Pt has a cardiac history. Denies CP. Pt c/o upper abdomen pain, under breast.      HPI: Ursula Smallwood, a 36 y.o. female  has a past medical history of Anemia, Hypertension, SVT (supraventricular tachycardia), and Uterine fibroid.     She presents to the ED for evaluation of SOB.  States that she was diagnoised with covid about 2 weeks ago and since that time has suffered with a cough and feeling of not being able to fully take in a breath.  Denies CP.  No fevers.  Attests to some post-tussive vomiting at times and sensation of heaviness in chest.  Taking OTC meds with little improvement.  States that she was vaccinated for covid.  Pt attests to non-compliance with lisinopril d/t concern for angioedema.        The history is provided by the patient.     Review of patient's allergies indicates:  No Known Allergies  Past Medical History:   Diagnosis Date    Anemia     Hypertension     SVT (supraventricular tachycardia)     Uterine fibroid      Past Surgical History:   Procedure Laterality Date    ABLATION N/A 6/4/2021    Procedure: Ablation;  Surgeon: NICHELLE Zepeda MD;  Location: [facility] EP LAB;  Service: Cardiology;  Laterality: N/A;  SVT, RFA, Carto, anes, EH, 3prep    CYSTOSCOPY N/A 8/3/2021    Procedure: CYSTOSCOPY;  Surgeon: Jamarcus Ventura MD;  Location: [facility] OR;  Service: OB/GYN;  Laterality: N/A;    ROBOT-ASSISTED LAPAROSCOPIC HYSTERECTOMY N/A 8/3/2021    Procedure: ROBOTIC HYSTERECTOMY;  Surgeon: Jamarcus Ventura MD;  Location: [facility] OR;  Service: OB/GYN;  Laterality: N/A;    ROBOT-ASSISTED SALPINGECTOMY Bilateral 8/3/2021    Procedure: ROBOTIC SALPINGECTOMY;  Surgeon: Jamarcus Ventura MD;  Location: [facility] OR;  Service: OB/GYN;  Laterality: Bilateral;     No family history on file.  Social History      Tobacco Use    Smoking status: Never Smoker    Smokeless tobacco: Never Used   Substance Use Topics    Alcohol use: No    Drug use: No     Review of Systems   Constitutional: Negative for fever.   Respiratory: Positive for cough and shortness of breath.    Cardiovascular: Negative for chest pain and leg swelling.   Gastrointestinal: Positive for abdominal pain. Negative for diarrhea, nausea and vomiting.   Genitourinary: Negative for dysuria.   Skin: Negative for color change and rash.   Allergic/Immunologic: Negative for immunocompromised state.   Neurological: Negative for weakness.   Hematological: Negative for adenopathy.   Psychiatric/Behavioral: Negative for agitation.   All other systems reviewed and are negative.      Physical Exam     Initial Vitals [01/23/22 1814]   BP Pulse Resp Temp SpO2   (!) 154/109 (!) 126 (!) 26 98.4 °F (36.9 °C) 100 %      MAP       --         Physical Exam    Nursing note and vitals reviewed.  Constitutional: She appears well-developed and well-nourished. She is not diaphoretic. No distress.   HENT:   Head: Normocephalic and atraumatic.   Right Ear: External ear normal.   Left Ear: External ear normal.   Nose: Nose normal.   Eyes: Conjunctivae and EOM are normal.   Neck:   Normal range of motion.  Cardiovascular: Regular rhythm. Tachycardia present.    Pulmonary/Chest: Tachypnea noted. No respiratory distress. She has no wheezes. She has no rhonchi. She has no rales.   Musculoskeletal:         General: Normal range of motion.      Cervical back: Normal range of motion.     Neurological: She is alert and oriented to person, place, and time.   Skin: Capillary refill takes less than 2 seconds. No rash noted.   Psychiatric: Thought content normal. Her mood appears anxious.         ED Course   Procedures  Labs Reviewed   CBC W/ AUTO DIFFERENTIAL - Abnormal; Notable for the following components:       Result Value    Hemoglobin 11.1 (*)     MCV 74 (*)     MCH 22.1 (*)     MCHC  29.8 (*)     RDW 20.7 (*)     Platelets 499 (*)     All other components within normal limits   COMPREHENSIVE METABOLIC PANEL - Abnormal; Notable for the following components:    AST 13 (*)     All other components within normal limits   D DIMER, QUANTITATIVE - Abnormal; Notable for the following components:    D-Dimer 1.84 (*)     All other components within normal limits   NT-PRO NATRIURETIC PEPTIDE - Abnormal; Notable for the following components:    NT-proBNP 1580 (*)     All other components within normal limits   TROPONIN I   PREGNANCY TEST, URINE RAPID    Narrative:     Specimen Source->Urine          Imaging Results          CTA Chest Non-Coronary (PE Study) (Final result)  Result time 01/23/22 20:14:13    Final result by Bella Woodruff MD (Timothy) (01/23/22 20:14:13)                 Impression:      No evidence of pulmonary embolism.  Mild cardiomegaly.  Mild interstitial edema suspected.  Small bilateral pleural effusions.    All CT scans at this facility use dose modulation, iterative reconstruction and/or weight based dosing when appropriate to reduce radiation dose to as low as reasonably achievable.      Electronically signed by: Bella Woodruff MD  Date:    01/23/2022  Time:    20:14             Narrative:    EXAMINATION:  CTA CHEST NON CORONARY    CLINICAL HISTORY:  Pulmonary embolism (PE) suspected, positive D-dimer;    TECHNIQUE:  Standard CTA of the chest performed with 100 cc Omnipaque 350 utilizing 3-D MIP reformations.    COMPARISON:  None    FINDINGS:  No evidence of pulmonary embolism.  Thoracic aorta appears unremarkable.    The heart is mildly enlarged.  Interlobular septal thickening bilaterally with mild areas of patchy ground-glass density.  Findings favor edema.  Small bilateral pleural effusions.    Upper abdominal images are negative.                               X-Ray Chest AP Portable (Final result)  Result time 01/23/22 19:11:50    Final result by Gabe Suggs MD (01/23/22  19:11:50)                 Impression:      Mild perihilar interstitial opacities suggestive of pulmonary edema.  Atypical infectious not excluded.  Correlate clinically      Electronically signed by: Ifeanyi Bernal  Date:    01/23/2022  Time:    19:11             Narrative:    EXAMINATION:  XR CHEST AP PORTABLE    CLINICAL HISTORY:  Shortness of breath    TECHNIQUE:  Single frontal view of the chest was performed.    COMPARISON:  None    FINDINGS:  Prominent cardiac silhouette.  Mild perihilar interstitial opacities suggestive of pulmonary edema.    Bones are intact.                                 Medications   guaiFENesin-codeine 100-10 mg/5 ml syrup 5 mL (has no administration in time range)   guaiFENesin-codeine 100-10 mg/5 ml syrup 5 mL (5 mLs Oral Given 1/23/22 1830)   metoprolol tartrate (LOPRESSOR) tablet 50 mg (50 mg Oral Given 1/23/22 1830)   sodium chloride 0.9% bolus 500 mL (0 mLs Intravenous Stopped 1/23/22 1954)   lorazepam (ATIVAN) injection 1 mg (1 mg Intravenous Given 1/23/22 1945)   iohexoL (OMNIPAQUE 350) injection 100 mL (100 mLs Intravenous Given 1/23/22 1957)   furosemide injection 40 mg (40 mg Intravenous Given 1/23/22 2026)     Medical Decision Making:   Initial Assessment:   Cough, SOB, recent covid infection   Differential Diagnosis:   PE, ACS, CHF, PNA   Clinical Tests:   Lab Tests: Reviewed and Ordered  The following lab test(s) were unremarkable: CBC, CMP, Troponin and BNP  Radiological Study: Ordered and Reviewed  ED Management:  Pt presents to ED for evaluation of cough with SOB after recent covid infection.  Labs concerning for elevated D-dimer.  EKG shows no ischemic changes.  Pt given metoprolol and ativan with some improvement of HR and BP.  CTA shows no evidence of PE.  BNP elevated at 1500.  lasixs given with good urine output and improvement of blood pressure.  Spoke with attending who recommends placing pt on lisinopril-HCTZ combo and restarting metoprolol.  Pt has previously  scheduled cardiology f/u appointment in early feb that she is going to try to move up.  She was instructed to return with any new or worsening symptoms.                        Clinical Impression:   Final diagnoses:  [R06.02] SOB (shortness of breath) (Primary)  [R06.02] Shortness of breath  [R05.9] Cough  [I50.9] Congestive heart failure, unspecified HF chronicity, unspecified heart failure type                 Marj Cao PA-C  01/23/22 7027

## 2022-01-24 NOTE — PROGRESS NOTES
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      PRESENTING HISTORY:     History of Present Illness:  Ms. 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 Unfortunately, she recently contracted COVID and presented to the ED on 1/23/2022 with complaints of shortness of breath and chronic cough, noted to have sinus tachycardia in the setting of her COVID infection. 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     In brief review of her cardiac history, she previously presented to the ED 3/25/2021, 4/6/2021, and 4/16/2021 with complaints of palpitations and SVT. On ECG review, this appeared to be a narrow complex, short RP SVT. Per report, this arrhythmia terminated immediately with administration of 6 mg of adenosine. She was started on metoprolol succinate 25mg po daily. At the initial presentation with these symptoms, she was noted to be severely anemic, and was transfused 2 units of PRBCs. Despite her hemoglobin remaining stable, she continued to evidence periods of palpitations and SVT. I took her to the EP laboratory where typical AVNRT was diagnosed on EP study. This was followed by successful radiofrequency ablation of the slow pathway for typical AVNRT on 6/4/2021. She has since been able to undergo a robotic hysterectomy for definitive therapy of her metromenorrhagia secondary to uterine  "fibroids. She continues to have anemia, but this is resolving following her surgery. She had returned to her baseline level of health until approximately 12/29/2021, when her son contracted COVID and she began to develop symptoms of a COVID infection, with productive cough, shortness of breath, generalized fatigue, fevers, muscle aches, and symptoms of racing heart rates. Her shortness of breath seemed to have gotten worse, and she presented to the ED on 1/23/2022 for evaluation. At that encounter, her BP was elevated, but her oxygen saturation was acceptable, and she was discontinued on cough suppression and an optimized antihypertensive regimen.           In discussion with Ms. Smallwood today, she tells me that she is feeling overall "super terrible". She denies any further episodes of palpitations, although she continues to report shortness of breath with minimal exertion and symptoms of racing heart rates. She has continued to experience productive cough, generalized fatigue, fevers, muscle aches, and symptoms of racing heart rates without andrew palpitations. She has been taking the cough suppressant with minimal improvement in her symptoms. Her incisions from the robotic hysterectomy have healed without issue. She continues to follow with her OB/Gyn following surgery with no postoperative complications. She denies any episodes of dizziness, lightheadedness, syncope/presyncope, recent palpitations, chest pain or chest discomfort, nausea or vomiting, orthopnea, lower extremity edema, or PND. She has been unable to exercise in the setting of her COVID infection. Her children have recovered following COVID.    Review of Systems:  Review of Systems   Constitutional: Positive for activity change, fatigue and fever.   HENT: Positive for nasal congestion, postnasal drip, rhinorrhea, sinus pressure/congestion, sneezing and sore throat. Negative for nosebleeds.    Respiratory: Positive for cough and shortness of breath. " Negative for apnea, chest tightness and wheezing.    Cardiovascular: Negative for chest pain, palpitations, leg swelling and claudication.        Symptoms of racing heart rates.   Gastrointestinal: Negative for abdominal distention, abdominal pain, blood in stool, change in bowel habit, constipation, diarrhea, nausea, vomiting and change in bowel habit.   Genitourinary: Negative for dysuria and hematuria.   Musculoskeletal: Positive for myalgias. Negative for gait problem.   Neurological: Negative for dizziness, seizures, syncope, weakness, light-headedness, headaches, disturbances in coordination and coordination difficulties.        PAST HISTORY:     Past Medical History:   Diagnosis Date    Anemia     Hypertension     SVT (supraventricular tachycardia)     Uterine fibroid      Past Surgical History:   Procedure Laterality Date    ABLATION N/A 6/4/2021    Procedure: Ablation;  Surgeon: NICHELLE Zepeda MD;  Location: [facility] EP LAB;  Service: Cardiology;  Laterality: N/A;  SVT, RFA, Carto, anes, EH, 3prep    CYSTOSCOPY N/A 8/3/2021    Procedure: CYSTOSCOPY;  Surgeon: Jamarcus Ventura MD;  Location: [facility] OR;  Service: OB/GYN;  Laterality: N/A;    ROBOT-ASSISTED LAPAROSCOPIC HYSTERECTOMY N/A 8/3/2021    Procedure: ROBOTIC HYSTERECTOMY;  Surgeon: Jamarcus Ventura MD;  Location: [facility] OR;  Service: OB/GYN;  Laterality: N/A;    ROBOT-ASSISTED SALPINGECTOMY Bilateral 8/3/2021    Procedure: ROBOTIC SALPINGECTOMY;  Surgeon: Jamarcus Ventura MD;  Location: [facility] OR;  Service: OB/GYN;  Laterality: Bilateral;       Family History:  No cardiac conditions of which she is aware.     Social History:  She  reports that she has never smoked. She has never used smokeless tobacco. She reports that she does not drink alcohol and does not use drugs.      MEDICATIONS & ALLERGIES:     Review of patient's allergies indicates:  No Known Allergies    Current Outpatient Medications on File Prior to Visit   Medication Sig Dispense  Refill    elagolix-estradiol-norethindrn 300-1-0.5mg(AM) /300 mg(PM) CpSQ Take 1 tablet by mouth once daily. 30 capsule 10    ferrous sulfate (FEOSOL) 325 mg (65 mg iron) Tab tablet Take 1 tablet (325 mg total) by mouth daily with breakfast. 90 tablet 3    guaiFENesin-codeine 100-10 mg/5 ml (TUSSI-ORGANIDIN NR)  mg/5 mL syrup Take 5 mLs by mouth 3 (three) times daily as needed for Cough. 100 mL 0    ibuprofen (ADVIL,MOTRIN) 600 MG tablet Take 1 tablet (600 mg total) by mouth every 6 to 8 hours as needed. 40 tablet 0    lisinopriL (PRINIVIL,ZESTRIL) 5 MG tablet Take 1 tablet (5 mg total) by mouth once daily. 90 tablet 3    lisinopriL-hydrochlorothiazide (PRINZIDE,ZESTORETIC) 10-12.5 mg per tablet Take 1 tablet by mouth once daily. 30 tablet 0    metoprolol succinate (TOPROL-XL) 50 MG 24 hr tablet Take 1 tablet (50 mg total) by mouth once daily. 30 tablet 0    norgestrel-ethinyl estradioL (LO/OVRAL) 0.3-30 mg-mcg per tablet Take 1 tablet by mouth once daily. 90 tablet 3    oxyCODONE-acetaminophen (PERCOCET) 5-325 mg per tablet Take 1-2 tablets by mouth every 4 to 6 hours as needed. 30 tablet 0    [DISCONTINUED] metoprolol tartrate (LOPRESSOR) 50 MG tablet Take 1 tablet (50 mg total) by mouth once daily. 30 tablet 0     Current Facility-Administered Medications on File Prior to Visit   Medication Dose Route Frequency Provider Last Rate Last Admin    [COMPLETED] furosemide injection 40 mg  40 mg Intravenous ED 1 Time Marj H. AMINATA Cao   40 mg at 01/23/22 2026    [COMPLETED] guaiFENesin-codeine 100-10 mg/5 ml syrup 5 mL  5 mL Oral ED 1 Time Marj H. AMINATA Cao   5 mL at 01/23/22 1830    [COMPLETED] guaiFENesin-codeine 100-10 mg/5 ml syrup 5 mL  5 mL Oral ED 1 Time Marj H. AMINATA Cao   5 mL at 01/23/22 2205    [COMPLETED] iohexoL (OMNIPAQUE 350) injection 100 mL  100 mL Intravenous ONCE PRN Joe Stallworth MD   100 mL at 01/23/22 1957    [COMPLETED] lorazepam (ATIVAN) injection 1 mg  1 mg  Intravenous ED 1 Time Marj CAMPOS AMINATA Cao   1 mg at 01/23/22 1945    [COMPLETED] metoprolol tartrate (LOPRESSOR) tablet 50 mg  50 mg Oral ED 1 Time Marj CAMPOS AMINATA Cao   50 mg at 01/23/22 1830    [COMPLETED] sodium chloride 0.9% bolus 500 mL  500 mL Intravenous ED 1 Time Marj CAMPOS AMINATA Cao   Stopped at 01/23/22 1954        OBJECTIVE:     Vital Signs:  BP Pulse Resp Temp SpO2   (!) 154/109 (!) 126 (!) 26 98.4 °F (36.9 °C) 100      Physical Exam:  Physical Exam  Constitutional:       General: She is not in acute distress.     Appearance: Normal appearance. She is obese. She is not ill-appearing or diaphoretic.      Comments: Ill-appearing woman in mild distress, with frequent coughing spells and increased respiratory rate.    HENT:      Head: Normocephalic and atraumatic.      Comments: Mask worn in clinic in the setting of COVID precautions.   Eyes:      Pupils: Pupils are equal, round, and reactive to light.   Cardiovascular:      Rate and Rhythm: Regular rhythm. Tachycardia present.      Pulses: Normal pulses.      Heart sounds: Normal heart sounds. No murmur heard.  No friction rub. No gallop.       Comments: Sinus tachycardia noted on palpation of the right radial artery.  Pulmonary:      Effort: Pulmonary effort is normal. No respiratory distress.      Breath sounds: Normal breath sounds. No wheezing or rhonchi.      Comments: Increased respiratory rate, bilateral crackles in the lung bases bilaterally, and chronic cough.  Chest:      Chest wall: No tenderness.   Abdominal:      General: There is no distension.      Palpations: Abdomen is soft.      Tenderness: There is no abdominal tenderness.      Comments: Healing abdominal incisions from her recent robotic hysterectomy.    Musculoskeletal:         General: No swelling or tenderness.      Cervical back: Normal range of motion.      Right lower leg: No edema.      Left lower leg: No edema.   Skin:     General: Skin is warm and dry.      Findings:  No erythema, lesion or rash.   Neurological:      General: No focal deficit present.      Mental Status: She is alert and oriented to person, place, and time. Mental status is at baseline.      Motor: No weakness.      Gait: Gait normal.   Psychiatric:         Mood and Affect: Mood normal.         Behavior: Behavior normal.        Laboratory Data:  Lab Results   Component Value Date    WBC 10.44 01/23/2022    HGB 11.1 (L) 01/23/2022    HCT 37.2 01/23/2022    MCV 74 (L) 01/23/2022     (H) 01/23/2022     Lab Results   Component Value Date    GLU 94 01/23/2022     01/23/2022    K 4.1 01/23/2022     01/23/2022    CO2 27 01/23/2022    BUN 11 01/23/2022    CREATININE 1.15 01/23/2022    CALCIUM 9.2 01/23/2022    MG 1.9 03/26/2021     Lab Results   Component Value Date    INR 1.1 07/18/2021    INR 1.1 06/01/2021    INR 1.1 04/06/2021     Pertinent Cardiac Data:  ECG: Sinus tachycardia with rate of 118 bpm,  ms, QRS 88 ms, QT/QTc 338/473 ms.     Resting 2D Transthoracic Echocardiogram - 5/3/2021:  · The left ventricle is mildly enlarged with concentric hypertrophy and mildly decreased systolic function.  · The estimated ejection fraction is 45%.  · Grade I left ventricular diastolic dysfunction.  · Normal right ventricular size with normal right ventricular systolic function.  · Mild mitral regurgitation.  · Mild tricuspid regurgitation.  · Normal central venous pressure (3 mmHg).  · The estimated PA systolic pressure is 31 mmHg.    Pharmacologic Nuclear Cardiac Stress Test (PET) - 5/21/2021:    There are no clinically significant perfusion abnormalities. There is a small to moderate (10-15%) amount of mild fixed heterogeneity that improves with stress.    The whole heart absolute myocardial perfusion values averaged 1.36 cc/min/g at rest, which is elevated; 1.85 cc/min/g at stress, which is low normal; and CFR is 1.38 , which is moderately reduced in part due to elevated resting flow.    The  gated perfusion images showed an ejection fraction of 39% at rest and 42% during stress. A normal ejection fraction is greater than 51%.    There is mild global hypokinesis at rest and during stress.    The LV cavity size is normal at rest and stress.    The EKG portion of this study is negative for ischemia.    During stress, rare PVCs are noted.    The patient reported no chest pain during the stress test.    There are no prior studies for comparison.    EP Study and Radiofrequency Ablation - 6/4/2021:  · Successful radiofrequency slow pathway modification for typical, slow-fast AVNRT.    ASSESSMENT & PLAN:   Ms. 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 Unfortunately, she recently contracted COVID and presented to the ED on 1/23/2022 with complaints of shortness of breath and chronic cough, noted to have sinus tachycardia in the setting of her COVID infection. 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     - She denies any further episodes of palpitations following her prior ablation, although she is now reporting symptoms of racing heart rates since she has contracted COVID. She has not had any evidence of recurrent SVT on serial ECGs following ablation, with sinus tachycardia noted in the setting of her COVID infection.  - We discussed that her COVID infection is likely driving her sinus tachycardia, and that often this symptom is secondary to the underlying increased respiratory rate and her coughing spells, and may not resolve until she has recovered from her COVID infection. We discussed that in some cases, recovery from COVID  may be a prolonged course, and she may continue to have periods of sinus tachycardia in the future.   - She remains on her recently adjusted antihypertensive regimen, with reported improvement in her home BP recordings.   - We discussed the pathophysiology of typical AVNRT and we reviewed the area that was ablated. A rudimentary diagram was drawn for illustrative purposes. She remains on metoprolol succinate 50mg po daily with no adverse side effects.  - A prior resting 2D transthoracic echocardiogram revealed mildly decreased systolic function with LVEF 45%. This was prior to ablation and may potentially be attributable to periods of tachycardia-mediated cardiomyopathy. We will repeat this study at this time in order to reassess her overall systolic function following ablation and her recent COVID infection.   - Possible underlying drivers of atrial arrhythmia were addressed at this appointment, including recommendations for weight loss - now a class I recommendation. We discussed the role that her COVID infection and her prior anemia may play in exacerbating her sinus tachycardia. Review of laboratory data revealed stable and acceptable TSH at 1.87.     This patient will return to clinic with me in three months with a repeat resting 2D TTE in the interim. All questions and concerns were addressed at this encounter.     Signing Physician:       ELSI Zepeda MD  Electrophysiology Attending

## 2022-01-26 PROBLEM — Z90.710 H/O ABDOMINAL HYSTERECTOMY: Status: ACTIVE | Noted: 2022-01-26

## 2022-01-26 PROBLEM — U07.1 COVID-19: Status: ACTIVE | Noted: 2022-01-26

## 2022-01-27 ENCOUNTER — TELEPHONE (OUTPATIENT)
Dept: EMERGENCY MEDICINE | Facility: HOSPITAL | Age: 37
End: 2022-01-27
Payer: COMMERCIAL

## 2022-01-27 RX ORDER — BENZONATATE 200 MG/1
200 CAPSULE ORAL 3 TIMES DAILY PRN
Qty: 30 CAPSULE | Refills: 0 | Status: SHIPPED | OUTPATIENT
Start: 2022-01-27 | End: 2022-02-06

## 2022-02-01 ENCOUNTER — TELEPHONE (OUTPATIENT)
Dept: RESEARCH | Facility: HOSPITAL | Age: 37
End: 2022-02-01
Payer: COMMERCIAL

## 2022-02-01 NOTE — TELEPHONE ENCOUNTER
Study title: Detection of Reduced Left Ventricular Ejection Fraction and Atrial Arrhymias with Single Lead ECG using [employer]  IRB #: [id number]  IRB approval date: 6/30/2021  Sponsor: [employer]  Site Number: [id number]  Subject ID: N/A  Date of Encounter:  2/1/2022    Called to introduce the patient to the EKO clinical trial. Patient was not interested at this time.

## 2022-02-16 ENCOUNTER — TELEPHONE (OUTPATIENT)
Dept: ELECTROPHYSIOLOGY | Facility: CLINIC | Age: 37
End: 2022-02-16
Payer: COMMERCIAL

## 2022-02-16 NOTE — TELEPHONE ENCOUNTER
Spoke with pt to schedule ECHO that is needed prior to f/u appt on 4/29. Pt is set to have the Echo done on 4/25 for 1:00 pm. Pt verbalized understanding of appt time and location ([facility]).

## 2022-02-23 ENCOUNTER — PATIENT MESSAGE (OUTPATIENT)
Dept: CARDIOLOGY | Facility: CLINIC | Age: 37
End: 2022-02-23
Payer: COMMERCIAL

## 2022-02-23 RX ORDER — LISINOPRIL AND HYDROCHLOROTHIAZIDE 10; 12.5 MG/1; MG/1
1 TABLET ORAL DAILY
Qty: 30 TABLET | Refills: 0 | OUTPATIENT
Start: 2022-02-23 | End: 2022-03-02

## 2022-02-24 RX ORDER — METOPROLOL SUCCINATE 50 MG/1
50 TABLET, EXTENDED RELEASE ORAL DAILY
Qty: 30 TABLET | Refills: 0 | Status: ON HOLD | OUTPATIENT
Start: 2022-02-24 | End: 2022-03-02 | Stop reason: HOSPADM

## 2022-02-26 ENCOUNTER — HOSPITAL ENCOUNTER (INPATIENT)
Facility: HOSPITAL | Age: 37
LOS: 3 days | Discharge: HOME | DRG: 291 | End: 2022-03-02
Attending: EMERGENCY MEDICINE | Admitting: FAMILY MEDICINE
Payer: COMMERCIAL

## 2022-02-26 DIAGNOSIS — I50.43 ACUTE ON CHRONIC COMBINED SYSTOLIC AND DIASTOLIC CONGESTIVE HEART FAILURE: Primary | ICD-10-CM

## 2022-02-26 DIAGNOSIS — I31.39 PERICARDIAL EFFUSION: ICD-10-CM

## 2022-02-26 DIAGNOSIS — R06.02 SOB (SHORTNESS OF BREATH): ICD-10-CM

## 2022-02-26 DIAGNOSIS — R00.0 TACHYCARDIA: ICD-10-CM

## 2022-02-26 DIAGNOSIS — I50.9 HEART FAILURE: ICD-10-CM

## 2022-02-26 DIAGNOSIS — I50.33 ACUTE ON CHRONIC DIASTOLIC CONGESTIVE HEART FAILURE: ICD-10-CM

## 2022-02-26 DIAGNOSIS — R00.0 SINUS TACHYCARDIA: ICD-10-CM

## 2022-02-26 DIAGNOSIS — I47.10 SVT (SUPRAVENTRICULAR TACHYCARDIA): ICD-10-CM

## 2022-02-26 LAB
ALBUMIN SERPL BCP-MCNC: 3.9 G/DL (ref 3.5–5.2)
ALP SERPL-CCNC: 59 U/L (ref 38–126)
ALT SERPL W/O P-5'-P-CCNC: 19 U/L (ref 10–44)
ANION GAP SERPL CALC-SCNC: 11 MMOL/L (ref 8–16)
AST SERPL-CCNC: 12 U/L (ref 15–46)
BASOPHILS # BLD AUTO: 0.04 K/UL (ref 0–0.2)
BASOPHILS NFR BLD: 0.4 % (ref 0–1.9)
BILIRUB SERPL-MCNC: 0.5 MG/DL (ref 0.1–1)
CALCIUM SERPL-MCNC: 8.5 MG/DL (ref 8.7–10.5)
CHLORIDE SERPL-SCNC: 105 MMOL/L (ref 95–110)
CO2 SERPL-SCNC: 23 MMOL/L (ref 23–29)
CREAT SERPL-MCNC: 1.13 MG/DL (ref 0.5–1.4)
D DIMER PPP IA.FEU-MCNC: 1.22 MG/L FEU
DIFFERENTIAL METHOD: ABNORMAL
EOSINOPHIL # BLD AUTO: 0.1 K/UL (ref 0–0.5)
EOSINOPHIL NFR BLD: 0.7 % (ref 0–8)
ERYTHROCYTE [DISTWIDTH] IN BLOOD BY AUTOMATED COUNT: 18.6 % (ref 11.5–14.5)
EST. GFR  (AFRICAN AMERICAN): >60 ML/MIN/1.73 M^2
EST. GFR  (NON AFRICAN AMERICAN): >60 ML/MIN/1.73 M^2
GLUCOSE SERPL-MCNC: 109 MG/DL (ref 70–110)
HCT VFR BLD AUTO: 37.8 % (ref 37–48.5)
HGB BLD-MCNC: 11.5 G/DL (ref 12–16)
IMM GRANULOCYTES # BLD AUTO: 0.04 K/UL (ref 0–0.04)
IMM GRANULOCYTES NFR BLD AUTO: 0.4 % (ref 0–0.5)
LYMPHOCYTES # BLD AUTO: 2.6 K/UL (ref 1–4.8)
LYMPHOCYTES NFR BLD: 23.9 % (ref 18–48)
MCH RBC QN AUTO: 22.2 PG (ref 27–31)
MCHC RBC AUTO-ENTMCNC: 30.4 G/DL (ref 32–36)
MCV RBC AUTO: 73 FL (ref 82–98)
MONOCYTES # BLD AUTO: 0.6 K/UL (ref 0.3–1)
MONOCYTES NFR BLD: 5.5 % (ref 4–15)
NEUTROPHILS # BLD AUTO: 7.4 K/UL (ref 1.8–7.7)
NEUTROPHILS NFR BLD: 69.1 % (ref 38–73)
NRBC BLD-RTO: 0 /100 WBC
NT-PROBNP SERPL-MCNC: 4620 PG/ML (ref 5–450)
PLATELET # BLD AUTO: 481 K/UL (ref 150–450)
PMV BLD AUTO: 9.8 FL (ref 9.2–12.9)
POTASSIUM SERPL-SCNC: 3.8 MMOL/L (ref 3.5–5.1)
PROT SERPL-MCNC: 7.3 G/DL (ref 6–8.4)
RBC # BLD AUTO: 5.19 M/UL (ref 4–5.4)
SARS-COV-2 RDRP RESP QL NAA+PROBE: NEGATIVE
SODIUM SERPL-SCNC: 139 MMOL/L (ref 136–145)
TROPONIN I SERPL-MCNC: 0.01 NG/ML (ref 0.01–0.03)
UUN UR-MCNC: 12 MG/DL (ref 7–17)
WBC # BLD AUTO: 10.69 K/UL (ref 3.9–12.7)

## 2022-02-26 PROCEDURE — 93010 ELECTROCARDIOGRAM REPORT: CPT | Mod: ,,, | Performed by: INTERNAL MEDICINE

## 2022-02-26 PROCEDURE — U0002 COVID-19 LAB TEST NON-CDC: HCPCS | Mod: ER | Performed by: FAMILY MEDICINE

## 2022-02-26 PROCEDURE — 96374 THER/PROPH/DIAG INJ IV PUSH: CPT | Mod: ER

## 2022-02-26 PROCEDURE — 84484 ASSAY OF TROPONIN QUANT: CPT | Mod: ER | Performed by: EMERGENCY MEDICINE

## 2022-02-26 PROCEDURE — 99285 EMERGENCY DEPT VISIT HI MDM: CPT | Mod: 25,ER

## 2022-02-26 PROCEDURE — 25500020 PHARM REV CODE 255: Mod: ER | Performed by: EMERGENCY MEDICINE

## 2022-02-26 PROCEDURE — 63600175 PHARM REV CODE 636 W HCPCS: Mod: ER | Performed by: EMERGENCY MEDICINE

## 2022-02-26 PROCEDURE — 85379 FIBRIN DEGRADATION QUANT: CPT | Mod: ER | Performed by: EMERGENCY MEDICINE

## 2022-02-26 PROCEDURE — 93005 ELECTROCARDIOGRAM TRACING: CPT | Mod: ER

## 2022-02-26 PROCEDURE — 85025 COMPLETE CBC W/AUTO DIFF WBC: CPT | Mod: ER | Performed by: EMERGENCY MEDICINE

## 2022-02-26 PROCEDURE — 80053 COMPREHEN METABOLIC PANEL: CPT | Mod: ER | Performed by: EMERGENCY MEDICINE

## 2022-02-26 PROCEDURE — 93010 EKG 12-LEAD: ICD-10-PCS | Mod: ,,, | Performed by: INTERNAL MEDICINE

## 2022-02-26 PROCEDURE — 83880 ASSAY OF NATRIURETIC PEPTIDE: CPT | Mod: ER | Performed by: EMERGENCY MEDICINE

## 2022-02-26 RX ORDER — FUROSEMIDE 10 MG/ML
20 INJECTION INTRAMUSCULAR; INTRAVENOUS
Status: COMPLETED | OUTPATIENT
Start: 2022-02-26 | End: 2022-02-26

## 2022-02-26 RX ADMIN — FUROSEMIDE 20 MG: 10 INJECTION, SOLUTION INTRAVENOUS at 08:02

## 2022-02-26 RX ADMIN — IOHEXOL 100 ML: 350 INJECTION, SOLUTION INTRAVENOUS at 08:02

## 2022-02-27 PROBLEM — I50.9 ACUTE EXACERBATION OF CHF (CONGESTIVE HEART FAILURE): Status: ACTIVE | Noted: 2022-02-27

## 2022-02-27 PROBLEM — I47.10 SVT (SUPRAVENTRICULAR TACHYCARDIA): Status: ACTIVE | Noted: 2022-02-27

## 2022-02-27 LAB
ALBUMIN SERPL BCP-MCNC: 3.8 G/DL (ref 3.5–5.2)
ALP SERPL-CCNC: 61 U/L (ref 55–135)
ALT SERPL W/O P-5'-P-CCNC: 16 U/L (ref 10–44)
ANION GAP SERPL CALC-SCNC: 11 MMOL/L (ref 8–16)
AST SERPL-CCNC: 8 U/L (ref 10–40)
BILIRUB SERPL-MCNC: 0.8 MG/DL (ref 0.1–1)
BUN SERPL-MCNC: 11 MG/DL (ref 6–20)
CALCIUM SERPL-MCNC: 9.1 MG/DL (ref 8.7–10.5)
CHLORIDE SERPL-SCNC: 105 MMOL/L (ref 95–110)
CO2 SERPL-SCNC: 21 MMOL/L (ref 23–29)
CREAT SERPL-MCNC: 1.2 MG/DL (ref 0.5–1.4)
EST. GFR  (AFRICAN AMERICAN): >60 ML/MIN/1.73 M^2
EST. GFR  (NON AFRICAN AMERICAN): 57 ML/MIN/1.73 M^2
ESTIMATED AVG GLUCOSE: 134 MG/DL (ref 68–131)
GLUCOSE SERPL-MCNC: 115 MG/DL (ref 70–110)
HBA1C MFR BLD: 6.3 % (ref 4–5.6)
POTASSIUM SERPL-SCNC: 3.6 MMOL/L (ref 3.5–5.1)
PROT SERPL-MCNC: 7.7 G/DL (ref 6–8.4)
SODIUM SERPL-SCNC: 137 MMOL/L (ref 136–145)
TSH SERPL DL<=0.005 MIU/L-ACNC: 2.09 UIU/ML (ref 0.4–4)

## 2022-02-27 PROCEDURE — 25000003 PHARM REV CODE 250: Performed by: STUDENT IN AN ORGANIZED HEALTH CARE EDUCATION/TRAINING PROGRAM

## 2022-02-27 PROCEDURE — 96376 TX/PRO/DX INJ SAME DRUG ADON: CPT

## 2022-02-27 PROCEDURE — 11000001 HC ACUTE MED/SURG PRIVATE ROOM

## 2022-02-27 PROCEDURE — 84443 ASSAY THYROID STIM HORMONE: CPT | Performed by: STUDENT IN AN ORGANIZED HEALTH CARE EDUCATION/TRAINING PROGRAM

## 2022-02-27 PROCEDURE — 36415 COLL VENOUS BLD VENIPUNCTURE: CPT | Performed by: STUDENT IN AN ORGANIZED HEALTH CARE EDUCATION/TRAINING PROGRAM

## 2022-02-27 PROCEDURE — 83036 HEMOGLOBIN GLYCOSYLATED A1C: CPT | Performed by: STUDENT IN AN ORGANIZED HEALTH CARE EDUCATION/TRAINING PROGRAM

## 2022-02-27 PROCEDURE — 96372 THER/PROPH/DIAG INJ SC/IM: CPT | Performed by: STUDENT IN AN ORGANIZED HEALTH CARE EDUCATION/TRAINING PROGRAM

## 2022-02-27 PROCEDURE — 80053 COMPREHEN METABOLIC PANEL: CPT | Performed by: STUDENT IN AN ORGANIZED HEALTH CARE EDUCATION/TRAINING PROGRAM

## 2022-02-27 PROCEDURE — 63600175 PHARM REV CODE 636 W HCPCS: Performed by: STUDENT IN AN ORGANIZED HEALTH CARE EDUCATION/TRAINING PROGRAM

## 2022-02-27 RX ORDER — METOPROLOL TARTRATE 50 MG/1
50 TABLET ORAL ONCE
Status: COMPLETED | OUTPATIENT
Start: 2022-02-27 | End: 2022-02-27

## 2022-02-27 RX ORDER — FUROSEMIDE 10 MG/ML
40 INJECTION INTRAMUSCULAR; INTRAVENOUS
Status: DISCONTINUED | OUTPATIENT
Start: 2022-02-27 | End: 2022-02-27

## 2022-02-27 RX ORDER — METOPROLOL SUCCINATE 50 MG/1
50 TABLET, EXTENDED RELEASE ORAL DAILY
Status: DISCONTINUED | OUTPATIENT
Start: 2022-02-27 | End: 2022-02-27

## 2022-02-27 RX ORDER — TALC
6 POWDER (GRAM) TOPICAL NIGHTLY PRN
Status: DISCONTINUED | OUTPATIENT
Start: 2022-02-27 | End: 2022-03-02 | Stop reason: HOSPADM

## 2022-02-27 RX ORDER — FUROSEMIDE 10 MG/ML
60 INJECTION INTRAMUSCULAR; INTRAVENOUS
Status: DISCONTINUED | OUTPATIENT
Start: 2022-02-27 | End: 2022-02-28

## 2022-02-27 RX ORDER — ONDANSETRON 2 MG/ML
4 INJECTION INTRAMUSCULAR; INTRAVENOUS EVERY 8 HOURS PRN
Status: DISCONTINUED | OUTPATIENT
Start: 2022-02-27 | End: 2022-03-02 | Stop reason: HOSPADM

## 2022-02-27 RX ORDER — LISINOPRIL 10 MG/1
10 TABLET ORAL DAILY
Status: DISCONTINUED | OUTPATIENT
Start: 2022-02-27 | End: 2022-02-28

## 2022-02-27 RX ORDER — ACETAMINOPHEN 325 MG/1
650 TABLET ORAL EVERY 8 HOURS PRN
Status: DISCONTINUED | OUTPATIENT
Start: 2022-02-27 | End: 2022-03-01

## 2022-02-27 RX ORDER — HYDROCHLOROTHIAZIDE 12.5 MG/1
12.5 TABLET ORAL DAILY
Status: DISCONTINUED | OUTPATIENT
Start: 2022-02-27 | End: 2022-02-28

## 2022-02-27 RX ORDER — SODIUM CHLORIDE 0.9 % (FLUSH) 0.9 %
10 SYRINGE (ML) INJECTION
Status: DISCONTINUED | OUTPATIENT
Start: 2022-02-27 | End: 2022-03-02 | Stop reason: HOSPADM

## 2022-02-27 RX ORDER — ENOXAPARIN SODIUM 100 MG/ML
40 INJECTION SUBCUTANEOUS EVERY 12 HOURS
Status: DISCONTINUED | OUTPATIENT
Start: 2022-02-27 | End: 2022-03-02 | Stop reason: HOSPADM

## 2022-02-27 RX ORDER — METOPROLOL SUCCINATE 50 MG/1
100 TABLET, EXTENDED RELEASE ORAL DAILY
Status: DISCONTINUED | OUTPATIENT
Start: 2022-02-28 | End: 2022-03-02 | Stop reason: HOSPADM

## 2022-02-27 RX ADMIN — LISINOPRIL 10 MG: 10 TABLET ORAL at 10:02

## 2022-02-27 RX ADMIN — METOPROLOL TARTRATE 50 MG: 50 TABLET, FILM COATED ORAL at 04:02

## 2022-02-27 RX ADMIN — METOPROLOL SUCCINATE 50 MG: 50 TABLET, EXTENDED RELEASE ORAL at 10:02

## 2022-02-27 RX ADMIN — FUROSEMIDE 40 MG: 10 INJECTION, SOLUTION INTRAMUSCULAR; INTRAVENOUS at 10:02

## 2022-02-27 RX ADMIN — ENOXAPARIN SODIUM 40 MG: 100 INJECTION SUBCUTANEOUS at 08:02

## 2022-02-27 RX ADMIN — ENOXAPARIN SODIUM 40 MG: 100 INJECTION SUBCUTANEOUS at 10:02

## 2022-02-27 RX ADMIN — FUROSEMIDE 60 MG: 10 INJECTION, SOLUTION INTRAVENOUS at 10:02

## 2022-02-27 RX ADMIN — HYDROCHLOROTHIAZIDE 12.5 MG: 12.5 TABLET ORAL at 10:02

## 2022-02-27 NOTE — ED NOTES
Alicia stated that it would be about 1 more hour before they would be able to get pt and transfer her

## 2022-02-27 NOTE — PLAN OF CARE
Problem: Adult Inpatient Plan of Care  Goal: Plan of Care Review  Outcome: Ongoing, Progressing      02/27/22 1103   Admission   Initial VN Admission Questions Complete  (Patient arrived to unit. AAOx4. Sister at bedside. Admission questions completed. POC reviewed. Allowed time for questions. Instructed patient to call for assistance.)   Communication Issues? None   Shift   Virtual Nurse - Rounding Complete   Virtual Nurse - Patient Verbalized Approval Of Camera Use;VN Rounding   Type of Frequent Check   Type   (Admission)   Safety/Activity   Patient Rounds call light in patient/parent reach;visualized patient   Safety Promotion/Fall Prevention instructed to call staff for mobility;family to remain at bedside;assistive device/personal item within reach   Positioning   Body Position position changed independently   Pain/Comfort/Sleep   Sleep/Rest/Relaxation awake

## 2022-02-27 NOTE — ASSESSMENT & PLAN NOTE
s/p radiofrequency catheter ablation 6/4/2021  Will consult [facility] cardiology  Remain on cardiac telemetry

## 2022-02-27 NOTE — H&P
[facility] Medicine  History & Physical    Patient Name: Ursula Smallwood  MRN: [id number]  Patient Class: OP- Observation  Admission Date: 2/26/2022  Attending Physician: Baltazar Ray III, MD   Primary Care Provider: Tyrese Garcia MD    Patient information was obtained from patient and ER records.     Subjective:     Principal Problem:Acute exacerbation of CHF (congestive heart failure)    Chief Complaint:   Chief Complaint   Patient presents with    Shortness of Breath     C/o sob, cough, and out of fluid pill. Pt has chf ran out of HCTZ yesterday. States she is coughing so hard she is vomiting. States vomit is clear mucous.   Has called her pcp and was told they would call in rx but have not.   Denies any fever.         HPI: Ursula Smallwood is a 36-year-old female PMHx SVT s/p for radiofrequency catheter ablation (06/04/2021), CHF, HTN, anemia 2/2 uterine fibroids s/p hysterectomy who presented to [facility] ED with chief complaint of shortness of breath and clear productive cough. Endorses shortness of breath worsened yesterday after running out of her blood pressure medication HCTZ-lisinopril.  Reports a history of heart failure. Also endorses orthopnea and lower extremity edema. Endorses her family members will sometimes hear her snore and have apneic events. Denies any history of ALISSA.    In the ED, vitals with heart rate in the 130s, /118. Pro-BNP 4620. EKG with sinus tachycardia. CTA chest with small right pleural effusion, subsolid basilar opacities and a small pericardial effusion. Patient admitted to [facility] under [facility] for tachycardia and acute CHF exacerbation.       Past Medical History:   Diagnosis Date    Anemia     Hypertension     SVT (supraventricular tachycardia)     Uterine fibroid        Past Surgical History:   Procedure Laterality Date    ABLATION N/A 6/4/2021    Procedure: Ablation;  Surgeon: NICHELLE Zepeda MD;  Location: [facility];   Service: Cardiology;  Laterality: N/A;  SVT, RFA, Carto, anes, EH, 3prep    CYSTOSCOPY N/A 8/3/2021    Procedure: CYSTOSCOPY;  Surgeon: Jamarcus Ventura MD;  Location: [facility] OR;  Service: OB/GYN;  Laterality: N/A;    ROBOT-ASSISTED LAPAROSCOPIC HYSTERECTOMY N/A 8/3/2021    Procedure: ROBOTIC HYSTERECTOMY;  Surgeon: Jamarcus Ventura MD;  Location: [facility] OR;  Service: OB/GYN;  Laterality: N/A;    ROBOT-ASSISTED SALPINGECTOMY Bilateral 8/3/2021    Procedure: ROBOTIC SALPINGECTOMY;  Surgeon: Jamarcus Ventura MD;  Location: [facility] OR;  Service: OB/GYN;  Laterality: Bilateral;       Review of patient's allergies indicates:  No Known Allergies    No current facility-administered medications on file prior to encounter.     Current Outpatient Medications on File Prior to Encounter   Medication Sig    lisinopriL (PRINIVIL,ZESTRIL) 5 MG tablet Take 1 tablet (5 mg total) by mouth once daily.    lisinopriL-hydrochlorothiazide (PRINZIDE,ZESTORETIC) 10-12.5 mg per tablet Take 1 tablet by mouth once daily.    metoprolol succinate (TOPROL-XL) 50 MG 24 hr tablet Take 1 tablet (50 mg total) by mouth once daily.    ibuprofen (ADVIL,MOTRIN) 600 MG tablet Take 1 tablet (600 mg total) by mouth every 6 to 8 hours as needed.    norgestrel-ethinyl estradioL (LO/OVRAL) 0.3-30 mg-mcg per tablet Take 1 tablet by mouth once daily.    oxyCODONE-acetaminophen (PERCOCET) 5-325 mg per tablet Take 1-2 tablets by mouth every 4 to 6 hours as needed.    [DISCONTINUED] metoprolol tartrate (LOPRESSOR) 50 MG tablet Take 1 tablet (50 mg total) by mouth once daily.     Family History    None       Tobacco Use    Smoking status: Never Smoker    Smokeless tobacco: Never Used   Substance and Sexual Activity    Alcohol use: No    Drug use: No    Sexual activity: Not Currently     Partners: Male     Birth control/protection: Injection     Review of Systems   Constitutional:  Negative for chills and fever.   HENT: Negative.     Eyes: Negative.     Respiratory:  Positive for cough, chest tightness and shortness of breath.    Cardiovascular:  Positive for leg swelling. Negative for chest pain and palpitations.   Gastrointestinal:  Negative for nausea and vomiting.   Endocrine: Negative.    Genitourinary: Negative.    Musculoskeletal: Negative.    Skin: Negative.    Neurological: Negative.    Psychiatric/Behavioral: Negative.     Objective:     Vital Signs (Most Recent):  Temp: 96.3 °F (35.7 °C) (02/27/22 1135)  Pulse: (!) 130 (02/27/22 1212)  Resp: 20 (02/27/22 1135)  BP: (!) 168/107 (02/27/22 1135)  SpO2: (!) 94 % (02/27/22 1135)   Vital Signs (24h Range):  Temp:  [96.3 °F (35.7 °C)-98.9 °F (37.2 °C)] 96.3 °F (35.7 °C)  Pulse:  [121-136] 130  Resp:  [13-30] 20  SpO2:  [94 %-100 %] 94 %  BP: (157-183)/() 168/107     Weight: 135.3 kg (298 lb 4.8 oz)  Body mass index is 54.56 kg/m².    Physical Exam  Constitutional:       Appearance: She is obese.   Cardiovascular:      Rate and Rhythm: Regular rhythm. Tachycardia present.      Heart sounds: No murmur heard.    No friction rub. No gallop.   Pulmonary:      Breath sounds: No wheezing, rhonchi or rales.   Abdominal:      Tenderness: There is no abdominal tenderness.   Musculoskeletal:      Right lower leg: Edema (1+ pitting) present.      Left lower leg: Edema (1+ pitting) present.   Neurological:      Mental Status: She is alert and oriented to person, place, and time.   Psychiatric:         Mood and Affect: Mood normal.           Significant Labs: All pertinent labs within the past 24 hours have been reviewed.    CBC:   Recent Labs   Lab 02/26/22 1902   WBC 10.69   HGB 11.5*   HCT 37.8   *     CMP:   Recent Labs   Lab 02/26/22 1902 02/27/22  1105    137   K 3.8 3.6    105   CO2 23 21*    115*   BUN 12 11   CREATININE 1.13 1.2   CALCIUM 8.5* 9.1   PROT 7.3 7.7   ALBUMIN 3.9 3.8   BILITOT 0.5 0.8   ALKPHOS 59 61   AST 12* 8*   ALT 19 16   ANIONGAP 11 11   EGFRNONAA >60.0 57*      Significant Imaging:   EXAMINATION:  CTA CHEST NON CORONARY     CLINICAL HISTORY:  Pulmonary embolism (PE) suspected, positive D-dimer;     TECHNIQUE:  Low dose axial images, sagittal and coronal reformations were obtained from the thoracic inlet to the lung bases following the IV administration of 100 mL of Omnipaque 350.  Contrast timing was optimized to evaluate the pulmonary arteries.  MIP images were performed.     COMPARISON:  None     FINDINGS:  Base of Neck: No significant abnormality.     Thoracic soft tissues: Unremarkable.     Aorta: Left-sided aortic arch.  No aneurysm and no significant atherosclerosis     Heart: Normal size.  Small effusion.     Pulmonary vasculature: Pulmonary arteries are well opacified.  There is no pulmonary thromboembolism.     Dray/Mediastinum: No pathologic hermelinda enlargement.     Airways: Patent.     Lungs/Pleura: Small right pleural effusion.  Subsolid basilar opacities, overall nonspecific.  Early edema or infection should be considered.     Esophagus: Unremarkable.     Upper Abdomen: No abnormality of the partially imaged upper abdomen.     Bones: No acute fracture. No suspicious lytic or sclerotic lesions.     Impression:     Small right pleural effusion.  Subsolid basilar opacities overall nonspecific.  Early edema or infection should be considered.     Small pericardial effusion.     No pulmonary thromboembolism.     All CT scans at this facility are performed  using dose modulation techniques as appropriate to performed exam including the following:  automated exposure control; adjustment of mA and/or kV according to the patients size (this includes techniques or standardized protocols for targeted exams where dose is matched to indication/reason for exam: i.e. extremities or head);  iterative reconstruction technique.        Electronically signed by: Regulo Vale  Date:                                            02/26/2022  Time:                                            21:12      I have reviewed all pertinent imaging results/findings within the past 24 hours.    Assessment/Plan:     * Acute exacerbation of CHF (congestive heart failure)  Evidenced by history, elevated BNP, pulmonary edema, peripheral edema.    Plan:  - Diuresis w/ IV Lasix 60 mg BID  - GDMT: continue home metoprolol 50 mg daily, lisionpril 10 mg daily  - CPAP at night (needs outpatient sleep study) can aid in CHF by increasing intrathoracic pressure  - follow-up repeat echocardiogram  - Daily Weights  - Strict I/O  - Fluid restriction < 1.5 L daily  - Sodium restriction < 2 g/day  - Low-sodium cardiac diet  - will reassess volume status regularly      Hx of SVT (supraventricular tachycardia)  s/p radiofrequency catheter ablation 6/4/2021  [facility]  Remain on cardiac telemetry      Sinus tachycardia  EKG with sinus tachycardia  CTA without PE  [facility] cardiology consulted, will appreciate any recommendations      VTE Risk Mitigation (From admission, onward)         Ordered     enoxaparin injection 40 mg  Every 12 hours         02/27/22 0936     IP VTE HIGH RISK PATIENT  Once         02/27/22 0936     Place sequential compression device  Until discontinued         02/27/22 0936                Simone Pro DO  [facility], PGY-2  Department of Hospital Medicine   [place] [facility]

## 2022-02-27 NOTE — NURSING
Patient arrived to room from [facility], AAOx4 and denies any pain. IV clean, dry, and intact. Tele monitor in place reading ST. /102 and . Dr. Ridley notified of patient's arrival and VS. Physician states team will be rounding on patient. Bed alarm on, bed locked and low, side rails up x2, and call bell in reach.

## 2022-02-27 NOTE — PLAN OF CARE
Patient transferred from [facility] ED. Patient AAOx4 and denies any pain. IV Lasix administered and strict intake and output maintained. Fluid restriction of 1.5L initiated. Cardiology consulted. Echo pending. Tele monitor in place reading ST. No red alarms or ectopy noted. Bed alarm on, bed locked and low, side rails up x2, and call bell in reach.

## 2022-02-27 NOTE — ASSESSMENT & PLAN NOTE
Evidenced by history, elevated BNP, pulmonary edema, peripheral edema.    Plan:  - Diuresis w/ IV Lasix 60 mg BID  - GDMT: continue home metoprolol 50 mg daily, lisionpril 10 mg daily  - CPAP at night (needs outpatient sleep study) can aid in CHF by increasing intrathoracic pressure  - follow-up repeat echocardiogram  - Daily Weights  - Strict I/O  - Fluid restriction < 1.5 L daily  - Sodium restriction < 2 g/day  - Low-sodium cardiac diet  - will reassess volume status regularly

## 2022-02-27 NOTE — ED PROVIDER NOTES
Encounter Date: 2/26/2022       History     Chief Complaint   Patient presents with    Shortness of Breath     C/o sob, cough, and out of fluid pill. Pt has chf ran out of HCTZ yesterday. States she is coughing so hard she is vomiting. States vomit is clear mucous.   Has called her pcp and was told they would call in rx but have not.   Denies any fever.      The patient currently presents with concern of ongoing shortness of breath.  She knows that this began earlier today.  Patient does describe associated cough with intermittent L fluid of clear mucus.  She reports a history of congestive heart failure which has been managed with HCTZ/lisinopril.  She notes that she took her last dose of HCTZ yesterday and is currently out of her medication.  Past medical history is also notable for prior SVT status post ablation as well as hypertension and obesity.        Review of patient's allergies indicates:  No Known Allergies  Past Medical History:   Diagnosis Date    Anemia     Hypertension     SVT (supraventricular tachycardia)     Uterine fibroid      Past Surgical History:   Procedure Laterality Date    ABLATION N/A 6/4/2021    Procedure: Ablation;  Surgeon: NICHELLE Zepeda MD;  Location: [facility] EP LAB;  Service: Cardiology;  Laterality: N/A;  SVT, RFA, Carto, anes, EH, 3prep    CYSTOSCOPY N/A 8/3/2021    Procedure: CYSTOSCOPY;  Surgeon: Jamarcus Ventura MD;  Location: [facility] OR;  Service: OB/GYN;  Laterality: N/A;    ROBOT-ASSISTED LAPAROSCOPIC HYSTERECTOMY N/A 8/3/2021    Procedure: ROBOTIC HYSTERECTOMY;  Surgeon: Jamarcus Ventura MD;  Location: [facility] OR;  Service: OB/GYN;  Laterality: N/A;    ROBOT-ASSISTED SALPINGECTOMY Bilateral 8/3/2021    Procedure: ROBOTIC SALPINGECTOMY;  Surgeon: Jamarcus Ventura MD;  Location: [facility] OR;  Service: OB/GYN;  Laterality: Bilateral;     No family history on file.  Social History     Tobacco Use    Smoking status: Never Smoker    Smokeless tobacco: Never Used    Substance Use Topics    Alcohol use: No    Drug use: No     Review of Systems   Constitutional: Negative for chills and fever.   HENT: Negative for congestion and rhinorrhea.    Respiratory: Positive for cough, chest tightness and shortness of breath.    Cardiovascular: Negative for chest pain and palpitations.   Gastrointestinal: Negative for abdominal pain, diarrhea and vomiting.   Genitourinary: Negative for dysuria.   Skin: Negative for color change and rash.   Allergic/Immunologic: Negative for immunocompromised state.   Neurological: Negative for dizziness and light-headedness.   Hematological: Negative for adenopathy. Does not bruise/bleed easily.      Physical Exam     Initial Vitals [02/26/22 1847]   BP Pulse Resp Temp SpO2   (!) 181/118 (!) 136 (!) 30 98.9 °F (37.2 °C) 100 %      MAP       --         Vitals:    02/26/22 1957 02/26/22 2004 02/26/22 2025 02/26/22 2033   BP:  (!) 178/111  (!) 175/129   Pulse: (!) 130 (!) 134 (!) 134 (!) 133   Resp: (!) 27 (!) 24 14 (!) 25   Temp:       TempSrc:       SpO2: 99% 99% 98% 98%   Weight:       Height:        02/26/22 2102 02/26/22 2233 02/26/22 2302 02/27/22 0032   BP: (!) 158/101 (!) 183/93 (!) 179/104 (!) 179/98   Pulse: (!) 131 (!) 131 (!) 133 (!) 133   Resp: (!) 23 (!) 25 (!) 25 16   Temp:       TempSrc:       SpO2: 99% 99% 99% 98%   Weight:       Height:        02/27/22 0237 02/27/22 0239 02/27/22 0312 02/27/22 0535   BP:  (!) 166/113  (!) 160/103   Pulse: (!) 131  (!) 125 (!) 127   Resp: 18  (!) 22 13   Temp:       TempSrc:       SpO2: 97%  97% 99%   Weight:       Height:        02/27/22 0602 02/27/22 0827 02/27/22 0924   BP: (!) 157/94 (!) 157/95 (!) 173/102   Pulse: (!) 121 (!) 129 (!) 133   Resp: (!) 26 19 18   Temp:  98.8 °F (37.1 °C) 96.3 °F (35.7 °C)   TempSrc:  Oral    SpO2: 97% 99% 98%   Weight:      Height:        Physical Exam    Nursing note and vitals reviewed.  Constitutional: She appears well-developed and well-nourished. She is not  diaphoretic. No distress.   HENT:   Head: Normocephalic and atraumatic.   Nose: Nose normal.   Mouth/Throat: Oropharynx is clear and moist.   Eyes: Conjunctivae are normal. No scleral icterus.   Neck: Neck supple. No JVD present.   Cardiovascular: Regular rhythm, normal heart sounds and intact distal pulses. Tachycardia present.    Pulmonary/Chest: Breath sounds normal. Tachypnea noted. She is in respiratory distress.   Musculoskeletal:      Cervical back: Neck supple.     Neurological: She is alert and oriented to person, place, and time. She has normal strength.   Skin: Skin is warm and dry.       ED Course   Procedures  Labs Reviewed   CBC W/ AUTO DIFFERENTIAL - Abnormal; Notable for the following components:       Result Value    Hemoglobin 11.5 (*)     MCV 73 (*)     MCH 22.2 (*)     MCHC 30.4 (*)     RDW 18.6 (*)     Platelets 481 (*)     All other components within normal limits   COMPREHENSIVE METABOLIC PANEL - Abnormal; Notable for the following components:    Calcium 8.5 (*)     AST 12 (*)     All other components within normal limits   NT-PRO NATRIURETIC PEPTIDE - Abnormal; Notable for the following components:    NT-proBNP 4620 (*)     All other components within normal limits   D DIMER, QUANTITATIVE - Abnormal; Notable for the following components:    D-Dimer 1.22 (*)     All other components within normal limits   TROPONIN I   SARS-COV-2 RNA AMPLIFICATION, QUAL    Narrative:     Is the patient symptomatic?->No     EKG Readings: (Independently Interpreted)   Initial Reading: No STEMI. Rhythm: Sinus Tachycardia. Heart Rate: 130. Ectopy: No Ectopy. Conduction: Normal. Axis: Normal.       Imaging Results          CTA Chest Non-Coronary (PE Study) (Final result)  Result time 02/26/22 21:12:46    Final result by Regulo Vale MD (02/26/22 21:12:46)                 Impression:      Small right pleural effusion.  Subsolid basilar opacities overall nonspecific.  Early edema or infection should be  considered.    Small pericardial effusion.    No pulmonary thromboembolism.    All CT scans at this facility are performed  using dose modulation techniques as appropriate to performed exam including the following:  automated exposure control; adjustment of mA and/or kV according to the patients size (this includes techniques or standardized protocols for targeted exams where dose is matched to indication/reason for exam: i.e. extremities or head);  iterative reconstruction technique.      Electronically signed by: Regulo Vale  Date:    02/26/2022  Time:    21:12             Narrative:    EXAMINATION:  CTA CHEST NON CORONARY    CLINICAL HISTORY:  Pulmonary embolism (PE) suspected, positive D-dimer;    TECHNIQUE:  Low dose axial images, sagittal and coronal reformations were obtained from the thoracic inlet to the lung bases following the IV administration of 100 mL of Omnipaque 350.  Contrast timing was optimized to evaluate the pulmonary arteries.  MIP images were performed.    COMPARISON:  None    FINDINGS:  Base of Neck: No significant abnormality.    Thoracic soft tissues: Unremarkable.    Aorta: Left-sided aortic arch.  No aneurysm and no significant atherosclerosis    Heart: Normal size.  Small effusion.    Pulmonary vasculature: Pulmonary arteries are well opacified.  There is no pulmonary thromboembolism.    Dary/Mediastinum: No pathologic hermelinda enlargement.    Airways: Patent.    Lungs/Pleura: Small right pleural effusion.  Subsolid basilar opacities, overall nonspecific.  Early edema or infection should be considered.    Esophagus: Unremarkable.    Upper Abdomen: No abnormality of the partially imaged upper abdomen.    Bones: No acute fracture. No suspicious lytic or sclerotic lesions.                               X-Ray Chest AP Portable (Final result)  Result time 02/26/22 19:19:16    Final result by Regulo Vale MD (02/26/22 19:19:16)                 Impression:      No acute  abnormality.      Electronically signed by: Regulo Vale  Date:    02/26/2022  Time:    19:19             Narrative:    EXAMINATION:  XR CHEST AP PORTABLE    CLINICAL HISTORY:  Chest Pain;    TECHNIQUE:  Single frontal view of the chest was performed.    COMPARISON:  Multiple priors.    FINDINGS:  The lungs are clear, with normal appearance of pulmonary vasculature and no pleural effusion or pneumothorax.    The cardiac silhouette is enlarged.  The hilar and mediastinal contours are unremarkable.    Bones are intact.                                 Medications   metoprolol succinate (TOPROL-XL) 24 hr tablet 50 mg (50 mg Oral Given 2/27/22 1027)   lisinopriL tablet 10 mg (10 mg Oral Given 2/27/22 1027)   hydroCHLOROthiazide tablet 12.5 mg (12.5 mg Oral Given 2/27/22 1027)   sodium chloride 0.9% flush 10 mL (has no administration in time range)   melatonin tablet 6 mg (has no administration in time range)   enoxaparin injection 40 mg (40 mg Subcutaneous Given 2/27/22 1026)   acetaminophen tablet 650 mg (has no administration in time range)   acetaminophen tablet 650 mg (has no administration in time range)   ondansetron injection 4 mg (has no administration in time range)   furosemide injection 40 mg (40 mg Intravenous Given 2/27/22 1027)   sars-cov-2 (covid-19) (Pfizer COVID-19) 30 mcg/0.3 ml injection 0.3 mL (has no administration in time range)   furosemide injection 20 mg (20 mg Intravenous Given 2/26/22 2015)   iohexoL (OMNIPAQUE 350) injection 100 mL (100 mLs Intravenous Given 2/26/22 2047)     Medical Decision Making:   History:   Old Records Summarized: other records.       <> Summary of Records: TTE 5/3/2021    · The left ventricle is mildly enlarged with concentric hypertrophy and mildly decreased systolic function.  · The estimated ejection fraction is 45%.  · Grade I left ventricular diastolic dysfunction.  · Normal right ventricular size with normal right ventricular systolic function.  · Mild mitral  regurgitation.  · Mild tricuspid regurgitation.  · Normal central venous pressure (3 mmHg).  · The estimated PA systolic pressure is 31 mmHg.      ED Management:  All historical, clinical, radiographic, and laboratory findings were reviewed with the patient/family in detail along with the indications for transport to the facility in [place] in order to receive cardiac monitoring as well as consideration for cardiology consultation and echocardiography.  All remaining questions and concerns were addressed at this time and the patient/family communicates understanding and agrees to proceed accordingly.  Similarly, all pertinent details of the encounter were discussed with Dr. Ray via resident Dr Reed of [facility] who agrees to receive the patient at [facility] for further care as outlined above.  The patient will be transferred by [facility] ambulance services secondary to a need for ongoing cardiac moniotring en route.                        Clinical Impression:   Final diagnoses:  [R06.02] SOB (shortness of breath)  [R00.0] Sinus tachycardia (Primary)  [I31.3] Pericardial effusion  [I50.43] Acute on chronic combined systolic and diastolic congestive heart failure          ED Disposition Condition    Observation               Jermain Vergara MD  02/27/22 6730

## 2022-02-27 NOTE — ASSESSMENT & PLAN NOTE
EKG with sinus tachycardia  CTA without PE  [facility] cardiology consulted, will appreciate any recommendations

## 2022-02-27 NOTE — HPI
Ursula Smallwood is a 36-year-old female PMHx SVT s/p for radiofrequency catheter ablation (06/04/2021), CHF, HTN, anemia 2/2 uterine fibroids s/p hysterectomy who presented to [facility] ED with chief complaint of shortness of breath and clear productive cough. Endorses shortness of breath worsened the yesterday after running out of her blood pressure medication HCTZ-lisinopril.  Reports a history of heart failure. Also endorses orthopnea. Endorses her family members will sometimes hear her snore and have apneic events. Denies any history of ALISSA.    In the ED, vitals with heart rate in the 130s, /118. Pro-BMP 4620. EKG with sinus tachycardia. CTA chest with small right pleural effusion, subsolid basilar opacities and a small pericardial effusion.  [facility] for tachycardia and acute CHF exacerbation.

## 2022-02-27 NOTE — SUBJECTIVE & OBJECTIVE
Past Medical History:   Diagnosis Date    Anemia     Hypertension     SVT (supraventricular tachycardia)     Uterine fibroid        Past Surgical History:   Procedure Laterality Date    ABLATION N/A 6/4/2021    Procedure: Ablation;  Surgeon: NICHELLE Zepeda MD;  Location: [facility] EP LAB;  Service: Cardiology;  Laterality: N/A;  SVT, RFA, Carto, anes, EH, 3prep    CYSTOSCOPY N/A 8/3/2021    Procedure: CYSTOSCOPY;  Surgeon: Jamarcus Ventura MD;  Location: [facility] OR;  Service: OB/GYN;  Laterality: N/A;    ROBOT-ASSISTED LAPAROSCOPIC HYSTERECTOMY N/A 8/3/2021    Procedure: ROBOTIC HYSTERECTOMY;  Surgeon: Jamarcus Ventura MD;  Location: [facility] OR;  Service: OB/GYN;  Laterality: N/A;    ROBOT-ASSISTED SALPINGECTOMY Bilateral 8/3/2021    Procedure: ROBOTIC SALPINGECTOMY;  Surgeon: Jamarcus Ventura MD;  Location: [facility] OR;  Service: OB/GYN;  Laterality: Bilateral;       Review of patient's allergies indicates:  No Known Allergies    No current facility-administered medications on file prior to encounter.     Current Outpatient Medications on File Prior to Encounter   Medication Sig    lisinopriL (PRINIVIL,ZESTRIL) 5 MG tablet Take 1 tablet (5 mg total) by mouth once daily.    lisinopriL-hydrochlorothiazide (PRINZIDE,ZESTORETIC) 10-12.5 mg per tablet Take 1 tablet by mouth once daily.    metoprolol succinate (TOPROL-XL) 50 MG 24 hr tablet Take 1 tablet (50 mg total) by mouth once daily.    ibuprofen (ADVIL,MOTRIN) 600 MG tablet Take 1 tablet (600 mg total) by mouth every 6 to 8 hours as needed.    norgestrel-ethinyl estradioL (LO/OVRAL) 0.3-30 mg-mcg per tablet Take 1 tablet by mouth once daily.    oxyCODONE-acetaminophen (PERCOCET) 5-325 mg per tablet Take 1-2 tablets by mouth every 4 to 6 hours as needed.    [DISCONTINUED] metoprolol tartrate (LOPRESSOR) 50 MG tablet Take 1 tablet (50 mg total) by mouth once daily.     Family History    None       Tobacco Use    Smoking status: Never Smoker    Smokeless tobacco: Never  Used   Substance and Sexual Activity    Alcohol use: No    Drug use: No    Sexual activity: Not Currently     Partners: Male     Birth control/protection: Injection     Review of Systems   Constitutional:  Negative for chills and fever.   HENT: Negative.     Eyes: Negative.    Respiratory:  Positive for cough, chest tightness and shortness of breath.    Cardiovascular:  Positive for leg swelling. Negative for chest pain and palpitations.   Gastrointestinal:  Negative for nausea and vomiting.   Endocrine: Negative.    Genitourinary: Negative.    Musculoskeletal: Negative.    Skin: Negative.    Neurological: Negative.    Psychiatric/Behavioral: Negative.     Objective:     Vital Signs (Most Recent):  Temp: 96.3 °F (35.7 °C) (02/27/22 1135)  Pulse: (!) 130 (02/27/22 1212)  Resp: 20 (02/27/22 1135)  BP: (!) 168/107 (02/27/22 1135)  SpO2: (!) 94 % (02/27/22 1135)   Vital Signs (24h Range):  Temp:  [96.3 °F (35.7 °C)-98.9 °F (37.2 °C)] 96.3 °F (35.7 °C)  Pulse:  [121-136] 130  Resp:  [13-30] 20  SpO2:  [94 %-100 %] 94 %  BP: (157-183)/() 168/107     Weight: 135.3 kg (298 lb 4.8 oz)  Body mass index is 54.56 kg/m².    Physical Exam  Constitutional:       Appearance: She is obese.   Cardiovascular:      Rate and Rhythm: Regular rhythm. Tachycardia present.      Heart sounds: No murmur heard.    No friction rub. No gallop.   Pulmonary:      Breath sounds: No wheezing, rhonchi or rales.   Abdominal:      Tenderness: There is no abdominal tenderness.   Musculoskeletal:      Right lower leg: Edema (1+ pitting) present.      Left lower leg: Edema (1+ pitting) present.   Neurological:      Mental Status: She is alert and oriented to person, place, and time.   Psychiatric:         Mood and Affect: Mood normal.           Significant Labs: All pertinent labs within the past 24 hours have been reviewed.    CBC:   Recent Labs   Lab 02/26/22  1902   WBC 10.69   HGB 11.5*   HCT 37.8   *     CMP:   Recent Labs   Lab  02/26/22  1902 02/27/22  1105    137   K 3.8 3.6    105   CO2 23 21*    115*   BUN 12 11   CREATININE 1.13 1.2   CALCIUM 8.5* 9.1   PROT 7.3 7.7   ALBUMIN 3.9 3.8   BILITOT 0.5 0.8   ALKPHOS 59 61   AST 12* 8*   ALT 19 16   ANIONGAP 11 11   EGFRNONAA >60.0 57*     Significant Imaging:   EXAMINATION:  CTA CHEST NON CORONARY     CLINICAL HISTORY:  Pulmonary embolism (PE) suspected, positive D-dimer;     TECHNIQUE:  Low dose axial images, sagittal and coronal reformations were obtained from the thoracic inlet to the lung bases following the IV administration of 100 mL of Omnipaque 350.  Contrast timing was optimized to evaluate the pulmonary arteries.  MIP images were performed.     COMPARISON:  None     FINDINGS:  Base of Neck: No significant abnormality.     Thoracic soft tissues: Unremarkable.     Aorta: Left-sided aortic arch.  No aneurysm and no significant atherosclerosis     Heart: Normal size.  Small effusion.     Pulmonary vasculature: Pulmonary arteries are well opacified.  There is no pulmonary thromboembolism.     Dary/Mediastinum: No pathologic hermelinda enlargement.     Airways: Patent.     Lungs/Pleura: Small right pleural effusion.  Subsolid basilar opacities, overall nonspecific.  Early edema or infection should be considered.     Esophagus: Unremarkable.     Upper Abdomen: No abnormality of the partially imaged upper abdomen.     Bones: No acute fracture. No suspicious lytic or sclerotic lesions.     Impression:     Small right pleural effusion.  Subsolid basilar opacities overall nonspecific.  Early edema or infection should be considered.     Small pericardial effusion.     No pulmonary thromboembolism.     All CT scans at this facility are performed  using dose modulation techniques as appropriate to performed exam including the following:  automated exposure control; adjustment of mA and/or kV according to the patients size (this includes techniques or standardized protocols for  targeted exams where dose is matched to indication/reason for exam: i.e. extremities or head);  iterative reconstruction technique.        Electronically signed by: Regulo Vale  Date:                                            02/26/2022  Time:                                           21:12      I have reviewed all pertinent imaging results/findings within the past 24 hours.

## 2022-02-28 LAB
ALBUMIN SERPL BCP-MCNC: 3.4 G/DL (ref 3.5–5.2)
ALP SERPL-CCNC: 52 U/L (ref 55–135)
ALT SERPL W/O P-5'-P-CCNC: 15 U/L (ref 10–44)
ANION GAP SERPL CALC-SCNC: 9 MMOL/L (ref 8–16)
AORTIC ROOT ANNULUS: 2.9 CM
ASCENDING AORTA: 2.62 CM
AST SERPL-CCNC: 8 U/L (ref 10–40)
AV INDEX (PROSTH): 1.17
AV MEAN GRADIENT: 1 MMHG
AV PEAK GRADIENT: 2 MMHG
AV VALVE AREA: 4.92 CM2
AV VELOCITY RATIO: 0.99
BASOPHILS # BLD AUTO: 0.05 K/UL (ref 0–0.2)
BASOPHILS NFR BLD: 0.5 % (ref 0–1.9)
BILIRUB SERPL-MCNC: 0.6 MG/DL (ref 0.1–1)
BSA FOR ECHO PROCEDURE: 2.43 M2
BUN SERPL-MCNC: 17 MG/DL (ref 6–20)
CALCIUM SERPL-MCNC: 8.7 MG/DL (ref 8.7–10.5)
CHLORIDE SERPL-SCNC: 105 MMOL/L (ref 95–110)
CO2 SERPL-SCNC: 21 MMOL/L (ref 23–29)
CREAT SERPL-MCNC: 1.3 MG/DL (ref 0.5–1.4)
CV ECHO LV RWT: 0.26 CM
DIFFERENTIAL METHOD: ABNORMAL
DOP CALC AO PEAK VEL: 0.69 M/S
DOP CALC AO VTI: 6.53 CM
DOP CALC LVOT AREA: 4.2 CM2
DOP CALC LVOT DIAMETER: 2.31 CM
DOP CALC LVOT PEAK VEL: 0.68 M/S
DOP CALC LVOT STROKE VOLUME: 32.13 CM3
DOP CALC MV VTI: 24.11 CM
DOP CALCLVOT PEAK VEL VTI: 7.67 CM
E WAVE DECELERATION TIME: 157.62 MSEC
E/A RATIO: 3
ECHO LV POSTERIOR WALL: 0.89 CM (ref 0.6–1.1)
EJECTION FRACTION: 20 %
EOSINOPHIL # BLD AUTO: 0.1 K/UL (ref 0–0.5)
EOSINOPHIL NFR BLD: 1.1 % (ref 0–8)
ERYTHROCYTE [DISTWIDTH] IN BLOOD BY AUTOMATED COUNT: 18.6 % (ref 11.5–14.5)
EST. GFR  (AFRICAN AMERICAN): >60 ML/MIN/1.73 M^2
EST. GFR  (NON AFRICAN AMERICAN): 52 ML/MIN/1.73 M^2
FRACTIONAL SHORTENING: 15 % (ref 28–44)
GLUCOSE SERPL-MCNC: 102 MG/DL (ref 70–110)
HCT VFR BLD AUTO: 36.5 % (ref 37–48.5)
HGB BLD-MCNC: 10.9 G/DL (ref 12–16)
IMM GRANULOCYTES # BLD AUTO: 0.03 K/UL (ref 0–0.04)
IMM GRANULOCYTES NFR BLD AUTO: 0.3 % (ref 0–0.5)
INTERVENTRICULAR SEPTUM: 0.89 CM (ref 0.6–1.1)
LA MAJOR: 6.75 CM
LA MINOR: 5.16 CM
LA WIDTH: 5.79 CM
LEFT ATRIUM SIZE: 5.17 CM
LEFT ATRIUM VOLUME INDEX MOD: 55.8 ML/M2
LEFT ATRIUM VOLUME INDEX: 65.6 ML/M2
LEFT ATRIUM VOLUME MOD: 126.73 CM3
LEFT ATRIUM VOLUME: 148.82 CM3
LEFT INTERNAL DIMENSION IN SYSTOLE: 5.79 CM (ref 2.1–4)
LEFT VENTRICLE DIASTOLIC VOLUME INDEX: 107.11 ML/M2
LEFT VENTRICLE DIASTOLIC VOLUME: 243.15 ML
LEFT VENTRICLE MASS INDEX: 118 G/M2
LEFT VENTRICLE SYSTOLIC VOLUME INDEX: 73.1 ML/M2
LEFT VENTRICLE SYSTOLIC VOLUME: 165.88 ML
LEFT VENTRICULAR INTERNAL DIMENSION IN DIASTOLE: 6.85 CM (ref 3.5–6)
LEFT VENTRICULAR MASS: 267.94 G
LYMPHOCYTES # BLD AUTO: 2.9 K/UL (ref 1–4.8)
LYMPHOCYTES NFR BLD: 29.5 % (ref 18–48)
MAGNESIUM SERPL-MCNC: 1.7 MG/DL (ref 1.6–2.6)
MCH RBC QN AUTO: 21.3 PG (ref 27–31)
MCHC RBC AUTO-ENTMCNC: 29.9 G/DL (ref 32–36)
MCV RBC AUTO: 71 FL (ref 82–98)
MONOCYTES # BLD AUTO: 0.6 K/UL (ref 0.3–1)
MONOCYTES NFR BLD: 6 % (ref 4–15)
MV MEAN GRADIENT: 1 MMHG
MV PEAK A VEL: 0.47 M/S
MV PEAK E VEL: 1.41 M/S
MV PEAK GRADIENT: 8 MMHG
MV STENOSIS PRESSURE HALF TIME: 45.71 MS
MV VALVE AREA BY CONTINUITY EQUATION: 1.33 CM2
MV VALVE AREA P 1/2 METHOD: 4.81 CM2
NEUTROPHILS # BLD AUTO: 6.1 K/UL (ref 1.8–7.7)
NEUTROPHILS NFR BLD: 62.6 % (ref 38–73)
NRBC BLD-RTO: 0 /100 WBC
PHOSPHATE SERPL-MCNC: 5 MG/DL (ref 2.7–4.5)
PISA MRMAX VEL: 0.05 M/S
PISA TR MAX VEL: 2.53 M/S
PLATELET # BLD AUTO: 454 K/UL (ref 150–450)
PMV BLD AUTO: 9.8 FL (ref 9.2–12.9)
POTASSIUM SERPL-SCNC: 3.8 MMOL/L (ref 3.5–5.1)
PROT SERPL-MCNC: 6.9 G/DL (ref 6–8.4)
PV PEAK VELOCITY: 0.61 CM/S
RA MAJOR: 5.94 CM
RA PRESSURE: 8 MMHG
RA WIDTH: 5.07 CM
RBC # BLD AUTO: 5.12 M/UL (ref 4–5.4)
RIGHT VENTRICULAR END-DIASTOLIC DIMENSION: 3.49 CM
SODIUM SERPL-SCNC: 135 MMOL/L (ref 136–145)
STJ: 2.57 CM
TR MAX PG: 26 MMHG
TV REST PULMONARY ARTERY PRESSURE: 34 MMHG
WBC # BLD AUTO: 9.72 K/UL (ref 3.9–12.7)

## 2022-02-28 PROCEDURE — 25000003 PHARM REV CODE 250: Performed by: NURSE PRACTITIONER

## 2022-02-28 PROCEDURE — 63600175 PHARM REV CODE 636 W HCPCS: Performed by: STUDENT IN AN ORGANIZED HEALTH CARE EDUCATION/TRAINING PROGRAM

## 2022-02-28 PROCEDURE — 99223 PR INITIAL HOSPITAL CARE,LEVL III: ICD-10-PCS | Mod: 25,,, | Performed by: INTERNAL MEDICINE

## 2022-02-28 PROCEDURE — 84100 ASSAY OF PHOSPHORUS: CPT | Performed by: STUDENT IN AN ORGANIZED HEALTH CARE EDUCATION/TRAINING PROGRAM

## 2022-02-28 PROCEDURE — 99223 1ST HOSP IP/OBS HIGH 75: CPT | Mod: 25,,, | Performed by: INTERNAL MEDICINE

## 2022-02-28 PROCEDURE — 63600175 PHARM REV CODE 636 W HCPCS: Performed by: INTERNAL MEDICINE

## 2022-02-28 PROCEDURE — 83735 ASSAY OF MAGNESIUM: CPT | Performed by: STUDENT IN AN ORGANIZED HEALTH CARE EDUCATION/TRAINING PROGRAM

## 2022-02-28 PROCEDURE — 93010 ELECTROCARDIOGRAM REPORT: CPT | Mod: ,,, | Performed by: INTERNAL MEDICINE

## 2022-02-28 PROCEDURE — 93005 ELECTROCARDIOGRAM TRACING: CPT

## 2022-02-28 PROCEDURE — 11000001 HC ACUTE MED/SURG PRIVATE ROOM

## 2022-02-28 PROCEDURE — 94761 N-INVAS EAR/PLS OXIMETRY MLT: CPT

## 2022-02-28 PROCEDURE — 27000190 HC CPAP FULL FACE MASK W/VALVE

## 2022-02-28 PROCEDURE — 94660 CPAP INITIATION&MGMT: CPT

## 2022-02-28 PROCEDURE — 36415 COLL VENOUS BLD VENIPUNCTURE: CPT | Performed by: STUDENT IN AN ORGANIZED HEALTH CARE EDUCATION/TRAINING PROGRAM

## 2022-02-28 PROCEDURE — 99900035 HC TECH TIME PER 15 MIN (STAT)

## 2022-02-28 PROCEDURE — 93010 EKG 12-LEAD: ICD-10-PCS | Mod: ,,, | Performed by: INTERNAL MEDICINE

## 2022-02-28 PROCEDURE — 25000003 PHARM REV CODE 250: Performed by: STUDENT IN AN ORGANIZED HEALTH CARE EDUCATION/TRAINING PROGRAM

## 2022-02-28 PROCEDURE — 80053 COMPREHEN METABOLIC PANEL: CPT | Performed by: STUDENT IN AN ORGANIZED HEALTH CARE EDUCATION/TRAINING PROGRAM

## 2022-02-28 PROCEDURE — 85025 COMPLETE CBC W/AUTO DIFF WBC: CPT | Performed by: STUDENT IN AN ORGANIZED HEALTH CARE EDUCATION/TRAINING PROGRAM

## 2022-02-28 RX ORDER — FUROSEMIDE 10 MG/ML
40 INJECTION INTRAMUSCULAR; INTRAVENOUS ONCE
Status: COMPLETED | OUTPATIENT
Start: 2022-02-28 | End: 2022-02-28

## 2022-02-28 RX ORDER — LOSARTAN POTASSIUM 25 MG/1
25 TABLET ORAL DAILY
Status: DISCONTINUED | OUTPATIENT
Start: 2022-02-28 | End: 2022-03-02

## 2022-02-28 RX ORDER — HYDROXYZINE PAMOATE 25 MG/1
50 CAPSULE ORAL EVERY 8 HOURS PRN
Status: DISCONTINUED | OUTPATIENT
Start: 2022-02-28 | End: 2022-03-02 | Stop reason: HOSPADM

## 2022-02-28 RX ORDER — POTASSIUM CHLORIDE 20 MEQ/1
20 TABLET, EXTENDED RELEASE ORAL ONCE
Status: COMPLETED | OUTPATIENT
Start: 2022-02-28 | End: 2022-02-28

## 2022-02-28 RX ORDER — FUROSEMIDE 10 MG/ML
80 INJECTION INTRAMUSCULAR; INTRAVENOUS
Status: DISCONTINUED | OUTPATIENT
Start: 2022-02-28 | End: 2022-03-01

## 2022-02-28 RX ADMIN — FUROSEMIDE 60 MG: 10 INJECTION, SOLUTION INTRAVENOUS at 09:02

## 2022-02-28 RX ADMIN — FUROSEMIDE 40 MG: 10 INJECTION, SOLUTION INTRAMUSCULAR; INTRAVENOUS at 05:02

## 2022-02-28 RX ADMIN — LOSARTAN POTASSIUM 25 MG: 25 TABLET, FILM COATED ORAL at 09:02

## 2022-02-28 RX ADMIN — HYDROXYZINE PAMOATE 50 MG: 25 CAPSULE ORAL at 10:02

## 2022-02-28 RX ADMIN — ENOXAPARIN SODIUM 40 MG: 100 INJECTION SUBCUTANEOUS at 09:02

## 2022-02-28 RX ADMIN — FUROSEMIDE 80 MG: 10 INJECTION, SOLUTION INTRAMUSCULAR; INTRAVENOUS at 10:02

## 2022-02-28 RX ADMIN — METOPROLOL SUCCINATE 100 MG: 50 TABLET, EXTENDED RELEASE ORAL at 09:02

## 2022-02-28 RX ADMIN — ENOXAPARIN SODIUM 40 MG: 100 INJECTION SUBCUTANEOUS at 10:02

## 2022-02-28 RX ADMIN — POTASSIUM CHLORIDE 20 MEQ: 1500 TABLET, EXTENDED RELEASE ORAL at 09:02

## 2022-02-28 NOTE — ASSESSMENT & PLAN NOTE
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

## 2022-02-28 NOTE — NURSING
Home Oxygen Evaluation    Date Performed: 2022    1) Patient's Home O2 Sat on room air, while at rest: 99%        If O2 sats on room air at rest are 88% or below, patient qualifies. No additional testing needed. Document N/A in steps 2 and 3. If 89% or above, complete steps 2.      2) Patient's O2 Sat on room air while exercisin%        If O2 sats on room air while exercising remain 89% or above patient does not qualify, no further testing needed Document N/A in step 3. If O2 sats on room air while exercising are 88% or below, continue to step 3.      3) N/A         (Must show improvement from #2 for patients to qualify)    If O2 sats improve on oxygen, patient qualifies for portable oxygen. If not, the patient does not qualify.

## 2022-02-28 NOTE — RESIDENT HANDOFF
Handoff     Primary Team: Networked reference to record [facility]  Room Number: K462/K462 A     Patient Name: Ursula Smallwood MRN: [id number]     Date of Birth: [id number] Allergies: Patient has no known allergies.     Age: 36 y.o. Admit Date: 2/26/2022     Sex: female  BMI: Body mass index is 54.56 kg/m².     Code Status: Full Code        Illness Level (current clinical status): Watcher - No    Reason for Admission: Acute exacerbation of CHF (congestive heart failure)    Brief HPI (pertinent PMH and diagnosis or differential diagnosis): Ursula Smallwood is a 36-year-old female PMHx SVT s/p for radiofrequency catheter ablation (06/04/2021), CHF, HTN, anemia 2/2 uterine fibroids s/p hysterectomy here with acute decompensated heart failure and sinus tachycardia    Procedure Date: none    Hospital Course (updated, brief assessment by system or problem, significant events):   Underwent diuresis with IV Lasix on admission. Found to have significant tachycardia (heart rate in the 130s), increased patient's metoprolol to 100 mg daily. [facility]. Echocardiogram with EF 20% dilated cardiomyopathy.  Continued on guideline directed medical therapy:  Metoprolol 100 mg daily + losartan 25 mg daily    Tasks (specific, using if-then statements):    - Follow-up cardiology recs  - continue diuresis with IV Lasix,  plan transition to PO Lasix tomorrow    Contingency Plan (special circumstances anticipated and plan):   Persistent tachycardia (> 130s) can use metoprolol tartrate small doses as needed    Estimated Discharge Date:  03/01/2022    Discharge Disposition: Home or Self Care     Mentored By: upper level resident and attending    Simone Pro DO  [facility], PGY-2  02/28/2022

## 2022-02-28 NOTE — PROGRESS NOTES
[facility]  Progress Note    Patient Name: Ursula Smallwood  MRN: [id number]  Patient Class: IP- Inpatient   Admission Date: 2/26/2022  Length of Stay: 1 days  Attending Physician: Cory Golden MD  Primary Care Provider: Tyrese Garcia MD        Subjective:     Principal Problem:Acute exacerbation of CHF (congestive heart failure)    HPI:  Ursula Smallwood is a 36-year-old female PMHx SVT s/p for radiofrequency catheter ablation (06/04/2021), CHF, HTN, anemia 2/2 uterine fibroids s/p hysterectomy who presented to [facility] ED with chief complaint of shortness of breath and clear productive cough. Endorses shortness of breath worsened the yesterday after running out of her blood pressure medication HCTZ-lisinopril.  Reports a history of heart failure. Also endorses orthopnea. Endorses her family members will sometimes hear her snore and have apneic events. Denies any history of ALISSA.    In the ED, vitals with heart rate in the 130s, /118. Pro-BMP 4620. EKG with sinus tachycardia. CTA chest with small right pleural effusion, subsolid basilar opacities and a small pericardial effusion.  [facility] for tachycardia and acute CHF exacerbation.       Overview/Hospital Course:  No notes on file    Interval History: NAEON. VSS. SOB improving this morning.     Review of Systems  Objective:     Vital Signs (Most Recent):  Temp: 97.5 °F (36.4 °C) (02/28/22 1648)  Pulse: (!) 123 (02/28/22 1648)  Resp: 18 (02/28/22 1648)  BP: 134/74 (02/28/22 1648)  SpO2: 98 % (02/28/22 1648)   Vital Signs (24h Range):  Temp:  [97.5 °F (36.4 °C)-98.8 °F (37.1 °C)] 97.5 °F (36.4 °C)  Pulse:  [110-125] 123  Resp:  [16-18] 18  SpO2:  [96 %-99 %] 98 %  BP: (123-135)/(74-86) 134/74     Weight: 135.3 kg (298 lb 4.8 oz)  Body mass index is 54.56 kg/m².    Intake/Output Summary (Last 24 hours) at 2/28/2022 1740  Last data filed at 2/28/2022 1300  Gross per 24 hour   Intake 720 ml    Output --   Net 720 ml      Physical Exam  Constitutional:       Appearance: She is obese.   Cardiovascular:      Rate and Rhythm: Regular rhythm. Tachycardia present.      Heart sounds: No murmur heard.    No friction rub. No gallop.   Pulmonary:      Breath sounds: No wheezing, rhonchi or rales.   Abdominal:      Tenderness: There is no abdominal tenderness.   Musculoskeletal:      Right lower leg: Edema (1+ pitting) present.      Left lower leg: Edema (1+ pitting) present.   Neurological:      Mental Status: She is alert and oriented to person, place, and time.   Psychiatric:         Mood and Affect: Mood normal.       Significant Labs: All pertinent labs within the past 24 hours have been reviewed.    CBC:   Recent Labs   Lab 02/26/22  1902 02/28/22  0552   WBC 10.69 9.72   HGB 11.5* 10.9*   HCT 37.8 36.5*   * 454*     CMP:   Recent Labs   Lab 02/26/22  1902 02/27/22  1105 02/28/22  0552    137 135*   K 3.8 3.6 3.8    105 105   CO2 23 21* 21*    115* 102   BUN 12 11 17   CREATININE 1.13 1.2 1.3   CALCIUM 8.5* 9.1 8.7   PROT 7.3 7.7 6.9   ALBUMIN 3.9 3.8 3.4*   BILITOT 0.5 0.8 0.6   ALKPHOS 59 61 52*   AST 12* 8* 8*   ALT 19 16 15   ANIONGAP 11 11 9   EGFRNONAA >60.0 57* 52*     Magnesium:   Recent Labs   Lab 02/28/22  0552   MG 1.7     Significant Imaging: I have reviewed all pertinent imaging results/findings within the past 24 hours.      Assessment/Plan:      * Acute exacerbation of CHF (congestive heart failure)  Evidenced by history, elevated BNP, pulmonary edema, peripheral edema.  Echo EF 20%, dilated cardiomyopathy    Plan:  - [facility] on [facility]  - Diuresis w/ IV Lasix 80 mg BID  - GDMT: continue home metoprolol 100 mg daily, losartan 25 mg daily. Consider entresto  - CPAP at night (needs outpatient sleep study) can aid in CHF by increasing intrathoracic pressure  - Daily Weights  - Strict I/O  - Fluid restriction < 1.5 L daily  - Sodium restriction < 2 g/day  -  Low-sodium cardiac diet  - will reassess volume status regularly    Hx of SVT (supraventricular tachycardia)  s/p radiofrequency catheter ablation 6/4/2021  [facility]  Remain on cardiac telemetry    Sinus tachycardia  EKG with sinus tachycardia  CTA without PE  [facility] cardiology consulted, will appreciate any recommendations        VTE Risk Mitigation (From admission, onward)         Ordered     enoxaparin injection 40 mg  Every 12 hours         02/27/22 0936     IP VTE HIGH RISK PATIENT  Once         02/27/22 0936     Place sequential compression device  Until discontinued         02/27/22 0936                Discharge Planning   KRYSTIAN:      Code Status: Full Code   Is the patient medically ready for discharge?:     Reason for patient still in hospital (select all that apply): Treatment and Consult recommendations  Discharge Plan A: Home          Simone Pro DO  Department of Hospital Medicine   [facility]

## 2022-02-28 NOTE — CONSULTS
[facility] - Telemetry  Cardiology  Consult Note    Patient Name: Ursula Smallwood  MRN: [id number]  Admission Date: 2/26/2022  Hospital Length of Stay: 1 days  Code Status: Full Code   Attending Provider: Cory Golden MD   Consulting Provider: JULIÁN Hazel, SRAVANTHI  Primary Care Physician: Tyrese Garcia MD  Principal Problem:Acute exacerbation of CHF (congestive heart failure)    Patient information was obtained from patient, past medical records and ER records.     Inpatient consult to [facility]  Consult performed by: JULIÁN Dickerson, SRAVANTHI  Consult ordered by: Simone Pro DO  Reason for consult: ADHF         Subjective:     Chief Complaint:  SOB and cough      HPI:   35yo female with AVNRT s/p RFA 6/2021, acute CHF and ST who presented to the ER with complaints of cough and SOB for the past day. She reports running out of some of her medications with notation of SOB with no relief therefore she presented to the ER. Upon arrival, her BP was noted to be elevated at 181/118 with NT pro BNP 4620 Troponin .015. HR 110s-120s STShe was started on IV Lasix 60mg BID and admitted to [facility] Medicine. Cardiology was consulted for evaluation of tachycardia in setting of history of SVT/AVNRT     Hospital Course:     2/28/2022 Cardiology consulted for ST. See consult HPI   Past Medical History:   Diagnosis Date    Anemia     Hypertension     SVT (supraventricular tachycardia)     Uterine fibroid        Past Surgical History:   Procedure Laterality Date    ABLATION N/A 6/4/2021    Procedure: Ablation;  Surgeon: NICHELLE Zepeda MD;  Location: [facility] EP LAB;  Service: Cardiology;  Laterality: N/A;  SVT, RFA, Carto, anes, EH, 3prep    CYSTOSCOPY N/A 8/3/2021    Procedure: CYSTOSCOPY;  Surgeon: Jamarcus Ventura MD;  Location: [facility] OR;  Service: OB/GYN;  Laterality: N/A;    ROBOT-ASSISTED LAPAROSCOPIC HYSTERECTOMY N/A 8/3/2021    Procedure: ROBOTIC HYSTERECTOMY;  Surgeon: Jamarcus VENTURA  MD Audrey;  Location: [facility] OR;  Service: OB/GYN;  Laterality: N/A;    ROBOT-ASSISTED SALPINGECTOMY Bilateral 8/3/2021    Procedure: ROBOTIC SALPINGECTOMY;  Surgeon: Jamarcus Ventura MD;  Location: [facility] OR;  Service: OB/GYN;  Laterality: Bilateral;       Review of patient's allergies indicates:  No Known Allergies    No current facility-administered medications on file prior to encounter.     Current Outpatient Medications on File Prior to Encounter   Medication Sig    lisinopriL (PRINIVIL,ZESTRIL) 5 MG tablet Take 1 tablet (5 mg total) by mouth once daily.    lisinopriL-hydrochlorothiazide (PRINZIDE,ZESTORETIC) 10-12.5 mg per tablet Take 1 tablet by mouth once daily.    metoprolol succinate (TOPROL-XL) 50 MG 24 hr tablet Take 1 tablet (50 mg total) by mouth once daily.    ibuprofen (ADVIL,MOTRIN) 600 MG tablet Take 1 tablet (600 mg total) by mouth every 6 to 8 hours as needed.    norgestrel-ethinyl estradioL (LO/OVRAL) 0.3-30 mg-mcg per tablet Take 1 tablet by mouth once daily.    oxyCODONE-acetaminophen (PERCOCET) 5-325 mg per tablet Take 1-2 tablets by mouth every 4 to 6 hours as needed.    [DISCONTINUED] metoprolol tartrate (LOPRESSOR) 50 MG tablet Take 1 tablet (50 mg total) by mouth once daily.     Family History    None       Tobacco Use    Smoking status: Never Smoker    Smokeless tobacco: Never Used   Substance and Sexual Activity    Alcohol use: No    Drug use: No    Sexual activity: Not Currently     Partners: Male     Birth control/protection: Injection     Review of Systems   Constitutional: Negative for chills, decreased appetite, diaphoresis and fever.   Cardiovascular:  Positive for dyspnea on exertion. Negative for chest pain, claudication, cyanosis, irregular heartbeat, leg swelling, near-syncope, orthopnea, palpitations, paroxysmal nocturnal dyspnea and syncope.   Respiratory:  Positive for shortness of breath. Negative for cough, hemoptysis and wheezing.    Gastrointestinal:   Negative for bloating, abdominal pain, constipation, diarrhea, melena, nausea and vomiting.   Neurological:  Negative for dizziness and weakness.   Objective:     Vital Signs (Most Recent):  Temp: 98.4 °F (36.9 °C) (02/28/22 0548)  Pulse: (!) 117 (02/28/22 0548)  Resp: 18 (02/28/22 0548)  BP: 135/80 (02/28/22 0548)  SpO2: 98 % (02/28/22 0920)   Vital Signs (24h Range):  Temp:  [97.7 °F (36.5 °C)-98.8 °F (37.1 °C)] 98.4 °F (36.9 °C)  Pulse:  [] 117  Resp:  [16-18] 18  SpO2:  [96 %-99 %] 98 %  BP: (123-139)/(80-98) 135/80     Weight: 135.3 kg (298 lb 4.8 oz)  Body mass index is 54.56 kg/m².    SpO2: 98 %  O2 Device (Oxygen Therapy): room air      Intake/Output Summary (Last 24 hours) at 2/28/2022 1137  Last data filed at 2/28/2022 1100  Gross per 24 hour   Intake 720 ml   Output 500 ml   Net 220 ml       Lines/Drains/Airways       Peripheral Intravenous Line  Duration                  Peripheral IV - Single Lumen 02/26/22 1900 20 G Left Antecubital 1 day                    Physical Exam  Constitutional:       General: She is not in acute distress.     Appearance: She is well-developed.   Cardiovascular:      Rate and Rhythm: Regular rhythm. Tachycardia present.      Heart sounds: No murmur heard.    No gallop.   Pulmonary:      Effort: Pulmonary effort is normal. No respiratory distress.      Breath sounds: Normal breath sounds. No wheezing.   Abdominal:      General: Bowel sounds are normal. There is no distension.      Palpations: Abdomen is soft.      Tenderness: There is no abdominal tenderness.   Skin:     General: Skin is warm and dry.   Neurological:      Mental Status: She is alert and oriented to person, place, and time.       Significant Labs: BMP:   Recent Labs   Lab 02/26/22  1902 02/27/22  1105 02/28/22  0552    115* 102    137 135*   K 3.8 3.6 3.8    105 105   CO2 23 21* 21*   BUN 12 11 17   CREATININE 1.13 1.2 1.3   CALCIUM 8.5* 9.1 8.7   MG  --   --  1.7   , CBC   Recent Labs  "  Lab 02/26/22 1902 02/28/22  0552   WBC 10.69 9.72   HGB 11.5* 10.9*   HCT 37.8 36.5*   * 454*   , and Troponin   Recent Labs   Lab 02/26/22 1902   TROPONINI 0.015       Significant Imaging: Echocardiogram: Transthoracic echo (TTE) complete (Cupid Only):   Results for orders placed or performed during the hospital encounter of 05/03/21   Echo Color Flow Doppler? Yes   Result Value Ref Range    BSA 2.34 m2    TDI SEPTAL 0.08 m/s    LV LATERAL E/E' RATIO 9.90 m/s    LV SEPTAL E/E' RATIO 12.38 m/s    LA WIDTH 4.26 cm    TDI LATERAL 0.10 m/s    LVIDd 5.61 3.5 - 6.0 cm    IVS 1.18 (A) 0.6 - 1.1 cm    Posterior Wall 1.32 (A) 0.6 - 1.1 cm    Ao root annulus 3.11 cm    LVIDs 4.22 (A) 2.1 - 4.0 cm    FS 25 28 - 44 %    LA volume 70.62 cm3    LV mass 297.50 g    LA size 3.92 cm    RVDD 2.76 cm    Left Ventricle Relative Wall Thickness 0.47 cm    AV mean gradient 7 mmHg    AV valve area 2.23 cm2    AV Velocity Ratio 0.43     AV index (prosthetic) 0.49     MV mean gradient 1 mmHg    MV valve area p 1/2 method 3.63 cm2    E/A ratio 1.27     Mean e' 0.09 m/s    E wave deceleration time 208.72 msec    MV "A" wave duration 8.85 msec    Pulm vein S/D ratio 1.38     LVOT diameter 2.41 cm    LVOT area 4.6 cm2    LVOT peak benjamin 0.72 m/s    LVOT peak VTI 13.62 cm    Ao peak benjamin 1.69 m/s    Ao VTI 27.88 cm    LVOT stroke volume 62.10 cm3    AV peak gradient 11 mmHg    MV peak gradient 4 mmHg    E/E' ratio 11.00 m/s    MV Peak E Benjamin 0.99 m/s    TR Max Benjamin 2.66 m/s    MV stenosis pressure 1/2 time 60.53 ms    MV Peak A Benjamin 0.78 m/s    PV Peak S Benjamin 0.65 m/s    PV Peak D Benjamin 0.47 m/s    LV Systolic Volume 79.50 mL    LV Systolic Volume Index 35.8 mL/m2    LV Diastolic Volume 154.45 mL    LV Diastolic Volume Index 69.57 mL/m2    LA Volume Index 31.8 mL/m2    LV Mass Index 134 g/m2    RA Major Axis 4.20 cm    Left Atrium Minor Axis 4.29 cm    Left Atrium Major Axis 5.92 cm    Triscuspid Valve Regurgitation Peak Gradient 28 mmHg    LA " Volume Index (Mod) 24.1 mL/m2    LA volume (mod) 53.57 cm3    RA Width 3.28 cm    Right Atrial Pressure (from IVC) 3 mmHg    EF 45 %    TV rest pulmonary artery pressure 31 mmHg    Narrative    · The left ventricle is mildly enlarged with concentric hypertrophy and   mildly decreased systolic function.  · The estimated ejection fraction is 45%.  · Grade I left ventricular diastolic dysfunction.  · Normal right ventricular size with normal right ventricular systolic   function.  · Mild mitral regurgitation.  · Mild tricuspid regurgitation.  · Normal central venous pressure (3 mmHg).  · The estimated PA systolic pressure is 31 mmHg.        Assessment and Plan:     * Acute exacerbation of CHF (congestive heart failure)  - previous echo in 5/2021 with EF 45% and grade I diastolic dysfunction; HF etiology currently diastolic in nature  - presented with SOB and cough with elevated NT pro BNP (4620); CXR with no acute changes  - on IV Lasix 60mg BID with only 550cc documented out overnight; negative 310cc since admission; feel continued IV diuresis needed therefore will continue IV diuresis for now; once more euvolemic will plan to hold HCTZ completely and start on oral Lasix- dose to be determined  - SBP trending down to 150s-180s with further trend down to 120s-130s this AM; will transition ACEI to ARb and continue Toprol XL; repeat echo today for re evaluation of EF  - previous cardiac PET- small to moderate (10-15%)- mildly fixed heterogeneity that improves with stress- no concern for ischemic etiology     Hx of SVT (supraventricular tachycardia)  - 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 I will follow-up with patient. Please contact us if you have any additional questions.    JULIÁN Hazel, ANP  Cardiology   [place] - Telemetry

## 2022-02-28 NOTE — SUBJECTIVE & OBJECTIVE
Interval History: GOLDY. VSS. SOB improving this morning.     Review of Systems  Objective:     Vital Signs (Most Recent):  Temp: 97.5 °F (36.4 °C) (02/28/22 1648)  Pulse: (!) 123 (02/28/22 1648)  Resp: 18 (02/28/22 1648)  BP: 134/74 (02/28/22 1648)  SpO2: 98 % (02/28/22 1648)   Vital Signs (24h Range):  Temp:  [97.5 °F (36.4 °C)-98.8 °F (37.1 °C)] 97.5 °F (36.4 °C)  Pulse:  [110-125] 123  Resp:  [16-18] 18  SpO2:  [96 %-99 %] 98 %  BP: (123-135)/(74-86) 134/74     Weight: 135.3 kg (298 lb 4.8 oz)  Body mass index is 54.56 kg/m².    Intake/Output Summary (Last 24 hours) at 2/28/2022 1740  Last data filed at 2/28/2022 1300  Gross per 24 hour   Intake 720 ml   Output --   Net 720 ml      Physical Exam  Constitutional:       Appearance: She is obese.   Cardiovascular:      Rate and Rhythm: Regular rhythm. Tachycardia present.      Heart sounds: No murmur heard.    No friction rub. No gallop.   Pulmonary:      Breath sounds: No wheezing, rhonchi or rales.   Abdominal:      Tenderness: There is no abdominal tenderness.   Musculoskeletal:      Right lower leg: Edema (1+ pitting) present.      Left lower leg: Edema (1+ pitting) present.   Neurological:      Mental Status: She is alert and oriented to person, place, and time.   Psychiatric:         Mood and Affect: Mood normal.       Significant Labs: All pertinent labs within the past 24 hours have been reviewed.    CBC:   Recent Labs   Lab 02/26/22  1902 02/28/22  0552   WBC 10.69 9.72   HGB 11.5* 10.9*   HCT 37.8 36.5*   * 454*     CMP:   Recent Labs   Lab 02/26/22  1902 02/27/22  1105 02/28/22  0552    137 135*   K 3.8 3.6 3.8    105 105   CO2 23 21* 21*    115* 102   BUN 12 11 17   CREATININE 1.13 1.2 1.3   CALCIUM 8.5* 9.1 8.7   PROT 7.3 7.7 6.9   ALBUMIN 3.9 3.8 3.4*   BILITOT 0.5 0.8 0.6   ALKPHOS 59 61 52*   AST 12* 8* 8*   ALT 19 16 15   ANIONGAP 11 11 9   EGFRNONAA >60.0 57* 52*     Magnesium:   Recent Labs   Lab 02/28/22  0552   MG 1.7      Significant Imaging: I have reviewed all pertinent imaging results/findings within the past 24 hours.

## 2022-02-28 NOTE — PLAN OF CARE
TN went to meet with patient. Patient is independent and lives at home with her three children. She does not have HH or DME. Patient still drives, but her sister will transport home at discharge. Patient is listed as self-pay, however, she informed TN she has blue cross insurance Member ID#[id number]. TN sent message to insurance verified Kleber. I also requested PCP and cards follow-up from access navigator. Patient does not have a PCP, but is agreeable to follow-up at the [facility]. Patient encouraged to call with any questions or concerns.   will continue to follow patient through transitions of care and assist with any discharge needs.     TN reached out to [facility] with UR to notify of patient's insurance as well.    1632--TN spoke with Kleber . He stated patient's blue cross coverage is innactive. I told him patient provided me with her card as well. I had patient email me her card and I emailed it to Kleber. Patient confirms she does have insurance.    Future Appointments   Date Time Provider Department Center   3/9/2022  1:00 PM Ra Holloway PA-C [facility]   3/14/2022  9:00 AM Gregory Moore MD [facility] CARDIO [place]   3/23/2022  1:30 PM Macy Davidson MD [facility] IM [place]   4/25/2022  1:00 PM ECHO, [facility] ECHOSTR [facility]   4/29/2022 10:30 AM EKG, APPT [facility] EKG [facility]   4/29/2022 11:00 AM NICHELLE Zepeda MD [facility] ARRHYTH [facility]        02/28/22 1313   Discharge Assessment   Assessment Type Discharge Planning Assessment   Confirmed/corrected address, phone number and insurance Yes   Confirmed Demographics Correct on Facesheet   Source of Information patient   Lives With child(alvin), dependent   Facility Arrived From: Home   Do you expect to return to your current living situation? Yes   Prior to hospitilization cognitive status: Alert/Oriented   Current cognitive status: Alert/Oriented   Walking or Climbing Stairs Difficulty none    Dressing/Bathing Difficulty none   Equipment Currently Used at Home none   Do you currently have service(s) that help you manage your care at home? No   Do you take prescription medications? Yes   Do you have prescription coverage? Yes   Do you have any problems affording any of your prescribed medications? No   How do you get to doctors appointments? car, drives self;family or friend will provide   Are you on dialysis? No   Do you take coumadin? No   Discharge Plan A Home   Discharge Plan B Home with family   DME Needed Upon Discharge  none   Discharge Plan discussed with: Patient     Basilia Naidu RN    [phone]

## 2022-02-28 NOTE — HPI
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 She reports running out of some of her medications with notation of SOB with no relief therefore she presented to the ER. Upon arrival, her BP was noted to be elevated at 181/118 with NT pro BNP 4620 Troponin .015. HR 110s-120s STShe was started on IV Lasix 60mg BID and admitted to [facility] Medicine. Cardiology was consulted for evaluation of tachycardia in setting of history of SVT/AVNRT

## 2022-02-28 NOTE — ASSESSMENT & PLAN NOTE
- previous echo in 5/2021 with EF 45% and grade I diastolic dysfunction; HF etiology currently diastolic in nature  - presented with SOB and cough with elevated NT pro BNP (4620); CXR with no acute changes  - on IV Lasix 60mg BID with only 550cc documented out overnight; negative 310cc since admission; feel continued IV diuresis needed therefore will continue IV diuresis for now; once more euvolemic will plan to hold HCTZ completely and start on oral Lasix- dose to be determined  - SBP trending down to 150s-180s with further trend down to 120s-130s this AM; will transition ACEI to ARb and continue Toprol XL; repeat echo today for re evaluation of EF  - previous cardiac PET- small to moderate (10-15%)- mildly fixed heterogeneity that improves with stress- no concern for ischemic etiology

## 2022-02-28 NOTE — SUBJECTIVE & OBJECTIVE
Past Medical History:   Diagnosis Date    Anemia     Hypertension     SVT (supraventricular tachycardia)     Uterine fibroid        Past Surgical History:   Procedure Laterality Date    ABLATION N/A 6/4/2021    Procedure: Ablation;  Surgeon: NICHELLE Zepeda MD;  Location: [facility] EP LAB;  Service: Cardiology;  Laterality: N/A;  SVT, RFA, Carto, anes, EH, 3prep    CYSTOSCOPY N/A 8/3/2021    Procedure: CYSTOSCOPY;  Surgeon: Jamarcus Ventura MD;  Location: [facility] OR;  Service: OB/GYN;  Laterality: N/A;    ROBOT-ASSISTED LAPAROSCOPIC HYSTERECTOMY N/A 8/3/2021    Procedure: ROBOTIC HYSTERECTOMY;  Surgeon: Jamarcus Ventura MD;  Location: [facility] OR;  Service: OB/GYN;  Laterality: N/A;    ROBOT-ASSISTED SALPINGECTOMY Bilateral 8/3/2021    Procedure: ROBOTIC SALPINGECTOMY;  Surgeon: Jamarcus Ventura MD;  Location: [facility] OR;  Service: OB/GYN;  Laterality: Bilateral;       Review of patient's allergies indicates:  No Known Allergies    No current facility-administered medications on file prior to encounter.     Current Outpatient Medications on File Prior to Encounter   Medication Sig    lisinopriL (PRINIVIL,ZESTRIL) 5 MG tablet Take 1 tablet (5 mg total) by mouth once daily.    lisinopriL-hydrochlorothiazide (PRINZIDE,ZESTORETIC) 10-12.5 mg per tablet Take 1 tablet by mouth once daily.    metoprolol succinate (TOPROL-XL) 50 MG 24 hr tablet Take 1 tablet (50 mg total) by mouth once daily.    ibuprofen (ADVIL,MOTRIN) 600 MG tablet Take 1 tablet (600 mg total) by mouth every 6 to 8 hours as needed.    norgestrel-ethinyl estradioL (LO/OVRAL) 0.3-30 mg-mcg per tablet Take 1 tablet by mouth once daily.    oxyCODONE-acetaminophen (PERCOCET) 5-325 mg per tablet Take 1-2 tablets by mouth every 4 to 6 hours as needed.    [DISCONTINUED] metoprolol tartrate (LOPRESSOR) 50 MG tablet Take 1 tablet (50 mg total) by mouth once daily.     Family History    None       Tobacco Use    Smoking status: Never Smoker    Smokeless tobacco: Never  Used   Substance and Sexual Activity    Alcohol use: No    Drug use: No    Sexual activity: Not Currently     Partners: Male     Birth control/protection: Injection     Review of Systems   Constitutional: Negative for chills, decreased appetite, diaphoresis and fever.   Cardiovascular:  Positive for dyspnea on exertion. Negative for chest pain, claudication, cyanosis, irregular heartbeat, leg swelling, near-syncope, orthopnea, palpitations, paroxysmal nocturnal dyspnea and syncope.   Respiratory:  Positive for shortness of breath. Negative for cough, hemoptysis and wheezing.    Gastrointestinal:  Negative for bloating, abdominal pain, constipation, diarrhea, melena, nausea and vomiting.   Neurological:  Negative for dizziness and weakness.   Objective:     Vital Signs (Most Recent):  Temp: 98.4 °F (36.9 °C) (02/28/22 0548)  Pulse: (!) 117 (02/28/22 0548)  Resp: 18 (02/28/22 0548)  BP: 135/80 (02/28/22 0548)  SpO2: 98 % (02/28/22 0920)   Vital Signs (24h Range):  Temp:  [97.7 °F (36.5 °C)-98.8 °F (37.1 °C)] 98.4 °F (36.9 °C)  Pulse:  [] 117  Resp:  [16-18] 18  SpO2:  [96 %-99 %] 98 %  BP: (123-139)/(80-98) 135/80     Weight: 135.3 kg (298 lb 4.8 oz)  Body mass index is 54.56 kg/m².    SpO2: 98 %  O2 Device (Oxygen Therapy): room air      Intake/Output Summary (Last 24 hours) at 2/28/2022 1137  Last data filed at 2/28/2022 1100  Gross per 24 hour   Intake 720 ml   Output 500 ml   Net 220 ml       Lines/Drains/Airways       Peripheral Intravenous Line  Duration                  Peripheral IV - Single Lumen 02/26/22 1900 20 G Left Antecubital 1 day                    Physical Exam  Constitutional:       General: She is not in acute distress.     Appearance: She is well-developed.   Cardiovascular:      Rate and Rhythm: Regular rhythm. Tachycardia present.      Heart sounds: No murmur heard.    No gallop.   Pulmonary:      Effort: Pulmonary effort is normal. No respiratory distress.      Breath sounds: Normal  "breath sounds. No wheezing.   Abdominal:      General: Bowel sounds are normal. There is no distension.      Palpations: Abdomen is soft.      Tenderness: There is no abdominal tenderness.   Skin:     General: Skin is warm and dry.   Neurological:      Mental Status: She is alert and oriented to person, place, and time.       Significant Labs: BMP:   Recent Labs   Lab 02/26/22 1902 02/27/22  1105 02/28/22  0552    115* 102    137 135*   K 3.8 3.6 3.8    105 105   CO2 23 21* 21*   BUN 12 11 17   CREATININE 1.13 1.2 1.3   CALCIUM 8.5* 9.1 8.7   MG  --   --  1.7   , CBC   Recent Labs   Lab 02/26/22 1902 02/28/22  0552   WBC 10.69 9.72   HGB 11.5* 10.9*   HCT 37.8 36.5*   * 454*   , and Troponin   Recent Labs   Lab 02/26/22 1902   TROPONINI 0.015       Significant Imaging: Echocardiogram: Transthoracic echo (TTE) complete (Cupid Only):   Results for orders placed or performed during the hospital encounter of 05/03/21   Echo Color Flow Doppler? Yes   Result Value Ref Range    BSA 2.34 m2    TDI SEPTAL 0.08 m/s    LV LATERAL E/E' RATIO 9.90 m/s    LV SEPTAL E/E' RATIO 12.38 m/s    LA WIDTH 4.26 cm    TDI LATERAL 0.10 m/s    LVIDd 5.61 3.5 - 6.0 cm    IVS 1.18 (A) 0.6 - 1.1 cm    Posterior Wall 1.32 (A) 0.6 - 1.1 cm    Ao root annulus 3.11 cm    LVIDs 4.22 (A) 2.1 - 4.0 cm    FS 25 28 - 44 %    LA volume 70.62 cm3    LV mass 297.50 g    LA size 3.92 cm    RVDD 2.76 cm    Left Ventricle Relative Wall Thickness 0.47 cm    AV mean gradient 7 mmHg    AV valve area 2.23 cm2    AV Velocity Ratio 0.43     AV index (prosthetic) 0.49     MV mean gradient 1 mmHg    MV valve area p 1/2 method 3.63 cm2    E/A ratio 1.27     Mean e' 0.09 m/s    E wave deceleration time 208.72 msec    MV "A" wave duration 8.85 msec    Pulm vein S/D ratio 1.38     LVOT diameter 2.41 cm    LVOT area 4.6 cm2    LVOT peak timur 0.72 m/s    LVOT peak VTI 13.62 cm    Ao peak timur 1.69 m/s    Ao VTI 27.88 cm    LVOT stroke volume " 62.10 cm3    AV peak gradient 11 mmHg    MV peak gradient 4 mmHg    E/E' ratio 11.00 m/s    MV Peak E Benjamin 0.99 m/s    TR Max Benjamin 2.66 m/s    MV stenosis pressure 1/2 time 60.53 ms    MV Peak A Benjamin 0.78 m/s    PV Peak S Benjamin 0.65 m/s    PV Peak D Benjamin 0.47 m/s    LV Systolic Volume 79.50 mL    LV Systolic Volume Index 35.8 mL/m2    LV Diastolic Volume 154.45 mL    LV Diastolic Volume Index 69.57 mL/m2    LA Volume Index 31.8 mL/m2    LV Mass Index 134 g/m2    RA Major Axis 4.20 cm    Left Atrium Minor Axis 4.29 cm    Left Atrium Major Axis 5.92 cm    Triscuspid Valve Regurgitation Peak Gradient 28 mmHg    LA Volume Index (Mod) 24.1 mL/m2    LA volume (mod) 53.57 cm3    RA Width 3.28 cm    Right Atrial Pressure (from IVC) 3 mmHg    EF 45 %    TV rest pulmonary artery pressure 31 mmHg    Narrative    · The left ventricle is mildly enlarged with concentric hypertrophy and   mildly decreased systolic function.  · The estimated ejection fraction is 45%.  · Grade I left ventricular diastolic dysfunction.  · Normal right ventricular size with normal right ventricular systolic   function.  · Mild mitral regurgitation.  · Mild tricuspid regurgitation.  · Normal central venous pressure (3 mmHg).  · The estimated PA systolic pressure is 31 mmHg.

## 2022-02-28 NOTE — ASSESSMENT & PLAN NOTE
- HR 110s-120 overnight  - telemetry reviewed with ST noted; no recurrent SVT or AVNRT  - suspect related to compensatory mechanism in setting of ADHF

## 2022-02-28 NOTE — HOSPITAL COURSE
2/28/2022 Cardiology consulted for ST. See consult HPI   3/1/2022: Seen and examined. Stated that she is feeling better. Breathing is better. HR has improved mostly in high 90s. She was laying flat this AM with no distress  3/2/2022 Creatinine down to 1.5. HR and BP stable. 1L out overnight negative 5.5L since admission

## 2022-03-01 LAB
ALBUMIN SERPL BCP-MCNC: 3.4 G/DL (ref 3.5–5.2)
ALP SERPL-CCNC: 55 U/L (ref 55–135)
ALT SERPL W/O P-5'-P-CCNC: 17 U/L (ref 10–44)
ANION GAP SERPL CALC-SCNC: 11 MMOL/L (ref 8–16)
ANION GAP SERPL CALC-SCNC: 12 MMOL/L (ref 8–16)
AST SERPL-CCNC: 20 U/L (ref 10–40)
BASOPHILS # BLD AUTO: 0.06 K/UL (ref 0–0.2)
BASOPHILS NFR BLD: 0.6 % (ref 0–1.9)
BILIRUB SERPL-MCNC: 0.5 MG/DL (ref 0.1–1)
BUN SERPL-MCNC: 27 MG/DL (ref 6–20)
BUN SERPL-MCNC: 30 MG/DL (ref 6–20)
CALCIUM SERPL-MCNC: 8.7 MG/DL (ref 8.7–10.5)
CALCIUM SERPL-MCNC: 9.5 MG/DL (ref 8.7–10.5)
CHLORIDE SERPL-SCNC: 101 MMOL/L (ref 95–110)
CHLORIDE SERPL-SCNC: 103 MMOL/L (ref 95–110)
CO2 SERPL-SCNC: 22 MMOL/L (ref 23–29)
CO2 SERPL-SCNC: 25 MMOL/L (ref 23–29)
CREAT SERPL-MCNC: 1.5 MG/DL (ref 0.5–1.4)
CREAT SERPL-MCNC: 1.7 MG/DL (ref 0.5–1.4)
DIFFERENTIAL METHOD: ABNORMAL
EOSINOPHIL # BLD AUTO: 0.2 K/UL (ref 0–0.5)
EOSINOPHIL NFR BLD: 1.6 % (ref 0–8)
ERYTHROCYTE [DISTWIDTH] IN BLOOD BY AUTOMATED COUNT: 18.9 % (ref 11.5–14.5)
EST. GFR  (AFRICAN AMERICAN): 43 ML/MIN/1.73 M^2
EST. GFR  (AFRICAN AMERICAN): 51 ML/MIN/1.73 M^2
EST. GFR  (NON AFRICAN AMERICAN): 38 ML/MIN/1.73 M^2
EST. GFR  (NON AFRICAN AMERICAN): 44 ML/MIN/1.73 M^2
GLUCOSE SERPL-MCNC: 114 MG/DL (ref 70–110)
GLUCOSE SERPL-MCNC: 86 MG/DL (ref 70–110)
HCT VFR BLD AUTO: 35.7 % (ref 37–48.5)
HGB BLD-MCNC: 11 G/DL (ref 12–16)
IMM GRANULOCYTES # BLD AUTO: 0.04 K/UL (ref 0–0.04)
IMM GRANULOCYTES NFR BLD AUTO: 0.4 % (ref 0–0.5)
LYMPHOCYTES # BLD AUTO: 3 K/UL (ref 1–4.8)
LYMPHOCYTES NFR BLD: 29.3 % (ref 18–48)
MAGNESIUM SERPL-MCNC: 1.7 MG/DL (ref 1.6–2.6)
MCH RBC QN AUTO: 22 PG (ref 27–31)
MCHC RBC AUTO-ENTMCNC: 30.8 G/DL (ref 32–36)
MCV RBC AUTO: 71 FL (ref 82–98)
MONOCYTES # BLD AUTO: 0.7 K/UL (ref 0.3–1)
MONOCYTES NFR BLD: 6.6 % (ref 4–15)
NEUTROPHILS # BLD AUTO: 6.3 K/UL (ref 1.8–7.7)
NEUTROPHILS NFR BLD: 61.5 % (ref 38–73)
NRBC BLD-RTO: 0 /100 WBC
PHOSPHATE SERPL-MCNC: 4.8 MG/DL (ref 2.7–4.5)
PLATELET # BLD AUTO: 463 K/UL (ref 150–450)
PMV BLD AUTO: 10.5 FL (ref 9.2–12.9)
POTASSIUM SERPL-SCNC: 3.7 MMOL/L (ref 3.5–5.1)
POTASSIUM SERPL-SCNC: 4.1 MMOL/L (ref 3.5–5.1)
PROT SERPL-MCNC: 6.9 G/DL (ref 6–8.4)
RBC # BLD AUTO: 5 M/UL (ref 4–5.4)
SODIUM SERPL-SCNC: 137 MMOL/L (ref 136–145)
SODIUM SERPL-SCNC: 137 MMOL/L (ref 136–145)
WBC # BLD AUTO: 10.23 K/UL (ref 3.9–12.7)

## 2022-03-01 PROCEDURE — 99900035 HC TECH TIME PER 15 MIN (STAT)

## 2022-03-01 PROCEDURE — 94761 N-INVAS EAR/PLS OXIMETRY MLT: CPT

## 2022-03-01 PROCEDURE — 94760 N-INVAS EAR/PLS OXIMETRY 1: CPT

## 2022-03-01 PROCEDURE — 83735 ASSAY OF MAGNESIUM: CPT | Performed by: STUDENT IN AN ORGANIZED HEALTH CARE EDUCATION/TRAINING PROGRAM

## 2022-03-01 PROCEDURE — 25000003 PHARM REV CODE 250: Performed by: INTERNAL MEDICINE

## 2022-03-01 PROCEDURE — 80053 COMPREHEN METABOLIC PANEL: CPT | Performed by: STUDENT IN AN ORGANIZED HEALTH CARE EDUCATION/TRAINING PROGRAM

## 2022-03-01 PROCEDURE — 80048 BASIC METABOLIC PNL TOTAL CA: CPT | Performed by: INTERNAL MEDICINE

## 2022-03-01 PROCEDURE — 25000003 PHARM REV CODE 250: Performed by: NURSE PRACTITIONER

## 2022-03-01 PROCEDURE — 99233 SBSQ HOSP IP/OBS HIGH 50: CPT | Mod: ,,, | Performed by: INTERNAL MEDICINE

## 2022-03-01 PROCEDURE — 94660 CPAP INITIATION&MGMT: CPT

## 2022-03-01 PROCEDURE — 99233 PR SUBSEQUENT HOSPITAL CARE,LEVL III: ICD-10-PCS | Mod: ,,, | Performed by: INTERNAL MEDICINE

## 2022-03-01 PROCEDURE — 63600175 PHARM REV CODE 636 W HCPCS: Performed by: STUDENT IN AN ORGANIZED HEALTH CARE EDUCATION/TRAINING PROGRAM

## 2022-03-01 PROCEDURE — 84100 ASSAY OF PHOSPHORUS: CPT | Performed by: STUDENT IN AN ORGANIZED HEALTH CARE EDUCATION/TRAINING PROGRAM

## 2022-03-01 PROCEDURE — 63600175 PHARM REV CODE 636 W HCPCS: Performed by: INTERNAL MEDICINE

## 2022-03-01 PROCEDURE — 25000003 PHARM REV CODE 250: Performed by: STUDENT IN AN ORGANIZED HEALTH CARE EDUCATION/TRAINING PROGRAM

## 2022-03-01 PROCEDURE — 11000001 HC ACUTE MED/SURG PRIVATE ROOM

## 2022-03-01 PROCEDURE — 36415 COLL VENOUS BLD VENIPUNCTURE: CPT | Performed by: INTERNAL MEDICINE

## 2022-03-01 PROCEDURE — 36415 COLL VENOUS BLD VENIPUNCTURE: CPT | Performed by: STUDENT IN AN ORGANIZED HEALTH CARE EDUCATION/TRAINING PROGRAM

## 2022-03-01 PROCEDURE — 85025 COMPLETE CBC W/AUTO DIFF WBC: CPT | Performed by: STUDENT IN AN ORGANIZED HEALTH CARE EDUCATION/TRAINING PROGRAM

## 2022-03-01 RX ORDER — MAGNESIUM SULFATE HEPTAHYDRATE 40 MG/ML
2 INJECTION, SOLUTION INTRAVENOUS ONCE
Status: COMPLETED | OUTPATIENT
Start: 2022-03-01 | End: 2022-03-01

## 2022-03-01 RX ORDER — POTASSIUM CHLORIDE 750 MG/1
10 TABLET, EXTENDED RELEASE ORAL DAILY
Status: DISCONTINUED | OUTPATIENT
Start: 2022-03-01 | End: 2022-03-02

## 2022-03-01 RX ORDER — ACETAMINOPHEN 325 MG/1
650 TABLET ORAL EVERY 8 HOURS PRN
Status: DISCONTINUED | OUTPATIENT
Start: 2022-03-01 | End: 2022-03-02 | Stop reason: HOSPADM

## 2022-03-01 RX ORDER — SODIUM CHLORIDE 9 MG/ML
INJECTION, SOLUTION INTRAVENOUS CONTINUOUS
Status: DISCONTINUED | OUTPATIENT
Start: 2022-03-01 | End: 2022-03-02

## 2022-03-01 RX ORDER — FUROSEMIDE 40 MG/1
40 TABLET ORAL 2 TIMES DAILY
Status: DISCONTINUED | OUTPATIENT
Start: 2022-03-02 | End: 2022-03-01

## 2022-03-01 RX ORDER — FUROSEMIDE 40 MG/1
80 TABLET ORAL 2 TIMES DAILY
Status: DISCONTINUED | OUTPATIENT
Start: 2022-03-01 | End: 2022-03-01

## 2022-03-01 RX ORDER — METOPROLOL SUCCINATE 50 MG/1
50 TABLET, EXTENDED RELEASE ORAL NIGHTLY
Status: DISCONTINUED | OUTPATIENT
Start: 2022-03-01 | End: 2022-03-02 | Stop reason: HOSPADM

## 2022-03-01 RX ORDER — FUROSEMIDE 10 MG/ML
40 INJECTION INTRAMUSCULAR; INTRAVENOUS DAILY
Status: DISCONTINUED | OUTPATIENT
Start: 2022-03-02 | End: 2022-03-01

## 2022-03-01 RX ADMIN — FUROSEMIDE 80 MG: 10 INJECTION, SOLUTION INTRAMUSCULAR; INTRAVENOUS at 09:03

## 2022-03-01 RX ADMIN — METOPROLOL SUCCINATE 50 MG: 50 TABLET, EXTENDED RELEASE ORAL at 10:03

## 2022-03-01 RX ADMIN — ENOXAPARIN SODIUM 40 MG: 100 INJECTION SUBCUTANEOUS at 10:03

## 2022-03-01 RX ADMIN — METOPROLOL SUCCINATE 100 MG: 50 TABLET, EXTENDED RELEASE ORAL at 09:03

## 2022-03-01 RX ADMIN — LOSARTAN POTASSIUM 25 MG: 25 TABLET, FILM COATED ORAL at 09:03

## 2022-03-01 RX ADMIN — MAGNESIUM SULFATE IN WATER 2 G: 40 INJECTION, SOLUTION INTRAVENOUS at 12:03

## 2022-03-01 RX ADMIN — ENOXAPARIN SODIUM 40 MG: 100 INJECTION SUBCUTANEOUS at 09:03

## 2022-03-01 RX ADMIN — POTASSIUM CHLORIDE 10 MEQ: 750 TABLET, EXTENDED RELEASE ORAL at 12:03

## 2022-03-01 RX ADMIN — SODIUM CHLORIDE: 0.9 INJECTION, SOLUTION INTRAVENOUS at 10:03

## 2022-03-01 NOTE — PLAN OF CARE
Room air SpO2 =  99% . Patient with no SOB.  CRC will continue to follow.     Patient utilizing CPAP @ documented settings with functioning alarms. Patient instructed on benefits of therapy.

## 2022-03-01 NOTE — ASSESSMENT & PLAN NOTE
Evidenced by history, elevated BNP, pulmonary edema, peripheral edema.    Plan:  - Diuresis will continue with PO lasix 40 mg BID   - CPAP at night (needs outpatient sleep study) can aid in CHF by increasing intrathoracic pressure  - Repeat echocardiogram echo showed: Echo with severely reduced EF now. 20%,  dilated LV.    - Continue Toprol 100 mg daily   - Start Losartan, will switch to entresto tomorrow 3/2/22  - Daily Weights  - Strict I/O  - Fluid restriction < 1.5 L daily  - Sodium restriction < 2 g/day  - Low-sodium cardiac diet  - will reassess volume status regularly

## 2022-03-01 NOTE — PROGRESS NOTES
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 She reports running out of some of her medications with notation of SOB with no relief therefore she presented to the ER. Upon arrival, her BP was noted to be elevated at 181/118 with NT pro BNP 4620 Troponin .015. HR 110s-120s STShe was started on IV Lasix 60mg BID and admitted to [facility]. Cardiology was consulted for evaluation of tachycardia in setting of history of SVT/AVNRT   Hospital Course:  3/1/2022: Seen and examined. Stated that she is feeling better. Breathing is better. HR has improved mostly in high 90s. She was laying flat this AM with no distress.      Review of Systems   Constitutional: Negative for chills and fever.   HENT: Negative for hearing loss and nosebleeds.    Eyes: Negative for blurred vision.   Cardiovascular: Positive for dyspnea on exertion. Negative for chest pain.   Respiratory: Positive for shortness of breath. Negative for hemoptysis.    Hematologic/Lymphatic: Negative for bleeding problem.   Skin: Negative for itching.   Musculoskeletal: Negative for falls.   Gastrointestinal: Negative for abdominal pain and hematochezia.   Genitourinary: Negative for hematuria.   Neurological: Negative for dizziness and loss of balance.   Psychiatric/Behavioral: Negative for altered mental status and depression.        Objective:     Vital Signs (Most Recent):  Temp: 98 °F (36.7 °C) (03/01/22 0716)  Pulse: 100 (03/01/22 0800)  Resp: 18 (03/01/22 0716)  BP: 132/89 (03/01/22  0716)  SpO2: 98 % (03/01/22 0758) Vital Signs (24h Range):  Temp:  [96.7 °F (35.9 °C)-98.4 °F (36.9 °C)] 98 °F (36.7 °C)  Pulse:  [] 100  Resp:  [18] 18  SpO2:  [98 %-99 %] 98 %  BP: (117-140)/(54-89) 132/89     Weight: 135.3 kg (298 lb 4.8 oz)  Body mass index is 54.56 kg/m².    SpO2: 98 %  O2 Device (Oxygen Therapy): room air      Intake/Output Summary (Last 24 hours) at 3/1/2022 1111  Last data filed at 3/1/2022 1107  Gross per 24 hour   Intake 460 ml   Output 4500 ml   Net -4040 ml       Lines/Drains/Airways     Peripheral Intravenous Line  Duration                Peripheral IV - Single Lumen 02/26/22 1900 20 G Left Antecubital 2 days                Physical Exam  Constitutional:       Appearance: She is well-developed. She is obese.   HENT:      Head: Normocephalic and atraumatic.   Eyes:      Conjunctiva/sclera: Conjunctivae normal.   Neck:      Vascular: No carotid bruit or JVD.   Cardiovascular:      Rate and Rhythm: Normal rate and regular rhythm.      Pulses:           Carotid pulses are 2+ on the right side and 2+ on the left side.       Radial pulses are 2+ on the right side and 2+ on the left side.      Heart sounds: Normal heart sounds. No murmur heard.    No friction rub. No gallop.   Pulmonary:      Effort: Pulmonary effort is normal. No respiratory distress.      Breath sounds: Normal breath sounds. No stridor. No wheezing or rales.   Musculoskeletal:         General: Injury: Trace       Cervical back: Neck supple.      Right lower leg: Edema (Trace ) present.      Left lower leg: Edema present.   Skin:     General: Skin is warm and dry.   Neurological:      Mental Status: She is alert and oriented to person, place, and time.   Psychiatric:         Behavior: Behavior normal.         Significant Labs:   BMP:   Recent Labs   Lab 02/28/22  0552 03/01/22  0505    86   * 137   K 3.8 3.7    103   CO2 21* 22*   BUN 17 27*   CREATININE 1.3 1.5*   CALCIUM 8.7 8.7   MG 1.7 1.7   , CMP    Recent Labs   Lab 02/28/22  0552 03/01/22  0505   * 137   K 3.8 3.7    103   CO2 21* 22*    86   BUN 17 27*   CREATININE 1.3 1.5*   CALCIUM 8.7 8.7   PROT 6.9 6.9   ALBUMIN 3.4* 3.4*   BILITOT 0.6 0.5   ALKPHOS 52* 55   AST 8* 20   ALT 15 17   ANIONGAP 9 12   ESTGFRAFRICA >60 51*   EGFRNONAA 52* 44*   , CBC   Recent Labs   Lab 02/28/22  0552 03/01/22  0505   WBC 9.72 10.23   HGB 10.9* 11.0*   HCT 36.5* 35.7*   * 463*   , Lipid Panel No results for input(s): CHOL, HDL, LDLCALC, TRIG, CHOLHDL in the last 48 hours., Troponin No results for input(s): TROPONINI in the last 48 hours. and All pertinent lab results from the last 24 hours have been reviewed.    Significant Imaging: Echocardiogram:   2D echo with color flow doppler: No results found for this or any previous visit. and Transthoracic echo (TTE) complete (C[facility] Only):   Results for orders placed or performed during the hospital encounter of 02/26/22   Echo   Result Value Ref Range    Ascending aorta 2.62 cm    STJ 2.57 cm    AV mean gradient 1 mmHg    Ao peak benjamin 0.69 m/s    Ao VTI 6.53 cm    IVS 0.89 0.6 - 1.1 cm    LA size 5.17 cm    Left Atrium Major Axis 6.75 cm    Left Atrium Minor Axis 5.16 cm    LVIDd 6.85 (A) 3.5 - 6.0 cm    LVIDs 5.79 (A) 2.1 - 4.0 cm    LVOT diameter 2.31 cm    LVOT peak VTI 7.67 cm    Posterior Wall 0.89 0.6 - 1.1 cm    MV Peak A Benjamin 0.47 m/s    E wave deceleration time 157.62 msec    MV Peak E Benjamin 1.41 m/s    RA Major Axis 5.94 cm    RA Width 5.07 cm    RVDD 3.49 cm    TR Max Benjamin 2.53 m/s    LA WIDTH 5.79 cm    Ao root annulus 2.90 cm    PV PEAK VELOCITY 0.61 cm/s    MV stenosis pressure 1/2 time 45.71 ms    LV Diastolic Volume 243.15 mL    LV Systolic Volume 165.88 mL    LVOT peak benjamin 0.68 m/s    Mr max benjamin 0.05 m/s    LA volume (mod) 126.73 cm3    MV mean gradient 1 mmHg    MV peak gradient 8 mmHg    MV VTI 24.11 cm    FS 15 %    LA volume 148.82 cm3    LV mass 267.94 g    Left Ventricle Relative Wall  Thickness 0.26 cm    AV valve area 4.92 cm2    AV Velocity Ratio 0.99     AV index (prosthetic) 1.17     MV valve area p 1/2 method 4.81 cm2    MV valve area by continuity eq 1.33 cm2    E/A ratio 3.00     LVOT area 4.2 cm2    LVOT stroke volume 32.13 cm3    AV peak gradient 2 mmHg    LV Systolic Volume Index 73.1 mL/m2    LV Diastolic Volume Index 107.11 mL/m2    LA Volume Index 65.6 mL/m2    LV Mass Index 118 g/m2    Triscuspid Valve Regurgitation Peak Gradient 26 mmHg    LA Volume Index (Mod) 55.8 mL/m2    BSA 2.43 m2    Right Atrial Pressure (from IVC) 8 mmHg    EF 20 %    TV rest pulmonary artery pressure 34 mmHg    Narrative    · The left ventricle is severely enlarged with eccentric hypertrophy and   severely decreased systolic function.  · The estimated ejection fraction is 20%.  · There is severe left ventricular global hypokinesis.  · Grade III left ventricular diastolic dysfunction.  · Moderate mitral regurgitation.  · Mild tricuspid regurgitation.  · Severe left atrial enlargement.  · Mild pulmonic regurgitation.  · Intermediate central venous pressure (8 mmHg).  · The estimated PA systolic pressure is 34 mmHg.  · Trivial pericardial effusion.        Assessment and Plan:   36 year old female with     1. HFrEF decompensation  2. Hx of AVNRT s/p ablation   3. HTN   4. FH of cardiomyopathy     Plan:    - Continue IV diuresis today, hold evening dose due to slight increase in s.creat. Check bmp this evening. Will switch to po lasix 40 mg po BID tomorrow  - Increase Toprol to 100 mg in am and 50 mg pm   - Continue Losartan in anticipation to switch to entresto tomorrow if renal function and BP stable   - Will advance and titrate up GDMT as outpatient.  - Will need to establish care with Heart failure team as outpatient. May need cardiac MRI           Active Diagnoses:    Diagnosis Date Noted POA    PRINCIPAL PROBLEM:  Acute exacerbation of CHF (congestive heart failure) [I50.9] 02/27/2022 Yes    Hx of SVT  (supraventricular tachycardia) [I47.1] 02/27/2022 No    Sinus tachycardia [R00.0] 03/25/2021 Yes      Problems Resolved During this Admission:       VTE Risk Mitigation (From admission, onward)         Ordered     enoxaparin injection 40 mg  Every 12 hours         02/27/22 0936     IP VTE HIGH RISK PATIENT  Once         02/27/22 0936     Place sequential compression device  Until discontinued         02/27/22 0936                Gregory Moore MD  Cardiology  [facility] - Telemetry

## 2022-03-01 NOTE — SUBJECTIVE & OBJECTIVE
Interval History: NAEON, feeling well this morning    Review of Systems   Constitutional:  Negative for activity change, appetite change and fever.   HENT:  Negative for congestion.    Eyes:  Negative for visual disturbance.   Respiratory:  Positive for cough. Negative for apnea, chest tightness, shortness of breath and wheezing.    Gastrointestinal:  Negative for abdominal pain, constipation, diarrhea, nausea and vomiting.   Genitourinary:  Negative for difficulty urinating.   Neurological:  Negative for dizziness, syncope, numbness and headaches.   Objective:     Vital Signs (Most Recent):  Temp: 98 °F (36.7 °C) (03/01/22 0716)  Pulse: 100 (03/01/22 0800)  Resp: 18 (03/01/22 0716)  BP: 132/89 (03/01/22 0716)  SpO2: 98 % (03/01/22 0758) Vital Signs (24h Range):  Temp:  [96.7 °F (35.9 °C)-98.4 °F (36.9 °C)] 98 °F (36.7 °C)  Pulse:  [] 100  Resp:  [18] 18  SpO2:  [98 %-99 %] 98 %  BP: (117-140)/(54-89) 132/89     Weight: 135.3 kg (298 lb 4.8 oz)  Body mass index is 54.56 kg/m².    Intake/Output Summary (Last 24 hours) at 3/1/2022 1056  Last data filed at 3/1/2022 0630  Gross per 24 hour   Intake 700 ml   Output 3500 ml   Net -2800 ml      Physical Exam  Constitutional:       Appearance: She is obese.   Cardiovascular:      Rate and Rhythm: Normal rate and regular rhythm.      Heart sounds: No murmur heard.    No friction rub. No gallop.   Pulmonary:      Breath sounds: No wheezing, rhonchi or rales.   Abdominal:      Tenderness: There is no abdominal tenderness.   Neurological:      Mental Status: She is alert and oriented to person, place, and time.   Psychiatric:         Mood and Affect: Mood normal.       Significant Labs: All pertinent labs within the past 24 hours have been reviewed.  CBC:   Recent Labs   Lab 02/28/22  0552 03/01/22  0505   WBC 9.72 10.23   HGB 10.9* 11.0*   HCT 36.5* 35.7*   * 463*     CMP:   Recent Labs   Lab 02/27/22  1105 02/28/22  0552 03/01/22  0505    135* 137   K 3.6  3.8 3.7    105 103   CO2 21* 21* 22*   * 102 86   BUN 11 17 27*   CREATININE 1.2 1.3 1.5*   CALCIUM 9.1 8.7 8.7   PROT 7.7 6.9 6.9   ALBUMIN 3.8 3.4* 3.4*   BILITOT 0.8 0.6 0.5   ALKPHOS 61 52* 55   AST 8* 8* 20   ALT 16 15 17   ANIONGAP 11 9 12   EGFRNONAA 57* 52* 44*       Significant Imaging: I have reviewed all pertinent imaging results/findings within the past 24 hours.  Echo: I have reviewed all pertinent results/findings within the past 24 hours and my personal findings are:  Reduced ejection fraction of 20 %, Mild mitral regurgitation, mild tricuspid regurgitation, elevated PA pressure

## 2022-03-01 NOTE — ASSESSMENT & PLAN NOTE
EKG with sinus tachycardia  CTA without PE  [facility] cardiology consulted,   Recs: Continue Toprol 100 mg daily, switch losartan to entresto, continue diureses schedule with outpatient cardiology

## 2022-03-01 NOTE — HOSPITAL COURSE
Underwent diuresis with IV Lasix on admission. Found to have significant tachycardia (heart rate in the 130s), increased patient's metoprolol to 100 mg daily. [facility] cardiology was consulted. Echocardiogram with EF 20% dilated cardiomyopathy.  Continued on guideline directed medical therapy:  Metoprolol 100 mg daily + losartan 25 mg daily. Losartan changed to entresto. Lasix 40mg BID to be continued PO outpatient. Patient tolerating regimen of  metoprolol, lasix, and entresto. Patient will need follow up with heart failure clinic and primary care provider.

## 2022-03-01 NOTE — ASSESSMENT & PLAN NOTE
Evidenced by history, elevated BNP, pulmonary edema, peripheral edema.  Repeat echocardiogram echo showed: Echo with severely reduced EF now. 20%,  dilated LV.      Plan:  - Diuresis will continue with PO lasix 40 mg BID   - CPAP at night (needs outpatient sleep study) can aid in CHF by increasing intrathoracic pressure  - Continue Toprol 100 mg daily   - Started Entresto  - Daily Weights  - Strict I/O  - Fluid restriction < 1.5 L daily  - Low-sodium  < 2 g/day cardiac diet

## 2022-03-01 NOTE — PROGRESS NOTES
[facility] Medicine  Progress Note    Patient Name: Ursula Smallwood  MRN: [id number]  Patient Class: IP- Inpatient   Admission Date: 2/26/2022  Length of Stay: 2 days  Attending Physician: Cory Golden MD  Primary Care Provider: Tyrese Garcia MD        Subjective:     Principal Problem:Acute exacerbation of CHF (congestive heart failure)        HPI:  Ursula Smallwood is a 36-year-old female PMHx SVT s/p for radiofrequency catheter ablation (06/04/2021), CHF, HTN, anemia 2/2 uterine fibroids s/p hysterectomy who presented to [facility] ED with chief complaint of shortness of breath and clear productive cough. Endorses shortness of breath worsened the yesterday after running out of her blood pressure medication HCTZ-lisinopril.  Reports a history of heart failure. Also endorses orthopnea. Endorses her family members will sometimes hear her snore and have apneic events. Denies any history of ALISSA.    In the ED, vitals with heart rate in the 130s, /118. Pro-BMP 4620. EKG with sinus tachycardia. CTA chest with small right pleural effusion, subsolid basilar opacities and a small pericardial effusion.  [facility] admitted [facility] for tachycardia and acute CHF exacerbation.       Overview/Hospital Course:  No notes on file    Interval History: NAEON, feeling well this morning    Review of Systems   Constitutional:  Negative for activity change, appetite change and fever.   HENT:  Negative for congestion.    Eyes:  Negative for visual disturbance.   Respiratory:  Positive for cough. Negative for apnea, chest tightness, shortness of breath and wheezing.    Gastrointestinal:  Negative for abdominal pain, constipation, diarrhea, nausea and vomiting.   Genitourinary:  Negative for difficulty urinating.   Neurological:  Negative for dizziness, syncope, numbness and headaches.   Objective:     Vital Signs (Most Recent):  Temp: 98 °F (36.7 °C) (03/01/22 0716)  Pulse: 100 (03/01/22  0800)  Resp: 18 (03/01/22 0716)  BP: 132/89 (03/01/22 0716)  SpO2: 98 % (03/01/22 0758) Vital Signs (24h Range):  Temp:  [96.7 °F (35.9 °C)-98.4 °F (36.9 °C)] 98 °F (36.7 °C)  Pulse:  [] 100  Resp:  [18] 18  SpO2:  [98 %-99 %] 98 %  BP: (117-140)/(54-89) 132/89     Weight: 135.3 kg (298 lb 4.8 oz)  Body mass index is 54.56 kg/m².    Intake/Output Summary (Last 24 hours) at 3/1/2022 1056  Last data filed at 3/1/2022 0630  Gross per 24 hour   Intake 700 ml   Output 3500 ml   Net -2800 ml      Physical Exam  Constitutional:       Appearance: She is obese.   Cardiovascular:      Rate and Rhythm: Normal rate and regular rhythm.      Heart sounds: No murmur heard.    No friction rub. No gallop.   Pulmonary:      Breath sounds: No wheezing, rhonchi or rales.   Abdominal:      Tenderness: There is no abdominal tenderness.   Neurological:      Mental Status: She is alert and oriented to person, place, and time.   Psychiatric:         Mood and Affect: Mood normal.       Significant Labs: All pertinent labs within the past 24 hours have been reviewed.  CBC:   Recent Labs   Lab 02/28/22  0552 03/01/22  0505   WBC 9.72 10.23   HGB 10.9* 11.0*   HCT 36.5* 35.7*   * 463*     CMP:   Recent Labs   Lab 02/27/22  1105 02/28/22  0552 03/01/22  0505    135* 137   K 3.6 3.8 3.7    105 103   CO2 21* 21* 22*   * 102 86   BUN 11 17 27*   CREATININE 1.2 1.3 1.5*   CALCIUM 9.1 8.7 8.7   PROT 7.7 6.9 6.9   ALBUMIN 3.8 3.4* 3.4*   BILITOT 0.8 0.6 0.5   ALKPHOS 61 52* 55   AST 8* 8* 20   ALT 16 15 17   ANIONGAP 11 9 12   EGFRNONAA 57* 52* 44*       Significant Imaging: I have reviewed all pertinent imaging results/findings within the past 24 hours.  Echo: I have reviewed all pertinent results/findings within the past 24 hours and my personal findings are:  Reduced ejection fraction of 20 %, Mild mitral regurgitation, mild tricuspid regurgitation, elevated PA pressure      Assessment/Plan:      * Acute exacerbation  of CHF (congestive heart failure)  Evidenced by history, elevated BNP, pulmonary edema, peripheral edema.    Plan:  - Diuresis will continue with PO lasix 40 mg BID   - CPAP at night (needs outpatient sleep study) can aid in CHF by increasing intrathoracic pressure  - Repeat echocardiogram echo showed: Echo with severely reduced EF now. 20%,  dilated LV.    - Continue Toprol 100 mg daily   - Start Losartan, will switch to entresto tomorrow 3/2/22  - Daily Weights  - Strict I/O  - Fluid restriction < 1.5 L daily  - Sodium restriction < 2 g/day  - Low-sodium cardiac diet  - will reassess volume status regularly    Hx of SVT (supraventricular tachycardia)  s/p radiofrequency catheter ablation 6/4/2021  Cardiology recommends outpatient Heart Failure clinic follow-up  Remain on cardiac telemetry    Sinus tachycardia  EKG with sinus tachycardia  CTA without PE  [facility] cardiology consulted,   Recs: Continue Toprol 100 mg daily, switch losartan to entresto, continue diureses schedule with outpatient cardiology          VTE Risk Mitigation (From admission, onward)         Ordered     enoxaparin injection 40 mg  Every 12 hours         02/27/22 0936     IP VTE HIGH RISK PATIENT  Once         02/27/22 0936     Place sequential compression device  Until discontinued         02/27/22 0936                Discharge Planning   KRYSTIAN:      Code Status: Full Code   Is the patient medically ready for discharge?:     Reason for patient still in hospital (select all that apply): Patient trending condition, Treatment and Consult recommendations  Discharge Plan A: Home                Ken Harris MD  [facility] PGY-1  03/01/2022

## 2022-03-01 NOTE — ASSESSMENT & PLAN NOTE
s/p radiofrequency catheter ablation 6/4/2021  Cardiology recommends outpatient Heart Failure clinic follow-up  Remain on cardiac telemetry

## 2022-03-02 ENCOUNTER — TELEPHONE (OUTPATIENT)
Dept: ADMINISTRATIVE | Facility: OTHER | Age: 37
End: 2022-03-02
Payer: COMMERCIAL

## 2022-03-02 ENCOUNTER — TELEPHONE (OUTPATIENT)
Dept: TRANSPLANT | Facility: CLINIC | Age: 37
End: 2022-03-02
Payer: COMMERCIAL

## 2022-03-02 VITALS
TEMPERATURE: 99 F | BODY MASS INDEX: 53.92 KG/M2 | DIASTOLIC BLOOD PRESSURE: 70 MMHG | SYSTOLIC BLOOD PRESSURE: 121 MMHG | RESPIRATION RATE: 18 BRPM | HEART RATE: 97 BPM | WEIGHT: 293 LBS | OXYGEN SATURATION: 99 % | HEIGHT: 62 IN

## 2022-03-02 DIAGNOSIS — I50.9 CONGESTIVE HEART FAILURE, UNSPECIFIED HF CHRONICITY, UNSPECIFIED HEART FAILURE TYPE: Primary | ICD-10-CM

## 2022-03-02 LAB
ALBUMIN SERPL BCP-MCNC: 3.5 G/DL (ref 3.5–5.2)
ALP SERPL-CCNC: 51 U/L (ref 55–135)
ALT SERPL W/O P-5'-P-CCNC: 14 U/L (ref 10–44)
ANION GAP SERPL CALC-SCNC: 8 MMOL/L (ref 8–16)
AST SERPL-CCNC: 8 U/L (ref 10–40)
BASOPHILS # BLD AUTO: 0.06 K/UL (ref 0–0.2)
BASOPHILS NFR BLD: 0.7 % (ref 0–1.9)
BILIRUB SERPL-MCNC: 0.4 MG/DL (ref 0.1–1)
BUN SERPL-MCNC: 31 MG/DL (ref 6–20)
CALCIUM SERPL-MCNC: 9.1 MG/DL (ref 8.7–10.5)
CHLORIDE SERPL-SCNC: 104 MMOL/L (ref 95–110)
CO2 SERPL-SCNC: 26 MMOL/L (ref 23–29)
CREAT SERPL-MCNC: 1.5 MG/DL (ref 0.5–1.4)
DIFFERENTIAL METHOD: ABNORMAL
EOSINOPHIL # BLD AUTO: 0.2 K/UL (ref 0–0.5)
EOSINOPHIL NFR BLD: 2.5 % (ref 0–8)
ERYTHROCYTE [DISTWIDTH] IN BLOOD BY AUTOMATED COUNT: 18.6 % (ref 11.5–14.5)
EST. GFR  (AFRICAN AMERICAN): 51 ML/MIN/1.73 M^2
EST. GFR  (NON AFRICAN AMERICAN): 44 ML/MIN/1.73 M^2
GLUCOSE SERPL-MCNC: 96 MG/DL (ref 70–110)
HCT VFR BLD AUTO: 37.3 % (ref 37–48.5)
HGB BLD-MCNC: 11.1 G/DL (ref 12–16)
IMM GRANULOCYTES # BLD AUTO: 0.02 K/UL (ref 0–0.04)
IMM GRANULOCYTES NFR BLD AUTO: 0.2 % (ref 0–0.5)
LYMPHOCYTES # BLD AUTO: 2.4 K/UL (ref 1–4.8)
LYMPHOCYTES NFR BLD: 27.3 % (ref 18–48)
MCH RBC QN AUTO: 21.4 PG (ref 27–31)
MCHC RBC AUTO-ENTMCNC: 29.8 G/DL (ref 32–36)
MCV RBC AUTO: 72 FL (ref 82–98)
MONOCYTES # BLD AUTO: 0.5 K/UL (ref 0.3–1)
MONOCYTES NFR BLD: 6.1 % (ref 4–15)
NEUTROPHILS # BLD AUTO: 5.5 K/UL (ref 1.8–7.7)
NEUTROPHILS NFR BLD: 63.2 % (ref 38–73)
NRBC BLD-RTO: 0 /100 WBC
PLATELET # BLD AUTO: 470 K/UL (ref 150–450)
PMV BLD AUTO: 10.2 FL (ref 9.2–12.9)
POTASSIUM SERPL-SCNC: 3.9 MMOL/L (ref 3.5–5.1)
PROT SERPL-MCNC: 7.1 G/DL (ref 6–8.4)
RBC # BLD AUTO: 5.18 M/UL (ref 4–5.4)
SODIUM SERPL-SCNC: 138 MMOL/L (ref 136–145)
WBC # BLD AUTO: 8.67 K/UL (ref 3.9–12.7)

## 2022-03-02 PROCEDURE — 99900035 HC TECH TIME PER 15 MIN (STAT)

## 2022-03-02 PROCEDURE — 94760 N-INVAS EAR/PLS OXIMETRY 1: CPT

## 2022-03-02 PROCEDURE — 99233 PR SUBSEQUENT HOSPITAL CARE,LEVL III: ICD-10-PCS | Mod: ,,, | Performed by: NURSE PRACTITIONER

## 2022-03-02 PROCEDURE — 85025 COMPLETE CBC W/AUTO DIFF WBC: CPT | Performed by: STUDENT IN AN ORGANIZED HEALTH CARE EDUCATION/TRAINING PROGRAM

## 2022-03-02 PROCEDURE — 80053 COMPREHEN METABOLIC PANEL: CPT | Performed by: STUDENT IN AN ORGANIZED HEALTH CARE EDUCATION/TRAINING PROGRAM

## 2022-03-02 PROCEDURE — 94660 CPAP INITIATION&MGMT: CPT

## 2022-03-02 PROCEDURE — 99233 SBSQ HOSP IP/OBS HIGH 50: CPT | Mod: ,,, | Performed by: NURSE PRACTITIONER

## 2022-03-02 PROCEDURE — 63600175 PHARM REV CODE 636 W HCPCS: Performed by: STUDENT IN AN ORGANIZED HEALTH CARE EDUCATION/TRAINING PROGRAM

## 2022-03-02 PROCEDURE — 25000003 PHARM REV CODE 250: Performed by: STUDENT IN AN ORGANIZED HEALTH CARE EDUCATION/TRAINING PROGRAM

## 2022-03-02 PROCEDURE — 25000003 PHARM REV CODE 250: Performed by: INTERNAL MEDICINE

## 2022-03-02 PROCEDURE — 36415 COLL VENOUS BLD VENIPUNCTURE: CPT | Performed by: STUDENT IN AN ORGANIZED HEALTH CARE EDUCATION/TRAINING PROGRAM

## 2022-03-02 RX ORDER — FUROSEMIDE 40 MG/1
40 TABLET ORAL DAILY
Status: DISCONTINUED | OUTPATIENT
Start: 2022-03-03 | End: 2022-03-02 | Stop reason: HOSPADM

## 2022-03-02 RX ORDER — METOPROLOL SUCCINATE 50 MG/1
TABLET, EXTENDED RELEASE ORAL
Qty: 90 TABLET | Refills: 11 | Status: SHIPPED | OUTPATIENT
Start: 2022-03-02 | End: 2022-04-06

## 2022-03-02 RX ORDER — FUROSEMIDE 40 MG/1
40 TABLET ORAL DAILY
Qty: 30 TABLET | Refills: 11 | Status: SHIPPED | OUTPATIENT
Start: 2022-03-03 | End: 2022-03-08

## 2022-03-02 RX ADMIN — POTASSIUM CHLORIDE 10 MEQ: 750 TABLET, EXTENDED RELEASE ORAL at 08:03

## 2022-03-02 RX ADMIN — SACUBITRIL AND VALSARTAN 1 TABLET: 24; 26 TABLET, FILM COATED ORAL at 08:03

## 2022-03-02 RX ADMIN — METOPROLOL SUCCINATE 100 MG: 50 TABLET, EXTENDED RELEASE ORAL at 08:03

## 2022-03-02 RX ADMIN — ENOXAPARIN SODIUM 40 MG: 100 INJECTION SUBCUTANEOUS at 08:03

## 2022-03-02 NOTE — PLAN OF CARE
Plan of care reviewed, pt verbalizes understanding.  Pt is AAOX4, running NSR on tele monitor, on room air.  Due medications given.  Discharge orders noted.  IV and tele monitor removed.  Will continue to monitor.

## 2022-03-02 NOTE — ASSESSMENT & PLAN NOTE
- HR improved overnight and down to 80s-90st  - no recurrent SVT or AVNRT during admission  - Toprol XL increased to 100mg QAM and 50mg QPM and will continue BID dosing upon discharge

## 2022-03-02 NOTE — SUBJECTIVE & OBJECTIVE
Interval History: NAEON, no acute complaints this AM    Review of Systems   Constitutional:  Negative for activity change, appetite change and fever.   HENT:  Negative for congestion.    Eyes:  Negative for visual disturbance.   Respiratory:  Positive for cough. Negative for apnea, chest tightness, shortness of breath and wheezing.    Gastrointestinal:  Negative for abdominal pain, constipation, diarrhea, nausea and vomiting.   Genitourinary:  Negative for difficulty urinating.   Neurological:  Negative for dizziness, syncope, numbness and headaches.   Objective:     Vital Signs (Most Recent):  Temp: 97.6 °F (36.4 °C) (03/02/22 0712)  Pulse: 92 (03/02/22 0712)  Resp: 17 (03/02/22 0712)  BP: 137/84 (03/02/22 0712)  SpO2: 99 % (03/02/22 0712) Vital Signs (24h Range):  Temp:  [97.2 °F (36.2 °C)-98.5 °F (36.9 °C)] 97.6 °F (36.4 °C)  Pulse:  [] 92  Resp:  [17-22] 17  SpO2:  [96 %-100 %] 99 %  BP: (112-137)/(71-86) 137/84     Weight: 135.3 kg (298 lb 4.8 oz)  Body mass index is 54.56 kg/m².    Intake/Output Summary (Last 24 hours) at 3/2/2022 0849  Last data filed at 3/1/2022 1107  Gross per 24 hour   Intake --   Output 1000 ml   Net -1000 ml      Physical Exam  Constitutional:       Appearance: She is obese.   HENT:      Right Ear: External ear normal.      Left Ear: External ear normal.      Nose: Nose normal.      Mouth/Throat:      Mouth: Mucous membranes are moist.   Eyes:      Extraocular Movements: Extraocular movements intact.      Conjunctiva/sclera: Conjunctivae normal.      Pupils: Pupils are equal, round, and reactive to light.   Cardiovascular:      Rate and Rhythm: Normal rate and regular rhythm.      Heart sounds: No murmur heard.    No friction rub. No gallop.   Pulmonary:      Breath sounds: No wheezing, rhonchi or rales.   Abdominal:      Tenderness: There is no abdominal tenderness.   Musculoskeletal:         General: Normal range of motion.   Skin:     General: Skin is warm.   Neurological:       Mental Status: She is alert and oriented to person, place, and time.   Psychiatric:         Mood and Affect: Mood normal.       Significant Labs: All pertinent labs within the past 24 hours have been reviewed.  CBC:   Recent Labs   Lab 03/01/22  0505 03/02/22  0517   WBC 10.23 8.67   HGB 11.0* 11.1*   HCT 35.7* 37.3   * 470*     CMP:   Recent Labs   Lab 03/01/22  0505 03/01/22  1701 03/02/22  0516    137 138   K 3.7 4.1 3.9    101 104   CO2 22* 25 26   GLU 86 114* 96   BUN 27* 30* 31*   CREATININE 1.5* 1.7* 1.5*   CALCIUM 8.7 9.5 9.1   PROT 6.9  --  7.1   ALBUMIN 3.4*  --  3.5   BILITOT 0.5  --  0.4   ALKPHOS 55  --  51*   AST 20  --  8*   ALT 17  --  14   ANIONGAP 12 11 8   EGFRNONAA 44* 38* 44*       Significant Imaging: I have reviewed all pertinent imaging results/findings within the past 24 hours.

## 2022-03-02 NOTE — DISCHARGE SUMMARY
[facility] Medicine  Discharge Summary      Patient Name: Ursula Smallwood  MRN: [id number]  Patient Class: IP- Inpatient  Admission Date: 2/26/2022  Hospital Length of Stay: 3 days  Discharge Date and Time:  03/02/2022 1:31 PM  Attending Physician: Dr. Cory Golden MD   Discharging Provider: Ken Harris MD  Primary Care Provider: Tyrese Garcia MD      HPI:   Ursula Smallwood is a 36-year-old female PMHx SVT s/p for radiofrequency catheter ablation (06/04/2021), CHF, HTN, anemia 2/2 uterine fibroids s/p hysterectomy who presented to [facility] ED with chief complaint of shortness of breath and clear productive cough. Endorses shortness of breath worsened the yesterday after running out of her blood pressure medication HCTZ-lisinopril.  Reports a history of heart failure. Also endorses orthopnea. Endorses her family members will sometimes hear her snore and have apneic events. Denies any history of ALISSA.    In the ED, vitals with heart rate in the 130s, /118. Pro-BMP 4620. EKG with sinus tachycardia. CTA chest with small right pleural effusion, subsolid basilar opacities and a small pericardial effusion.  [facility] for tachycardia and acute CHF exacerbation.       * No surgery found *      Hospital Course:   Underwent diuresis with IV Lasix on admission. Found to have significant tachycardia (heart rate in the 130s), increased patient's metoprolol to 100 mg daily. [facility]. Echocardiogram with EF 20% dilated cardiomyopathy.  Continued on guideline directed medical therapy:  Metoprolol 100 mg daily + losartan 25 mg daily. Losartan changed to entresto. Lasix 40mg BID to be continued PO outpatient. Patient tolerating regimen of  metoprolol, lasix, and entresto. Patient will need follow up with heart failure clinic and primary care provider.      Physical Exam  Constitutional:       Appearance: She is obese.   HENT:      Head:  Normocephalic.      Right Ear: External ear normal.      Left Ear: External ear normal.      Nose: Nose normal.      Mouth/Throat:      Mouth: Mucous membranes are moist.   Eyes:      Extraocular Movements: Extraocular movements intact.      Pupils: Pupils are equal, round, and reactive to light.   Cardiovascular:      Rate and Rhythm: Normal rate and regular rhythm.      Pulses: Normal pulses.   Pulmonary:      Effort: Pulmonary effort is normal.      Breath sounds: Normal breath sounds.   Abdominal:      General: Bowel sounds are normal.      Palpations: Abdomen is soft.   Musculoskeletal:         General: Normal range of motion.      Cervical back: Normal range of motion.   Skin:     General: Skin is warm.      Capillary Refill: Capillary refill takes less than 2 seconds.   Neurological:      General: No focal deficit present.      Mental Status: She is alert and oriented to person, place, and time.   Psychiatric:         Mood and Affect: Mood normal.          Goals of Care Treatment Preferences:  Code Status: Full Code      Consults:   Consults (From admission, onward)        Status Ordering Provider     Inpatient consult to Social Work/Case Management  Once        Provider:  (Not yet assigned)    CHRISTOPHER Horton III     Inpatient consult to Cardiology-[facility]  Once        Provider:  Gregory Moore MD    Completed NOHEMY WILSON          * Acute exacerbation of CHF (congestive heart failure)  Evidenced by history, elevated BNP, pulmonary edema, peripheral edema.  Repeat echocardiogram echo showed: Echo with severely reduced EF now. 20%,  dilated LV.      Plan:  - Diuresis will continue with PO lasix 40 mg BID   - CPAP at night (needs outpatient sleep study) can aid in CHF by increasing intrathoracic pressure  - Continue Toprol 100 mg daily   - Started Entresto  - Daily Weights  - Strict I/O  - Fluid restriction < 1.5 L daily  - Low-sodium  < 2 g/day cardiac diet    Hx of SVT (supraventricular  tachycardia)  s/p radiofrequency catheter ablation 6/4/2021  Cardiology recommends outpatient Heart Failure clinic follow-up  Remain on cardiac telemetry    Final Active Diagnoses:    Diagnosis Date Noted POA    PRINCIPAL PROBLEM:  Acute exacerbation of CHF (congestive heart failure) [I50.9] 02/27/2022 Yes    Hx of SVT (supraventricular tachycardia) [I47.1] 02/27/2022 No    Sinus tachycardia [R00.0] 03/25/2021 Yes      Problems Resolved During this Admission:       Discharged Condition: stable    Disposition: Home or Self Care    Follow Up:   Follow-up Information     Simone Pro, DO Follow up in 2 week(s).    Specialty: Family Medicine  Why: For Hospital Discharge Follow up Care  Contact information:  [address]  [address]  [facility] [id number] [phone]                       Patient Instructions:      Ambulatory referral/consult to [facility]   Standing Status: Future   Referral Priority: Routine Referral Type: Consultation   Referral Reason: Specialty Services Required   Referred to Provider: ARASH REAL Requested Specialty: Family Medicine   Number of Visits Requested: 1     Ambulatory referral/consult to Congestive Heart Failure Clinic   Standing Status: Future   Referral Priority: Routine Referral Type: Consultation   Referral Reason: Specialty Services Required   Requested Specialty: Cardiology   Number of Visits Requested: 1     Diet Cardiac     Notify your health care provider if you experience any of the following:  persistent nausea and vomiting or diarrhea     Notify your health care provider if you experience any of the following:  difficulty breathing or increased cough     Notify your health care provider if you experience any of the following:  increased confusion or weakness     Notify your health care provider if you experience any of the following:  persistent dizziness, light-headedness, or visual disturbances     Activity as tolerated           Pending Diagnostic Studies:      None         Medications:  Reconciled Home Medications:      Medication List      START taking these medications    ENTRESTO 24-26 mg per tablet  Generic drug: sacubitriL-valsartan  Take 1 tablet by mouth 2 (two) times daily.     furosemide 40 MG tablet  Commonly known as: LASIX  Take 1 tablet (40 mg total) by mouth once daily.  Start taking on: March 3, 2022        CHANGE how you take these medications    metoprolol succinate 50 MG 24 hr tablet  Commonly known as: TOPROL-XL  take 100mg ( 2 tablets )  by mouth every morning and 50mg ( 1 tablet )  by mouth every evening  What changed:   · how much to take  · how to take this  · when to take this  · additional instructions        CONTINUE taking these medications    norgestrel-ethinyl estradioL 0.3-30 mg-mcg per tablet  Commonly known as: LO/OVRAL  Take 1 tablet by mouth once daily.        STOP taking these medications    ibuprofen 600 MG tablet  Commonly known as: ADVIL,MOTRIN     lisinopriL 5 MG tablet  Commonly known as: PRINIVIL,ZESTRIL     lisinopriL-hydrochlorothiazide 10-12.5 mg per tablet  Commonly known as: PRINZIDE,ZESTORETIC     oxyCODONE-acetaminophen 5-325 mg per tablet  Commonly known as: PERCOCET            Indwelling Lines/Drains at time of discharge:   Lines/Drains/Airways     None                 Time spent on the discharge of patient: 30 minutes       Ken Harris MD  [facility] Family Medicine PGY-1  03/02/2022

## 2022-03-02 NOTE — SUBJECTIVE & OBJECTIVE
Review of Systems   Constitutional: Negative for chills, decreased appetite, diaphoresis and fever.   Cardiovascular:  Negative for chest pain, claudication, cyanosis, dyspnea on exertion, irregular heartbeat, leg swelling, near-syncope, orthopnea, palpitations, paroxysmal nocturnal dyspnea and syncope.   Respiratory:  Negative for cough, hemoptysis, shortness of breath and wheezing.    Gastrointestinal:  Negative for bloating, abdominal pain, constipation, diarrhea, melena, nausea and vomiting.   Neurological:  Negative for dizziness and weakness.   Objective:     Vital Signs (Most Recent):  Temp: 97.6 °F (36.4 °C) (03/02/22 0712)  Pulse: 92 (03/02/22 0800)  Resp: 17 (03/02/22 0712)  BP: 137/84 (03/02/22 0712)  SpO2: 99 % (03/02/22 0712) Vital Signs (24h Range):  Temp:  [97.2 °F (36.2 °C)-98.5 °F (36.9 °C)] 97.6 °F (36.4 °C)  Pulse:  [] 92  Resp:  [17-22] 17  SpO2:  [96 %-100 %] 99 %  BP: (112-137)/(71-84) 137/84     Weight: 135.3 kg (298 lb 4.8 oz)  Body mass index is 54.56 kg/m².     SpO2: 99 %  O2 Device (Oxygen Therapy): room air      Intake/Output Summary (Last 24 hours) at 3/2/2022 1206  Last data filed at 3/2/2022 0921  Gross per 24 hour   Intake 240 ml   Output --   Net 240 ml       Lines/Drains/Airways       None                   Physical Exam  Constitutional:       General: She is not in acute distress.     Appearance: She is well-developed.   Cardiovascular:      Rate and Rhythm: Normal rate and regular rhythm.      Heart sounds: No murmur heard.    No gallop.   Pulmonary:      Effort: Pulmonary effort is normal. No respiratory distress.      Breath sounds: Normal breath sounds. No wheezing.   Abdominal:      General: Bowel sounds are normal. There is no distension.      Palpations: Abdomen is soft.      Tenderness: There is no abdominal tenderness.   Skin:     General: Skin is warm and dry.   Neurological:      Mental Status: She is alert and oriented to person, place, and time.        Significant Labs: BMP:   Recent Labs   Lab 03/01/22  0505 03/01/22  1701 03/02/22  0516   GLU 86 114* 96    137 138   K 3.7 4.1 3.9    101 104   CO2 22* 25 26   BUN 27* 30* 31*   CREATININE 1.5* 1.7* 1.5*   CALCIUM 8.7 9.5 9.1   MG 1.7  --   --     and CBC   Recent Labs   Lab 03/01/22  0505 03/02/22  0517   WBC 10.23 8.67   HGB 11.0* 11.1*   HCT 35.7* 37.3   * 470*       Significant Imaging: Echocardiogram: Transthoracic echo (TTE) complete (Cupid Only):   Results for orders placed or performed during the hospital encounter of 02/26/22   Echo   Result Value Ref Range    Ascending aorta 2.62 cm    STJ 2.57 cm    AV mean gradient 1 mmHg    Ao peak benjamin 0.69 m/s    Ao VTI 6.53 cm    IVS 0.89 0.6 - 1.1 cm    LA size 5.17 cm    Left Atrium Major Axis 6.75 cm    Left Atrium Minor Axis 5.16 cm    LVIDd 6.85 (A) 3.5 - 6.0 cm    LVIDs 5.79 (A) 2.1 - 4.0 cm    LVOT diameter 2.31 cm    LVOT peak VTI 7.67 cm    Posterior Wall 0.89 0.6 - 1.1 cm    MV Peak A Benjamin 0.47 m/s    E wave deceleration time 157.62 msec    MV Peak E Benjamin 1.41 m/s    RA Major Axis 5.94 cm    RA Width 5.07 cm    RVDD 3.49 cm    TR Max Benjamin 2.53 m/s    LA WIDTH 5.79 cm    Ao root annulus 2.90 cm    PV PEAK VELOCITY 0.61 cm/s    MV stenosis pressure 1/2 time 45.71 ms    LV Diastolic Volume 243.15 mL    LV Systolic Volume 165.88 mL    LVOT peak benjamin 0.68 m/s    Mr max benjamin 0.05 m/s    LA volume (mod) 126.73 cm3    MV mean gradient 1 mmHg    MV peak gradient 8 mmHg    MV VTI 24.11 cm    FS 15 %    LA volume 148.82 cm3    LV mass 267.94 g    Left Ventricle Relative Wall Thickness 0.26 cm    AV valve area 4.92 cm2    AV Velocity Ratio 0.99     AV index (prosthetic) 1.17     MV valve area p 1/2 method 4.81 cm2    MV valve area by continuity eq 1.33 cm2    E/A ratio 3.00     LVOT area 4.2 cm2    LVOT stroke volume 32.13 cm3    AV peak gradient 2 mmHg    LV Systolic Volume Index 73.1 mL/m2    LV Diastolic Volume Index 107.11 mL/m2    LA Volume  Index 65.6 mL/m2    LV Mass Index 118 g/m2    Triscuspid Valve Regurgitation Peak Gradient 26 mmHg    LA Volume Index (Mod) 55.8 mL/m2    BSA 2.43 m2    Right Atrial Pressure (from IVC) 8 mmHg    EF 20 %    TV rest pulmonary artery pressure 34 mmHg    Narrative    · The left ventricle is severely enlarged with eccentric hypertrophy and   severely decreased systolic function.  · The estimated ejection fraction is 20%.  · There is severe left ventricular global hypokinesis.  · Grade III left ventricular diastolic dysfunction.  · Moderate mitral regurgitation.  · Mild tricuspid regurgitation.  · Severe left atrial enlargement.  · Mild pulmonic regurgitation.  · Intermediate central venous pressure (8 mmHg).  · The estimated PA systolic pressure is 34 mmHg.  · Trivial pericardial effusion.

## 2022-03-02 NOTE — PROGRESS NOTES
[facility]                    Pharmacy       Discharge Medication Education    Patient ACCEPTED medication education. Pharmacy has provided education on the name, indication, and possible side effects of the medication(s) prescribed, using teach-back method.     The following medications have also been discussed, during this admission.        Medication List        START taking these medications      ENTRESTO 24-26 mg per tablet  Generic drug: sacubitriL-valsartan  Take 1 tablet by mouth 2 (two) times daily.     furosemide 40 MG tablet  Commonly known as: LASIX  Take 1 tablet (40 mg total) by mouth once daily.  Start taking on: March 3, 2022            CHANGE how you take these medications      metoprolol succinate 50 MG 24 hr tablet  Commonly known as: TOPROL-XL  take 100mg ( 2 tablets )  by mouth every morning and 50mg ( 1 tablet )  by mouth every evening  What changed:   how much to take  how to take this  when to take this  additional instructions            CONTINUE taking these medications      norgestrel-ethinyl estradioL 0.3-30 mg-mcg per tablet  Commonly known as: LO/OVRAL  Take 1 tablet by mouth once daily.            STOP taking these medications      ibuprofen 600 MG tablet  Commonly known as: ADVIL,MOTRIN     lisinopriL 5 MG tablet  Commonly known as: PRINIVIL,ZESTRIL     lisinopriL-hydrochlorothiazide 10-12.5 mg per tablet  Commonly known as: PRINZIDE,ZESTORETIC     oxyCODONE-acetaminophen 5-325 mg per tablet  Commonly known as: PERCOCET               Where to Get Your Medications        These medications were sent to [facility]  [address], [place] [address]      Hours: Mon-Fri, 8a-5:30p Phone: [phone]   ENTRESTO 24-26 mg per tablet  furosemide 40 MG tablet  metoprolol succinate 50 MG 24 hr tablet          Thank you  Segundo Jarquin, PharmD  [phone]

## 2022-03-02 NOTE — ASSESSMENT & PLAN NOTE
- previous echo in 5/2021 with EF 45% and grade I diastolic dysfunction; HF etiology currently diastolic in nature; repeat echo this admission with EF down to 20%  - presented with SOB and cough with elevated NT pro BNP (4620); CXR with no acute changes  - diuresed with IV Lasix with good response; creatinine trended up on IV Lasix 60mg BID therefore held yesterday and today; will plan to resume Lasix 40mg po daily starting tomorrow; negative 5.5L since admission   - SBP trended down to 110s-120s; continue Toprol XL BID as well as Entresto BID   - previous cardiac PET- small to moderate (10-15%)- mildly fixed heterogeneity that improves with stress- no concern for ischemic etiology   - follow up with Dr. Moore in one week and will refer to [facility] at [facility]   -stressed importance of dietary and medication compliance along with follow up; reviewed need for low/no salt diet

## 2022-03-02 NOTE — ASSESSMENT & PLAN NOTE
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

## 2022-03-02 NOTE — PLAN OF CARE
Introduced as VN and will be reviewing discharge instructions.  Educated patient on reason for admission, home medication list, and discharge instructions including when to return to ED and the following doctor appointments.  Education per flowsheet.  Opportunity given for questions and questions answered.    Nurse notified of   completion of discharge education. Patient is waiting for medications from [facility] pharmacy

## 2022-03-02 NOTE — PROGRESS NOTES
[facility] - Telemetry  Cardiology  Progress Note    Patient Name: Ursula Smallwood  MRN: [id number]  Admission Date: 2/26/2022  Hospital Length of Stay: 3 days  Code Status: Full Code   Attending Physician: Cory Golden MD   Primary Care Physician: Tyrese Garcia MD  Expected Discharge Date: 3/2/2022  Principal Problem:Acute exacerbation of CHF (congestive heart failure)    Subjective:     Hospital Course:   2/28/2022 Cardiology consulted for ST. See consult HPI   3/1/2022: Seen and examined. Stated that she is feeling better. Breathing is better. HR has improved mostly in high 90s. She was laying flat this AM with no distress  3/2/2022 Creatinine down to 1.5. HR and BP stable. 1L out overnight negative 5.5L since admission          Review of Systems   Constitutional: Negative for chills, decreased appetite, diaphoresis and fever.   Cardiovascular:  Negative for chest pain, claudication, cyanosis, dyspnea on exertion, irregular heartbeat, leg swelling, near-syncope, orthopnea, palpitations, paroxysmal nocturnal dyspnea and syncope.   Respiratory:  Negative for cough, hemoptysis, shortness of breath and wheezing.    Gastrointestinal:  Negative for bloating, abdominal pain, constipation, diarrhea, melena, nausea and vomiting.   Neurological:  Negative for dizziness and weakness.   Objective:     Vital Signs (Most Recent):  Temp: 97.6 °F (36.4 °C) (03/02/22 0712)  Pulse: 92 (03/02/22 0800)  Resp: 17 (03/02/22 0712)  BP: 137/84 (03/02/22 0712)  SpO2: 99 % (03/02/22 0712) Vital Signs (24h Range):  Temp:  [97.2 °F (36.2 °C)-98.5 °F (36.9 °C)] 97.6 °F (36.4 °C)  Pulse:  [] 92  Resp:  [17-22] 17  SpO2:  [96 %-100 %] 99 %  BP: (112-137)/(71-84) 137/84     Weight: 135.3 kg (298 lb 4.8 oz)  Body mass index is 54.56 kg/m².     SpO2: 99 %  O2 Device (Oxygen Therapy): room air      Intake/Output Summary (Last 24 hours) at 3/2/2022 1206  Last data filed at 3/2/2022 0921  Gross per 24 hour   Intake 240 ml   Output --   Net  240 ml       Lines/Drains/Airways       None                   Physical Exam  Constitutional:       General: She is not in acute distress.     Appearance: She is well-developed.   Cardiovascular:      Rate and Rhythm: Normal rate and regular rhythm.      Heart sounds: No murmur heard.    No gallop.   Pulmonary:      Effort: Pulmonary effort is normal. No respiratory distress.      Breath sounds: Normal breath sounds. No wheezing.   Abdominal:      General: Bowel sounds are normal. There is no distension.      Palpations: Abdomen is soft.      Tenderness: There is no abdominal tenderness.   Skin:     General: Skin is warm and dry.   Neurological:      Mental Status: She is alert and oriented to person, place, and time.       Significant Labs: BMP:   Recent Labs   Lab 03/01/22  0505 03/01/22  1701 03/02/22  0516   GLU 86 114* 96    137 138   K 3.7 4.1 3.9    101 104   CO2 22* 25 26   BUN 27* 30* 31*   CREATININE 1.5* 1.7* 1.5*   CALCIUM 8.7 9.5 9.1   MG 1.7  --   --     and CBC   Recent Labs   Lab 03/01/22  0505 03/02/22  0517   WBC 10.23 8.67   HGB 11.0* 11.1*   HCT 35.7* 37.3   * 470*       Significant Imaging: Echocardiogram: Transthoracic echo (TTE) complete (Cupid Only):   Results for orders placed or performed during the hospital encounter of 02/26/22   Echo   Result Value Ref Range    Ascending aorta 2.62 cm    STJ 2.57 cm    AV mean gradient 1 mmHg    Ao peak benjamin 0.69 m/s    Ao VTI 6.53 cm    IVS 0.89 0.6 - 1.1 cm    LA size 5.17 cm    Left Atrium Major Axis 6.75 cm    Left Atrium Minor Axis 5.16 cm    LVIDd 6.85 (A) 3.5 - 6.0 cm    LVIDs 5.79 (A) 2.1 - 4.0 cm    LVOT diameter 2.31 cm    LVOT peak VTI 7.67 cm    Posterior Wall 0.89 0.6 - 1.1 cm    MV Peak A Benjamin 0.47 m/s    E wave deceleration time 157.62 msec    MV Peak E Benjamin 1.41 m/s    RA Major Axis 5.94 cm    RA Width 5.07 cm    RVDD 3.49 cm    TR Max Benjamin 2.53 m/s    LA WIDTH 5.79 cm    Ao root annulus 2.90 cm    PV PEAK VELOCITY 0.61  cm/s    MV stenosis pressure 1/2 time 45.71 ms    LV Diastolic Volume 243.15 mL    LV Systolic Volume 165.88 mL    LVOT peak timur 0.68 m/s    Mr max timur 0.05 m/s    LA volume (mod) 126.73 cm3    MV mean gradient 1 mmHg    MV peak gradient 8 mmHg    MV VTI 24.11 cm    FS 15 %    LA volume 148.82 cm3    LV mass 267.94 g    Left Ventricle Relative Wall Thickness 0.26 cm    AV valve area 4.92 cm2    AV Velocity Ratio 0.99     AV index (prosthetic) 1.17     MV valve area p 1/2 method 4.81 cm2    MV valve area by continuity eq 1.33 cm2    E/A ratio 3.00     LVOT area 4.2 cm2    LVOT stroke volume 32.13 cm3    AV peak gradient 2 mmHg    LV Systolic Volume Index 73.1 mL/m2    LV Diastolic Volume Index 107.11 mL/m2    LA Volume Index 65.6 mL/m2    LV Mass Index 118 g/m2    Triscuspid Valve Regurgitation Peak Gradient 26 mmHg    LA Volume Index (Mod) 55.8 mL/m2    BSA 2.43 m2    Right Atrial Pressure (from IVC) 8 mmHg    EF 20 %    TV rest pulmonary artery pressure 34 mmHg    Narrative    · The left ventricle is severely enlarged with eccentric hypertrophy and   severely decreased systolic function.  · The estimated ejection fraction is 20%.  · There is severe left ventricular global hypokinesis.  · Grade III left ventricular diastolic dysfunction.  · Moderate mitral regurgitation.  · Mild tricuspid regurgitation.  · Severe left atrial enlargement.  · Mild pulmonic regurgitation.  · Intermediate central venous pressure (8 mmHg).  · The estimated PA systolic pressure is 34 mmHg.  · Trivial pericardial effusion.        Assessment and Plan:     Brief HPI: Seen on AM rounds with Dr. Moore with sister at bedside. Reports feeling better. Reviewed echo findings again this morning. Discussed cardiac POC as detailed below-verbalized understanding and agrees with POC     * Acute exacerbation of CHF (congestive heart failure)  - previous echo in 5/2021 with EF 45% and grade I diastolic dysfunction; HF etiology currently diastolic in  nature; repeat echo this admission with EF down to 20%  - presented with SOB and cough with elevated NT pro BNP (4620); CXR with no acute changes  - diuresed with IV Lasix with good response; creatinine trended up on IV Lasix 60mg BID therefore held yesterday and today; will plan to resume Lasix 40mg po daily starting tomorrow; negative 5.5L since admission   - SBP trended down to 110s-120s; continue Toprol XL BID as well as Entresto BID   - previous cardiac PET- small to moderate (10-15%)- mildly fixed heterogeneity that improves with stress- no concern for ischemic etiology   - follow up with Dr. 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  - ST initially; no AVNRT or SVT noted on telemetry   - HR trended down; continue Toprol XL BID     Sinus tachycardia  - HR improved overnight and down to 80s-90st  - no recurrent SVT or AVNRT during admission  - Toprol XL increased to 100mg QAM and 50mg QPM and will continue BID dosing upon discharge         VTE Risk Mitigation (From admission, onward)         Ordered     enoxaparin injection 40 mg  Every 12 hours         02/27/22 0936     IP VTE HIGH RISK PATIENT  Once         02/27/22 0936     Place sequential compression device  Until discontinued         02/27/22 0936                JULIÁN Hazel, ANP  Cardiology  [place] - Telemetry

## 2022-03-02 NOTE — PLAN OF CARE
D/c orders noted, no DME, no HH.     April met with pt and pt's sister Rosalba to discuss d/c plans. Sw informed pt and Rosalba of upcoming follow up appointments. Pt and Rosalba verbalized understanding of all. Rosalba will transport pt home at the time of d/c.     Pt is cleared to go from CM standpoint.     Future Appointments   Date Time Provider Department Center   3/9/2022  1:00 PM [facility]   3/14/2022  9:00 AM Gregory Moore MD [facility] CARDIO [place]   3/23/2022  1:30 PM Macy Davidson MD [facility]   4/25/2022  [facility]   4/29/2022 10:30 AM EKG, APPT [facility] EKG [facility]   4/29/2022 11:00 AM NICHELLE Zepeda MD [facility] ARRHYTH [facility]         03/02/22 1139   Final Note   Assessment Type Final Discharge Note   Anticipated Discharge Disposition Home   What phone number can be called within the next 1-3 days to see how you are doing after discharge? [phone]   Hospital Resources/Appts/Education Provided Appointments scheduled by Navigator/Coordinator   Post-Acute Status   Discharge Delays None known at this time

## 2022-03-03 ENCOUNTER — PATIENT OUTREACH (OUTPATIENT)
Dept: ADMINISTRATIVE | Facility: CLINIC | Age: 37
End: 2022-03-03
Payer: COMMERCIAL

## 2022-03-03 NOTE — PROGRESS NOTES
C3 nurse spoke with Ursula Smallwood for a TCC post hospital discharge follow up call. The patient has a scheduled HOSFU appointment with AMINATA Holloway on 3/9/2022 @ 1300.

## 2022-03-08 ENCOUNTER — OFFICE VISIT (OUTPATIENT)
Dept: TRANSPLANT | Facility: CLINIC | Age: 37
End: 2022-03-08
Payer: COMMERCIAL

## 2022-03-08 ENCOUNTER — LAB VISIT (OUTPATIENT)
Dept: LAB | Facility: HOSPITAL | Age: 37
End: 2022-03-08
Attending: INTERNAL MEDICINE
Payer: COMMERCIAL

## 2022-03-08 VITALS
HEIGHT: 67 IN | DIASTOLIC BLOOD PRESSURE: 75 MMHG | HEART RATE: 96 BPM | SYSTOLIC BLOOD PRESSURE: 128 MMHG | BODY MASS INDEX: 44.54 KG/M2 | WEIGHT: 283.75 LBS

## 2022-03-08 DIAGNOSIS — I50.22 CHRONIC SYSTOLIC HEART FAILURE: Primary | ICD-10-CM

## 2022-03-08 DIAGNOSIS — I50.9 CONGESTIVE HEART FAILURE, UNSPECIFIED HF CHRONICITY, UNSPECIFIED HEART FAILURE TYPE: ICD-10-CM

## 2022-03-08 DIAGNOSIS — I47.10 SVT (SUPRAVENTRICULAR TACHYCARDIA): ICD-10-CM

## 2022-03-08 DIAGNOSIS — Z98.890 H/O CARDIAC RADIOFREQUENCY ABLATION: ICD-10-CM

## 2022-03-08 DIAGNOSIS — E66.01 MORBID OBESITY: Chronic | ICD-10-CM

## 2022-03-08 PROBLEM — I47.19 AVNRT (AV NODAL RE-ENTRY TACHYCARDIA): Status: RESOLVED | Noted: 2021-04-22 | Resolved: 2022-03-08

## 2022-03-08 PROBLEM — R00.0 SINUS TACHYCARDIA: Status: RESOLVED | Noted: 2021-03-25 | Resolved: 2022-03-08

## 2022-03-08 PROBLEM — R06.02 SOB (SHORTNESS OF BREATH): Status: RESOLVED | Noted: 2021-03-25 | Resolved: 2022-03-08

## 2022-03-08 LAB
ALBUMIN SERPL BCP-MCNC: 3.6 G/DL (ref 3.5–5.2)
ALP SERPL-CCNC: 52 U/L (ref 55–135)
ALT SERPL W/O P-5'-P-CCNC: 22 U/L (ref 10–44)
ANION GAP SERPL CALC-SCNC: 8 MMOL/L (ref 8–16)
AST SERPL-CCNC: 10 U/L (ref 10–40)
BILIRUB SERPL-MCNC: 0.4 MG/DL (ref 0.1–1)
BNP SERPL-MCNC: 954 PG/ML (ref 0–99)
BUN SERPL-MCNC: 18 MG/DL (ref 6–20)
CALCIUM SERPL-MCNC: 9.3 MG/DL (ref 8.7–10.5)
CHLORIDE SERPL-SCNC: 105 MMOL/L (ref 95–110)
CO2 SERPL-SCNC: 24 MMOL/L (ref 23–29)
CREAT SERPL-MCNC: 1.2 MG/DL (ref 0.5–1.4)
EST. GFR  (AFRICAN AMERICAN): >60 ML/MIN/1.73 M^2
EST. GFR  (NON AFRICAN AMERICAN): 57.5 ML/MIN/1.73 M^2
GLUCOSE SERPL-MCNC: 121 MG/DL (ref 70–110)
MAGNESIUM SERPL-MCNC: 2 MG/DL (ref 1.6–2.6)
POTASSIUM SERPL-SCNC: 4.5 MMOL/L (ref 3.5–5.1)
PROT SERPL-MCNC: 6.9 G/DL (ref 6–8.4)
SODIUM SERPL-SCNC: 137 MMOL/L (ref 136–145)
TSH SERPL DL<=0.005 MIU/L-ACNC: 2.69 UIU/ML (ref 0.4–4)

## 2022-03-08 PROCEDURE — 83880 ASSAY OF NATRIURETIC PEPTIDE: CPT | Performed by: INTERNAL MEDICINE

## 2022-03-08 PROCEDURE — 1111F PR DISCHARGE MEDS RECONCILED W/ CURRENT OUTPATIENT MED LIST: ICD-10-PCS | Mod: CPTII,S$GLB,, | Performed by: INTERNAL MEDICINE

## 2022-03-08 PROCEDURE — 1111F DSCHRG MED/CURRENT MED MERGE: CPT | Mod: CPTII,S$GLB,, | Performed by: INTERNAL MEDICINE

## 2022-03-08 PROCEDURE — 99999 PR PBB SHADOW E&M-EST. PATIENT-LVL III: ICD-10-PCS | Mod: PBBFAC,,, | Performed by: INTERNAL MEDICINE

## 2022-03-08 PROCEDURE — 4010F ACE/ARB THERAPY RXD/TAKEN: CPT | Mod: CPTII,S$GLB,, | Performed by: INTERNAL MEDICINE

## 2022-03-08 PROCEDURE — 3008F PR BODY MASS INDEX (BMI) DOCUMENTED: ICD-10-PCS | Mod: CPTII,S$GLB,, | Performed by: INTERNAL MEDICINE

## 2022-03-08 PROCEDURE — 99999 PR PBB SHADOW E&M-EST. PATIENT-LVL III: CPT | Mod: PBBFAC,,, | Performed by: INTERNAL MEDICINE

## 2022-03-08 PROCEDURE — 83735 ASSAY OF MAGNESIUM: CPT | Performed by: INTERNAL MEDICINE

## 2022-03-08 PROCEDURE — 84443 ASSAY THYROID STIM HORMONE: CPT | Performed by: INTERNAL MEDICINE

## 2022-03-08 PROCEDURE — 36415 COLL VENOUS BLD VENIPUNCTURE: CPT | Performed by: INTERNAL MEDICINE

## 2022-03-08 PROCEDURE — 3078F DIAST BP <80 MM HG: CPT | Mod: CPTII,S$GLB,, | Performed by: INTERNAL MEDICINE

## 2022-03-08 PROCEDURE — 99204 PR OFFICE/OUTPT VISIT, NEW, LEVL IV, 45-59 MIN: ICD-10-PCS | Mod: S$GLB,,, | Performed by: INTERNAL MEDICINE

## 2022-03-08 PROCEDURE — 3044F HG A1C LEVEL LT 7.0%: CPT | Mod: CPTII,S$GLB,, | Performed by: INTERNAL MEDICINE

## 2022-03-08 PROCEDURE — 3074F PR MOST RECENT SYSTOLIC BLOOD PRESSURE < 130 MM HG: ICD-10-PCS | Mod: CPTII,S$GLB,, | Performed by: INTERNAL MEDICINE

## 2022-03-08 PROCEDURE — 4010F PR ACE/ARB THEARPY RXD/TAKEN: ICD-10-PCS | Mod: CPTII,S$GLB,, | Performed by: INTERNAL MEDICINE

## 2022-03-08 PROCEDURE — 1159F PR MEDICATION LIST DOCUMENTED IN MEDICAL RECORD: ICD-10-PCS | Mod: CPTII,S$GLB,, | Performed by: INTERNAL MEDICINE

## 2022-03-08 PROCEDURE — 3044F PR MOST RECENT HEMOGLOBIN A1C LEVEL <7.0%: ICD-10-PCS | Mod: CPTII,S$GLB,, | Performed by: INTERNAL MEDICINE

## 2022-03-08 PROCEDURE — 1159F MED LIST DOCD IN RCRD: CPT | Mod: CPTII,S$GLB,, | Performed by: INTERNAL MEDICINE

## 2022-03-08 PROCEDURE — 99204 OFFICE O/P NEW MOD 45 MIN: CPT | Mod: S$GLB,,, | Performed by: INTERNAL MEDICINE

## 2022-03-08 PROCEDURE — 3074F SYST BP LT 130 MM HG: CPT | Mod: CPTII,S$GLB,, | Performed by: INTERNAL MEDICINE

## 2022-03-08 PROCEDURE — 3008F BODY MASS INDEX DOCD: CPT | Mod: CPTII,S$GLB,, | Performed by: INTERNAL MEDICINE

## 2022-03-08 PROCEDURE — 80053 COMPREHEN METABOLIC PANEL: CPT | Performed by: INTERNAL MEDICINE

## 2022-03-08 PROCEDURE — 3078F PR MOST RECENT DIASTOLIC BLOOD PRESSURE < 80 MM HG: ICD-10-PCS | Mod: CPTII,S$GLB,, | Performed by: INTERNAL MEDICINE

## 2022-03-08 RX ORDER — TORSEMIDE 20 MG/1
20 TABLET ORAL DAILY
Qty: 30 TABLET | Refills: 11 | Status: SHIPPED | OUTPATIENT
Start: 2022-03-08 | End: 2022-06-03

## 2022-03-08 RX ORDER — SPIRONOLACTONE 25 MG/1
25 TABLET ORAL DAILY
Qty: 30 TABLET | Refills: 11 | Status: SHIPPED | OUTPATIENT
Start: 2022-03-08 | End: 2023-06-13 | Stop reason: SDUPTHER

## 2022-03-08 NOTE — PROGRESS NOTES
[facility] Follow up.        Attending Physician: Cory Sullivan MD.        HPI.  Ursula Smallwood is a 36-year-old female morbidly obese BMI 44, PMHx SVT s/p for radiofrequency catheter ablation (06/04/2021), CHF diagnosed March 2021 when her LVEF was 45%, now LVEF 20% in 2022, HTN, anemia 2/2 uterine fibroids s/p hysterectomy who presents for post discharge follow up.    Had PET stress last year that did not find perfusion abnormalities.    She denies a personal history of diabetes or HTN. No family history of HF, SCD or Mis.  She denies any consumption of tobacco, alcohol, drugs.    Today she denies orthopnea, PND, edema. She does have dyspnea on exertion.  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    Review of Systems   Constitutional: Positive for fatigue. Negative for activity change, appetite change, chills, diaphoresis, fever and unexpected weight change.   HENT: Negative for nasal congestion, rhinorrhea and sore throat.    Eyes: Negative for visual disturbance.   Respiratory: Positive for shortness of breath. Negative for apnea, cough, choking, chest tightness, wheezing and stridor.    Cardiovascular: Negative for chest pain, palpitations and leg swelling.   Gastrointestinal: Negative for abdominal distention, abdominal pain, diarrhea, nausea and vomiting.   Genitourinary: Negative for difficulty urinating, dysuria and hematuria.   Integumentary:  Negative for rash.   Neurological: Negative for seizures, syncope and light-headedness.   Psychiatric/Behavioral: Negative for agitation and hallucinations.        Past Medical History:   Diagnosis Date    Anemia     Hypertension     SVT (supraventricular tachycardia)     Uterine fibroid         Past Surgical History:   Procedure Laterality Date    ABLATION N/A 6/4/2021    Procedure: Ablation;  Surgeon: NICHELLE Zepeda MD;  Location:  [facility] EP LAB;  Service: Cardiology;  Laterality: N/A;  SVT, RFA, Carto, anes, EH, 3prep    CYSTOSCOPY N/A 8/3/2021    Procedure: CYSTOSCOPY;  Surgeon: Jamarcus Ventura MD;  Location: [facility] OR;  Service: OB/GYN;  Laterality: N/A;    ROBOT-ASSISTED LAPAROSCOPIC HYSTERECTOMY N/A 8/3/2021    Procedure: ROBOTIC HYSTERECTOMY;  Surgeon: Jamarcus Ventura MD;  Location: [facility] OR;  Service: OB/GYN;  Laterality: N/A;    ROBOT-ASSISTED SALPINGECTOMY Bilateral 8/3/2021    Procedure: ROBOTIC SALPINGECTOMY;  Surgeon: Jamarcus Ventura MD;  Location: [facility] OR;  Service: OB/GYN;  Laterality: Bilateral;        No family history on file.     Review of patient's allergies indicates:  No Known Allergies     Current Outpatient Medications   Medication Instructions    furosemide (LASIX) 40 mg, Oral, Daily    metoprolol succinate (TOPROL-XL) 50 MG 24 hr tablet take 100mg ( 2 tablets )  by mouth every morning and 50mg ( 1 tablet )  by mouth every evening    norgestrel-ethinyl estradioL (LO/OVRAL) 0.3-30 mg-mcg per tablet 1 tablet, Oral, Daily    sacubitriL-valsartan (ENTRESTO) 24-26 mg per tablet 1 tablet, Oral, 2 times daily        Vitals:    03/08/22 0902   BP: 128/75   Pulse: 96        Wt Readings from Last 3 Encounters:   03/08/22 128.7 kg (283 lb 11.7 oz)   02/26/22 135.3 kg (298 lb 4.8 oz)   01/23/22 124.7 kg (275 lb)     Temp Readings from Last 3 Encounters:   03/02/22 98.6 °F (37 °C)   01/23/22 98.4 °F (36.9 °C) (Oral)   08/03/21 97.9 °F (36.6 °C) (Skin)     BP Readings from Last 3 Encounters:   03/08/22 128/75   03/02/22 121/70   01/23/22 131/84     Pulse Readings from Last 3 Encounters:   03/08/22 96   03/02/22 97   01/23/22 109        Body mass index is 44.44 kg/m². Estimated body surface area is 2.47 meters squared as calculated from the following:    Height as of this encounter: 5' 7" (1.702 m).    Weight as of this encounter: 128.7 kg (283 lb 11.7 oz).     Physical Exam  Constitutional:       Appearance: She is " well-developed.   HENT:      Head: Normocephalic and atraumatic.      Right Ear: External ear normal.      Left Ear: External ear normal.   Eyes:      Conjunctiva/sclera: Conjunctivae normal.      Pupils: Pupils are equal, round, and reactive to light.   Neck:      Vascular: Hepatojugular reflux and JVD present.      Comments: JVP 16 cmH20  Cardiovascular:      Rate and Rhythm: Normal rate and regular rhythm.      Pulses: Intact distal pulses.      Heart sounds: S1 normal and S2 normal. No murmur heard.    No friction rub. No gallop.   Pulmonary:      Effort: Pulmonary effort is normal.      Breath sounds: Normal breath sounds.   Abdominal:      General: Bowel sounds are normal. There is no distension.      Palpations: Abdomen is soft.      Tenderness: There is no abdominal tenderness. There is no guarding or rebound.   Musculoskeletal:      Cervical back: Normal range of motion and neck supple.      Right lower leg: No edema.      Left lower leg: No edema.   Neurological:      Mental Status: She is alert and oriented to person, place, and time.          Lab Results   Component Value Date     03/02/2022    K 3.9 03/02/2022    MG 1.7 03/01/2022     03/02/2022    CO2 26 03/02/2022    BUN 31 (H) 03/02/2022    CREATININE 1.5 (H) 03/02/2022    GLU 96 03/02/2022    HGBA1C 6.3 (H) 02/27/2022    AST 8 (L) 03/02/2022    ALT 14 03/02/2022    ALBUMIN 3.5 03/02/2022    PROT 7.1 03/02/2022    BILITOT 0.4 03/02/2022    WBC 8.67 03/02/2022    HGB 11.1 (L) 03/02/2022    HCT 37.3 03/02/2022    HCT 23 (L) 06/01/2021     (H) 03/02/2022    INR 1.1 07/18/2021    TSH 2.091 02/27/2022           Results for orders placed during the hospital encounter of 02/26/22    Echo    Interpretation Summary  · The left ventricle is severely enlarged with eccentric hypertrophy and severely decreased systolic function.  · The estimated ejection fraction is 20%.  · There is severe left ventricular global hypokinesis.  · Grade III left  ventricular diastolic dysfunction.  · Moderate mitral regurgitation.  · Mild tricuspid regurgitation.  · Severe left atrial enlargement.  · Mild pulmonic regurgitation.  · Intermediate central venous pressure (8 mmHg).  · The estimated PA systolic pressure is 34 mmHg.  · Trivial pericardial effusion.        No results found for this or any previous visit.         Assessment and Plan:  H/O cardiac radiofrequency ablation    Hx of SVT (supraventricular tachycardia)    Morbid obesity    Chronic systolic heart failure          1. Acute on chronic systolic HF, NYHA class III, stage C.  Etiology: NICM. PET stress negative for ischemia. Not improved after control of SVT (s/p ablation).  Devices: none.  Medications: sacubitril-valsartan low dose, metoprolol succinate, lasix 40 mg daily.  Hemodynamic status: warm, normotensive, hypervolemic.  Clinical course: recently discharged March 3, 2022 after her index hospitalization (EF down to 20%).  Plan:  -patient to adhere to a fluid restriction of NMT 1.5 liters/24h.  -patient to adhere to  low sodium diet, NMT 1.5 grams of sodium in a day.  -patient was asked to weight himself every day and to keep daily record of his weights.  -Follow up BMP, NT-pro BNP on Friday am.   -patient to call us on Friday afternoon, to report weight  and symptoms.    -Follow up in 4-6 weeks with labs.    New diagnosis of HFrEF.  Plan for decongestion and uptitration of medical therapy.

## 2022-03-08 NOTE — PATIENT INSTRUCTIONS
You have extra fluid on you.  Please adhere to a low sodium diet (no more than 1.5 grams of sodium in 24h).  3.   Follow fluid restriction of  2. no more than 1.5 liters in 24 hours..   4. Please stop taking LASIX.  5. Please start taking TORSEMIDE 20 mg daily.  6. Please start taking spironolactone 25 mg daily.  7. Follow up blood test Friday morning.  8. Call us on Friday afternoon to report weight and symptoms.  9. Follow up in 4-6 weeks with labs same day.

## 2022-03-09 ENCOUNTER — OFFICE VISIT (OUTPATIENT)
Dept: FAMILY MEDICINE | Facility: HOSPITAL | Age: 37
End: 2022-03-09
Attending: FAMILY MEDICINE
Payer: COMMERCIAL

## 2022-03-09 VITALS
HEART RATE: 101 BPM | WEIGHT: 280.19 LBS | DIASTOLIC BLOOD PRESSURE: 82 MMHG | SYSTOLIC BLOOD PRESSURE: 116 MMHG | HEIGHT: 67 IN | BODY MASS INDEX: 43.98 KG/M2

## 2022-03-09 DIAGNOSIS — I47.10 SVT (SUPRAVENTRICULAR TACHYCARDIA): ICD-10-CM

## 2022-03-09 DIAGNOSIS — I50.43 ACUTE ON CHRONIC COMBINED SYSTOLIC AND DIASTOLIC CONGESTIVE HEART FAILURE: Primary | ICD-10-CM

## 2022-03-09 DIAGNOSIS — R05.8 PRODUCTIVE COUGH: ICD-10-CM

## 2022-03-09 DIAGNOSIS — I10 HYPERTENSION, UNSPECIFIED TYPE: ICD-10-CM

## 2022-03-09 PROCEDURE — 99215 OFFICE O/P EST HI 40 MIN: CPT | Performed by: PHYSICIAN ASSISTANT

## 2022-03-09 RX ORDER — GUAIFENESIN 600 MG/1
1200 TABLET, EXTENDED RELEASE ORAL 2 TIMES DAILY
Qty: 40 TABLET | Refills: 0 | Status: SHIPPED | OUTPATIENT
Start: 2022-03-09 | End: 2022-03-19

## 2022-03-09 NOTE — PROGRESS NOTES
Subjective:       Patient ID: Ursula Smallwood is a 36 y.o. female.    Chief Complaint: Follow-up    Ursula Smallwood is a 36-year-old F with PMH of obesity BMI 44, SVT s/p radiofrequency catheter ablation (06/04/2021), HTN, and CHF diagnosed March 2021 here today for hospital follow up. She was recently admitted with CHF exacerbation. Diuresed well with IV Lasix. Tachycardic during admission and BB increased. Cardiology was consulted and ECHO with EF 20% dilated cardiomyopathy (down from 45% at diagnosis).  Continued on GDMT:  Metoprolol 100 mg daily, Losartan changed to entresto. Lasix 40mg BID.    Today Ms. Smallwood is unaccompanied during the visit. She saw Transplant yesterday, Dr. Sullivan. Switched Lasix to Torsemide and added Aldactone. Since then she has had increased urination and is down 4lbs. 283->279lbs. She has been doing daily weights and has also been walking every day. Today she denies orthopnea, PND, edema. She also reports significant improvement of her RUBI. PET stress last year that did not find perfusion abnormalities. She has been adherent to fluid and salt restriction, but has a lot of questions about food. She is unsure what she should and should not be eating and would like to talk to dietician if possible. She is very committed to following a heart healthy diet and improving her condition.       Review of Systems   Respiratory: Positive for shortness of breath (with exertion, improving).    All other systems reviewed and are negative.    Past Medical History:   Diagnosis Date    Anemia     Hypertension     SVT (supraventricular tachycardia)     Uterine fibroid    No family history on file.  Past Surgical History:   Procedure Laterality Date    ABLATION N/A 6/4/2021    Procedure: Ablation;  Surgeon: NICHELLE Zepeda MD;  Location: [facility] EP LAB;  Service: Cardiology;  Laterality: N/A;  SVT, RFA, Carto, anes, EH, 3prep    CYSTOSCOPY N/A 8/3/2021    Procedure: CYSTOSCOPY;  Surgeon: Jamarcus Ventura,  "MD;  Location: [facility] OR;  Service: OB/GYN;  Laterality: N/A;    ROBOT-ASSISTED LAPAROSCOPIC HYSTERECTOMY N/A 8/3/2021    Procedure: ROBOTIC HYSTERECTOMY;  Surgeon: Jamarcus Ventura MD;  Location: [facility] OR;  Service: OB/GYN;  Laterality: N/A;    ROBOT-ASSISTED SALPINGECTOMY Bilateral 8/3/2021    Procedure: ROBOTIC SALPINGECTOMY;  Surgeon: Jamarcus Ventura MD;  Location: [facility] OR;  Service: OB/GYN;  Laterality: Bilateral;     Social History     Socioeconomic History    Marital status: Single   Tobacco Use    Smoking status: Never Smoker    Smokeless tobacco: Never Used   Substance and Sexual Activity    Alcohol use: No    Drug use: No    Sexual activity: Not Currently     Partners: Male     Birth control/protection: Injection     Current Outpatient Medications   Medication Instructions    guaiFENesin (MUCINEX) 1,200 mg, Oral, 2 times daily    metoprolol succinate (TOPROL-XL) 50 MG 24 hr tablet take 100mg ( 2 tablets )  by mouth every morning and 50mg ( 1 tablet )  by mouth every evening    norgestrel-ethinyl estradioL (LO/OVRAL) 0.3-30 mg-mcg per tablet 1 tablet, Oral, Daily    sacubitriL-valsartan (ENTRESTO) 24-26 mg per tablet 1 tablet, Oral, 2 times daily    spironolactone (ALDACTONE) 25 mg, Oral, Daily    torsemide (DEMADEX) 20 mg, Oral, Daily     Objective:     Vitals:    03/09/22 1322   BP: 116/82   Pulse: 101   Weight: 127.1 kg (280 lb 3.3 oz)   Height: 5' 7" (1.702 m)     Physical Exam  Vitals and nursing note reviewed.   Constitutional:       General: She is not in acute distress.     Appearance: She is well-developed. She is not diaphoretic.   HENT:      Head: Normocephalic and atraumatic.   Eyes:      Conjunctiva/sclera: Conjunctivae normal.   Neck:      Trachea: No tracheal deviation.   Cardiovascular:      Rate and Rhythm: Normal rate and regular rhythm.      Heart sounds: Normal heart sounds. No murmur heard.  Pulmonary:      Effort: Pulmonary effort is normal. No respiratory distress.     "  Breath sounds: Normal breath sounds. No wheezing.   Chest:      Chest wall: No tenderness.   Abdominal:      General: Bowel sounds are normal. There is no distension.      Palpations: Abdomen is soft.      Tenderness: There is no abdominal tenderness. There is no guarding.   Musculoskeletal:         General: No tenderness or deformity. Normal range of motion.   Skin:     General: Skin is warm and dry.   Neurological:      General: No focal deficit present.      Mental Status: She is alert and oriented to person, place, and time.      Coordination: Coordination normal.   Psychiatric:         Mood and Affect: Mood normal.         Behavior: Behavior normal.         Thought Content: Thought content normal.         Judgment: Judgment normal.     ECHO  Summary    · The left ventricle is severely enlarged with eccentric hypertrophy and severely decreased systolic function.  · The estimated ejection fraction is 20%.  · There is severe left ventricular global hypokinesis.  · Grade III left ventricular diastolic dysfunction.  · Moderate mitral regurgitation.  · Mild tricuspid regurgitation.  · Severe left atrial enlargement.  · Mild pulmonic regurgitation.  · Intermediate central venous pressure (8 mmHg).  · The estimated PA systolic pressure is 34 mmHg.  · Trivial pericardial effusion.      Assessment:       1. Acute on chronic combined systolic and diastolic congestive heart failure    2. Hx of SVT (supraventricular tachycardia)    3. Hypertension, unspecified type    4. Productive cough        Plan:       Acute on chronic combined systolic and diastolic congestive heart failure   -Doing quite well today. Continue daily weights, fluid and salt restriction. Moderate exercise as tolerated. Continue current medications. Follow up with Cards as scheduled.    -Could consider adding Farxiga at some point (also with elevated A1c), but would discuss with Cardiology.    -Spent more than 30 min of this visit discussing food labels,  sodium content, and heart healthy diet. Dietitian appointment also scheduled today for more information on Cardiac diet.   -     Ambulatory referral/consult to [facility]  -     Ambulatory referral/consult to Nutrition Services; Future; Expected date: 03/16/2022    Hx of SVT (supraventricular tachycardia)   -S/p ablation. HR borderline today at 101. Taking metoprolol 100mg, could increase.     Hypertension, unspecified type   -Well controlled.     Productive cough   -Possibly HF related. Can try some mucinex.   -     guaiFENesin (MUCINEX) 600 mg 12 hr tablet; Take 2 tablets (1,200 mg total) by mouth 2 (two) times daily. for 10 days  Dispense: 40 tablet; Refill: 0      Future Appointments   Date Time Provider Department Center   3/11/2022 10:30 AM LAB, [facility] RVPH LAB [facility]   3/14/2022  9:00 AM Gregory Moore MD [facility] CARDIO [place]   4/4/2022  9:30 AM DIETITIAN, INTERNAL MEDICINE [facility] NUTRI [place]   4/6/2022 12:30 PM LAB, APPOINTMENT [place] LAB [facility]   4/6/2022  1:30 PM Cory Sullivan MD [facility] HEARTTX [place]   4/13/2022  8:40 AM [facility]   4/25/2022  1:00 PM ECHO, [facility] ECHOSTR [facility]   4/29/2022 10:30 AM EKG, APPT [facility] EKG [place] [facility]   4/29/2022 11:00 AM NICHELLE Zepeda MD [facility] ARRHYTH [place]

## 2022-03-11 ENCOUNTER — LAB VISIT (OUTPATIENT)
Dept: LAB | Facility: HOSPITAL | Age: 37
End: 2022-03-11
Attending: INTERNAL MEDICINE
Payer: COMMERCIAL

## 2022-03-11 DIAGNOSIS — I50.22 CHRONIC SYSTOLIC HEART FAILURE: ICD-10-CM

## 2022-03-11 LAB
ANION GAP SERPL CALC-SCNC: 9 MMOL/L (ref 8–16)
CALCIUM SERPL-MCNC: 9.2 MG/DL (ref 8.7–10.5)
CHLORIDE SERPL-SCNC: 104 MMOL/L (ref 95–110)
CO2 SERPL-SCNC: 26 MMOL/L (ref 23–29)
CREAT SERPL-MCNC: 1.43 MG/DL (ref 0.5–1.4)
EST. GFR  (AFRICAN AMERICAN): 53.6 ML/MIN/1.73 M^2
EST. GFR  (NON AFRICAN AMERICAN): 46.5 ML/MIN/1.73 M^2
GLUCOSE SERPL-MCNC: 94 MG/DL (ref 70–110)
NT-PROBNP SERPL-MCNC: 2020 PG/ML (ref 5–450)
POTASSIUM SERPL-SCNC: 4.6 MMOL/L (ref 3.5–5.1)
SODIUM SERPL-SCNC: 139 MMOL/L (ref 136–145)
UUN UR-MCNC: 20 MG/DL (ref 7–17)

## 2022-03-11 PROCEDURE — 36415 COLL VENOUS BLD VENIPUNCTURE: CPT | Mod: PO | Performed by: INTERNAL MEDICINE

## 2022-03-11 PROCEDURE — 80048 BASIC METABOLIC PNL TOTAL CA: CPT | Mod: PO | Performed by: INTERNAL MEDICINE

## 2022-03-11 PROCEDURE — 83880 ASSAY OF NATRIURETIC PEPTIDE: CPT | Mod: PO | Performed by: INTERNAL MEDICINE

## 2022-03-11 NOTE — PROGRESS NOTES
Subjective:   @Patient ID:  Ursula Smallwood is a 36 y.o. female who presents for evaluation of SVT      HPI:   Here for follow up   Recent hospital admission 2/2022 with CHF. Echo with newly reduced EF 20%. She diuresed well and Toprol dose was increased and GDMT initiated with up titration. She established with our advanced heart failure team. Lasix was switched to Torsemide and she was started on Spironolactone.     She is doing very well today.   She is working hard on losing wt  NYHA FC II  Compliant with her medications      Historically:    Patient presented to ER 4/6 and 4/16 with tachy-palpitations and was found to have SVT that responded to adenosine. She was found to be severely anemia and was transfused 2 units PRBCs. The anemia was attributed to her heavy menstrual period.  She was referred to EP and had EPS with slow pathway modification 6/4/2021        EKG 4/16/2021 SVT, short R-P       Prior cardiovascular  Hx  --------------------------------    EPS 6/4/2021 Successful radiofrequency slow pathway modification for typical, slow-fast AVNRT.      PET stress 5/21/2021 done for mild reduced EF 45%     There are no clinically significant perfusion abnormalities. There is a small to moderate (10-15%) amount of mild fixed heterogeneity that improves with stress.    The whole heart absolute myocardial perfusion values averaged 1.36 cc/min/g at rest, which is elevated; 1.85 cc/min/g at stress, which is low normal; and CFR is 1.38 , which is moderately reduced in part due to elevated resting flow.    The gated perfusion images showed an ejection fraction of 39% at rest and 42% during stress. A normal ejection fraction is greater than 51%.    There is mild global hypokinesis at rest and during stress.    The LV cavity size is normal at rest and stress.    The EKG portion of this study is negative for ischemia.    During stress, rare PVCs are noted.    The patient reported no chest pain during the stress  test.    There are no prior studies for comparison     Echo 2/28/2022  · The left ventricle is severely enlarged with eccentric hypertrophy and severely decreased systolic function.  · The estimated ejection fraction is 20%.  · There is severe left ventricular global hypokinesis.  · Grade III left ventricular diastolic dysfunction.  · Moderate mitral regurgitation.  · Mild tricuspid regurgitation.  · Severe left atrial enlargement.  · Mild pulmonic regurgitation.  · Intermediate central venous pressure (8 mmHg).  · The estimated PA systolic pressure is 34 mmHg.  · Trivial pericardial effusion.      Echo 5/3/2021   · The left ventricle is mildly enlarged with concentric hypertrophy and mildly decreased systolic function.  · The estimated ejection fraction is 45%.  · Grade I left ventricular diastolic dysfunction.  · Normal right ventricular size with normal right ventricular systolic function.  · Mild mitral regurgitation.  · Mild tricuspid regurgitation.  · Normal central venous pressure (3 mmHg).  · The estimated PA systolic pressure is 31 mmHg.                Patient Active Problem List    Diagnosis Date Noted    Hypertension     Chronic systolic heart failure 03/08/2022    Hx of SVT (supraventricular tachycardia) 02/27/2022    H/O abdominal hysterectomy 01/26/2022    COVID-19 01/26/2022    H/O cardiac radiofrequency ablation 08/13/2021    Uterine fibroid 04/26/2021    Menorrhagia with irregular cycle 04/26/2021    Morbid obesity 04/22/2021    Symptomatic anemia 03/25/2021                    LAST HbA1c  Lab Results   Component Value Date    HGBA1C 6.3 (H) 02/27/2022       Lipid panel  No results found for: CHOL  No results found for: HDL  No results found for: LDLCALC  No results found for: TRIG  No results found for: CHOLHDL         Review of Systems   Constitutional: Negative for chills and fever.   HENT: Negative for hearing loss and nosebleeds.    Eyes: Negative for blurred vision.   Cardiovascular:  Positive for palpitations. Negative for chest pain and leg swelling.        As in HPI    Respiratory: Negative for hemoptysis and shortness of breath.    Hematologic/Lymphatic: Negative for bleeding problem.   Skin: Negative for itching.   Musculoskeletal: Negative for falls.   Gastrointestinal: Negative for abdominal pain and hematochezia.   Genitourinary: Positive for menorrhagia. Negative for hematuria.   Neurological: Negative for dizziness and loss of balance.   Psychiatric/Behavioral: Negative for altered mental status and depression.       Objective:   Physical Exam  Constitutional:       Appearance: She is well-developed. She is obese.   HENT:      Head: Normocephalic and atraumatic.   Eyes:      Conjunctiva/sclera: Conjunctivae normal.   Neck:      Vascular: No carotid bruit or JVD.   Cardiovascular:      Rate and Rhythm: Normal rate and regular rhythm.      Pulses:           Carotid pulses are 2+ on the right side and 2+ on the left side.       Radial pulses are 2+ on the right side and 2+ on the left side.      Heart sounds: Normal heart sounds. No murmur heard.    No friction rub. No gallop.   Pulmonary:      Effort: Pulmonary effort is normal. No respiratory distress.      Breath sounds: Normal breath sounds. No stridor. No wheezing.   Musculoskeletal:      Cervical back: Neck supple.   Skin:     General: Skin is warm and dry.   Neurological:      Mental Status: She is alert and oriented to person, place, and time.   Psychiatric:         Behavior: Behavior normal.          Assessment:     1. Hx of SVT (supraventricular tachycardia)    2. Chronic systolic heart failure    3. Primary hypertension    4. H/O cardiac radiofrequency ablation    5. Morbid obesity        Plan:   - Continue GDMT. Medications are currently adjusted by our heart failure team.   - WT loss and life style changes as doing   - F/U with heart failure team     - F/U after 3 months          I spent 5-10 minutes asking, assessing,  assisting, arranging and advising heart healthy diet improvements. This included low-salt meals, portion control and health food alternatives. I also encourage 30 minutes of moderate exercise 3-4x a week.   Pertinent cardiac images and EKG reviewed independently.    Continue with current medical plan and lifestyle changes.  Return sooner for concerns or questions. If symptoms persist go to the ED  I have reviewed all pertinent data including patient's medical history in detail and updated the computerized patient record.     No orders of the defined types were placed in this encounter.      Follow up as scheduled.     She expressed verbal understanding and agreed with the plan    Patient's Medications   New Prescriptions    No medications on file   Previous Medications    GUAIFENESIN (MUCINEX) 600 MG 12 HR TABLET    Take 2 tablets (1,200 mg total) by mouth 2 (two) times daily. for 10 days    METOPROLOL SUCCINATE (TOPROL-XL) 50 MG 24 HR TABLET    take 100mg ( 2 tablets )  by mouth every morning and 50mg ( 1 tablet )  by mouth every evening    SACUBITRIL-VALSARTAN (ENTRESTO) 24-26 MG PER TABLET    Take 1 tablet by mouth 2 (two) times daily.    SPIRONOLACTONE (ALDACTONE) 25 MG TABLET    Take 1 tablet (25 mg total) by mouth once daily.    TORSEMIDE (DEMADEX) 20 MG TAB    Take 1 tablet (20 mg total) by mouth once daily.   Modified Medications    No medications on file   Discontinued Medications    NORGESTREL-ETHINYL ESTRADIOL (LO/OVRAL) 0.3-30 MG-MCG PER TABLET    Take 1 tablet by mouth once daily.

## 2022-03-14 ENCOUNTER — OFFICE VISIT (OUTPATIENT)
Dept: CARDIOLOGY | Facility: CLINIC | Age: 37
End: 2022-03-14
Payer: COMMERCIAL

## 2022-03-14 ENCOUNTER — TELEPHONE (OUTPATIENT)
Dept: TRANSPLANT | Facility: CLINIC | Age: 37
End: 2022-03-14
Payer: COMMERCIAL

## 2022-03-14 VITALS
OXYGEN SATURATION: 98 % | BODY MASS INDEX: 43.53 KG/M2 | HEIGHT: 67 IN | WEIGHT: 277.31 LBS | SYSTOLIC BLOOD PRESSURE: 120 MMHG | HEART RATE: 93 BPM | DIASTOLIC BLOOD PRESSURE: 71 MMHG

## 2022-03-14 DIAGNOSIS — Z98.890 H/O CARDIAC RADIOFREQUENCY ABLATION: ICD-10-CM

## 2022-03-14 DIAGNOSIS — I50.22 CHRONIC SYSTOLIC HEART FAILURE: ICD-10-CM

## 2022-03-14 DIAGNOSIS — E66.01 MORBID OBESITY: Chronic | ICD-10-CM

## 2022-03-14 DIAGNOSIS — I47.10 SVT (SUPRAVENTRICULAR TACHYCARDIA): Primary | ICD-10-CM

## 2022-03-14 DIAGNOSIS — I10 PRIMARY HYPERTENSION: ICD-10-CM

## 2022-03-14 PROCEDURE — 3074F PR MOST RECENT SYSTOLIC BLOOD PRESSURE < 130 MM HG: ICD-10-PCS | Mod: CPTII,S$GLB,, | Performed by: INTERNAL MEDICINE

## 2022-03-14 PROCEDURE — 99214 PR OFFICE/OUTPT VISIT, EST, LEVL IV, 30-39 MIN: ICD-10-PCS | Mod: S$GLB,,, | Performed by: INTERNAL MEDICINE

## 2022-03-14 PROCEDURE — 1159F MED LIST DOCD IN RCRD: CPT | Mod: CPTII,S$GLB,, | Performed by: INTERNAL MEDICINE

## 2022-03-14 PROCEDURE — 3074F SYST BP LT 130 MM HG: CPT | Mod: CPTII,S$GLB,, | Performed by: INTERNAL MEDICINE

## 2022-03-14 PROCEDURE — 1111F PR DISCHARGE MEDS RECONCILED W/ CURRENT OUTPATIENT MED LIST: ICD-10-PCS | Mod: CPTII,S$GLB,, | Performed by: INTERNAL MEDICINE

## 2022-03-14 PROCEDURE — 3044F HG A1C LEVEL LT 7.0%: CPT | Mod: CPTII,S$GLB,, | Performed by: INTERNAL MEDICINE

## 2022-03-14 PROCEDURE — 3008F PR BODY MASS INDEX (BMI) DOCUMENTED: ICD-10-PCS | Mod: CPTII,S$GLB,, | Performed by: INTERNAL MEDICINE

## 2022-03-14 PROCEDURE — 4010F PR ACE/ARB THEARPY RXD/TAKEN: ICD-10-PCS | Mod: CPTII,S$GLB,, | Performed by: INTERNAL MEDICINE

## 2022-03-14 PROCEDURE — 3044F PR MOST RECENT HEMOGLOBIN A1C LEVEL <7.0%: ICD-10-PCS | Mod: CPTII,S$GLB,, | Performed by: INTERNAL MEDICINE

## 2022-03-14 PROCEDURE — 1159F PR MEDICATION LIST DOCUMENTED IN MEDICAL RECORD: ICD-10-PCS | Mod: CPTII,S$GLB,, | Performed by: INTERNAL MEDICINE

## 2022-03-14 PROCEDURE — 99214 OFFICE O/P EST MOD 30 MIN: CPT | Mod: S$GLB,,, | Performed by: INTERNAL MEDICINE

## 2022-03-14 PROCEDURE — 4010F ACE/ARB THERAPY RXD/TAKEN: CPT | Mod: CPTII,S$GLB,, | Performed by: INTERNAL MEDICINE

## 2022-03-14 PROCEDURE — 1111F DSCHRG MED/CURRENT MED MERGE: CPT | Mod: CPTII,S$GLB,, | Performed by: INTERNAL MEDICINE

## 2022-03-14 PROCEDURE — 3078F PR MOST RECENT DIASTOLIC BLOOD PRESSURE < 80 MM HG: ICD-10-PCS | Mod: CPTII,S$GLB,, | Performed by: INTERNAL MEDICINE

## 2022-03-14 PROCEDURE — 3008F BODY MASS INDEX DOCD: CPT | Mod: CPTII,S$GLB,, | Performed by: INTERNAL MEDICINE

## 2022-03-14 PROCEDURE — 3078F DIAST BP <80 MM HG: CPT | Mod: CPTII,S$GLB,, | Performed by: INTERNAL MEDICINE

## 2022-03-14 NOTE — TELEPHONE ENCOUNTER
"3/11/22     Labs reviewed with pt.   Pt reports "feeling much better", cough improving, coughing less. Post hop dc home wt 277lbs. 3/11/33 wt 272lbs.  "

## 2022-03-23 ENCOUNTER — TELEPHONE (OUTPATIENT)
Dept: TRANSPLANT | Facility: CLINIC | Age: 37
End: 2022-03-23
Payer: COMMERCIAL

## 2022-03-23 NOTE — TELEPHONE ENCOUNTER
Called pt to inform her of nutr visit being moved to cardiology appointment with Dr. Fong. Informed him of the change which he thought would benefit the pt. Pt seems very motivated. Reports UBW of 272 which is 20 pounds from hospital discharge on 03/02. Pt was emailed a handout on seasoning to answer questions she had on the phone. Look forward to meet with pt on 04/06 in clinic.

## 2022-04-06 ENCOUNTER — OFFICE VISIT (OUTPATIENT)
Dept: TRANSPLANT | Facility: CLINIC | Age: 37
End: 2022-04-06
Payer: COMMERCIAL

## 2022-04-06 ENCOUNTER — LAB VISIT (OUTPATIENT)
Dept: LAB | Facility: HOSPITAL | Age: 37
End: 2022-04-06
Attending: INTERNAL MEDICINE
Payer: COMMERCIAL

## 2022-04-06 VITALS
HEART RATE: 84 BPM | BODY MASS INDEX: 43.43 KG/M2 | HEIGHT: 67 IN | SYSTOLIC BLOOD PRESSURE: 137 MMHG | WEIGHT: 276.69 LBS | DIASTOLIC BLOOD PRESSURE: 84 MMHG

## 2022-04-06 DIAGNOSIS — I50.9 CONGESTIVE HEART FAILURE, UNSPECIFIED HF CHRONICITY, UNSPECIFIED HEART FAILURE TYPE: Primary | ICD-10-CM

## 2022-04-06 DIAGNOSIS — I50.22 CHRONIC SYSTOLIC HEART FAILURE: ICD-10-CM

## 2022-04-06 DIAGNOSIS — D25.0 SUBMUCOUS LEIOMYOMA OF UTERUS: ICD-10-CM

## 2022-04-06 DIAGNOSIS — Z98.890 H/O CARDIAC RADIOFREQUENCY ABLATION: ICD-10-CM

## 2022-04-06 DIAGNOSIS — E66.01 MORBID OBESITY: Chronic | ICD-10-CM

## 2022-04-06 DIAGNOSIS — I47.10 SVT (SUPRAVENTRICULAR TACHYCARDIA): ICD-10-CM

## 2022-04-06 DIAGNOSIS — I10 PRIMARY HYPERTENSION: ICD-10-CM

## 2022-04-06 LAB
ALBUMIN SERPL BCP-MCNC: 3.7 G/DL (ref 3.5–5.2)
ALP SERPL-CCNC: 67 U/L (ref 55–135)
ALT SERPL W/O P-5'-P-CCNC: 12 U/L (ref 10–44)
ANION GAP SERPL CALC-SCNC: 6 MMOL/L (ref 8–16)
AST SERPL-CCNC: 7 U/L (ref 10–40)
BASOPHILS # BLD AUTO: 0.02 K/UL (ref 0–0.2)
BASOPHILS NFR BLD: 0.3 % (ref 0–1.9)
BILIRUB SERPL-MCNC: 0.4 MG/DL (ref 0.1–1)
BUN SERPL-MCNC: 13 MG/DL (ref 6–20)
CALCIUM SERPL-MCNC: 9.8 MG/DL (ref 8.7–10.5)
CHLORIDE SERPL-SCNC: 104 MMOL/L (ref 95–110)
CO2 SERPL-SCNC: 28 MMOL/L (ref 23–29)
CREAT SERPL-MCNC: 1.1 MG/DL (ref 0.5–1.4)
DIFFERENTIAL METHOD: ABNORMAL
EOSINOPHIL # BLD AUTO: 0.1 K/UL (ref 0–0.5)
EOSINOPHIL NFR BLD: 1.5 % (ref 0–8)
ERYTHROCYTE [DISTWIDTH] IN BLOOD BY AUTOMATED COUNT: 19.3 % (ref 11.5–14.5)
EST. GFR  (AFRICAN AMERICAN): >60 ML/MIN/1.73 M^2
EST. GFR  (NON AFRICAN AMERICAN): >60 ML/MIN/1.73 M^2
FERRITIN SERPL-MCNC: 32 NG/ML (ref 20–300)
GLUCOSE SERPL-MCNC: 101 MG/DL (ref 70–110)
HCT VFR BLD AUTO: 43.6 % (ref 37–48.5)
HGB BLD-MCNC: 12.9 G/DL (ref 12–16)
IMM GRANULOCYTES # BLD AUTO: 0.01 K/UL (ref 0–0.04)
IMM GRANULOCYTES NFR BLD AUTO: 0.2 % (ref 0–0.5)
LYMPHOCYTES # BLD AUTO: 2.2 K/UL (ref 1–4.8)
LYMPHOCYTES NFR BLD: 38.4 % (ref 18–48)
MCH RBC QN AUTO: 21.7 PG (ref 27–31)
MCHC RBC AUTO-ENTMCNC: 29.6 G/DL (ref 32–36)
MCV RBC AUTO: 73 FL (ref 82–98)
MONOCYTES # BLD AUTO: 0.5 K/UL (ref 0.3–1)
MONOCYTES NFR BLD: 8.8 % (ref 4–15)
NEUTROPHILS # BLD AUTO: 3 K/UL (ref 1.8–7.7)
NEUTROPHILS NFR BLD: 50.8 % (ref 38–73)
NRBC BLD-RTO: 0 /100 WBC
PLATELET # BLD AUTO: 417 K/UL (ref 150–450)
PMV BLD AUTO: 10.6 FL (ref 9.2–12.9)
POTASSIUM SERPL-SCNC: 4.6 MMOL/L (ref 3.5–5.1)
PROT SERPL-MCNC: 7.3 G/DL (ref 6–8.4)
RBC # BLD AUTO: 5.94 M/UL (ref 4–5.4)
SODIUM SERPL-SCNC: 138 MMOL/L (ref 136–145)
WBC # BLD AUTO: 5.81 K/UL (ref 3.9–12.7)

## 2022-04-06 PROCEDURE — 4010F ACE/ARB THERAPY RXD/TAKEN: CPT | Mod: CPTII,S$GLB,, | Performed by: INTERNAL MEDICINE

## 2022-04-06 PROCEDURE — 82728 ASSAY OF FERRITIN: CPT | Performed by: INTERNAL MEDICINE

## 2022-04-06 PROCEDURE — 3008F PR BODY MASS INDEX (BMI) DOCUMENTED: ICD-10-PCS | Mod: CPTII,S$GLB,, | Performed by: INTERNAL MEDICINE

## 2022-04-06 PROCEDURE — 3075F SYST BP GE 130 - 139MM HG: CPT | Mod: CPTII,S$GLB,, | Performed by: INTERNAL MEDICINE

## 2022-04-06 PROCEDURE — 99214 PR OFFICE/OUTPT VISIT, EST, LEVL IV, 30-39 MIN: ICD-10-PCS | Mod: S$GLB,,, | Performed by: INTERNAL MEDICINE

## 2022-04-06 PROCEDURE — 3079F DIAST BP 80-89 MM HG: CPT | Mod: CPTII,S$GLB,, | Performed by: INTERNAL MEDICINE

## 2022-04-06 PROCEDURE — 99999 PR PBB SHADOW E&M-EST. PATIENT-LVL IV: ICD-10-PCS | Mod: PBBFAC,,, | Performed by: INTERNAL MEDICINE

## 2022-04-06 PROCEDURE — 85025 COMPLETE CBC W/AUTO DIFF WBC: CPT | Performed by: INTERNAL MEDICINE

## 2022-04-06 PROCEDURE — 3079F PR MOST RECENT DIASTOLIC BLOOD PRESSURE 80-89 MM HG: ICD-10-PCS | Mod: CPTII,S$GLB,, | Performed by: INTERNAL MEDICINE

## 2022-04-06 PROCEDURE — 83880 ASSAY OF NATRIURETIC PEPTIDE: CPT | Performed by: INTERNAL MEDICINE

## 2022-04-06 PROCEDURE — 99214 OFFICE O/P EST MOD 30 MIN: CPT | Mod: S$GLB,,, | Performed by: INTERNAL MEDICINE

## 2022-04-06 PROCEDURE — 3075F PR MOST RECENT SYSTOLIC BLOOD PRESS GE 130-139MM HG: ICD-10-PCS | Mod: CPTII,S$GLB,, | Performed by: INTERNAL MEDICINE

## 2022-04-06 PROCEDURE — 4010F PR ACE/ARB THEARPY RXD/TAKEN: ICD-10-PCS | Mod: CPTII,S$GLB,, | Performed by: INTERNAL MEDICINE

## 2022-04-06 PROCEDURE — 1159F MED LIST DOCD IN RCRD: CPT | Mod: CPTII,S$GLB,, | Performed by: INTERNAL MEDICINE

## 2022-04-06 PROCEDURE — 3008F BODY MASS INDEX DOCD: CPT | Mod: CPTII,S$GLB,, | Performed by: INTERNAL MEDICINE

## 2022-04-06 PROCEDURE — 36415 COLL VENOUS BLD VENIPUNCTURE: CPT | Performed by: INTERNAL MEDICINE

## 2022-04-06 PROCEDURE — 3044F HG A1C LEVEL LT 7.0%: CPT | Mod: CPTII,S$GLB,, | Performed by: INTERNAL MEDICINE

## 2022-04-06 PROCEDURE — 1159F PR MEDICATION LIST DOCUMENTED IN MEDICAL RECORD: ICD-10-PCS | Mod: CPTII,S$GLB,, | Performed by: INTERNAL MEDICINE

## 2022-04-06 PROCEDURE — 99999 PR PBB SHADOW E&M-EST. PATIENT-LVL IV: CPT | Mod: PBBFAC,,, | Performed by: INTERNAL MEDICINE

## 2022-04-06 PROCEDURE — 80053 COMPREHEN METABOLIC PANEL: CPT | Performed by: INTERNAL MEDICINE

## 2022-04-06 PROCEDURE — 3044F PR MOST RECENT HEMOGLOBIN A1C LEVEL <7.0%: ICD-10-PCS | Mod: CPTII,S$GLB,, | Performed by: INTERNAL MEDICINE

## 2022-04-06 RX ORDER — SACUBITRIL AND VALSARTAN 49; 51 MG/1; MG/1
1 TABLET, FILM COATED ORAL 2 TIMES DAILY
Qty: 60 TABLET | Refills: 3 | Status: SHIPPED | OUTPATIENT
Start: 2022-04-06 | End: 2022-05-09

## 2022-04-06 RX ORDER — METOPROLOL SUCCINATE 200 MG/1
200 TABLET, EXTENDED RELEASE ORAL DAILY
Qty: 30 TABLET | Refills: 11 | Status: SHIPPED | OUTPATIENT
Start: 2022-04-06 | End: 2023-06-13 | Stop reason: SDUPTHER

## 2022-04-06 NOTE — PROGRESS NOTES
[facility] Heart Failure [facility] Clinic Follow up.        Attending Physician: Cory Sullivan MD.  The patient's last visit with me was on 3/8/2022.         HPI.  Ursula Smallwood is a 36-year-old female morbidly obese BMI 44, PMHx SVT s/p for radiofrequency catheter ablation (06/04/2021), CHF diagnosed March 2022 when her LVEF was 45%, now LVEF 20% in 2022, HTN, anemia 2/2 uterine fibroids s/p hysterectomy who presents for post discharge follow up.     Had PET stress last year that did not find perfusion abnormalities.     She denies a personal history of diabetes or HTN. No family history of HF, SCD or Mis.  She denies any consumption of tobacco, alcohol, drugs. 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    Last visit 3/8/2022, she was having dyspnea on exertion and had evidence of central hypervolemia on exam. We increased her dose of diuretics to torsemide 20 mg daily.     Today she came in very good spirit. She said she has been feeling well. She said she started doing more exercise. She is walking every day. She is being careful with her diet. She is motivated to do well. She thinks she lost fluid and weight since her last appointment.         Review of Systems   Constitutional: Negative for activity change, appetite change, chills, diaphoresis, fatigue, fever and unexpected weight change.   HENT: Negative for nasal congestion, rhinorrhea and sore throat.    Eyes: Negative for visual disturbance.   Respiratory: Negative for cough, choking, chest tightness and shortness of breath.    Cardiovascular: Negative for chest pain, palpitations and leg swelling.   Gastrointestinal: Negative for abdominal distention, abdominal pain, diarrhea, nausea and vomiting.   Genitourinary: Negative for difficulty urinating, dysuria and hematuria.   Integumentary:  Negative for rash.   Neurological: Negative for seizures, syncope and  light-headedness.   Psychiatric/Behavioral: Negative for agitation and hallucinations.        Past Medical History:   Diagnosis Date    Anemia     Hypertension     SVT (supraventricular tachycardia)     Uterine fibroid         Past Surgical History:   Procedure Laterality Date    ABLATION N/A 6/4/2021    Procedure: Ablation;  Surgeon: NICHELLE Zepeda MD;  Location: [facility] EP LAB;  Service: Cardiology;  Laterality: N/A;  SVT, RFA, Carto, anes, EH, 3prep    CYSTOSCOPY N/A 8/3/2021    Procedure: CYSTOSCOPY;  Surgeon: Jamarcus Ventura MD;  Location: [facility] OR;  Service: OB/GYN;  Laterality: N/A;    ROBOT-ASSISTED LAPAROSCOPIC HYSTERECTOMY N/A 8/3/2021    Procedure: ROBOTIC HYSTERECTOMY;  Surgeon: Jamarcus Ventura MD;  Location: [facility] OR;  Service: OB/GYN;  Laterality: N/A;    ROBOT-ASSISTED SALPINGECTOMY Bilateral 8/3/2021    Procedure: ROBOTIC SALPINGECTOMY;  Surgeon: Jamarcus Ventura MD;  Location: [facility] OR;  Service: OB/GYN;  Laterality: Bilateral;        No family history on file.     Review of patient's allergies indicates:  No Known Allergies     Current Outpatient Medications   Medication Instructions    metoprolol succinate (TOPROL-XL) 50 MG 24 hr tablet take 100mg ( 2 tablets )  by mouth every morning and 50mg ( 1 tablet )  by mouth every evening    sacubitriL-valsartan (ENTRESTO) 24-26 mg per tablet 1 tablet, Oral, 2 times daily    spironolactone (ALDACTONE) 25 mg, Oral, Daily    torsemide (DEMADEX) 20 mg, Oral, Daily        Vitals:    04/06/22 1307   BP: 137/84   Pulse: 84        Wt Readings from Last 3 Encounters:   04/06/22 125.5 kg (276 lb 10.8 oz)   03/14/22 125.8 kg (277 lb 4.8 oz)   03/09/22 127.1 kg (280 lb 3.3 oz)     Temp Readings from Last 3 Encounters:   03/02/22 98.6 °F (37 °C)   01/23/22 98.4 °F (36.9 °C) (Oral)   08/03/21 97.9 °F (36.6 °C) (Skin)     BP Readings from Last 3 Encounters:   04/06/22 137/84   03/14/22 120/71   03/09/22 116/82     Pulse Readings from Last 3 Encounters:  "  04/06/22 84   03/14/22 93   03/09/22 101        Body mass index is 43.33 kg/m². Estimated body surface area is 2.44 meters squared as calculated from the following:    Height as of this encounter: 5' 7" (1.702 m).    Weight as of this encounter: 125.5 kg (276 lb 10.8 oz).     Physical Exam  Constitutional:       Appearance: She is well-developed.   HENT:      Head: Normocephalic and atraumatic.      Right Ear: External ear normal.      Left Ear: External ear normal.   Eyes:      Conjunctiva/sclera: Conjunctivae normal.      Pupils: Pupils are equal, round, and reactive to light.   Neck:      Vascular: No hepatojugular reflux or JVD.   Cardiovascular:      Rate and Rhythm: Normal rate and regular rhythm.      Pulses: Intact distal pulses.      Heart sounds: S1 normal and S2 normal. No murmur heard.    No friction rub. No gallop.   Pulmonary:      Effort: Pulmonary effort is normal.      Breath sounds: Normal breath sounds.   Abdominal:      General: Bowel sounds are normal. There is no distension.      Palpations: Abdomen is soft.      Tenderness: There is no abdominal tenderness. There is no guarding or rebound.   Musculoskeletal:      Cervical back: Normal range of motion and neck supple.      Right lower leg: No edema.      Left lower leg: No edema.   Neurological:      Mental Status: She is alert and oriented to person, place, and time.          Lab Results   Component Value Date     (H) 03/08/2022     04/06/2022    K 4.6 04/06/2022    MG 2.0 03/08/2022     04/06/2022    CO2 28 04/06/2022    BUN 13 04/06/2022    CREATININE 1.1 04/06/2022     04/06/2022    HGBA1C 6.3 (H) 02/27/2022    AST 7 (L) 04/06/2022    ALT 12 04/06/2022    ALBUMIN 3.7 04/06/2022    PROT 7.3 04/06/2022    BILITOT 0.4 04/06/2022    WBC 5.81 04/06/2022    HGB 12.9 04/06/2022    HCT 43.6 04/06/2022    HCT 23 (L) 06/01/2021     04/06/2022    INR 1.1 07/18/2021    TSH 2.685 03/08/2022           Results for orders " placed during the hospital encounter of 02/26/22    Echo    Interpretation Summary  · The left ventricle is severely enlarged with eccentric hypertrophy and severely decreased systolic function.  · The estimated ejection fraction is 20%.  · There is severe left ventricular global hypokinesis.  · Grade III left ventricular diastolic dysfunction.  · Moderate mitral regurgitation.  · Mild tricuspid regurgitation.  · Severe left atrial enlargement.  · Mild pulmonic regurgitation.  · Intermediate central venous pressure (8 mmHg).  · The estimated PA systolic pressure is 34 mmHg.  · Trivial pericardial effusion.        No results found for this or any previous visit.         Assessment and Plan:  Congestive heart failure, unspecified HF chronicity, unspecified heart failure type  -     CBC Auto Differential; Future; Expected date: 05/06/2022  -     Comprehensive Metabolic Panel; Future; Expected date: 05/06/2022  -     NT-Pro Natriuretic Peptide; Future; Expected date: 05/06/2022    Chronic systolic heart failure    Primary hypertension    Hx of SVT (supraventricular tachycardia)    H/O cardiac radiofrequency ablation    Submucous leiomyoma of uterus    Morbid obesity    Other orders  -     metoprolol succinate (TOPROL-XL) 200 MG 24 hr tablet; Take 1 tablet (200 mg total) by mouth once daily.  Dispense: 30 tablet; Refill: 11  -     sacubitriL-valsartan (ENTRESTO) 49-51 mg per tablet; Take 1 tablet by mouth 2 (two) times daily.  Dispense: 60 tablet; Refill: 3             1. Acute on chronic systolic HF, NYHA class III, stage C.  Etiology: NICM. PET stress negative for ischemia in May 2021. Not improved after control of SVT (s/p ablation).  Devices: none.  Medications: sacubitril-valsartan low dose, metoprolol succinate 150 mg daily, torsemide 20 mg daily.  Hemodynamic status: warm, normotensive, euvolemic.  Clinical course: recently discharged March 3, 2022 after her index hospitalization (EF down to 20%). NO ER visits or  admissions since last appointment.  Plan:  -patient to adhere to a fluid restriction of NMT 1.5 liters/24h.  -patient to adhere to  low sodium diet, NMT 1.5 grams of sodium in a day.  -increase metoprolol xl to 200 mg daily.  -patient to call us every 2 weeks for next upgrade on medications (increase dose of entresto, start farxiga).    -Follow up in 6-8 weeks with labs.     New diagnosis of HFrEF.  Plan for uptitration of medical therapy, then repeat echo after 3 months on optimal doses to risk stratify for SCD (ICD).

## 2022-04-06 NOTE — PATIENT INSTRUCTIONS
You have just the right amount of fluid on you.  Please adhere to a low sodium diet (no more than 1.5 grams of sodium in 24h).  3.   Follow fluid restriction of  2. no more than 1.5 liters in 24 hours..   4. Please increase your metoprolol xl from 150 mg daily total to 200 mg daily.  5. Call us  every 2 weeks for next upgrade on your medical regimen.  6. Follow up in 6-8 weeks with labs.

## 2022-04-07 ENCOUNTER — EDUCATION (OUTPATIENT)
Dept: TRANSPLANT | Facility: CLINIC | Age: 37
End: 2022-04-07
Payer: COMMERCIAL

## 2022-04-07 LAB — NT-PROBNP SERPL-MCNC: 1137 PG/ML

## 2022-04-07 NOTE — PROGRESS NOTES
"TRANSPLANT NUTRITIONAL ASSESSMENT    Referring Provider: Simone Pro DO    Reason for Visit: Weight loss education    Age: 36 y.o.  Sex: female    Patient Active Problem List   Diagnosis    Symptomatic anemia    Morbid obesity    Uterine fibroid    Menorrhagia with irregular cycle    H/O cardiac radiofrequency ablation    H/O abdominal hysterectomy    COVID-19    Hx of SVT (supraventricular tachycardia)    Chronic systolic heart failure    Hypertension     Past Medical History:   Diagnosis Date    Anemia     Hypertension     SVT (supraventricular tachycardia)     Uterine fibroid      Lab Results   Component Value Date     04/06/2022    K 4.6 04/06/2022    PHOS 4.8 (H) 03/01/2022    MG 2.0 03/08/2022    ALBUMIN 3.7 04/06/2022    HGBA1C 6.3 (H) 02/27/2022    CALCIUM 9.8 04/06/2022     Other Pertinent Labs: AST: 7 (L)    Current Outpatient Medications   Medication Sig    metoprolol succinate (TOPROL-XL) 200 MG 24 hr tablet Take 1 tablet (200 mg total) by mouth once daily.    sacubitriL-valsartan (ENTRESTO) 49-51 mg per tablet Take 1 tablet by mouth 2 (two) times daily.    spironolactone (ALDACTONE) 25 MG tablet Take 1 tablet (25 mg total) by mouth once daily.    torsemide (DEMADEX) 20 MG Tab Take 1 tablet (20 mg total) by mouth once daily.     No current facility-administered medications for this visit.     Allergies: Patient has no known allergies.    Ht Readings from Last 1 Encounters:   04/06/22 5' 7" (1.702 m)     Wt Readings from Last 1 Encounters:   04/06/22 125.5 kg (276 lb 10.8 oz)      BMI: 43.33 kg/m²    ABW: 170.25 lbs (77.4 kg)  Usual Weight: 275  Weight Change/Time: n/a  Current Diet: XIN  Appetite/Current Intake: good   Exercise/Physical Activity: lightly active  Nutritional/Herbal Supplements: none  Potential Food/Medication Interactions: none  Chewing/Swallowing Problems: none  Symptoms: none  Assessment of Lab Values: weight and heart related issues   Support System: " family     Estimated Kcal Need: 2167 - 2322 (28-30 kcals/kg ABW)  Estimated Protein Need: 100 (0.8 g/kg CBW)    Nutritional History: pt reports eating eggs with oatmeal or 3 servings of cereal with whole milk (raisin brand, frosted flakes, special K) for breakfast. Pt will do a smoothie with a carnations plus or chicken with a carb source or lunch. Dinner is very similar. Pt reports she has been eating out a lot since our last phone call on 3/23. Pt has been trying to use juiced vegetables and fruits as meals more lately.     Nutritional Diagnoses  Problem: overweight/obesity  Etiology: excessive calorie intake   Symptoms: nutr hist and BMI    Educational Need? yes  Barriers: none identified  Discussed with: patient  Interventions: Patient taught nutrition information regarding Weight loss education.  Pt wa sgiven three goals to implement. 1. Walk 5x a week (pt discussed she did this in the past to lose weight). 2. Increase protein intake (pt has been drinking calories lately. Protein intake may be 50 g at the most). 3, pt wants to be at wt from previous phone call by the end of the month. (pt's goal)  Goals/Recommendations: diet adherence and gradual weight loss  Actions Taken: instruct/provide written information  Strategies Used: problem solving, goal setting, motivational interviewing  Patient and/or family comprehend instructions: yes , adherence expected  Outcome: Verbalizes understanding  Monitoring: Contact information provided, will f/u in clinic and communicate with the care team as needed.     Counseling Time: 15 minutes

## 2022-04-13 ENCOUNTER — LAB VISIT (OUTPATIENT)
Dept: LAB | Facility: HOSPITAL | Age: 37
End: 2022-04-13
Attending: STUDENT IN AN ORGANIZED HEALTH CARE EDUCATION/TRAINING PROGRAM
Payer: COMMERCIAL

## 2022-04-13 ENCOUNTER — DOCUMENTATION ONLY (OUTPATIENT)
Dept: TRANSPLANT | Facility: CLINIC | Age: 37
End: 2022-04-13
Payer: COMMERCIAL

## 2022-04-13 ENCOUNTER — OFFICE VISIT (OUTPATIENT)
Dept: FAMILY MEDICINE | Facility: HOSPITAL | Age: 37
End: 2022-04-13
Attending: STUDENT IN AN ORGANIZED HEALTH CARE EDUCATION/TRAINING PROGRAM
Payer: COMMERCIAL

## 2022-04-13 VITALS
WEIGHT: 272.25 LBS | HEIGHT: 67 IN | DIASTOLIC BLOOD PRESSURE: 72 MMHG | HEART RATE: 82 BPM | SYSTOLIC BLOOD PRESSURE: 108 MMHG | BODY MASS INDEX: 42.73 KG/M2

## 2022-04-13 DIAGNOSIS — E66.01 CLASS 3 SEVERE OBESITY WITH BODY MASS INDEX (BMI) OF 40.0 TO 44.9 IN ADULT, UNSPECIFIED OBESITY TYPE, UNSPECIFIED WHETHER SERIOUS COMORBIDITY PRESENT: ICD-10-CM

## 2022-04-13 DIAGNOSIS — Z00.00 HEALTHCARE MAINTENANCE: ICD-10-CM

## 2022-04-13 DIAGNOSIS — I50.20 HFREF (HEART FAILURE WITH REDUCED EJECTION FRACTION): ICD-10-CM

## 2022-04-13 DIAGNOSIS — Z23 NEED FOR VACCINATION: ICD-10-CM

## 2022-04-13 DIAGNOSIS — I50.20 HFREF (HEART FAILURE WITH REDUCED EJECTION FRACTION): Primary | ICD-10-CM

## 2022-04-13 LAB
CHOLEST SERPL-MCNC: 190 MG/DL (ref 120–199)
CHOLEST/HDLC SERPL: 5.8 {RATIO} (ref 2–5)
HDLC SERPL-MCNC: 33 MG/DL (ref 40–75)
HDLC SERPL: 17.4 % (ref 20–50)
LDLC SERPL CALC-MCNC: 125.2 MG/DL (ref 63–159)
NONHDLC SERPL-MCNC: 157 MG/DL
TRIGL SERPL-MCNC: 159 MG/DL (ref 30–150)

## 2022-04-13 PROCEDURE — 36415 COLL VENOUS BLD VENIPUNCTURE: CPT | Performed by: STUDENT IN AN ORGANIZED HEALTH CARE EDUCATION/TRAINING PROGRAM

## 2022-04-13 PROCEDURE — 87389 HIV-1 AG W/HIV-1&-2 AB AG IA: CPT | Performed by: STUDENT IN AN ORGANIZED HEALTH CARE EDUCATION/TRAINING PROGRAM

## 2022-04-13 PROCEDURE — 99214 OFFICE O/P EST MOD 30 MIN: CPT | Mod: 25 | Performed by: STUDENT IN AN ORGANIZED HEALTH CARE EDUCATION/TRAINING PROGRAM

## 2022-04-13 PROCEDURE — 90715 TDAP VACCINE 7 YRS/> IM: CPT

## 2022-04-13 PROCEDURE — 80061 LIPID PANEL: CPT | Performed by: STUDENT IN AN ORGANIZED HEALTH CARE EDUCATION/TRAINING PROGRAM

## 2022-04-13 PROCEDURE — 86803 HEPATITIS C AB TEST: CPT | Performed by: STUDENT IN AN ORGANIZED HEALTH CARE EDUCATION/TRAINING PROGRAM

## 2022-04-13 NOTE — PROGRESS NOTES
Talked to pt this AM. Pt reports weight of 269 lbs (122.3 kg). Pt is excelling at goals made from visit on 4/6 will cont to monitor pt and create new goals as needed.

## 2022-04-13 NOTE — PROGRESS NOTES
Subjective:       Patient ID: Ursula Smallwood is a 36 y.o. female.    Chief Complaint: Establish Care    Ursula Smallwood is a 36-year-old female PMHx  HFrEF (EF 20% 02/28/2022), SVT s/p radiofrequency catheter ablation (06/04/2021), HTN, Obesity, Hx of anemia 2/2 uterine fibroids s/p hysterectomy who is presenting to establish care. Ms Vergara is doing great since being hospitlized for acute CHF exacerbation in late Feb with discharge on 3/02/2022.  Echocardiogram during hospitalization with EF 20% with left ventricle severely enlarged and severely decreased systolic function as well as grade 3 left ventricular diastolic dysfunction. Recently seen by Cardiology, set for repeat echocardiogram on 04/25/2022. Guideline directed medical therapy includes metoprolol  mg daily, Entresto twice daily, and spironolactone 25 mg daily. Torsemide 20 mg daily to keep euvolemic. Denies any recent shortness of breath or chest pain. Since hospital discharge, has made a tremendous improvement in her overall health. Is exercising daily with walking and using resistance bands for strength training. Is limiting the amount of sodium intake, fluid intake, and has an overall balance diet. Endorses losing 28 lbs since hospitalization.     Healthcare maintenance: Total laparoscopic Hysterectomy with bilateral salpingectomy performed on 08/03/2021.  Does not need Pap smear. Due for mammogram at 38 years of age.     Review of Systems   Constitutional: Negative for chills and fever.   Respiratory: Negative for shortness of breath.    Cardiovascular: Negative for chest pain, palpitations and leg swelling.   Gastrointestinal: Negative for constipation.   Musculoskeletal: Positive for myalgias (left hip).   Skin: Negative for rash.   Neurological: Negative for weakness.   Psychiatric/Behavioral: Negative for dysphoric mood and sleep disturbance. The patient is not nervous/anxious.        Objective:      Vitals:    04/13/22 0825   BP: 108/72   Pulse: 82      Physical Exam  Vitals and nursing note reviewed.   Constitutional:       Appearance: Normal appearance.   Cardiovascular:      Rate and Rhythm: Normal rate and regular rhythm.      Heart sounds: No murmur heard.    No friction rub. No gallop.   Pulmonary:      Effort: Pulmonary effort is normal.      Breath sounds: Normal breath sounds. No wheezing, rhonchi or rales.      Comments: CTAB  Musculoskeletal:      Right lower leg: Edema (trace) present.      Left lower leg: Edema (trace) present.   Skin:     General: Skin is warm.   Neurological:      General: No focal deficit present.      Mental Status: She is alert and oriented to person, place, and time.   Psychiatric:         Mood and Affect: Mood normal.         Behavior: Behavior normal.         Assessment:       1. HFrEF (heart failure with reduced ejection fraction)    2. Class 3 severe obesity with body mass index (BMI) of 40.0 to 44.9 in adult, unspecified obesity type, unspecified whether serious comorbidity present    3. Healthcare maintenance    4. Need for vaccination        Plan:       HFrEF (heart failure with reduced ejection fraction)       -     Continue GDMT with metoprolol  mg daily, Entresto twice daily, and spironolactone 25 mg daily. Torsemide 20 mg daily to keep euvolemic.        -     fluid restriction to less than 1.5 L/day and sodium restriction to less than 1.5 g/day       -     continue with echocardiogram set for 04/25/2022.       -     continue follow-up with Cardiology, from chart review plan to increase dose of Entresto an add dapagliflozin in future. Cardiology to risk stratify for ICD placement in 3 months.     Class 3 severe obesity with body mass index (BMI) of 40.0 to 44.9 in adult, unspecified obesity type, unspecified whether serious comorbidity present  -     Lipid Panel; Future; Expected date: 04/13/2022  -     overall doing well, continue to achieve weight loss goals with diet and exercise    Healthcare  maintenance  -     Hepatitis C Antibody; Future; Expected date: 04/13/2022  -     HIV 1/2 Ag/Ab (4th Gen); Future; Expected date: 04/13/2022    Need for vaccination  -     (In Office Administered) Tdap Vaccine     Advised to follow-up in 3 months for re-evaluation. Overall doing well.     Simone Pro DO  [facility], PGY-2  04/13/2022

## 2022-04-14 LAB
HCV AB SERPL QL IA: NEGATIVE
HIV 1+2 AB+HIV1 P24 AG SERPL QL IA: NEGATIVE

## 2022-04-14 NOTE — PROGRESS NOTES
I assume primary medical responsibility for this patient. I have reviewed the history, physical, and assessement & treatment plan with the resident and agree that the care is reasonable and necessary. This service has been performed by a resident without the presence of a teaching physician under the primary care exception. If necessary, an addendum of additional findings or evaluation beyond the resident documentation will be noted below.    Patient here to establish care. Recent hospitalization for Acute HFrEF. Discussed management. Healthcare maintenance updated.     Grace Aguirre MD    [facility]

## 2022-04-25 ENCOUNTER — HOSPITAL ENCOUNTER (OUTPATIENT)
Dept: CARDIOLOGY | Facility: HOSPITAL | Age: 37
Discharge: HOME | End: 2022-04-25
Attending: STUDENT IN AN ORGANIZED HEALTH CARE EDUCATION/TRAINING PROGRAM
Payer: COMMERCIAL

## 2022-04-25 VITALS
DIASTOLIC BLOOD PRESSURE: 72 MMHG | SYSTOLIC BLOOD PRESSURE: 108 MMHG | HEART RATE: 82 BPM | BODY MASS INDEX: 43.32 KG/M2 | HEIGHT: 67 IN | WEIGHT: 276 LBS

## 2022-04-25 DIAGNOSIS — U07.1 COVID-19: ICD-10-CM

## 2022-04-25 LAB
ASCENDING AORTA: 2.68 CM
AV INDEX (PROSTH): 0.57
AV MEAN GRADIENT: 4 MMHG
AV PEAK GRADIENT: 6 MMHG
AV VALVE AREA: 1.93 CM2
AV VELOCITY RATIO: 0.53
BSA FOR ECHO PROCEDURE: 2.43 M2
CV ECHO LV RWT: 0.29 CM
DOP CALC AO PEAK VEL: 1.27 M/S
DOP CALC AO VTI: 20.46 CM
DOP CALC LVOT AREA: 3.4 CM2
DOP CALC LVOT DIAMETER: 2.08 CM
DOP CALC LVOT PEAK VEL: 0.67 M/S
DOP CALC LVOT STROKE VOLUME: 39.4 CM3
DOP CALCLVOT PEAK VEL VTI: 11.6 CM
E WAVE DECELERATION TIME: 263.87 MSEC
E/A RATIO: 1.67
E/E' RATIO: 12.75 M/S
ECHO LV POSTERIOR WALL: 0.97 CM (ref 0.6–1.1)
EJECTION FRACTION: 30 %
FRACTIONAL SHORTENING: 21 % (ref 28–44)
INTERVENTRICULAR SEPTUM: 0.82 CM (ref 0.6–1.1)
LA MAJOR: 5.27 CM
LA MINOR: 5.1 CM
LA WIDTH: 4.73 CM
LEFT ATRIUM SIZE: 3.48 CM
LEFT ATRIUM VOLUME INDEX MOD: 30.2 ML/M2
LEFT ATRIUM VOLUME INDEX: 31.3 ML/M2
LEFT ATRIUM VOLUME MOD: 70.16 CM3
LEFT ATRIUM VOLUME: 72.53 CM3
LEFT INTERNAL DIMENSION IN SYSTOLE: 5.2 CM (ref 2.1–4)
LEFT VENTRICLE DIASTOLIC VOLUME INDEX: 77.47 ML/M2
LEFT VENTRICLE DIASTOLIC VOLUME: 179.73 ML
LEFT VENTRICLE MASS INDEX: 109 G/M2
LEFT VENTRICLE SYSTOLIC VOLUME INDEX: 53.7 ML/M2
LEFT VENTRICLE SYSTOLIC VOLUME: 124.48 ML
LEFT VENTRICULAR INTERNAL DIMENSION IN DIASTOLE: 6.6 CM (ref 3.5–6)
LEFT VENTRICULAR MASS: 252.77 G
LV LATERAL E/E' RATIO: 12.75 M/S
LV SEPTAL E/E' RATIO: 12.75 M/S
MV PEAK A VEL: 0.61 M/S
MV PEAK E VEL: 1.02 M/S
MV STENOSIS PRESSURE HALF TIME: 76.52 MS
MV VALVE AREA P 1/2 METHOD: 2.88 CM2
PISA TR MAX VEL: 2.37 M/S
RA MAJOR: 4.7 CM
RA PRESSURE: 3 MMHG
RA WIDTH: 4.39 CM
RIGHT VENTRICULAR END-DIASTOLIC DIMENSION: 4.96 CM
RV TISSUE DOPPLER FREE WALL SYSTOLIC VELOCITY 1 (APICAL 4 CHAMBER VIEW): 11.85 CM/S
SINUS: 2.73 CM
STJ: 2.49 CM
TDI LATERAL: 0.08 M/S
TDI SEPTAL: 0.08 M/S
TDI: 0.08 M/S
TR MAX PG: 22 MMHG
TRICUSPID ANNULAR PLANE SYSTOLIC EXCURSION: 1.87 CM
TV REST PULMONARY ARTERY PRESSURE: 25 MMHG

## 2022-04-25 PROCEDURE — 93306 ECHO (CUPID ONLY): ICD-10-PCS | Mod: 26,,, | Performed by: INTERNAL MEDICINE

## 2022-04-25 PROCEDURE — 93306 TTE W/DOPPLER COMPLETE: CPT | Mod: 26,,, | Performed by: INTERNAL MEDICINE

## 2022-04-25 PROCEDURE — 93306 TTE W/DOPPLER COMPLETE: CPT

## 2022-04-27 ENCOUNTER — TELEPHONE (OUTPATIENT)
Dept: ELECTROPHYSIOLOGY | Facility: CLINIC | Age: 37
End: 2022-04-27
Payer: COMMERCIAL

## 2022-04-27 NOTE — TELEPHONE ENCOUNTER
Called pt ton confirm appt on 4/29 starting at 10:30. No answer, left message and call back number.

## 2022-04-29 ENCOUNTER — HOSPITAL ENCOUNTER (OUTPATIENT)
Dept: CARDIOLOGY | Facility: CLINIC | Age: 37
Discharge: HOME | End: 2022-04-29
Payer: COMMERCIAL

## 2022-04-29 ENCOUNTER — OFFICE VISIT (OUTPATIENT)
Dept: ELECTROPHYSIOLOGY | Facility: CLINIC | Age: 37
End: 2022-04-29
Payer: COMMERCIAL

## 2022-04-29 VITALS
HEIGHT: 69 IN | DIASTOLIC BLOOD PRESSURE: 60 MMHG | SYSTOLIC BLOOD PRESSURE: 119 MMHG | HEART RATE: 76 BPM | BODY MASS INDEX: 40.66 KG/M2 | WEIGHT: 274.5 LBS

## 2022-04-29 DIAGNOSIS — I47.19 AVNRT (AV NODAL RE-ENTRY TACHYCARDIA): Primary | ICD-10-CM

## 2022-04-29 DIAGNOSIS — D64.9 SYMPTOMATIC ANEMIA: ICD-10-CM

## 2022-04-29 DIAGNOSIS — Z98.890 H/O CARDIAC RADIOFREQUENCY ABLATION: ICD-10-CM

## 2022-04-29 DIAGNOSIS — I10 PRIMARY HYPERTENSION: ICD-10-CM

## 2022-04-29 DIAGNOSIS — Z90.710 H/O ABDOMINAL HYSTERECTOMY: ICD-10-CM

## 2022-04-29 DIAGNOSIS — I50.20 HFREF (HEART FAILURE WITH REDUCED EJECTION FRACTION): ICD-10-CM

## 2022-04-29 DIAGNOSIS — I42.8 NONISCHEMIC CARDIOMYOPATHY: ICD-10-CM

## 2022-04-29 DIAGNOSIS — N92.1 MENORRHAGIA WITH IRREGULAR CYCLE: ICD-10-CM

## 2022-04-29 DIAGNOSIS — N93.8 DUB (DYSFUNCTIONAL UTERINE BLEEDING): ICD-10-CM

## 2022-04-29 DIAGNOSIS — D50.0 IRON DEFICIENCY ANEMIA DUE TO CHRONIC BLOOD LOSS: ICD-10-CM

## 2022-04-29 DIAGNOSIS — R00.0 SINUS TACHYCARDIA: ICD-10-CM

## 2022-04-29 DIAGNOSIS — I50.42 CHRONIC COMBINED SYSTOLIC AND DIASTOLIC HEART FAILURE: ICD-10-CM

## 2022-04-29 DIAGNOSIS — D25.9 UTERINE LEIOMYOMA, UNSPECIFIED LOCATION: ICD-10-CM

## 2022-04-29 DIAGNOSIS — E66.01 CLASS 3 SEVERE OBESITY WITH BODY MASS INDEX (BMI) OF 40.0 TO 44.9 IN ADULT, UNSPECIFIED OBESITY TYPE, UNSPECIFIED WHETHER SERIOUS COMORBIDITY PRESENT: ICD-10-CM

## 2022-04-29 DIAGNOSIS — I49.8 OTHER SPECIFIED CARDIAC ARRHYTHMIAS: ICD-10-CM

## 2022-04-29 DIAGNOSIS — R06.02 SOB (SHORTNESS OF BREATH): ICD-10-CM

## 2022-04-29 DIAGNOSIS — U07.1 COVID-19: ICD-10-CM

## 2022-04-29 PROCEDURE — 4010F PR ACE/ARB THEARPY RXD/TAKEN: ICD-10-PCS | Mod: CPTII,S$GLB,, | Performed by: STUDENT IN AN ORGANIZED HEALTH CARE EDUCATION/TRAINING PROGRAM

## 2022-04-29 PROCEDURE — 1159F PR MEDICATION LIST DOCUMENTED IN MEDICAL RECORD: ICD-10-PCS | Mod: CPTII,S$GLB,, | Performed by: STUDENT IN AN ORGANIZED HEALTH CARE EDUCATION/TRAINING PROGRAM

## 2022-04-29 PROCEDURE — 1159F MED LIST DOCD IN RCRD: CPT | Mod: CPTII,S$GLB,, | Performed by: STUDENT IN AN ORGANIZED HEALTH CARE EDUCATION/TRAINING PROGRAM

## 2022-04-29 PROCEDURE — 4010F ACE/ARB THERAPY RXD/TAKEN: CPT | Mod: CPTII,S$GLB,, | Performed by: STUDENT IN AN ORGANIZED HEALTH CARE EDUCATION/TRAINING PROGRAM

## 2022-04-29 PROCEDURE — 99214 OFFICE O/P EST MOD 30 MIN: CPT | Mod: S$GLB,,, | Performed by: STUDENT IN AN ORGANIZED HEALTH CARE EDUCATION/TRAINING PROGRAM

## 2022-04-29 PROCEDURE — 93005 RHYTHM STRIP: ICD-10-PCS | Mod: S$GLB,,, | Performed by: STUDENT IN AN ORGANIZED HEALTH CARE EDUCATION/TRAINING PROGRAM

## 2022-04-29 PROCEDURE — 3074F SYST BP LT 130 MM HG: CPT | Mod: CPTII,S$GLB,, | Performed by: STUDENT IN AN ORGANIZED HEALTH CARE EDUCATION/TRAINING PROGRAM

## 2022-04-29 PROCEDURE — 93005 ELECTROCARDIOGRAM TRACING: CPT | Mod: S$GLB,,, | Performed by: STUDENT IN AN ORGANIZED HEALTH CARE EDUCATION/TRAINING PROGRAM

## 2022-04-29 PROCEDURE — 99999 PR PBB SHADOW E&M-EST. PATIENT-LVL III: CPT | Mod: PBBFAC,,, | Performed by: STUDENT IN AN ORGANIZED HEALTH CARE EDUCATION/TRAINING PROGRAM

## 2022-04-29 PROCEDURE — 3008F PR BODY MASS INDEX (BMI) DOCUMENTED: ICD-10-PCS | Mod: CPTII,S$GLB,, | Performed by: STUDENT IN AN ORGANIZED HEALTH CARE EDUCATION/TRAINING PROGRAM

## 2022-04-29 PROCEDURE — 3008F BODY MASS INDEX DOCD: CPT | Mod: CPTII,S$GLB,, | Performed by: STUDENT IN AN ORGANIZED HEALTH CARE EDUCATION/TRAINING PROGRAM

## 2022-04-29 PROCEDURE — 93010 ELECTROCARDIOGRAM REPORT: CPT | Mod: S$GLB,,, | Performed by: INTERNAL MEDICINE

## 2022-04-29 PROCEDURE — 93010 RHYTHM STRIP: ICD-10-PCS | Mod: S$GLB,,, | Performed by: INTERNAL MEDICINE

## 2022-04-29 PROCEDURE — 3044F HG A1C LEVEL LT 7.0%: CPT | Mod: CPTII,S$GLB,, | Performed by: STUDENT IN AN ORGANIZED HEALTH CARE EDUCATION/TRAINING PROGRAM

## 2022-04-29 PROCEDURE — 99999 PR PBB SHADOW E&M-EST. PATIENT-LVL III: ICD-10-PCS | Mod: PBBFAC,,, | Performed by: STUDENT IN AN ORGANIZED HEALTH CARE EDUCATION/TRAINING PROGRAM

## 2022-04-29 PROCEDURE — 99214 PR OFFICE/OUTPT VISIT, EST, LEVL IV, 30-39 MIN: ICD-10-PCS | Mod: S$GLB,,, | Performed by: STUDENT IN AN ORGANIZED HEALTH CARE EDUCATION/TRAINING PROGRAM

## 2022-04-29 PROCEDURE — 3078F PR MOST RECENT DIASTOLIC BLOOD PRESSURE < 80 MM HG: ICD-10-PCS | Mod: CPTII,S$GLB,, | Performed by: STUDENT IN AN ORGANIZED HEALTH CARE EDUCATION/TRAINING PROGRAM

## 2022-04-29 PROCEDURE — 3074F PR MOST RECENT SYSTOLIC BLOOD PRESSURE < 130 MM HG: ICD-10-PCS | Mod: CPTII,S$GLB,, | Performed by: STUDENT IN AN ORGANIZED HEALTH CARE EDUCATION/TRAINING PROGRAM

## 2022-04-29 PROCEDURE — 3078F DIAST BP <80 MM HG: CPT | Mod: CPTII,S$GLB,, | Performed by: STUDENT IN AN ORGANIZED HEALTH CARE EDUCATION/TRAINING PROGRAM

## 2022-04-29 PROCEDURE — 3044F PR MOST RECENT HEMOGLOBIN A1C LEVEL <7.0%: ICD-10-PCS | Mod: CPTII,S$GLB,, | Performed by: STUDENT IN AN ORGANIZED HEALTH CARE EDUCATION/TRAINING PROGRAM

## 2022-04-29 NOTE — PROGRESS NOTES
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      PRESENTING HISTORY:     History of Present Illness:  Ms. 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 Unfortunately, she contracted COVID and presented to the ED on 1/23/2022 with complaints of shortness of breath and chronic cough, noted to have sinus tachycardia in the setting of her COVID infection. On echocardiogram, her systolic function was noted to be newly decreased, with LVEF sharad 20%. 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     In brief review of her cardiac history, she previously presented to the ED 3/25/2021, 4/6/2021, and 4/16/2021 with complaints of palpitations and SVT. On ECG review, this appeared to be a narrow complex, short RP SVT. Per report, this arrhythmia terminated immediately with administration of 6 mg of adenosine. She was started on metoprolol succinate 25mg po daily. At the initial presentation with these symptoms, she was noted to be severely anemic, and was transfused 2 units of PRBCs. Despite her hemoglobin remaining stable, she continued to evidence periods of palpitations and SVT. I took her to the EP laboratory where typical AVNRT was diagnosed on EP study. This was followed by successful radiofrequency ablation of  "the slow pathway for typical AVNRT on 6/4/2021. She has since been able to undergo a robotic hysterectomy for definitive therapy of her metromenorrhagia secondary to uterine fibroids. She continues to have anemia, but this is resolving following her surgery. She had returned to her baseline level of health until approximately 12/29/2021, when her son contracted COVID and she began to develop symptoms of a COVID infection, with productive cough, shortness of breath, generalized fatigue, fevers, muscle aches, and symptoms of racing heart rates. Her shortness of breath seemed to have gotten worse, and she presented to the ED on 1/23/2022 for evaluation. At that encounter, her BP was elevated, but her oxygen saturation was acceptable, and she was discontinued on cough suppression and an optimized antihypertensive regimen. Unfortunately, her echocardiogram 2/28/2022 revealed newly decreased systolic function with LVEF 20%. She was initiated on guideline-directed medical therapy, and has established with the Advanced Heart Failure service with Dr. Fong. Her most recent echocardiogram reveals improved function, with LVEF 30%.     In discussion with Ms. Smallwood today, she tells me that she is feeling overall "much better" sine her prior COVID infection. She reports compliance on all medication, and has made significant changes to her diet and exercise program in an effort to improve her systolic function. She has lost about 30 lbs, and has an overall goal of getting to 215 lbs. She has been walking for 2-3 miles per day with improved exercise tolerance, and shows me her Apple watch recordings where she has been closing her rings most days of the week. She denies any further episodes of palpitations, although she continues to report shortness of breath with significant exertion. She denies any further symptoms racing heart rates. She is no longer experiencing productive cough, generalized fatigue, fevers, or muscle " aches. She denies any episodes of dizziness, lightheadedness, syncope/presyncope, recent palpitations, chest pain or chest discomfort, nausea or vomiting, orthopnea, lower extremity edema, or PND.     Review of Systems:  Review of Systems   Constitutional: Negative for activity change, fatigue and fever.   HENT: Negative for nasal congestion, nosebleeds, postnasal drip, rhinorrhea, sinus pressure/congestion, sneezing and sore throat.    Respiratory: Positive for shortness of breath. Negative for apnea, cough, chest tightness and wheezing.    Cardiovascular: Negative for chest pain, palpitations, leg swelling and claudication.        No further symptoms of racing heart rates.   Gastrointestinal: Negative for abdominal distention, abdominal pain, blood in stool, change in bowel habit, constipation, diarrhea, nausea, vomiting and change in bowel habit.   Genitourinary: Negative for dysuria and hematuria.   Musculoskeletal: Negative for gait problem and myalgias.   Neurological: Negative for dizziness, seizures, syncope, weakness, light-headedness, headaches, disturbances in coordination and coordination difficulties.        PAST HISTORY:     Past Medical History:   Diagnosis Date    Anemia     Hypertension     SVT (supraventricular tachycardia)     Uterine fibroid      Past Surgical History:   Procedure Laterality Date    ABLATION N/A 6/4/2021    Procedure: Ablation;  Surgeon: NICHELLE Zepeda MD;  Location: [facility] EP LAB;  Service: Cardiology;  Laterality: N/A;  SVT, RFA, Carto, anes, EH, 3prep    CYSTOSCOPY N/A 8/3/2021    Procedure: CYSTOSCOPY;  Surgeon: Jamarcus Ventura MD;  Location: [facility] OR;  Service: OB/GYN;  Laterality: N/A;    ROBOT-ASSISTED LAPAROSCOPIC HYSTERECTOMY N/A 8/3/2021    Procedure: ROBOTIC HYSTERECTOMY;  Surgeon: Jamarcus Ventura MD;  Location: [facility] OR;  Service: OB/GYN;  Laterality: N/A;    ROBOT-ASSISTED SALPINGECTOMY Bilateral 8/3/2021    Procedure: ROBOTIC SALPINGECTOMY;  Surgeon:  "Jamarcus Ventura MD;  Location: [facility] OR;  Service: OB/GYN;  Laterality: Bilateral;       Family History:  No cardiac conditions of which she is aware.     Social History:  She  reports that she has never smoked. She has never used smokeless tobacco. She reports that she does not drink alcohol and does not use drugs.      MEDICATIONS & ALLERGIES:     Review of patient's allergies indicates:  No Known Allergies    Current Outpatient Medications on File Prior to Visit   Medication Sig Dispense Refill    metoprolol succinate (TOPROL-XL) 200 MG 24 hr tablet Take 1 tablet (200 mg total) by mouth once daily. 30 tablet 11    sacubitriL-valsartan (ENTRESTO) 49-51 mg per tablet Take 1 tablet by mouth 2 (two) times daily. 60 tablet 3    spironolactone (ALDACTONE) 25 MG tablet Take 1 tablet (25 mg total) by mouth once daily. 30 tablet 11    torsemide (DEMADEX) 20 MG Tab Take 1 tablet (20 mg total) by mouth once daily. 30 tablet 11    [DISCONTINUED] lisinopriL (PRINIVIL,ZESTRIL) 5 MG tablet Take 1 tablet (5 mg total) by mouth once daily. 90 tablet 3    [DISCONTINUED] lisinopriL-hydrochlorothiazide (PRINZIDE,ZESTORETIC) 10-12.5 mg per tablet Take 1 tablet by mouth once daily. 30 tablet 0    [DISCONTINUED] metoprolol tartrate (LOPRESSOR) 50 MG tablet Take 1 tablet (50 mg total) by mouth once daily. 30 tablet 0     No current facility-administered medications on file prior to visit.        OBJECTIVE:     Vital Signs:  /60   Pulse 76   Ht 5' 9" (1.753 m)   Wt 124.5 kg (274 lb 7.6 oz)   LMP 06/04/2021 Comment: since 4/11/21  BMI 40.53 kg/m²     Physical Exam:  Physical Exam  Constitutional:       General: She is not in acute distress.     Appearance: Normal appearance. She is obese. She is not ill-appearing or diaphoretic.      Comments: Well-appearing woman in NAD, she has lost weight since our prior encounters.    HENT:      Head: Normocephalic and atraumatic.      Comments: Mask worn in clinic in the setting of " COVID precautions.   Eyes:      Pupils: Pupils are equal, round, and reactive to light.   Cardiovascular:      Rate and Rhythm: Normal rate and regular rhythm.      Pulses: Normal pulses.      Heart sounds: Normal heart sounds. No murmur heard.    No friction rub. No gallop.   Pulmonary:      Effort: Pulmonary effort is normal. No respiratory distress.      Breath sounds: Normal breath sounds. No wheezing or rhonchi.   Chest:      Chest wall: No tenderness.   Abdominal:      General: There is no distension.      Palpations: Abdomen is soft.      Tenderness: There is no abdominal tenderness.   Musculoskeletal:         General: No swelling or tenderness.      Cervical back: Normal range of motion.      Right lower leg: No edema.      Left lower leg: No edema.   Skin:     General: Skin is warm and dry.      Findings: No erythema, lesion or rash.   Neurological:      General: No focal deficit present.      Mental Status: She is alert and oriented to person, place, and time. Mental status is at baseline.      Motor: No weakness.      Gait: Gait normal.   Psychiatric:         Mood and Affect: Mood normal.         Behavior: Behavior normal.        Laboratory Data:  Lab Results   Component Value Date    WBC 5.81 04/06/2022    HGB 12.9 04/06/2022    HCT 43.6 04/06/2022    MCV 73 (L) 04/06/2022     04/06/2022     Lab Results   Component Value Date     04/06/2022     04/06/2022    K 4.6 04/06/2022     04/06/2022    CO2 28 04/06/2022    BUN 13 04/06/2022    CREATININE 1.1 04/06/2022    CALCIUM 9.8 04/06/2022    MG 2.0 03/08/2022     Lab Results   Component Value Date    INR 1.1 07/18/2021    INR 1.1 06/01/2021    INR 1.1 04/06/2021     Pertinent Cardiac Data:  ECG: Normal sinus rhythm with rate of 76 bpm,  ms, QRS 98 ms, QT/QTc 398/447 ms.     Resting 2D Transthoracic Echocardiogram - 5/3/2021:  · The left ventricle is mildly enlarged with concentric hypertrophy and mildly decreased systolic  function.  · The estimated ejection fraction is 45%.  · Grade I left ventricular diastolic dysfunction.  · Normal right ventricular size with normal right ventricular systolic function.  · Mild mitral regurgitation.  · Mild tricuspid regurgitation.  · Normal central venous pressure (3 mmHg).  · The estimated PA systolic pressure is 31 mmHg.    Pharmacologic Nuclear Cardiac Stress Test (PET) - 5/21/2021:    There are no clinically significant perfusion abnormalities. There is a small to moderate (10-15%) amount of mild fixed heterogeneity that improves with stress.    The whole heart absolute myocardial perfusion values averaged 1.36 cc/min/g at rest, which is elevated; 1.85 cc/min/g at stress, which is low normal; and CFR is 1.38 , which is moderately reduced in part due to elevated resting flow.    The gated perfusion images showed an ejection fraction of 39% at rest and 42% during stress. A normal ejection fraction is greater than 51%.    There is mild global hypokinesis at rest and during stress.    The LV cavity size is normal at rest and stress.    The EKG portion of this study is negative for ischemia.    During stress, rare PVCs are noted.    The patient reported no chest pain during the stress test.    There are no prior studies for comparison.    EP Study and Radiofrequency Ablation - 6/4/2021:  · Successful radiofrequency slow pathway modification for typical, slow-fast AVNRT.    Resting 2D Transthoracic Echocardiogram - 2/28/2022:  · The left ventricle is severely enlarged with eccentric hypertrophy and severely decreased systolic function.  · The estimated ejection fraction is 20%.  · There is severe left ventricular global hypokinesis.  · Grade III left ventricular diastolic dysfunction.  · Moderate mitral regurgitation.  · Mild tricuspid regurgitation.  · Severe left atrial enlargement.  · Mild pulmonic regurgitation.  · Intermediate central venous pressure (8 mmHg).  · The estimated PA  systolic pressure is 34 mmHg.  · Trivial pericardial effusion.    Resting 2D Transthoracic Echocardiogram - 4/25/2022:  · The left ventricle is severely enlarged with eccentric hypertrophy and moderately decreased systolic function. The estimated ejection fraction is 30%.  · Mild right ventricular enlargement with low normal right ventricular systolic function.  · Grade I left ventricular diastolic dysfunction.  · Moderate mitral regurgitation.  · The estimated PA systolic pressure is 25 mmHg.  · Normal central venous pressure (3 mmHg).      ASSESSMENT & PLAN:   Ms. 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 Unfortunately, she contracted COVID and presented to the ED on 1/23/2022 with complaints of shortness of breath and chronic cough, noted to have sinus tachycardia in the setting of her COVID infection. On echocardiogram, her systolic function was noted to be newly decreased, with LVEF sharad 20%. 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     - We discussed the results of her recent decline in systolic function. Most recently her systolic function has improved from her sharad of LVEF 20% to 30%, and she may have continued improvement as she continues to recover from COVID with less episodes of sinus tachycardia. She remains on guideline-directed medical therapy with close follow-up with the Advanced Heart Failure team. Should her systolic function remain depressed, we discussed the possible indication for implantation of a primary prevention ICD.     - She denies any further episodes of palpitations following her COVID recovery, with no evidnece of any recurrent SVT on serial ECGs following ablation. She was noted to have periods of prolonged sinus tachycardia in the setting of her COVID infection, which has now resolved with return to NSR and normal rates.   - She remains on her recently adjusted antihypertensive regimen, with reported improvement in her home BP recordings.   - We discussed the pathophysiology of typical AVNRT and we reviewed the area that was ablated. A rudimentary diagram was drawn for illustrative purposes. She remains on metoprolol succinate 200mg po daily with no adverse side effects.  - Possible underlying drivers of atrial arrhythmia were addressed at this appointment, including recommendations for weight loss - now a class I recommendation. We discussed the role that her COVID infection and her prior anemia may play in exacerbating her sinus tachycardia. Review of laboratory data revealed stable and acceptable TSH at 2.685.     This patient will return to clinic with me in six months with a repeat resting 2D TTE in the interim. She will continue to follow with the Advanced Heart Failure service and her PCP in the interim. All questions and concerns were addressed at this encounter.     Signing Physician:       ELSI Zepeda MD  Electrophysiology Attending

## 2022-05-01 PROBLEM — I50.20 HFREF (HEART FAILURE WITH REDUCED EJECTION FRACTION): Status: ACTIVE | Noted: 2022-05-01

## 2022-05-01 PROBLEM — I42.8 NONISCHEMIC CARDIOMYOPATHY: Status: ACTIVE | Noted: 2022-05-01

## 2022-05-03 DIAGNOSIS — I50.9 CONGESTIVE HEART FAILURE, UNSPECIFIED HF CHRONICITY, UNSPECIFIED HEART FAILURE TYPE: Primary | ICD-10-CM

## 2022-05-04 ENCOUNTER — LAB VISIT (OUTPATIENT)
Dept: LAB | Facility: HOSPITAL | Age: 37
End: 2022-05-04
Attending: INTERNAL MEDICINE
Payer: COMMERCIAL

## 2022-05-04 DIAGNOSIS — I50.9 CONGESTIVE HEART FAILURE, UNSPECIFIED HF CHRONICITY, UNSPECIFIED HEART FAILURE TYPE: ICD-10-CM

## 2022-05-04 LAB
ANION GAP SERPL CALC-SCNC: 11 MMOL/L (ref 8–16)
CALCIUM SERPL-MCNC: 9.1 MG/DL (ref 8.7–10.5)
CHLORIDE SERPL-SCNC: 101 MMOL/L (ref 95–110)
CO2 SERPL-SCNC: 27 MMOL/L (ref 23–29)
CREAT SERPL-MCNC: 1.16 MG/DL (ref 0.5–1.4)
EST. GFR  (AFRICAN AMERICAN): >60 ML/MIN/1.73 M^2
EST. GFR  (NON AFRICAN AMERICAN): 59.9 ML/MIN/1.73 M^2
GLUCOSE SERPL-MCNC: 98 MG/DL (ref 70–110)
NT-PROBNP SERPL-MCNC: 1120 PG/ML (ref 5–450)
POTASSIUM SERPL-SCNC: 4.7 MMOL/L (ref 3.5–5.1)
SODIUM SERPL-SCNC: 139 MMOL/L (ref 136–145)
UUN UR-MCNC: 17 MG/DL (ref 7–17)

## 2022-05-04 PROCEDURE — 83880 ASSAY OF NATRIURETIC PEPTIDE: CPT | Mod: PO | Performed by: INTERNAL MEDICINE

## 2022-05-04 PROCEDURE — 80048 BASIC METABOLIC PNL TOTAL CA: CPT | Mod: PO | Performed by: INTERNAL MEDICINE

## 2022-05-04 PROCEDURE — 36415 COLL VENOUS BLD VENIPUNCTURE: CPT | Mod: PO | Performed by: INTERNAL MEDICINE

## 2022-05-09 DIAGNOSIS — I50.22 CHRONIC SYSTOLIC CONGESTIVE HEART FAILURE: Primary | ICD-10-CM

## 2022-05-18 ENCOUNTER — PATIENT MESSAGE (OUTPATIENT)
Dept: CARDIAC CATH/INVASIVE PROCEDURES | Facility: HOSPITAL | Age: 37
End: 2022-05-18
Payer: COMMERCIAL

## 2022-05-18 ENCOUNTER — TELEPHONE (OUTPATIENT)
Dept: TRANSPLANT | Facility: CLINIC | Age: 37
End: 2022-05-18
Payer: COMMERCIAL

## 2022-05-18 ENCOUNTER — LAB VISIT (OUTPATIENT)
Dept: LAB | Facility: HOSPITAL | Age: 37
End: 2022-05-18
Attending: INTERNAL MEDICINE
Payer: COMMERCIAL

## 2022-05-18 DIAGNOSIS — I50.22 CHRONIC SYSTOLIC CONGESTIVE HEART FAILURE: Primary | ICD-10-CM

## 2022-05-18 DIAGNOSIS — I50.22 CHRONIC SYSTOLIC CONGESTIVE HEART FAILURE: ICD-10-CM

## 2022-05-18 LAB
ANION GAP SERPL CALC-SCNC: 11 MMOL/L (ref 8–16)
CALCIUM SERPL-MCNC: 9.6 MG/DL (ref 8.7–10.5)
CHLORIDE SERPL-SCNC: 102 MMOL/L (ref 95–110)
CO2 SERPL-SCNC: 27 MMOL/L (ref 23–29)
CREAT SERPL-MCNC: 1.2 MG/DL (ref 0.5–1.4)
EST. GFR  (AFRICAN AMERICAN): >60 ML/MIN/1.73 M^2
EST. GFR  (NON AFRICAN AMERICAN): 57.5 ML/MIN/1.73 M^2
GLUCOSE SERPL-MCNC: 95 MG/DL (ref 70–110)
NT-PROBNP SERPL-MCNC: 348 PG/ML (ref 5–450)
POTASSIUM SERPL-SCNC: 4.8 MMOL/L (ref 3.5–5.1)
SODIUM SERPL-SCNC: 140 MMOL/L (ref 136–145)
UUN UR-MCNC: 24 MG/DL (ref 7–17)

## 2022-05-18 PROCEDURE — 36415 COLL VENOUS BLD VENIPUNCTURE: CPT | Mod: PO | Performed by: INTERNAL MEDICINE

## 2022-05-18 PROCEDURE — 80048 BASIC METABOLIC PNL TOTAL CA: CPT | Mod: PO | Performed by: INTERNAL MEDICINE

## 2022-05-18 PROCEDURE — 83880 ASSAY OF NATRIURETIC PEPTIDE: CPT | Mod: PO | Performed by: INTERNAL MEDICINE

## 2022-05-18 NOTE — TELEPHONE ENCOUNTER
Patients labs today are ok. K 4.8 was 4.7, bun/cr 24/1.2 was 17/1.1.   You increased her  entresto 49/51 one tab in am and 2 tabs in pm.   Patient reports feeling well, good urine output. Denies sob, and swelling at this time and reports weights 267lbs says down about 7lbs in a week and shes been losing about 1lb per day. Patient says she still walking every night.     Update sent to Dr Fong in staff messaging.

## 2022-05-26 ENCOUNTER — LAB VISIT (OUTPATIENT)
Dept: LAB | Facility: HOSPITAL | Age: 37
End: 2022-05-26
Attending: INTERNAL MEDICINE
Payer: COMMERCIAL

## 2022-05-26 ENCOUNTER — TELEPHONE (OUTPATIENT)
Dept: TRANSPLANT | Facility: CLINIC | Age: 37
End: 2022-05-26
Payer: COMMERCIAL

## 2022-05-26 DIAGNOSIS — I50.22 CHRONIC SYSTOLIC CONGESTIVE HEART FAILURE: ICD-10-CM

## 2022-05-26 LAB
ANION GAP SERPL CALC-SCNC: 12 MMOL/L (ref 8–16)
CALCIUM SERPL-MCNC: 9.6 MG/DL (ref 8.7–10.5)
CHLORIDE SERPL-SCNC: 102 MMOL/L (ref 95–110)
CO2 SERPL-SCNC: 27 MMOL/L (ref 23–29)
CREAT SERPL-MCNC: 1.23 MG/DL (ref 0.5–1.4)
EST. GFR  (AFRICAN AMERICAN): >60 ML/MIN/1.73 M^2
EST. GFR  (NON AFRICAN AMERICAN): 55.8 ML/MIN/1.73 M^2
GLUCOSE SERPL-MCNC: 96 MG/DL (ref 70–110)
POTASSIUM SERPL-SCNC: 5.2 MMOL/L (ref 3.5–5.1)
SODIUM SERPL-SCNC: 141 MMOL/L (ref 136–145)
UUN UR-MCNC: 19 MG/DL (ref 7–17)

## 2022-05-26 PROCEDURE — 36415 COLL VENOUS BLD VENIPUNCTURE: CPT | Mod: PO | Performed by: INTERNAL MEDICINE

## 2022-05-26 PROCEDURE — 80048 BASIC METABOLIC PNL TOTAL CA: CPT | Mod: PO | Performed by: INTERNAL MEDICINE

## 2022-05-26 NOTE — TELEPHONE ENCOUNTER
Todays  Labs reviewed on Entresto 97/103 bid. Pt reports feeling well.  K 5.2, bun/cr 19/1.2. Patient also reports she been eating bananas, sweet potatoes, with her meal preps. Asked pt to decrease intake of those foods. Just eat less frequently. Patient agreed and verbalized understanding.

## 2022-06-03 ENCOUNTER — LAB VISIT (OUTPATIENT)
Dept: LAB | Facility: HOSPITAL | Age: 37
End: 2022-06-03
Attending: INTERNAL MEDICINE
Payer: COMMERCIAL

## 2022-06-03 ENCOUNTER — OFFICE VISIT (OUTPATIENT)
Dept: TRANSPLANT | Facility: CLINIC | Age: 37
End: 2022-06-03
Payer: COMMERCIAL

## 2022-06-03 ENCOUNTER — DOCUMENTATION ONLY (OUTPATIENT)
Dept: TRANSPLANT | Facility: CLINIC | Age: 37
End: 2022-06-03

## 2022-06-03 VITALS
DIASTOLIC BLOOD PRESSURE: 65 MMHG | HEIGHT: 67 IN | BODY MASS INDEX: 42.6 KG/M2 | SYSTOLIC BLOOD PRESSURE: 108 MMHG | WEIGHT: 271.38 LBS | HEART RATE: 69 BPM

## 2022-06-03 DIAGNOSIS — I50.9 CONGESTIVE HEART FAILURE, UNSPECIFIED HF CHRONICITY, UNSPECIFIED HEART FAILURE TYPE: ICD-10-CM

## 2022-06-03 DIAGNOSIS — I50.22 CHRONIC SYSTOLIC CONGESTIVE HEART FAILURE: ICD-10-CM

## 2022-06-03 DIAGNOSIS — I50.42 CHRONIC COMBINED SYSTOLIC AND DIASTOLIC HEART FAILURE: Primary | ICD-10-CM

## 2022-06-03 DIAGNOSIS — I50.20 HFREF (HEART FAILURE WITH REDUCED EJECTION FRACTION): ICD-10-CM

## 2022-06-03 DIAGNOSIS — Z98.890 H/O CARDIAC RADIOFREQUENCY ABLATION: ICD-10-CM

## 2022-06-03 DIAGNOSIS — I47.10 SVT (SUPRAVENTRICULAR TACHYCARDIA): ICD-10-CM

## 2022-06-03 LAB
ALBUMIN SERPL BCP-MCNC: 3.6 G/DL (ref 3.5–5.2)
ALP SERPL-CCNC: 59 U/L (ref 55–135)
ALT SERPL W/O P-5'-P-CCNC: 11 U/L (ref 10–44)
ANION GAP SERPL CALC-SCNC: 6 MMOL/L (ref 8–16)
AST SERPL-CCNC: 7 U/L (ref 10–40)
BASOPHILS # BLD AUTO: 0.03 K/UL (ref 0–0.2)
BASOPHILS NFR BLD: 0.4 % (ref 0–1.9)
BILIRUB SERPL-MCNC: 0.4 MG/DL (ref 0.1–1)
BUN SERPL-MCNC: 20 MG/DL (ref 6–20)
CALCIUM SERPL-MCNC: 9.6 MG/DL (ref 8.7–10.5)
CHLORIDE SERPL-SCNC: 103 MMOL/L (ref 95–110)
CO2 SERPL-SCNC: 26 MMOL/L (ref 23–29)
CREAT SERPL-MCNC: 1.1 MG/DL (ref 0.5–1.4)
DIFFERENTIAL METHOD: ABNORMAL
EOSINOPHIL # BLD AUTO: 0.1 K/UL (ref 0–0.5)
EOSINOPHIL NFR BLD: 0.6 % (ref 0–8)
ERYTHROCYTE [DISTWIDTH] IN BLOOD BY AUTOMATED COUNT: 23.8 % (ref 11.5–14.5)
EST. GFR  (AFRICAN AMERICAN): >60 ML/MIN/1.73 M^2
EST. GFR  (NON AFRICAN AMERICAN): >60 ML/MIN/1.73 M^2
GLUCOSE SERPL-MCNC: 90 MG/DL (ref 70–110)
HCT VFR BLD AUTO: 39.1 % (ref 37–48.5)
HGB BLD-MCNC: 12.2 G/DL (ref 12–16)
IMM GRANULOCYTES # BLD AUTO: 0.02 K/UL (ref 0–0.04)
IMM GRANULOCYTES NFR BLD AUTO: 0.2 % (ref 0–0.5)
LYMPHOCYTES # BLD AUTO: 2.2 K/UL (ref 1–4.8)
LYMPHOCYTES NFR BLD: 27.2 % (ref 18–48)
MCH RBC QN AUTO: 23.1 PG (ref 27–31)
MCHC RBC AUTO-ENTMCNC: 31.2 G/DL (ref 32–36)
MCV RBC AUTO: 74 FL (ref 82–98)
MONOCYTES # BLD AUTO: 0.6 K/UL (ref 0.3–1)
MONOCYTES NFR BLD: 7.5 % (ref 4–15)
NEUTROPHILS # BLD AUTO: 5.2 K/UL (ref 1.8–7.7)
NEUTROPHILS NFR BLD: 64.1 % (ref 38–73)
NRBC BLD-RTO: 0 /100 WBC
PLATELET # BLD AUTO: 400 K/UL (ref 150–450)
PMV BLD AUTO: 10.1 FL (ref 9.2–12.9)
POTASSIUM SERPL-SCNC: 4.4 MMOL/L (ref 3.5–5.1)
PROT SERPL-MCNC: 7.4 G/DL (ref 6–8.4)
RBC # BLD AUTO: 5.28 M/UL (ref 4–5.4)
SODIUM SERPL-SCNC: 135 MMOL/L (ref 136–145)
WBC # BLD AUTO: 8.1 K/UL (ref 3.9–12.7)

## 2022-06-03 PROCEDURE — 99999 PR PBB SHADOW E&M-EST. PATIENT-LVL IV: ICD-10-PCS | Mod: PBBFAC,,, | Performed by: INTERNAL MEDICINE

## 2022-06-03 PROCEDURE — 99999 PR PBB SHADOW E&M-EST. PATIENT-LVL IV: CPT | Mod: PBBFAC,,, | Performed by: INTERNAL MEDICINE

## 2022-06-03 PROCEDURE — 85025 COMPLETE CBC W/AUTO DIFF WBC: CPT | Performed by: INTERNAL MEDICINE

## 2022-06-03 PROCEDURE — 99214 OFFICE O/P EST MOD 30 MIN: CPT | Mod: S$GLB,,, | Performed by: INTERNAL MEDICINE

## 2022-06-03 PROCEDURE — 99214 PR OFFICE/OUTPT VISIT, EST, LEVL IV, 30-39 MIN: ICD-10-PCS | Mod: S$GLB,,, | Performed by: INTERNAL MEDICINE

## 2022-06-03 PROCEDURE — 80053 COMPREHEN METABOLIC PANEL: CPT | Performed by: INTERNAL MEDICINE

## 2022-06-03 PROCEDURE — 83880 ASSAY OF NATRIURETIC PEPTIDE: CPT | Performed by: INTERNAL MEDICINE

## 2022-06-03 PROCEDURE — 36415 COLL VENOUS BLD VENIPUNCTURE: CPT | Performed by: INTERNAL MEDICINE

## 2022-06-03 RX ORDER — TORSEMIDE 20 MG/1
20 TABLET ORAL EVERY OTHER DAY
Qty: 15 TABLET | Refills: 11 | Status: SHIPPED | OUTPATIENT
Start: 2022-06-03 | End: 2023-06-13 | Stop reason: SDUPTHER

## 2022-06-03 RX ORDER — DAPAGLIFLOZIN 10 MG/1
10 TABLET, FILM COATED ORAL DAILY
Qty: 30 TABLET | Refills: 11 | Status: SHIPPED | OUTPATIENT
Start: 2022-06-03 | End: 2023-06-11 | Stop reason: SDUPTHER

## 2022-06-03 NOTE — PATIENT INSTRUCTIONS
Start taking Farxiga 10mg daily  Start taking torsemide 20mg every other day instead of daily  Repeat Blood test on Thursday  Return to clinic in 3 months with an echo  
none

## 2022-06-03 NOTE — PROGRESS NOTES
Met with pt to discuss diet, exercise, labs. Very pleased with pt's progress. Pt reports being 266 lbs at home. Pt is walking three miles daily and pt is eating lean meats, veggies, and a carb source 2x a day with breakfast of a protein shake. Will cont to follow pt in clinic

## 2022-06-03 NOTE — PROGRESS NOTES
[facility] Follow up.        Attending Physician: Cory Sullivan MD.  The patient's last visit was on 04/062022.     HPIVadim Smallwood is a 36-year-old female morbidly obese BMI 42 (loosing weight and very motivated), PMHx SVT s/p for radiofrequency catheter ablation (06/04/2021), CHF diagnosed March 2021 when her LVEF was 45%, now LVEF 20% in 2022 with an admission in Feb, HTN, anemia 2/2 uterine fibroids s/p hysterectomy who presents for follow up. She had PET stress last year that did not find perfusion abnormalities.     She denies a personal history of diabetes or HTN. No family history of HF, SCD or Mis.  She denies any consumption of tobacco, alcohol, drugs. 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    She was started on GDMT and uptitrated to full dose entresto, Toprol , Aldactone 25 and Torsemide 20mg daily.  Today she came in very good spirit. She said she has been feeling well. She said she started doing more exercise. She is walking every day. She is being careful with her diet. She is motivated to do well. She thinks she lost fluid and weight since her last appointment.     Review of Systems   Constitutional: Negative.  Negative for activity change, appetite change, chills, diaphoresis, fatigue, fever and unexpected weight change.   HENT: Negative.  Negative for nasal congestion, rhinorrhea and sore throat.    Eyes: Negative.  Negative for visual disturbance.   Respiratory: Negative.  Negative for cough, choking, chest tightness and shortness of breath.    Cardiovascular: Negative for chest pain, palpitations and leg swelling.   Gastrointestinal: Negative for abdominal distention, abdominal pain, diarrhea, nausea and vomiting.   Genitourinary: Negative for difficulty urinating, dysuria and hematuria.   Integumentary:  Negative for rash.   Neurological: Negative for seizures, syncope and light-headedness.   Psychiatric/Behavioral: Negative for agitation  and hallucinations.        Past Medical History:   Diagnosis Date    Anemia     Hypertension     SVT (supraventricular tachycardia)     Uterine fibroid         Past Surgical History:   Procedure Laterality Date    ABLATION N/A 6/4/2021    Procedure: Ablation;  Surgeon: NICHELLE Zepeda MD;  Location: [facility] EP LAB;  Service: Cardiology;  Laterality: N/A;  SVT, RFA, Carto, anes, EH, 3prep    CYSTOSCOPY N/A 8/3/2021    Procedure: CYSTOSCOPY;  Surgeon: Jamarcus Ventura MD;  Location: [facility] OR;  Service: OB/GYN;  Laterality: N/A;    ROBOT-ASSISTED LAPAROSCOPIC HYSTERECTOMY N/A 8/3/2021    Procedure: ROBOTIC HYSTERECTOMY;  Surgeon: Jamarcus Ventura MD;  Location: [facility] OR;  Service: OB/GYN;  Laterality: N/A;    ROBOT-ASSISTED SALPINGECTOMY Bilateral 8/3/2021    Procedure: ROBOTIC SALPINGECTOMY;  Surgeon: Jamarcus Ventura MD;  Location: [facility] OR;  Service: OB/GYN;  Laterality: Bilateral;        History reviewed. No pertinent family history.     Review of patient's allergies indicates:  No Known Allergies     Current Outpatient Medications   Medication Instructions    metoprolol succinate (TOPROL-XL) 200 mg, Oral, Daily    sacubitriL-valsartan (ENTRESTO)  mg per tablet 1 tablet, Oral, 2 times daily    spironolactone (ALDACTONE) 25 mg, Oral, Daily    torsemide (DEMADEX) 20 mg, Oral, Daily        Vitals:    06/03/22 1042   BP: 108/65   Pulse: 69        Wt Readings from Last 3 Encounters:   06/03/22 123.1 kg (271 lb 6.2 oz)   04/29/22 124.5 kg (274 lb 7.6 oz)   04/25/22 125.2 kg (276 lb)     Temp Readings from Last 3 Encounters:   03/02/22 98.6 °F (37 °C)   01/23/22 98.4 °F (36.9 °C) (Oral)   08/03/21 97.9 °F (36.6 °C) (Skin)     BP Readings from Last 3 Encounters:   06/03/22 108/65   04/29/22 119/60   04/25/22 108/72     Pulse Readings from Last 3 Encounters:   06/03/22 69   04/29/22 76   04/25/22 82        Body mass index is 42.51 kg/m². Estimated body surface area is 2.41 meters squared as calculated  "from the following:    Height as of this encounter: 5' 7" (1.702 m).    Weight as of this encounter: 123.1 kg (271 lb 6.2 oz).     Physical Exam  Constitutional:       Appearance: She is well-developed.   HENT:      Head: Normocephalic and atraumatic.      Right Ear: External ear normal.      Left Ear: External ear normal.   Eyes:      Conjunctiva/sclera: Conjunctivae normal.      Pupils: Pupils are equal, round, and reactive to light.   Neck:      Vascular: No hepatojugular reflux or JVD.   Cardiovascular:      Rate and Rhythm: Normal rate and regular rhythm.      Pulses: Intact distal pulses.      Heart sounds: S1 normal and S2 normal. No murmur heard.    No friction rub. No gallop.      Comments: JVD 2 Fingers above the clavicle, no pitting edema  Pulmonary:      Effort: Pulmonary effort is normal.      Breath sounds: Normal breath sounds.   Abdominal:      General: Bowel sounds are normal. There is no distension.      Palpations: Abdomen is soft.      Tenderness: There is no abdominal tenderness. There is no guarding or rebound.   Musculoskeletal:      Cervical back: Normal range of motion and neck supple.      Right lower leg: No edema.      Left lower leg: No edema.   Neurological:      Mental Status: She is alert and oriented to person, place, and time.          Lab Results   Component Value Date     (H) 03/08/2022     05/26/2022    K 5.2 (H) 05/26/2022    MG 2.0 03/08/2022     05/26/2022    CO2 27 05/26/2022    BUN 19 (H) 05/26/2022    CREATININE 1.23 05/26/2022    GLU 96 05/26/2022    HGBA1C 6.3 (H) 02/27/2022    AST 7 (L) 04/06/2022    ALT 12 04/06/2022    ALBUMIN 3.7 04/06/2022    PROT 7.3 04/06/2022    BILITOT 0.4 04/06/2022    WBC 5.81 04/06/2022    HGB 12.9 04/06/2022    HCT 43.6 04/06/2022    HCT 23 (L) 06/01/2021     04/06/2022    INR 1.1 07/18/2021    TSH 2.685 03/08/2022    CHOL 190 04/13/2022    HDL 33 (L) 04/13/2022    LDLCALC 125.2 04/13/2022    TRIG 159 (H) 04/13/2022 "           Results for orders placed during the hospital encounter of 02/26/22    Echo    Interpretation Summary  · The left ventricle is severely enlarged with eccentric hypertrophy and severely decreased systolic function.  · The estimated ejection fraction is 20%.  · There is severe left ventricular global hypokinesis.  · Grade III left ventricular diastolic dysfunction.  · Moderate mitral regurgitation.  · Mild tricuspid regurgitation.  · Severe left atrial enlargement.  · Mild pulmonic regurgitation.  · Intermediate central venous pressure (8 mmHg).  · The estimated PA systolic pressure is 34 mmHg.  · Trivial pericardial effusion         Assessment and Plan:  Chronic combined systolic and diastolic heart failure  -     Comprehensive metabolic panel; Future; Expected date: 06/03/2022  -     NT-Pro Natriuretic Peptide; Future; Expected date: 06/03/2022  -     NT-Pro Natriuretic Peptide; Future; Expected date: 06/03/2022  -     Comprehensive metabolic panel; Future; Expected date: 06/03/2022  -     CBC auto differential; Future; Expected date: 06/03/2022  -     Echo; Future; Expected date: 09/03/2022    H/O cardiac radiofrequency ablation    Hx of SVT (supraventricular tachycardia)    HFrEF (heart failure with reduced ejection fraction)    Chronic systolic congestive heart failure  -     sacubitriL-valsartan (ENTRESTO)  mg per tablet; Take 1 tablet by mouth 2 (two) times daily.  Dispense: 60 tablet; Refill: 6    Other orders  -     torsemide (DEMADEX) 20 MG Tab; Take 1 tablet (20 mg total) by mouth every other day.  Dispense: 15 tablet; Refill: 11  -     dapagliflozin (FARXIGA) 10 mg tablet; Take 1 tablet (10 mg total) by mouth once daily.  Dispense: 30 tablet; Refill: 11       1. Acute on chronic systolic HF, NYHA class III, stage C.  Etiology: NICM. PET stress negative for ischemia in May 2021. Not improved after control of SVT (s/p ablation).  Devices: none.  Medications: sacubitril-valsartan full dose,  metoprolol succinate 200 mg daily, aldactone 25, torsemide 20mg daily    Hemodynamic status: warm, normotensive, euvolemic.  Clinical course: recently discharged March 3, 2022 after her index hospitalization (EF down to 20%). NO ER visits or admissions since last appointment.    Plan:  -patient to adhere to a fluid restriction of NMT 1.5 liters/24h.  -patient to adhere to  low sodium diet, NMT 1.5 grams of sodium in a day.  -Will add Farxiga 10mg daily and change torsemide to 20mg every other day from daily since she is euvolemic  -Repeat Labs on thursday  -Follow up in 12 weeks with labs and echo      New diagnosis of HFrEF.  Plan for uptitration of medical therapy, then repeat echo after 3 months on optimal doses to risk stratify for SCD (ICD).    Vianca Lieberman MD    Pt care discussed with Dr Fong who agrees with the above assessment and plan

## 2022-06-05 LAB — NT-PROBNP SERPL-MCNC: 559 PG/ML

## 2022-06-06 ENCOUNTER — PATIENT MESSAGE (OUTPATIENT)
Dept: TRANSPLANT | Facility: CLINIC | Age: 37
End: 2022-06-06
Payer: COMMERCIAL

## 2022-06-12 ENCOUNTER — PATIENT MESSAGE (OUTPATIENT)
Dept: TRANSPLANT | Facility: CLINIC | Age: 37
End: 2022-06-12
Payer: COMMERCIAL

## 2022-06-13 ENCOUNTER — TELEPHONE (OUTPATIENT)
Dept: TRANSPLANT | Facility: CLINIC | Age: 37
End: 2022-06-13
Payer: COMMERCIAL

## 2022-06-13 NOTE — TELEPHONE ENCOUNTER
----- Message from Johanny Solitario sent at 6/13/2022 10:58 AM CDT -----  Regarding: medication  Pls call pt at [phone].  She was prescribed a new medication and needs to clarify how to take it.  She does not have the name of the medication.    Thank you

## 2022-06-13 NOTE — TELEPHONE ENCOUNTER
Returned patient call    Went through every medication patient is to be taking.    Explained the importance of taking blood pressure prior to taking medication.    Patient made note of what medication she needed to get refilled.     Patient stated she was out of medication ( metoprolol succinate and spironolactone) and will get them refilled, encouraged patient to take the medication that she does have , she verbalized understanding.     No c/o pain or distress noted or verbalized during call.

## 2022-06-20 ENCOUNTER — OFFICE VISIT (OUTPATIENT)
Dept: CARDIOLOGY | Facility: CLINIC | Age: 37
End: 2022-06-20
Payer: COMMERCIAL

## 2022-06-20 VITALS
DIASTOLIC BLOOD PRESSURE: 65 MMHG | OXYGEN SATURATION: 95 % | BODY MASS INDEX: 41.72 KG/M2 | WEIGHT: 265.81 LBS | HEIGHT: 67 IN | SYSTOLIC BLOOD PRESSURE: 129 MMHG | HEART RATE: 70 BPM

## 2022-06-20 DIAGNOSIS — Z98.890 H/O CARDIAC RADIOFREQUENCY ABLATION: ICD-10-CM

## 2022-06-20 DIAGNOSIS — I50.22 CHRONIC SYSTOLIC HEART FAILURE: ICD-10-CM

## 2022-06-20 DIAGNOSIS — I42.8 NONISCHEMIC CARDIOMYOPATHY: ICD-10-CM

## 2022-06-20 DIAGNOSIS — I10 PRIMARY HYPERTENSION: ICD-10-CM

## 2022-06-20 DIAGNOSIS — I47.10 SVT (SUPRAVENTRICULAR TACHYCARDIA): Primary | ICD-10-CM

## 2022-06-20 DIAGNOSIS — E66.01 MORBID OBESITY: Chronic | ICD-10-CM

## 2022-06-20 DIAGNOSIS — I50.20 HFREF (HEART FAILURE WITH REDUCED EJECTION FRACTION): ICD-10-CM

## 2022-06-20 PROCEDURE — 4010F PR ACE/ARB THEARPY RXD/TAKEN: ICD-10-PCS | Mod: CPTII,S$GLB,, | Performed by: INTERNAL MEDICINE

## 2022-06-20 PROCEDURE — 3008F BODY MASS INDEX DOCD: CPT | Mod: CPTII,S$GLB,, | Performed by: INTERNAL MEDICINE

## 2022-06-20 PROCEDURE — 3008F PR BODY MASS INDEX (BMI) DOCUMENTED: ICD-10-PCS | Mod: CPTII,S$GLB,, | Performed by: INTERNAL MEDICINE

## 2022-06-20 PROCEDURE — 1159F PR MEDICATION LIST DOCUMENTED IN MEDICAL RECORD: ICD-10-PCS | Mod: CPTII,S$GLB,, | Performed by: INTERNAL MEDICINE

## 2022-06-20 PROCEDURE — 3074F SYST BP LT 130 MM HG: CPT | Mod: CPTII,S$GLB,, | Performed by: INTERNAL MEDICINE

## 2022-06-20 PROCEDURE — 3044F HG A1C LEVEL LT 7.0%: CPT | Mod: CPTII,S$GLB,, | Performed by: INTERNAL MEDICINE

## 2022-06-20 PROCEDURE — 4010F ACE/ARB THERAPY RXD/TAKEN: CPT | Mod: CPTII,S$GLB,, | Performed by: INTERNAL MEDICINE

## 2022-06-20 PROCEDURE — 3078F DIAST BP <80 MM HG: CPT | Mod: CPTII,S$GLB,, | Performed by: INTERNAL MEDICINE

## 2022-06-20 PROCEDURE — 3044F PR MOST RECENT HEMOGLOBIN A1C LEVEL <7.0%: ICD-10-PCS | Mod: CPTII,S$GLB,, | Performed by: INTERNAL MEDICINE

## 2022-06-20 PROCEDURE — 3074F PR MOST RECENT SYSTOLIC BLOOD PRESSURE < 130 MM HG: ICD-10-PCS | Mod: CPTII,S$GLB,, | Performed by: INTERNAL MEDICINE

## 2022-06-20 PROCEDURE — 99214 OFFICE O/P EST MOD 30 MIN: CPT | Mod: S$GLB,,, | Performed by: INTERNAL MEDICINE

## 2022-06-20 PROCEDURE — 99214 PR OFFICE/OUTPT VISIT, EST, LEVL IV, 30-39 MIN: ICD-10-PCS | Mod: S$GLB,,, | Performed by: INTERNAL MEDICINE

## 2022-06-20 PROCEDURE — 1159F MED LIST DOCD IN RCRD: CPT | Mod: CPTII,S$GLB,, | Performed by: INTERNAL MEDICINE

## 2022-06-20 PROCEDURE — 3078F PR MOST RECENT DIASTOLIC BLOOD PRESSURE < 80 MM HG: ICD-10-PCS | Mod: CPTII,S$GLB,, | Performed by: INTERNAL MEDICINE

## 2022-06-20 NOTE — PROGRESS NOTES
Subjective:   @Patient ID:  Ursula Smallwood is a 36 y.o. female who presents for evaluation of SVT, Cardiomyopathy         HPI:   Here for follow up   She is doing very well  Compliant with her medications  On appropriate GDMT  Loosing wt  NYHA FC II        Historically:    Patient presented to ER 4/6 and 4/16 with tachy-palpitations and was found to have SVT that responded to adenosine. She was found to be severely anemia and was transfused 2 units PRBCs. The anemia was attributed to her heavy menstrual period.  She was referred to EP and had EPS with slow pathway modification 6/4/2021      hospital admission 2/2022 with CHF. Echo with newly reduced EF 20%. She diuresed well and Toprol dose was increased and GDMT initiated with up titration. She established with our advanced heart failure team.     EKG 4/16/2021 SVT, short R-P       Prior cardiovascular  Hx  --------------------------------    EPS 6/4/2021 Successful radiofrequency slow pathway modification for typical, slow-fast AVNRT.      PET stress 5/21/2021 done for mild reduced EF 45%     There are no clinically significant perfusion abnormalities. There is a small to moderate (10-15%) amount of mild fixed heterogeneity that improves with stress.    The whole heart absolute myocardial perfusion values averaged 1.36 cc/min/g at rest, which is elevated; 1.85 cc/min/g at stress, which is low normal; and CFR is 1.38 , which is moderately reduced in part due to elevated resting flow.    The gated perfusion images showed an ejection fraction of 39% at rest and 42% during stress. A normal ejection fraction is greater than 51%.    There is mild global hypokinesis at rest and during stress.    The LV cavity size is normal at rest and stress.    The EKG portion of this study is negative for ischemia.    During stress, rare PVCs are noted.    The patient reported no chest pain during the stress test.    There are no prior studies for comparison     Echo  2/28/2022  · The left ventricle is severely enlarged with eccentric hypertrophy and severely decreased systolic function.  · The estimated ejection fraction is 20%.  · There is severe left ventricular global hypokinesis.  · Grade III left ventricular diastolic dysfunction.  · Moderate mitral regurgitation.  · Mild tricuspid regurgitation.  · Severe left atrial enlargement.  · Mild pulmonic regurgitation.  · Intermediate central venous pressure (8 mmHg).  · The estimated PA systolic pressure is 34 mmHg.  · Trivial pericardial effusion.      Echo 5/3/2021   · The left ventricle is mildly enlarged with concentric hypertrophy and mildly decreased systolic function.  · The estimated ejection fraction is 45%.  · Grade I left ventricular diastolic dysfunction.  · Normal right ventricular size with normal right ventricular systolic function.  · Mild mitral regurgitation.  · Mild tricuspid regurgitation.  · Normal central venous pressure (3 mmHg).  · The estimated PA systolic pressure is 31 mmHg.                Patient Active Problem List    Diagnosis Date Noted    Nonischemic cardiomyopathy 05/01/2022    HFrEF (heart failure with reduced ejection fraction) 05/01/2022    Hypertension     Chronic systolic heart failure 03/08/2022    Hx of SVT (supraventricular tachycardia) 02/27/2022    H/O abdominal hysterectomy 01/26/2022    COVID-19 01/26/2022    H/O cardiac radiofrequency ablation 08/13/2021    Uterine fibroid 04/26/2021    Menorrhagia with irregular cycle 04/26/2021    Morbid obesity 04/22/2021    Symptomatic anemia 03/25/2021                    LAST HbA1c  Lab Results   Component Value Date    HGBA1C 6.3 (H) 02/27/2022       Lipid panel  Lab Results   Component Value Date    CHOL 190 04/13/2022     Lab Results   Component Value Date    HDL 33 (L) 04/13/2022     Lab Results   Component Value Date    LDLCALC 125.2 04/13/2022     Lab Results   Component Value Date    TRIG 159 (H) 04/13/2022     Lab Results    Component Value Date    CHOLHDL 17.4 (L) 04/13/2022            Review of Systems   Constitutional: Negative for chills and fever.   HENT: Negative for hearing loss and nosebleeds.    Eyes: Negative for blurred vision.   Cardiovascular: Negative for chest pain, leg swelling and palpitations.        As in HPI    Respiratory: Negative for hemoptysis and shortness of breath.    Hematologic/Lymphatic: Negative for bleeding problem.   Skin: Negative for itching.   Musculoskeletal: Negative for falls.   Gastrointestinal: Negative for abdominal pain and hematochezia.   Genitourinary: Negative for hematuria.   Neurological: Negative for dizziness and loss of balance.   Psychiatric/Behavioral: Negative for altered mental status and depression.       Objective:   Physical Exam  Constitutional:       Appearance: She is well-developed. She is obese.   HENT:      Head: Normocephalic and atraumatic.   Eyes:      Conjunctiva/sclera: Conjunctivae normal.   Neck:      Vascular: No carotid bruit or JVD.   Cardiovascular:      Rate and Rhythm: Normal rate and regular rhythm.      Pulses:           Carotid pulses are 2+ on the right side and 2+ on the left side.       Radial pulses are 2+ on the right side and 2+ on the left side.      Heart sounds: Normal heart sounds. No murmur heard.    No friction rub. No gallop.   Pulmonary:      Effort: Pulmonary effort is normal. No respiratory distress.      Breath sounds: Normal breath sounds. No stridor. No wheezing.   Musculoskeletal:      Cervical back: Neck supple.   Skin:     General: Skin is warm and dry.   Neurological:      Mental Status: She is alert and oriented to person, place, and time.   Psychiatric:         Behavior: Behavior normal.          Assessment:     1. Hx of SVT (supraventricular tachycardia)    2. Chronic systolic heart failure    3. Primary hypertension    4. Nonischemic cardiomyopathy    5. HFrEF (heart failure with reduced ejection fraction)    6. H/O cardiac  radiofrequency ablation    7. Morbid obesity        Plan:   - Continue GDMT. Medications are currently adjusted by our heart failure team.   - WT loss and life style changes as doing   - F/U with heart failure team   - Repeat Echo scheduled 9/2022  - F/U after 6 months          I spent 5-10 minutes asking, assessing, assisting, arranging and advising heart healthy diet improvements. This included low-salt meals, portion control and health food alternatives. I also encourage 30 minutes of moderate exercise 3-4x a week.   Pertinent cardiac images and EKG reviewed independently.    Continue with current medical plan and lifestyle changes.  Return sooner for concerns or questions. If symptoms persist go to the ED  I have reviewed all pertinent data including patient's medical history in detail and updated the computerized patient record.     No orders of the defined types were placed in this encounter.      Follow up as scheduled.     She expressed verbal understanding and agreed with the plan    Patient's Medications   New Prescriptions    No medications on file   Previous Medications    DAPAGLIFLOZIN (FARXIGA) 10 MG TABLET    Take 1 tablet (10 mg total) by mouth once daily.    METOPROLOL SUCCINATE (TOPROL-XL) 200 MG 24 HR TABLET    Take 1 tablet (200 mg total) by mouth once daily.    SACUBITRIL-VALSARTAN (ENTRESTO)  MG PER TABLET    Take 1 tablet by mouth 2 (two) times daily.    SPIRONOLACTONE (ALDACTONE) 25 MG TABLET    Take 1 tablet (25 mg total) by mouth once daily.    TORSEMIDE (DEMADEX) 20 MG TAB    Take 1 tablet (20 mg total) by mouth every other day.   Modified Medications    No medications on file   Discontinued Medications    No medications on file

## 2022-07-26 ENCOUNTER — OFFICE VISIT (OUTPATIENT)
Dept: OBSTETRICS AND GYNECOLOGY | Facility: CLINIC | Age: 37
End: 2022-07-26
Payer: COMMERCIAL

## 2022-07-26 ENCOUNTER — LAB VISIT (OUTPATIENT)
Dept: LAB | Facility: HOSPITAL | Age: 37
End: 2022-07-26
Attending: INTERNAL MEDICINE
Payer: COMMERCIAL

## 2022-07-26 VITALS
DIASTOLIC BLOOD PRESSURE: 84 MMHG | WEIGHT: 270 LBS | HEIGHT: 67 IN | SYSTOLIC BLOOD PRESSURE: 120 MMHG | BODY MASS INDEX: 42.38 KG/M2

## 2022-07-26 DIAGNOSIS — I50.42 CHRONIC COMBINED SYSTOLIC AND DIASTOLIC HEART FAILURE: Primary | ICD-10-CM

## 2022-07-26 DIAGNOSIS — I50.42 CHRONIC COMBINED SYSTOLIC AND DIASTOLIC HEART FAILURE: ICD-10-CM

## 2022-07-26 DIAGNOSIS — Z01.419 WELL WOMAN EXAM WITH ROUTINE GYNECOLOGICAL EXAM: Primary | ICD-10-CM

## 2022-07-26 LAB
ANION GAP SERPL CALC-SCNC: 9 MMOL/L (ref 8–16)
BUN SERPL-MCNC: 12 MG/DL (ref 6–20)
CALCIUM SERPL-MCNC: 9.6 MG/DL (ref 8.7–10.5)
CHLORIDE SERPL-SCNC: 103 MMOL/L (ref 95–110)
CO2 SERPL-SCNC: 24 MMOL/L (ref 23–29)
CREAT SERPL-MCNC: 1.1 MG/DL (ref 0.5–1.4)
EST. GFR  (AFRICAN AMERICAN): >60 ML/MIN/1.73 M^2
EST. GFR  (NON AFRICAN AMERICAN): >60 ML/MIN/1.73 M^2
GLUCOSE SERPL-MCNC: 100 MG/DL (ref 70–110)
POTASSIUM SERPL-SCNC: 4.2 MMOL/L (ref 3.5–5.1)
SODIUM SERPL-SCNC: 136 MMOL/L (ref 136–145)

## 2022-07-26 PROCEDURE — 1159F PR MEDICATION LIST DOCUMENTED IN MEDICAL RECORD: ICD-10-PCS | Mod: CPTII,S$GLB,, | Performed by: OBSTETRICS & GYNECOLOGY

## 2022-07-26 PROCEDURE — 3079F DIAST BP 80-89 MM HG: CPT | Mod: CPTII,S$GLB,, | Performed by: OBSTETRICS & GYNECOLOGY

## 2022-07-26 PROCEDURE — 99999 PR PBB SHADOW E&M-EST. PATIENT-LVL III: ICD-10-PCS | Mod: PBBFAC,,, | Performed by: OBSTETRICS & GYNECOLOGY

## 2022-07-26 PROCEDURE — 83880 ASSAY OF NATRIURETIC PEPTIDE: CPT | Performed by: INTERNAL MEDICINE

## 2022-07-26 PROCEDURE — 3074F SYST BP LT 130 MM HG: CPT | Mod: CPTII,S$GLB,, | Performed by: OBSTETRICS & GYNECOLOGY

## 2022-07-26 PROCEDURE — 1160F RVW MEDS BY RX/DR IN RCRD: CPT | Mod: CPTII,S$GLB,, | Performed by: OBSTETRICS & GYNECOLOGY

## 2022-07-26 PROCEDURE — 80048 BASIC METABOLIC PNL TOTAL CA: CPT | Performed by: INTERNAL MEDICINE

## 2022-07-26 PROCEDURE — 3044F PR MOST RECENT HEMOGLOBIN A1C LEVEL <7.0%: ICD-10-PCS | Mod: CPTII,S$GLB,, | Performed by: OBSTETRICS & GYNECOLOGY

## 2022-07-26 PROCEDURE — 4010F PR ACE/ARB THEARPY RXD/TAKEN: ICD-10-PCS | Mod: CPTII,S$GLB,, | Performed by: OBSTETRICS & GYNECOLOGY

## 2022-07-26 PROCEDURE — 99999 PR PBB SHADOW E&M-EST. PATIENT-LVL III: CPT | Mod: PBBFAC,,, | Performed by: OBSTETRICS & GYNECOLOGY

## 2022-07-26 PROCEDURE — 1159F MED LIST DOCD IN RCRD: CPT | Mod: CPTII,S$GLB,, | Performed by: OBSTETRICS & GYNECOLOGY

## 2022-07-26 PROCEDURE — 99395 PR PREVENTIVE VISIT,EST,18-39: ICD-10-PCS | Mod: S$GLB,,, | Performed by: OBSTETRICS & GYNECOLOGY

## 2022-07-26 PROCEDURE — 3044F HG A1C LEVEL LT 7.0%: CPT | Mod: CPTII,S$GLB,, | Performed by: OBSTETRICS & GYNECOLOGY

## 2022-07-26 PROCEDURE — 3008F PR BODY MASS INDEX (BMI) DOCUMENTED: ICD-10-PCS | Mod: CPTII,S$GLB,, | Performed by: OBSTETRICS & GYNECOLOGY

## 2022-07-26 PROCEDURE — 3008F BODY MASS INDEX DOCD: CPT | Mod: CPTII,S$GLB,, | Performed by: OBSTETRICS & GYNECOLOGY

## 2022-07-26 PROCEDURE — 4010F ACE/ARB THERAPY RXD/TAKEN: CPT | Mod: CPTII,S$GLB,, | Performed by: OBSTETRICS & GYNECOLOGY

## 2022-07-26 PROCEDURE — 1160F PR REVIEW ALL MEDS BY PRESCRIBER/CLIN PHARMACIST DOCUMENTED: ICD-10-PCS | Mod: CPTII,S$GLB,, | Performed by: OBSTETRICS & GYNECOLOGY

## 2022-07-26 PROCEDURE — 3074F PR MOST RECENT SYSTOLIC BLOOD PRESSURE < 130 MM HG: ICD-10-PCS | Mod: CPTII,S$GLB,, | Performed by: OBSTETRICS & GYNECOLOGY

## 2022-07-26 PROCEDURE — 99395 PREV VISIT EST AGE 18-39: CPT | Mod: S$GLB,,, | Performed by: OBSTETRICS & GYNECOLOGY

## 2022-07-26 PROCEDURE — 3079F PR MOST RECENT DIASTOLIC BLOOD PRESSURE 80-89 MM HG: ICD-10-PCS | Mod: CPTII,S$GLB,, | Performed by: OBSTETRICS & GYNECOLOGY

## 2022-07-26 NOTE — PROGRESS NOTES
CC: Annual check-up    SUBJECTIVE:   37 y.o. female   for annual routine Pap and checkup. Patient's last menstrual period was 2021..  She has no unusual complaints and has a new boyfriend and he is 39 and wants have a child and pt is stressed about it  Had Robotic TLH for fibroids AUB, anemia last year, no gyn problems, .    Also dealing with some severe CHF    Past Medical History:   Diagnosis Date    Anemia     Hypertension     SVT (supraventricular tachycardia)     Uterine fibroid      Past Surgical History:   Procedure Laterality Date    ABLATION N/A 2021    Procedure: Ablation;  Surgeon: NICHELLE Zepeda MD;  Location: [facility] EP LAB;  Service: Cardiology;  Laterality: N/A;  SVT, RFA, Carto, anes, EH, 3prep    CYSTOSCOPY N/A 8/3/2021    Procedure: CYSTOSCOPY;  Surgeon: Jamarcus Ventura MD;  Location: [facility] OR;  Service: OB/GYN;  Laterality: N/A;    ROBOT-ASSISTED LAPAROSCOPIC HYSTERECTOMY N/A 8/3/2021    Procedure: ROBOTIC HYSTERECTOMY;  Surgeon: Jamarcus Ventura MD;  Location: [facility] OR;  Service: OB/GYN;  Laterality: N/A;    ROBOT-ASSISTED SALPINGECTOMY Bilateral 8/3/2021    Procedure: ROBOTIC SALPINGECTOMY;  Surgeon: Jamarcus Ventura MD;  Location: [facility] OR;  Service: OB/GYN;  Laterality: Bilateral;     Social History     Socioeconomic History    Marital status: Single   Tobacco Use    Smoking status: Never Smoker    Smokeless tobacco: Never Used   Substance and Sexual Activity    Alcohol use: No    Drug use: No    Sexual activity: Not Currently     Partners: Male     Birth control/protection: Injection     History reviewed. No pertinent family history.  OB History    Para Term  AB Living   3 3 3 0 0 3   SAB IAB Ectopic Multiple Live Births   0 0 0 0 3      # Outcome Date GA Lbr Jese/2nd Weight Sex Delivery Anes PTL Lv   3 Term      Vag-Spont   ISIDRA   2 Term      Vag-Spont   ISIDRA   1 Term      Vag-Spont   ISIDRA         Current Outpatient Medications   Medication Sig  "Dispense Refill    metoprolol succinate (TOPROL-XL) 200 MG 24 hr tablet Take 1 tablet (200 mg total) by mouth once daily. 30 tablet 11    sacubitriL-valsartan (ENTRESTO)  mg per tablet Take 1 tablet by mouth 2 (two) times daily. 60 tablet 6    spironolactone (ALDACTONE) 25 MG tablet Take 1 tablet (25 mg total) by mouth once daily. 30 tablet 11    torsemide (DEMADEX) 20 MG Tab Take 1 tablet (20 mg total) by mouth every other day. 15 tablet 11    dapagliflozin (FARXIGA) 10 mg tablet Take 1 tablet (10 mg total) by mouth once daily. (Patient not taking: Reported on 7/26/2022) 30 tablet 11     No current facility-administered medications for this visit.     Allergies: Patient has no known allergies.     ROS:  Constitutional: no weight loss, weight gain, fever, fatigue  Eyes:  No vision changes, glasses/contacts  ENT/Mouth: No ulcers, sinus problems, ears ringing, headache  Cardiovascular: No inability to lie flat, chest pain, exercise intolerance, swelling, heart palpitations  Respiratory: No wheezing, coughing blood, shortness of breath, or cough  Gastrointestinal: No diarrhea, bloody stool, nausea/vomiting, constipation, gas, hemorrhoids  Genitourinary: No blood in urine, painful urination, urgency of urination, frequency of urination, incomplete emptying, incontinence, abnormal bleeding, painful periods, heavy periods, vaginal discharge, vaginal odor, painful intercourse, sexual problems, bleeding after intercourse.  Musculoskeletal: No muscle weakness  Skin/Breast: No painful breasts, nipple discharge, masses, rash, ulcers  Neurological: No passing out, seizures, numbness, headache  Endocrine: No diabetes, hypothyroid, hyperthyroid, hot flashes, hair loss, abnormal hair growth, ance  Psychiatric: No depression, crying  Hematologic: No bruises, bleeding, swollen lymph nodes, anemia.      OBJECTIVE:   The patient appears well, alert, oriented x 3, in no distress.  /84   Ht 5' 7" (1.702 m)   Wt 122.5 " kg (270 lb)   LMP 06/04/2021 Comment: since 4/11/21  BMI 42.29 kg/m²   NECK: no thyromegaly, trachea midline  SKIN: no acne, striae, hirsutism  BREAST EXAM: breasts appear normal, no suspicious masses, no skin or nipple changes or axillary nodes, symmetric fibrous changes in both upper outer quadrants  ABDOMEN: obese and no hernias, masses, or hepatosplenomegaly  GENITALIA: normal external genitalia, no erythema, no discharge  URETHRA: normal urethra, normal urethral meatus  VAGINA: cuff intact  CERVIX: absenty  UTERUS: uterus absent  ADNEXA: no mass, fullness, tenderness  Component 11 mo ago    Final Pathologic Diagnosis FRAGMENTED UTERUS, CERVIX AND BILATERAL FALLOPIAN TUBES WEIGHING 248 G   SHOWING:   SECRETORY ENDOMETRIUM WITH EXTENSIVE ADENOMYOSIS   NEGATIVE CERVIX   AN INTRAMURAL LEIOMYOMA IS PRESENT   LEFT AND RIGHT FALLOPIAN TUBES WITH BENIGN PARATUBAL CYSTS    Comment: Interp By Paul Bolanos M.D., Signed on 08/06/2021 at 09:11     Lab Results   Component Value Date    WBC 8.10 06/03/2022    HGB 12.2 06/03/2022    HCT 39.1 06/03/2022    MCV 74 (L) 06/03/2022     06/03/2022         ASSESSMENT:   well woman  1. Well woman exam with routine gynecological exam        PLAN:   mammogram  pap smear  return annually or prn  Orders Placed This Encounter    Mammo Digital Screening Bilat w/ Louis

## 2022-07-27 LAB — NT-PROBNP SERPL IA-MCNC: 416 PG/ML

## 2022-09-07 ENCOUNTER — OFFICE VISIT (OUTPATIENT)
Dept: TRANSPLANT | Facility: CLINIC | Age: 37
End: 2022-09-07
Payer: COMMERCIAL

## 2022-09-07 ENCOUNTER — HOSPITAL ENCOUNTER (OUTPATIENT)
Dept: CARDIOLOGY | Facility: HOSPITAL | Age: 37
Discharge: HOME | End: 2022-09-07
Attending: INTERNAL MEDICINE
Payer: COMMERCIAL

## 2022-09-07 VITALS
BODY MASS INDEX: 43.63 KG/M2 | HEIGHT: 67 IN | SYSTOLIC BLOOD PRESSURE: 126 MMHG | DIASTOLIC BLOOD PRESSURE: 66 MMHG | HEART RATE: 84 BPM | WEIGHT: 278 LBS

## 2022-09-07 VITALS
HEIGHT: 67 IN | HEART RATE: 75 BPM | DIASTOLIC BLOOD PRESSURE: 84 MMHG | SYSTOLIC BLOOD PRESSURE: 120 MMHG | WEIGHT: 270 LBS | BODY MASS INDEX: 42.38 KG/M2

## 2022-09-07 DIAGNOSIS — E66.01 MORBID OBESITY WITH BMI OF 40.0-44.9, ADULT: ICD-10-CM

## 2022-09-07 DIAGNOSIS — I47.10 SVT (SUPRAVENTRICULAR TACHYCARDIA): ICD-10-CM

## 2022-09-07 DIAGNOSIS — I50.22 CHRONIC SYSTOLIC HEART FAILURE: Primary | ICD-10-CM

## 2022-09-07 DIAGNOSIS — I10 PRIMARY HYPERTENSION: ICD-10-CM

## 2022-09-07 DIAGNOSIS — Z98.890 H/O CARDIAC RADIOFREQUENCY ABLATION: ICD-10-CM

## 2022-09-07 DIAGNOSIS — I50.42 CHRONIC COMBINED SYSTOLIC AND DIASTOLIC HEART FAILURE: ICD-10-CM

## 2022-09-07 DIAGNOSIS — I42.8 NONISCHEMIC CARDIOMYOPATHY: ICD-10-CM

## 2022-09-07 PROBLEM — I50.20 HFREF (HEART FAILURE WITH REDUCED EJECTION FRACTION): Status: RESOLVED | Noted: 2022-05-01 | Resolved: 2022-09-07

## 2022-09-07 LAB
ASCENDING AORTA: 2.86 CM
AV INDEX (PROSTH): 0.73
AV MEAN GRADIENT: 4 MMHG
AV PEAK GRADIENT: 8 MMHG
AV VALVE AREA: 3.32 CM2
AV VELOCITY RATIO: 0.7
BSA FOR ECHO PROCEDURE: 2.41 M2
CV ECHO LV RWT: 0.34 CM
DOP CALC AO PEAK VEL: 1.42 M/S
DOP CALC AO VTI: 24.78 CM
DOP CALC LVOT AREA: 4.5 CM2
DOP CALC LVOT DIAMETER: 2.4 CM
DOP CALC LVOT PEAK VEL: 1 M/S
DOP CALC LVOT STROKE VOLUME: 82.16 CM3
DOP CALCLVOT PEAK VEL VTI: 18.17 CM
E WAVE DECELERATION TIME: 192.82 MSEC
E/A RATIO: 1.13
E/E' RATIO: 8 M/S
ECHO LV POSTERIOR WALL: 0.97 CM (ref 0.6–1.1)
EJECTION FRACTION: 45 %
FRACTIONAL SHORTENING: 22 % (ref 28–44)
INTERVENTRICULAR SEPTUM: 0.78 CM (ref 0.6–1.1)
IVRT: 65.65 MSEC
LA MAJOR: 5.47 CM
LA MINOR: 4.67 CM
LA WIDTH: 4.65 CM
LEFT ATRIUM SIZE: 3.62 CM
LEFT ATRIUM VOLUME INDEX MOD: 33.9 ML/M2
LEFT ATRIUM VOLUME INDEX: 31.3 ML/M2
LEFT ATRIUM VOLUME MOD: 77.96 CM3
LEFT ATRIUM VOLUME: 72.09 CM3
LEFT INTERNAL DIMENSION IN SYSTOLE: 4.41 CM (ref 2.1–4)
LEFT VENTRICLE DIASTOLIC VOLUME INDEX: 68.53 ML/M2
LEFT VENTRICLE DIASTOLIC VOLUME: 157.62 ML
LEFT VENTRICLE MASS INDEX: 82 G/M2
LEFT VENTRICLE SYSTOLIC VOLUME INDEX: 38.3 ML/M2
LEFT VENTRICLE SYSTOLIC VOLUME: 88.02 ML
LEFT VENTRICULAR INTERNAL DIMENSION IN DIASTOLE: 5.66 CM (ref 3.5–6)
LEFT VENTRICULAR MASS: 188.26 G
LV LATERAL E/E' RATIO: 8 M/S
LV SEPTAL E/E' RATIO: 8 M/S
MV A" WAVE DURATION": 16.84 MSEC
MV PEAK A VEL: 0.64 M/S
MV PEAK E VEL: 0.72 M/S
MV STENOSIS PRESSURE HALF TIME: 55.92 MS
MV VALVE AREA P 1/2 METHOD: 3.93 CM2
PISA TR MAX VEL: 2.31 M/S
PULM VEIN S/D RATIO: 1.59
PV PEAK D VEL: 0.34 M/S
PV PEAK S VEL: 0.54 M/S
RA MAJOR: 4.87 CM
RA PRESSURE: 3 MMHG
RA WIDTH: 3.95 CM
RIGHT VENTRICULAR END-DIASTOLIC DIMENSION: 3.46 CM
RV TISSUE DOPPLER FREE WALL SYSTOLIC VELOCITY 1 (APICAL 4 CHAMBER VIEW): 12.51 CM/S
SINUS: 3.03 CM
STJ: 2.71 CM
TDI LATERAL: 0.09 M/S
TDI SEPTAL: 0.09 M/S
TDI: 0.09 M/S
TR MAX PG: 21 MMHG
TRICUSPID ANNULAR PLANE SYSTOLIC EXCURSION: 2.34 CM
TV REST PULMONARY ARTERY PRESSURE: 24 MMHG

## 2022-09-07 PROCEDURE — 4010F ACE/ARB THERAPY RXD/TAKEN: CPT | Mod: CPTII,S$GLB,, | Performed by: INTERNAL MEDICINE

## 2022-09-07 PROCEDURE — 93306 ECHO (CUPID ONLY): ICD-10-PCS | Mod: 26,,, | Performed by: INTERNAL MEDICINE

## 2022-09-07 PROCEDURE — 99214 OFFICE O/P EST MOD 30 MIN: CPT | Mod: S$GLB,,, | Performed by: INTERNAL MEDICINE

## 2022-09-07 PROCEDURE — 3078F PR MOST RECENT DIASTOLIC BLOOD PRESSURE < 80 MM HG: ICD-10-PCS | Mod: CPTII,S$GLB,, | Performed by: INTERNAL MEDICINE

## 2022-09-07 PROCEDURE — 3044F PR MOST RECENT HEMOGLOBIN A1C LEVEL <7.0%: ICD-10-PCS | Mod: CPTII,S$GLB,, | Performed by: INTERNAL MEDICINE

## 2022-09-07 PROCEDURE — 99214 PR OFFICE/OUTPT VISIT, EST, LEVL IV, 30-39 MIN: ICD-10-PCS | Mod: S$GLB,,, | Performed by: INTERNAL MEDICINE

## 2022-09-07 PROCEDURE — 3078F DIAST BP <80 MM HG: CPT | Mod: CPTII,S$GLB,, | Performed by: INTERNAL MEDICINE

## 2022-09-07 PROCEDURE — 3074F PR MOST RECENT SYSTOLIC BLOOD PRESSURE < 130 MM HG: ICD-10-PCS | Mod: CPTII,S$GLB,, | Performed by: INTERNAL MEDICINE

## 2022-09-07 PROCEDURE — 3008F PR BODY MASS INDEX (BMI) DOCUMENTED: ICD-10-PCS | Mod: CPTII,S$GLB,, | Performed by: INTERNAL MEDICINE

## 2022-09-07 PROCEDURE — 1159F PR MEDICATION LIST DOCUMENTED IN MEDICAL RECORD: ICD-10-PCS | Mod: CPTII,S$GLB,, | Performed by: INTERNAL MEDICINE

## 2022-09-07 PROCEDURE — 1159F MED LIST DOCD IN RCRD: CPT | Mod: CPTII,S$GLB,, | Performed by: INTERNAL MEDICINE

## 2022-09-07 PROCEDURE — 93306 TTE W/DOPPLER COMPLETE: CPT | Mod: 26,,, | Performed by: INTERNAL MEDICINE

## 2022-09-07 PROCEDURE — C8929 TTE W OR WO FOL WCON,DOPPLER: HCPCS

## 2022-09-07 PROCEDURE — 25500020 PHARM REV CODE 255: Performed by: INTERNAL MEDICINE

## 2022-09-07 PROCEDURE — 99999 PR PBB SHADOW E&M-EST. PATIENT-LVL III: CPT | Mod: PBBFAC,,, | Performed by: INTERNAL MEDICINE

## 2022-09-07 PROCEDURE — 3074F SYST BP LT 130 MM HG: CPT | Mod: CPTII,S$GLB,, | Performed by: INTERNAL MEDICINE

## 2022-09-07 PROCEDURE — 4010F PR ACE/ARB THEARPY RXD/TAKEN: ICD-10-PCS | Mod: CPTII,S$GLB,, | Performed by: INTERNAL MEDICINE

## 2022-09-07 PROCEDURE — 99999 PR PBB SHADOW E&M-EST. PATIENT-LVL III: ICD-10-PCS | Mod: PBBFAC,,, | Performed by: INTERNAL MEDICINE

## 2022-09-07 PROCEDURE — 3008F BODY MASS INDEX DOCD: CPT | Mod: CPTII,S$GLB,, | Performed by: INTERNAL MEDICINE

## 2022-09-07 PROCEDURE — 3044F HG A1C LEVEL LT 7.0%: CPT | Mod: CPTII,S$GLB,, | Performed by: INTERNAL MEDICINE

## 2022-09-07 RX ADMIN — HUMAN ALBUMIN MICROSPHERES AND PERFLUTREN 0.66 MG: 10; .22 INJECTION, SOLUTION INTRAVENOUS at 11:09

## 2022-09-07 NOTE — PROGRESS NOTES
Advanced Heart Failure [facility] Transplantation Clinic Follow up.      Attending Physician: Cory Sullivan MD.  The patient's last visit with me was on 6/3/2022.         HPIVadim Smallwood is a 36-year-old female morbidly obese BMI 42 (loosing weight and very motivated), PMHx SVT s/p for radiofrequency catheter ablation (06/04/2021), CHF diagnosed March 2021 when her LVEF was 45%, now LVEF 20% in 2022 with an admission in Feb, HTN, anemia 2/2 uterine fibroids s/p hysterectomy who presents for follow up. She had PET stress last year that did not find perfusion abnormalities.     She denies a personal history of diabetes or HTN. No family history of HF, SCD or Mis.  She denies any consumption of tobacco, alcohol, drugs. 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 She was started on GDMT and uptitrated to full dose entresto, Toprol , Aldactone 25 and Torsemide 20mg daily.  Today she came in very good spirit. She said she has been feeling well. She said she started doing more exercise. She is walking every day. She is being careful with her diet. She is motivated to do well. She thinks she lost fluid and weight since her last appointment.    Review of Systems   Constitutional:  Negative for activity change, appetite change, chills, diaphoresis, fatigue, fever and unexpected weight change.   HENT:  Negative for nasal congestion, rhinorrhea and sore throat.    Eyes:  Negative for visual disturbance.   Respiratory:  Negative for cough, choking, chest tightness and shortness of breath.    Cardiovascular:  Negative for chest pain, palpitations and leg swelling.   Gastrointestinal:  Negative for abdominal distention, abdominal pain, diarrhea, nausea and vomiting.   Genitourinary:  Negative for difficulty urinating, dysuria and hematuria.   Integumentary:  Negative for rash.   Neurological:  Negative for seizures, syncope and  light-headedness.   Psychiatric/Behavioral:  Negative for agitation and hallucinations.       Past Medical History:   Diagnosis Date    Anemia     Hypertension     SVT (supraventricular tachycardia)     Uterine fibroid         Past Surgical History:   Procedure Laterality Date    ABLATION N/A 6/4/2021    Procedure: Ablation;  Surgeon: NICHELLE Zepeda MD;  Location: [facility] EP LAB;  Service: Cardiology;  Laterality: N/A;  SVT, RFA, Carto, anes, EH, 3prep    CYSTOSCOPY N/A 8/3/2021    Procedure: CYSTOSCOPY;  Surgeon: Jamarcus Ventura MD;  Location: [facility] OR;  Service: OB/GYN;  Laterality: N/A;    ROBOT-ASSISTED LAPAROSCOPIC HYSTERECTOMY N/A 8/3/2021    Procedure: ROBOTIC HYSTERECTOMY;  Surgeon: Jamarcus Ventura MD;  Location: [facility] OR;  Service: OB/GYN;  Laterality: N/A;    ROBOT-ASSISTED SALPINGECTOMY Bilateral 8/3/2021    Procedure: ROBOTIC SALPINGECTOMY;  Surgeon: Jamarcus Ventura MD;  Location: [facility] OR;  Service: OB/GYN;  Laterality: Bilateral;        No family history on file.     Review of patient's allergies indicates:  No Known Allergies     Current Outpatient Medications   Medication Instructions    FARXIGA 10 mg, Oral, Daily    metoprolol succinate (TOPROL-XL) 200 mg, Oral, Daily    sacubitriL-valsartan (ENTRESTO)  mg per tablet 1 tablet, Oral, 2 times daily    spironolactone (ALDACTONE) 25 mg, Oral, Daily    torsemide (DEMADEX) 20 mg, Oral, Every other day        There were no vitals filed for this visit.     Wt Readings from Last 3 Encounters:   09/07/22 126.1 kg (278 lb)   09/07/22 122.5 kg (270 lb)   07/26/22 122.5 kg (270 lb)     Temp Readings from Last 3 Encounters:   03/02/22 98.6 °F (37 °C)   01/23/22 98.4 °F (36.9 °C) (Oral)   08/03/21 97.9 °F (36.6 °C) (Skin)     BP Readings from Last 3 Encounters:   09/07/22 120/84   07/26/22 120/84   06/20/22 129/65     Pulse Readings from Last 3 Encounters:   09/07/22 75   06/20/22 70   06/03/22 69        There is no height or weight on file to  "calculate BMI. Estimated body surface area is 2.41 meters squared as calculated from the following:    Height as of an earlier encounter on 9/7/22: 5' 7" (1.702 m).    Weight as of an earlier encounter on 9/7/22: 122.5 kg (270 lb).     Physical Exam  Constitutional:       Appearance: She is well-developed.   HENT:      Head: Normocephalic and atraumatic.      Right Ear: External ear normal.      Left Ear: External ear normal.   Eyes:      Conjunctiva/sclera: Conjunctivae normal.      Pupils: Pupils are equal, round, and reactive to light.   Neck:      Vascular: No hepatojugular reflux or JVD.   Cardiovascular:      Rate and Rhythm: Normal rate and regular rhythm.      Pulses: Intact distal pulses.      Heart sounds: S1 normal and S2 normal. No murmur heard.    No friction rub. No gallop.   Pulmonary:      Effort: Pulmonary effort is normal.      Breath sounds: Normal breath sounds.   Abdominal:      General: Bowel sounds are normal. There is no distension.      Palpations: Abdomen is soft.      Tenderness: There is no abdominal tenderness. There is no guarding or rebound.   Musculoskeletal:      Cervical back: Normal range of motion and neck supple.      Right lower leg: No edema.      Left lower leg: No edema.   Neurological:      Mental Status: She is alert and oriented to person, place, and time.        Lab Results   Component Value Date     (H) 03/08/2022     09/07/2022    K 4.1 09/07/2022    MG 2.0 03/08/2022     09/07/2022    CO2 28 09/07/2022    BUN 16 09/07/2022    CREATININE 1.3 09/07/2022     09/07/2022    HGBA1C 6.3 (H) 02/27/2022    AST 7 (L) 09/07/2022    ALT 11 09/07/2022    ALBUMIN 3.8 09/07/2022    PROT 7.8 09/07/2022    BILITOT 0.4 09/07/2022    WBC 9.60 09/07/2022    HGB 13.4 09/07/2022    HCT 41.5 09/07/2022    HCT 23 (L) 06/01/2021     (H) 09/07/2022    INR 1.1 07/18/2021    TSH 2.685 03/08/2022    CHOL 190 04/13/2022    HDL 33 (L) 04/13/2022    LDLCALC 125.2 " 04/13/2022    TRIG 159 (H) 04/13/2022           Results for orders placed during the hospital encounter of 04/25/22    Echo    Interpretation Summary  · The left ventricle is severely enlarged with eccentric hypertrophy and moderately decreased systolic function. The estimated ejection fraction is 30%.  · Mild right ventricular enlargement with low normal right ventricular systolic function.  · Grade I left ventricular diastolic dysfunction.  · Moderate mitral regurgitation.  · The estimated PA systolic pressure is 25 mmHg.  · Normal central venous pressure (3 mmHg).        No results found for this or any previous visit.         Assessment and Plan:  Chronic systolic heart failure    H/O cardiac radiofrequency ablation    Hx of SVT (supraventricular tachycardia)    Primary hypertension. Stable. Controlled.    Nonischemic cardiomyopathy        Chronic systolic HF, NYHA class II, stage C.  Improved LVEF from 20% to 45%.  Etiology: NICM. PET stress negative for ischemia in May 2021. Not improved after control of SVT (s/p ablation).  Devices: none.  Medications: sacubitril-valsartan high dose, metoprolol succinate 200 mg daily, spironolactone 25 mg daily, farxiga 10 mg daily, torsemide 20 mg every other day.  Hemodynamic status: warm, normotensive, euvolemic.  Clinical course: recently discharged March 3, 2022 after her index hospitalization (EF down to 20%). NO ER visits or admissions since last appointment.  Plan:  -no change on medications today.     -Follow up in 4 months with labs.       2. Morbid obesity.  Patient has been referred to our nutritionist. Unfortunately she is still eating fast food and bad choices (chips, etc)

## 2022-09-07 NOTE — PROGRESS NOTES
Procedure explained. 22 g sl started in left forearm for optison use. Optison given ivp via sl for imaging by ruma loredo rdcs. Tolerated well. Sl d/callum after. Pressure applied.

## 2022-09-07 NOTE — LETTER
September 7, 2022      [facility]  [address] [facility] [address]  Phone: [phone]       Patient: Ursula Smallwood   YOB: 1985  Date of Visit: 09/07/2022    To Whom It May Concern:    Caroline Smallwood  was at [facility] on 09/07/2022. The patient may return to work/school on 9/8/2022  With no restrictions. If you have any questions or concerns, or if I can be of further assistance, please do not hesitate to contact me.    Sincerely,    Domenica Lantigua MA

## 2022-09-07 NOTE — PATIENT INSTRUCTIONS
You have just the right amount of fluid on you.  Please adhere to a low sodium diet (no more than 1.5 grams of sodium in 24h).  3.   Follow fluid restriction of  1. no more than 2 liters in 24 hours..   4.   No change on medications today.  5.  Follow up in 4 months with labs.

## 2022-09-08 ENCOUNTER — DOCUMENTATION ONLY (OUTPATIENT)
Dept: TRANSPLANT | Facility: CLINIC | Age: 37
End: 2022-09-08
Payer: COMMERCIAL

## 2022-09-08 NOTE — PROGRESS NOTES
"Met with pt due to recent wt gain 8kg in 2.5 months. Pt states its "happy weight" due to a new relationship with a boyfriend. Ensured pt she knows how to get back and track and that support will always be available. Pt eager to get back on track and boyfriend is motivating her as well. Will follow up with pt closely.    "

## 2022-09-12 ENCOUNTER — HOSPITAL ENCOUNTER (EMERGENCY)
Facility: HOSPITAL | Age: 37
Discharge: HOME | End: 2022-09-12
Attending: EMERGENCY MEDICINE
Payer: COMMERCIAL

## 2022-09-12 VITALS
BODY MASS INDEX: 46.13 KG/M2 | OXYGEN SATURATION: 99 % | RESPIRATION RATE: 20 BRPM | WEIGHT: 293 LBS | SYSTOLIC BLOOD PRESSURE: 163 MMHG | DIASTOLIC BLOOD PRESSURE: 90 MMHG | TEMPERATURE: 98 F | HEART RATE: 87 BPM

## 2022-09-12 DIAGNOSIS — S31.41XA VAGINAL LACERATION, INITIAL ENCOUNTER: Primary | ICD-10-CM

## 2022-09-12 PROCEDURE — 63600175 PHARM REV CODE 636 W HCPCS: Mod: ER | Performed by: EMERGENCY MEDICINE

## 2022-09-12 PROCEDURE — 96372 THER/PROPH/DIAG INJ SC/IM: CPT | Performed by: EMERGENCY MEDICINE

## 2022-09-12 PROCEDURE — 99284 EMERGENCY DEPT VISIT MOD MDM: CPT | Mod: ER

## 2022-09-12 PROCEDURE — 25000003 PHARM REV CODE 250: Mod: ER | Performed by: EMERGENCY MEDICINE

## 2022-09-12 RX ORDER — KETOROLAC TROMETHAMINE 30 MG/ML
15 INJECTION, SOLUTION INTRAMUSCULAR; INTRAVENOUS
Status: COMPLETED | OUTPATIENT
Start: 2022-09-12 | End: 2022-09-12

## 2022-09-12 RX ORDER — CEPHALEXIN 500 MG/1
500 CAPSULE ORAL
Status: COMPLETED | OUTPATIENT
Start: 2022-09-12 | End: 2022-09-12

## 2022-09-12 RX ORDER — CEPHALEXIN 500 MG/1
500 CAPSULE ORAL 4 TIMES DAILY
Qty: 20 CAPSULE | Refills: 0 | Status: SHIPPED | OUTPATIENT
Start: 2022-09-12 | End: 2022-09-17

## 2022-09-12 RX ORDER — DICLOFENAC SODIUM 75 MG/1
75 TABLET, DELAYED RELEASE ORAL 2 TIMES DAILY
Qty: 60 TABLET | Refills: 0 | Status: SHIPPED | OUTPATIENT
Start: 2022-09-12 | End: 2023-09-25

## 2022-09-12 RX ADMIN — KETOROLAC TROMETHAMINE 15 MG: 30 INJECTION, SOLUTION INTRAMUSCULAR; INTRAVENOUS at 12:09

## 2022-09-12 RX ADMIN — CEPHALEXIN 500 MG: 500 CAPSULE ORAL at 12:09

## 2022-09-12 NOTE — ED PROVIDER NOTES
"Encounter Date: 9/12/2022       History     Chief Complaint   Patient presents with    Vaginal Bleeding     Vaginal bleeding that began while having intercourse and pts spouse noticed bleeding on hands. Pt went to restroom and found blood/clots when wiping. States "it was running down my legs/leaking."     HPI  37 y.o.   Vb after sexual intercourse  Now improved  Sts not a lot of pain    Review of patient's allergies indicates:  No Known Allergies  Past Medical History:   Diagnosis Date    Anemia     Hypertension     SVT (supraventricular tachycardia)     Uterine fibroid      Past Surgical History:   Procedure Laterality Date    ABLATION N/A 6/4/2021    Procedure: Ablation;  Surgeon: NICHELLE Zepeda MD;  Location: [facility] EP LAB;  Service: Cardiology;  Laterality: N/A;  SVT, RFA, Carto, anes, EH, 3prep    CYSTOSCOPY N/A 8/3/2021    Procedure: CYSTOSCOPY;  Surgeon: Jamarcus Ventura MD;  Location: [facility] OR;  Service: OB/GYN;  Laterality: N/A;    ROBOT-ASSISTED LAPAROSCOPIC HYSTERECTOMY N/A 8/3/2021    Procedure: ROBOTIC HYSTERECTOMY;  Surgeon: Jamarcus Ventura MD;  Location: [facility] OR;  Service: OB/GYN;  Laterality: N/A;    ROBOT-ASSISTED SALPINGECTOMY Bilateral 8/3/2021    Procedure: ROBOTIC SALPINGECTOMY;  Surgeon: Jamarcus Ventura MD;  Location: [facility] OR;  Service: OB/GYN;  Laterality: Bilateral;     History reviewed. No pertinent family history.  Social History     Tobacco Use    Smoking status: Never    Smokeless tobacco: Never   Substance Use Topics    Alcohol use: No    Drug use: No     Review of Systems  All systems were reviewed/examined and were negative except as noted in the HPI.    Physical Exam     Initial Vitals [09/12/22 0018]   BP Pulse Resp Temp SpO2   (!) 163/90 87 20 98.4 °F (36.9 °C) 99 %      MAP       --         Physical Exam    General: the patient is awake, alert, and in no apparent distress.  Head: normocephalic and atraumatic, sclera are clear  Neck: supple without meningismus  Chest:  no " respiratory distress  Heart: regular rate and rhythm  ABD soft, nontender, nondistended, no peritoneal signs  : small laceration vaginal mucosa L wall, not actively bleeding    Chaperone: Bernice CARVER  Extremities: warm and well perfused  Skin: warm and dry  Psych conversant  Neuro: awake, alert, moving all extremities    ED Course   Procedures  Labs Reviewed   URINALYSIS, REFLEX TO URINE CULTURE          Imaging Results    None          Medications   ketorolac injection 15 mg (15 mg Intramuscular Given 9/12/22 0038)   cephALEXin capsule 500 mg (500 mg Oral Given 9/12/22 0037)      Medical Decision Making:    This is an emergent evaluation of a patient presenting to the ED.  Nursing notes were reviewed.    I decided to obtain and review old medical records, which showed: TDAP utd    Evaluation for Emergency Medical Condition  The patient received a medical screening exam and within a reasonable degree of clinical confidence an emergency medical condition has not been identified.  The patient is instructed on proper follow up and return precautions to the ED.    Analgesics  Antibiotics for mucosal inj    Twan Ruiz MD, ADRIANA                      Clinical Impression:   Final diagnoses:  [S31.41XA] Vaginal laceration, initial encounter (Primary)        ED Disposition Condition    Discharge Stable          ED Prescriptions       Medication Sig Dispense Start Date End Date Auth. Provider    diclofenac (VOLTAREN) 75 MG EC tablet Take 1 tablet (75 mg total) by mouth 2 (two) times daily. 60 tablet 9/12/2022 -- Riley Ruiz MD    cephALEXin (KEFLEX) 500 MG capsule Take 1 capsule (500 mg total) by mouth 4 (four) times daily. for 5 days 20 capsule 9/12/2022 9/17/2022 Riley Ruiz MD          Follow-up Information       Follow up With Specialties Details Why Contact Info    Simone Pro,  Family Medicine Schedule an appointment as soon as possible for a visit   [address]  [address]  [place]  [address]  [phone]            Discharged to home in stable condition, return to ED warnings given, follow up and patient care instructions given.      Twan Ruiz MD, ADRIANA, [facility]  Department of Emergency Medicine       Riley Ruiz MD  09/12/22 3413

## 2022-10-12 ENCOUNTER — HOSPITAL ENCOUNTER (OUTPATIENT)
Dept: RADIOLOGY | Facility: HOSPITAL | Age: 37
Discharge: HOME | End: 2022-10-12
Attending: OBSTETRICS & GYNECOLOGY
Payer: COMMERCIAL

## 2022-10-12 VITALS — WEIGHT: 290 LBS | BODY MASS INDEX: 49.51 KG/M2 | HEIGHT: 64 IN

## 2022-10-12 DIAGNOSIS — Z01.419 WELL WOMAN EXAM WITH ROUTINE GYNECOLOGICAL EXAM: ICD-10-CM

## 2022-10-12 PROCEDURE — 77063 MAMMO DIGITAL SCREENING BILAT WITH TOMO: ICD-10-PCS | Mod: 26,,, | Performed by: RADIOLOGY

## 2022-10-12 PROCEDURE — 77063 BREAST TOMOSYNTHESIS BI: CPT | Mod: 26,,, | Performed by: RADIOLOGY

## 2022-10-12 PROCEDURE — 77067 SCR MAMMO BI INCL CAD: CPT | Mod: 26,,, | Performed by: RADIOLOGY

## 2022-10-12 PROCEDURE — 77067 MAMMO DIGITAL SCREENING BILAT WITH TOMO: ICD-10-PCS | Mod: 26,,, | Performed by: RADIOLOGY

## 2022-10-12 PROCEDURE — 77063 BREAST TOMOSYNTHESIS BI: CPT | Mod: TC

## 2022-10-31 ENCOUNTER — OFFICE VISIT (OUTPATIENT)
Dept: ELECTROPHYSIOLOGY | Facility: CLINIC | Age: 37
End: 2022-10-31
Payer: COMMERCIAL

## 2022-10-31 ENCOUNTER — HOSPITAL ENCOUNTER (OUTPATIENT)
Dept: CARDIOLOGY | Facility: CLINIC | Age: 37
Discharge: HOME | End: 2022-10-31
Payer: COMMERCIAL

## 2022-10-31 VITALS — DIASTOLIC BLOOD PRESSURE: 103 MMHG | SYSTOLIC BLOOD PRESSURE: 173 MMHG | HEART RATE: 85 BPM

## 2022-10-31 DIAGNOSIS — I49.8 OTHER SPECIFIED CARDIAC ARRHYTHMIAS: ICD-10-CM

## 2022-10-31 DIAGNOSIS — N92.1 MENORRHAGIA WITH IRREGULAR CYCLE: ICD-10-CM

## 2022-10-31 DIAGNOSIS — I50.20 HFREF (HEART FAILURE WITH REDUCED EJECTION FRACTION): ICD-10-CM

## 2022-10-31 DIAGNOSIS — E66.01 CLASS 3 SEVERE OBESITY WITH BODY MASS INDEX (BMI) OF 45.0 TO 49.9 IN ADULT, UNSPECIFIED OBESITY TYPE, UNSPECIFIED WHETHER SERIOUS COMORBIDITY PRESENT: ICD-10-CM

## 2022-10-31 DIAGNOSIS — U07.1 COVID-19: ICD-10-CM

## 2022-10-31 DIAGNOSIS — D64.9 SYMPTOMATIC ANEMIA: ICD-10-CM

## 2022-10-31 DIAGNOSIS — N93.8 DUB (DYSFUNCTIONAL UTERINE BLEEDING): ICD-10-CM

## 2022-10-31 DIAGNOSIS — E66.01 MORBID OBESITY: Chronic | ICD-10-CM

## 2022-10-31 DIAGNOSIS — R06.02 SOB (SHORTNESS OF BREATH): ICD-10-CM

## 2022-10-31 DIAGNOSIS — I47.10 SVT (SUPRAVENTRICULAR TACHYCARDIA): ICD-10-CM

## 2022-10-31 DIAGNOSIS — Z90.710 H/O ABDOMINAL HYSTERECTOMY: ICD-10-CM

## 2022-10-31 DIAGNOSIS — I50.42 CHRONIC COMBINED SYSTOLIC AND DIASTOLIC HEART FAILURE: ICD-10-CM

## 2022-10-31 DIAGNOSIS — D25.9 UTERINE LEIOMYOMA, UNSPECIFIED LOCATION: ICD-10-CM

## 2022-10-31 DIAGNOSIS — I42.8 NONISCHEMIC CARDIOMYOPATHY: Primary | ICD-10-CM

## 2022-10-31 DIAGNOSIS — I47.19 AVNRT (AV NODAL RE-ENTRY TACHYCARDIA): ICD-10-CM

## 2022-10-31 DIAGNOSIS — D50.0 IRON DEFICIENCY ANEMIA DUE TO CHRONIC BLOOD LOSS: ICD-10-CM

## 2022-10-31 DIAGNOSIS — I10 PRIMARY HYPERTENSION: ICD-10-CM

## 2022-10-31 DIAGNOSIS — Z98.890 H/O CARDIAC RADIOFREQUENCY ABLATION: ICD-10-CM

## 2022-10-31 DIAGNOSIS — R00.0 SINUS TACHYCARDIA: ICD-10-CM

## 2022-10-31 PROCEDURE — 93005 ELECTROCARDIOGRAM TRACING: CPT | Mod: S$GLB,,, | Performed by: STUDENT IN AN ORGANIZED HEALTH CARE EDUCATION/TRAINING PROGRAM

## 2022-10-31 PROCEDURE — 93010 ELECTROCARDIOGRAM REPORT: CPT | Mod: S$GLB,,, | Performed by: INTERNAL MEDICINE

## 2022-10-31 PROCEDURE — 3077F PR MOST RECENT SYSTOLIC BLOOD PRESSURE >= 140 MM HG: ICD-10-PCS | Mod: CPTII,S$GLB,, | Performed by: STUDENT IN AN ORGANIZED HEALTH CARE EDUCATION/TRAINING PROGRAM

## 2022-10-31 PROCEDURE — 99214 OFFICE O/P EST MOD 30 MIN: CPT | Mod: S$GLB,,, | Performed by: STUDENT IN AN ORGANIZED HEALTH CARE EDUCATION/TRAINING PROGRAM

## 2022-10-31 PROCEDURE — 93010 RHYTHM STRIP: ICD-10-PCS | Mod: S$GLB,,, | Performed by: INTERNAL MEDICINE

## 2022-10-31 PROCEDURE — 4010F ACE/ARB THERAPY RXD/TAKEN: CPT | Mod: CPTII,S$GLB,, | Performed by: STUDENT IN AN ORGANIZED HEALTH CARE EDUCATION/TRAINING PROGRAM

## 2022-10-31 PROCEDURE — 3044F PR MOST RECENT HEMOGLOBIN A1C LEVEL <7.0%: ICD-10-PCS | Mod: CPTII,S$GLB,, | Performed by: STUDENT IN AN ORGANIZED HEALTH CARE EDUCATION/TRAINING PROGRAM

## 2022-10-31 PROCEDURE — 1159F MED LIST DOCD IN RCRD: CPT | Mod: CPTII,S$GLB,, | Performed by: STUDENT IN AN ORGANIZED HEALTH CARE EDUCATION/TRAINING PROGRAM

## 2022-10-31 PROCEDURE — 99214 PR OFFICE/OUTPT VISIT, EST, LEVL IV, 30-39 MIN: ICD-10-PCS | Mod: S$GLB,,, | Performed by: STUDENT IN AN ORGANIZED HEALTH CARE EDUCATION/TRAINING PROGRAM

## 2022-10-31 PROCEDURE — 3080F PR MOST RECENT DIASTOLIC BLOOD PRESSURE >= 90 MM HG: ICD-10-PCS | Mod: CPTII,S$GLB,, | Performed by: STUDENT IN AN ORGANIZED HEALTH CARE EDUCATION/TRAINING PROGRAM

## 2022-10-31 PROCEDURE — 4010F PR ACE/ARB THEARPY RXD/TAKEN: ICD-10-PCS | Mod: CPTII,S$GLB,, | Performed by: STUDENT IN AN ORGANIZED HEALTH CARE EDUCATION/TRAINING PROGRAM

## 2022-10-31 PROCEDURE — 3044F HG A1C LEVEL LT 7.0%: CPT | Mod: CPTII,S$GLB,, | Performed by: STUDENT IN AN ORGANIZED HEALTH CARE EDUCATION/TRAINING PROGRAM

## 2022-10-31 PROCEDURE — 99999 PR PBB SHADOW E&M-EST. PATIENT-LVL III: CPT | Mod: PBBFAC,,, | Performed by: STUDENT IN AN ORGANIZED HEALTH CARE EDUCATION/TRAINING PROGRAM

## 2022-10-31 PROCEDURE — 3080F DIAST BP >= 90 MM HG: CPT | Mod: CPTII,S$GLB,, | Performed by: STUDENT IN AN ORGANIZED HEALTH CARE EDUCATION/TRAINING PROGRAM

## 2022-10-31 PROCEDURE — 1159F PR MEDICATION LIST DOCUMENTED IN MEDICAL RECORD: ICD-10-PCS | Mod: CPTII,S$GLB,, | Performed by: STUDENT IN AN ORGANIZED HEALTH CARE EDUCATION/TRAINING PROGRAM

## 2022-10-31 PROCEDURE — 3077F SYST BP >= 140 MM HG: CPT | Mod: CPTII,S$GLB,, | Performed by: STUDENT IN AN ORGANIZED HEALTH CARE EDUCATION/TRAINING PROGRAM

## 2022-10-31 PROCEDURE — 93005 RHYTHM STRIP: ICD-10-PCS | Mod: S$GLB,,, | Performed by: STUDENT IN AN ORGANIZED HEALTH CARE EDUCATION/TRAINING PROGRAM

## 2022-10-31 PROCEDURE — 99999 PR PBB SHADOW E&M-EST. PATIENT-LVL III: ICD-10-PCS | Mod: PBBFAC,,, | Performed by: STUDENT IN AN ORGANIZED HEALTH CARE EDUCATION/TRAINING PROGRAM

## 2022-10-31 NOTE — LETTER
October 31, 2022      [facility] 3rd Fl  [address]  [facility] [address]  Phone: [phone]  Fax: [phone]       Patient: Ursula Smallwood   YOB: 1985  Date of Visit: 10/31/2022    To Whom It May Concern:    Caroline Smallwood  was seen in Arrhythmia Clinic with me at [facility] on 10/31/2022. The patient may return to work/school on 11/1/2022 with no restrictions. If you have any questions or concerns, or if I can be of further assistance, please do not hesitate to contact me.    Sincerely,    NICHELLE Zepeda MD

## 2022-10-31 NOTE — PROGRESS NOTES
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      PRESENTING HISTORY:     History of Present Illness:  Ms. 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 Unfortunately, she contracted COVID and presented to the ED on 1/23/2022 with complaints of shortness of breath and chronic cough, noted to have sinus tachycardia in the setting of her COVID infection. On echocardiogram, her systolic function was noted to be decreased, with LVEF sharad 20%. Her systolic function has since improved to LVEF 45%. 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     In brief review of her cardiac history, she previously presented to the ED 3/25/2021, 4/6/2021, and 4/16/2021 with complaints of palpitations and SVT. On ECG review, this appeared to be a narrow complex, short RP SVT. Per report, this arrhythmia terminated immediately with administration of 6 mg of adenosine. She was started on metoprolol succinate 25mg po daily. At the initial presentation with these symptoms, she was noted to be severely anemic, and was transfused 2 units of PRBCs. Despite her hemoglobin remaining stable, she continued to evidence periods of palpitations and SVT. I took her to the EP laboratory where typical AVNRT was diagnosed on EP study. This was followed by  "successful radiofrequency ablation of the slow pathway for typical AVNRT on 6/4/2021. She has since been able to undergo a robotic hysterectomy for definitive therapy of her metromenorrhagia secondary to uterine fibroids. She continues to have anemia, but this is resolving following her surgery. She had returned to her baseline level of health until approximately 12/29/2021, when her son contracted COVID and she began to develop symptoms of a COVID infection, with productive cough, shortness of breath, generalized fatigue, fevers, muscle aches, and symptoms of racing heart rates. Her shortness of breath seemed to have gotten worse, and she presented to the ED on 1/23/2022 for evaluation. At that encounter, her BP was elevated, but her oxygen saturation was acceptable, and she was discontinued on cough suppression and an optimized antihypertensive regimen. Unfortunately, her echocardiogram 2/28/2022 revealed newly decreased systolic function with LVEF 20%. She was initiated on guideline-directed medical therapy, and has established with the Advanced Heart Failure service with Dr. Fong. Her most recent echocardiogram reveals improved function, with LVEF 45%.     In discussion with Ms. Smallwood today, she tells me that she is feeling overall "much better" since her prior COVID infection. She reports compliance on all medication, and has made significant changes to her diet and exercise program in an effort to improve her systolic function. She has lost about 30 lbs, and has an overall goal of getting to 215 lbs. She has been walking for 2-3 miles per day with improved exercise tolerance, and shows me her Apple watch recordings where she has been closing her rings most days of the week. She denies any further episodes of palpitations, although she continues to report shortness of breath with significant exertion. She denies any further symptoms racing heart rates. She is no longer experiencing productive cough, " generalized fatigue, fevers, or muscle aches. She denies any episodes of dizziness, lightheadedness, syncope/presyncope, recent palpitations, chest pain or chest discomfort, nausea or vomiting, orthopnea, lower extremity edema, or PND.     Review of Systems:  Review of Systems   Constitutional:  Negative for activity change, fatigue and fever.   HENT:  Negative for nasal congestion, nosebleeds, postnasal drip, rhinorrhea, sinus pressure/congestion, sneezing and sore throat.    Respiratory:  Positive for shortness of breath. Negative for apnea, cough, chest tightness and wheezing.    Cardiovascular:  Negative for chest pain, palpitations, leg swelling and claudication.        No further symptoms of racing heart rates.   Gastrointestinal:  Negative for abdominal distention, abdominal pain, blood in stool, change in bowel habit, constipation, diarrhea, nausea, vomiting and change in bowel habit.   Genitourinary:  Negative for dysuria and hematuria.   Musculoskeletal:  Negative for gait problem and myalgias.   Neurological:  Negative for dizziness, seizures, syncope, weakness, light-headedness, headaches, coordination difficulties and coordination difficulties.      PAST HISTORY:     Past Medical History:   Diagnosis Date    Anemia     Hypertension     SVT (supraventricular tachycardia)     Uterine fibroid      Past Surgical History:   Procedure Laterality Date    ABLATION N/A 06/04/2021    Procedure: Ablation;  Surgeon: NICHELLE Zepeda MD;  Location: [facility] EP LAB;  Service: Cardiology;  Laterality: N/A;  SVT, RFA, Carto, anes, EH, 3prep    CYSTOSCOPY N/A 08/03/2021    Procedure: CYSTOSCOPY;  Surgeon: Jamarcus Ventura MD;  Location: [facility] OR;  Service: OB/GYN;  Laterality: N/A;    HYSTERECTOMY      ROBOT-ASSISTED LAPAROSCOPIC HYSTERECTOMY N/A 08/03/2021    Procedure: ROBOTIC HYSTERECTOMY;  Surgeon: Jamarcus Ventura MD;  Location: [facility] OR;  Service: OB/GYN;  Laterality: N/A;    ROBOT-ASSISTED SALPINGECTOMY Bilateral  08/03/2021    Procedure: ROBOTIC SALPINGECTOMY;  Surgeon: Jamarcus Ventura MD;  Location: [facility];  Service: OB/GYN;  Laterality: Bilateral;       Family History:  No cardiac conditions of which she is aware.     Social History:  She  reports that she has never smoked. She has never used smokeless tobacco. She reports that she does not drink alcohol and does not use drugs.      MEDICATIONS & ALLERGIES:     Review of patient's allergies indicates:  No Known Allergies    Current Outpatient Medications on File Prior to Visit   Medication Sig Dispense Refill    dapagliflozin (FARXIGA) 10 mg tablet Take 1 tablet (10 mg total) by mouth once daily. 30 tablet 11    diclofenac (VOLTAREN) 75 MG EC tablet Take 1 tablet (75 mg total) by mouth 2 (two) times daily. 60 tablet 0    metoprolol succinate (TOPROL-XL) 200 MG 24 hr tablet Take 1 tablet (200 mg total) by mouth once daily. 30 tablet 11    sacubitriL-valsartan (ENTRESTO)  mg per tablet Take 1 tablet by mouth 2 (two) times daily. 60 tablet 6    spironolactone (ALDACTONE) 25 MG tablet Take 1 tablet (25 mg total) by mouth once daily. 30 tablet 11    torsemide (DEMADEX) 20 MG Tab Take 1 tablet (20 mg total) by mouth every other day. 15 tablet 11    [DISCONTINUED] lisinopriL (PRINIVIL,ZESTRIL) 5 MG tablet Take 1 tablet (5 mg total) by mouth once daily. 90 tablet 3    [DISCONTINUED] lisinopriL-hydrochlorothiazide (PRINZIDE,ZESTORETIC) 10-12.5 mg per tablet Take 1 tablet by mouth once daily. 30 tablet 0    [DISCONTINUED] metoprolol tartrate (LOPRESSOR) 50 MG tablet Take 1 tablet (50 mg total) by mouth once daily. 30 tablet 0     No current facility-administered medications on file prior to visit.        OBJECTIVE:     Vital Signs:  BP (!) 173/103   Pulse 85   LMP 06/04/2021 Comment: since 4/11/21    Physical Exam:  Physical Exam  Constitutional:       General: She is not in acute distress.     Appearance: Normal appearance. She is obese. She is not ill-appearing or  diaphoretic.      Comments: Well-appearing woman in NAD, she has lost weight since our prior encounters.    HENT:      Head: Normocephalic and atraumatic.      Comments: Mask worn in clinic in the setting of COVID precautions.   Eyes:      Pupils: Pupils are equal, round, and reactive to light.   Cardiovascular:      Rate and Rhythm: Normal rate and regular rhythm.      Pulses: Normal pulses.      Heart sounds: Normal heart sounds. No murmur heard.    No friction rub. No gallop.   Pulmonary:      Effort: Pulmonary effort is normal. No respiratory distress.      Breath sounds: Normal breath sounds. No wheezing or rhonchi.   Chest:      Chest wall: No tenderness.   Abdominal:      General: There is no distension.      Palpations: Abdomen is soft.      Tenderness: There is no abdominal tenderness.   Musculoskeletal:         General: No swelling or tenderness.      Cervical back: Normal range of motion.      Right lower leg: No edema.      Left lower leg: No edema.   Skin:     General: Skin is warm and dry.      Findings: No erythema, lesion or rash.   Neurological:      General: No focal deficit present.      Mental Status: She is alert and oriented to person, place, and time. Mental status is at baseline.      Motor: No weakness.      Gait: Gait normal.   Psychiatric:         Mood and Affect: Mood normal.         Behavior: Behavior normal.      Laboratory Data:  Lab Results   Component Value Date    WBC 9.60 09/07/2022    HGB 13.4 09/07/2022    HCT 41.5 09/07/2022    MCV 83 09/07/2022     (H) 09/07/2022     Lab Results   Component Value Date     09/07/2022     09/07/2022    K 4.1 09/07/2022     09/07/2022    CO2 28 09/07/2022    BUN 16 09/07/2022    CREATININE 1.3 09/07/2022    CALCIUM 9.9 09/07/2022    MG 2.0 03/08/2022     Lab Results   Component Value Date    INR 1.1 07/18/2021    INR 1.1 06/01/2021    INR 1.1 04/06/2021     Pertinent Cardiac Data:  ECG: Normal sinus rhythm with rate of 90  bpm,  ms, QRS 98 ms, QT/QTc 386/472 ms.     Resting 2D Transthoracic Echocardiogram - 5/3/2021:  The left ventricle is mildly enlarged with concentric hypertrophy and mildly decreased systolic function.  The estimated ejection fraction is 45%.  Grade I left ventricular diastolic dysfunction.  Normal right ventricular size with normal right ventricular systolic function.  Mild mitral regurgitation.  Mild tricuspid regurgitation.  Normal central venous pressure (3 mmHg).  The estimated PA systolic pressure is 31 mmHg.    Pharmacologic Nuclear Cardiac Stress Test (PET) - 5/21/2021:    There are no clinically significant perfusion abnormalities. There is a small to moderate (10-15%) amount of mild fixed heterogeneity that improves with stress.    The whole heart absolute myocardial perfusion values averaged 1.36 cc/min/g at rest, which is elevated; 1.85 cc/min/g at stress, which is low normal; and CFR is 1.38 , which is moderately reduced in part due to elevated resting flow.    The gated perfusion images showed an ejection fraction of 39% at rest and 42% during stress. A normal ejection fraction is greater than 51%.    There is mild global hypokinesis at rest and during stress.    The LV cavity size is normal at rest and stress.    The EKG portion of this study is negative for ischemia.    During stress, rare PVCs are noted.    The patient reported no chest pain during the stress test.    There are no prior studies for comparison.    EP Study and Radiofrequency Ablation - 6/4/2021:  Successful radiofrequency slow pathway modification for typical, slow-fast AVNRT.    Resting 2D Transthoracic Echocardiogram - 2/28/2022:  The left ventricle is severely enlarged with eccentric hypertrophy and severely decreased systolic function.  The estimated ejection fraction is 20%.  There is severe left ventricular global hypokinesis.  Grade III left ventricular diastolic dysfunction.  Moderate mitral regurgitation.  Mild  tricuspid regurgitation.  Severe left atrial enlargement.  Mild pulmonic regurgitation.  Intermediate central venous pressure (8 mmHg).  The estimated PA systolic pressure is 34 mmHg.  Trivial pericardial effusion.    Resting 2D Transthoracic Echocardiogram - 4/25/2022:  The left ventricle is severely enlarged with eccentric hypertrophy and moderately decreased systolic function. The estimated ejection fraction is 30%.  Mild right ventricular enlargement with low normal right ventricular systolic function.  Grade I left ventricular diastolic dysfunction.  Moderate mitral regurgitation.  The estimated PA systolic pressure is 25 mmHg.  Normal central venous pressure (3 mmHg).    Resting 2D Transthoracic Echocardiogram - 9/7/2022:  The left ventricle is mildly enlarged with moderately decreased systolic function. The estimated ejection fraction is 45%.  Normal right ventricular size with normal right ventricular systolic function.  Grade I left ventricular diastolic dysfunction.  The estimated PA systolic pressure is 24 mmHg.  Normal central venous pressure (3 mmHg).      ASSESSMENT & PLAN:   Ms. 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 Unfortunately, she contracted COVID and presented to the ED on 1/23/2022 with complaints of shortness of breath and chronic cough, noted to have sinus tachycardia in the setting of her COVID infection. On echocardiogram, her systolic function was noted to be decreased, with LVEF sharad 20%. Her systolic function has since improved to LVEF 45%. 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     - We discussed the results of her recent echocardiogram, revealing improvement in her systolic function. Most recently her systolic function has improved from her sharad of LVEF 20% to 45%, and she may have continued improvement on guideline-directed medical therapy with close follow-up with the Advanced Heart Failure team. Should her systolic function decline to less than 35%, we discussed the possible indication for implantation of a primary prevention ICD.    - She denies any further episodes of palpitations following her COVID recovery, with no evidence of any recurrent SVT on serial ECGs following ablation. She was noted to have periods of prolonged sinus tachycardia in the setting of her COVID infection, which has now resolved with return to NSR and normal rates.   - She remains on her recently adjusted antihypertensive regimen, with reported improvement in her home BP recordings.   - We discussed the pathophysiology of typical AVNRT and we reviewed the area that was ablated. A rudimentary diagram was drawn for illustrative purposes. She remains on metoprolol succinate 200mg po daily with no adverse side effects.  - Possible underlying drivers of atrial arrhythmia were addressed at this appointment, including recommendations for weight loss - now a class I recommendation. We discussed the role that her COVID infection and her prior anemia may play in exacerbating her sinus tachycardia. Review of laboratory data revealed stable and acceptable TSH at 2.685.     This patient will return to clinic with me in one year. She will continue to follow with the Advanced Heart Failure service and her PCP in the interim. All questions and concerns were addressed at this encounter.     Signing Physician:       ELSI Zepeda MD  Electrophysiology Attending

## 2022-11-05 ENCOUNTER — ANESTHESIA EVENT (OUTPATIENT)
Dept: SURGERY | Facility: HOSPITAL | Age: 37
DRG: 660 | End: 2022-11-05
Payer: COMMERCIAL

## 2022-11-05 ENCOUNTER — HOSPITAL ENCOUNTER (OUTPATIENT)
Facility: HOSPITAL | Age: 37
Discharge: HOME | DRG: 660 | End: 2022-11-08
Attending: FAMILY MEDICINE | Admitting: HOSPITALIST
Payer: COMMERCIAL

## 2022-11-05 DIAGNOSIS — N20.1 CALCULUS OF URETER: Primary | ICD-10-CM

## 2022-11-05 DIAGNOSIS — N13.2 URETERAL STONE WITH HYDRONEPHROSIS: ICD-10-CM

## 2022-11-05 DIAGNOSIS — R07.9 CHEST PAIN: ICD-10-CM

## 2022-11-05 DIAGNOSIS — N20.0 RENAL STONE: ICD-10-CM

## 2022-11-05 DIAGNOSIS — N30.01 ACUTE CYSTITIS WITH HEMATURIA: ICD-10-CM

## 2022-11-05 DIAGNOSIS — N13.30 HYDRONEPHROSIS, UNSPECIFIED HYDRONEPHROSIS TYPE: ICD-10-CM

## 2022-11-05 PROBLEM — E11.9 TYPE 2 DIABETES MELLITUS, WITHOUT LONG-TERM CURRENT USE OF INSULIN: Status: ACTIVE | Noted: 2022-11-05

## 2022-11-05 LAB
ALBUMIN SERPL BCP-MCNC: 4.2 G/DL (ref 3.5–5.2)
ALP SERPL-CCNC: 76 U/L (ref 38–126)
ALT SERPL W/O P-5'-P-CCNC: 20 U/L (ref 10–44)
ANION GAP SERPL CALC-SCNC: 6 MMOL/L (ref 8–16)
AST SERPL-CCNC: 13 U/L (ref 15–46)
BACTERIA #/AREA URNS AUTO: ABNORMAL /HPF
BASOPHILS # BLD AUTO: 0.03 K/UL (ref 0–0.2)
BASOPHILS NFR BLD: 0.3 % (ref 0–1.9)
BILIRUB SERPL-MCNC: 0.5 MG/DL (ref 0.1–1)
BILIRUB UR QL STRIP: NEGATIVE
CALCIUM SERPL-MCNC: 9.1 MG/DL (ref 8.7–10.5)
CHLORIDE SERPL-SCNC: 105 MMOL/L (ref 95–110)
CLARITY UR REFRACT.AUTO: CLEAR
CO2 SERPL-SCNC: 29 MMOL/L (ref 23–29)
COLOR UR AUTO: YELLOW
CREAT SERPL-MCNC: 1 MG/DL (ref 0.5–1.4)
DIFFERENTIAL METHOD: ABNORMAL
EOSINOPHIL # BLD AUTO: 0.1 K/UL (ref 0–0.5)
EOSINOPHIL NFR BLD: 1.3 % (ref 0–8)
ERYTHROCYTE [DISTWIDTH] IN BLOOD BY AUTOMATED COUNT: 14.3 % (ref 11.5–14.5)
EST. GFR  (NO RACE VARIABLE): >60 ML/MIN/1.73 M^2
GLUCOSE SERPL-MCNC: 123 MG/DL (ref 70–110)
GLUCOSE UR QL STRIP: NEGATIVE
HCT VFR BLD AUTO: 43.6 % (ref 37–48.5)
HGB BLD-MCNC: 13.7 G/DL (ref 12–16)
HGB UR QL STRIP: ABNORMAL
HYALINE CASTS UR QL AUTO: 0 /LPF
IMM GRANULOCYTES # BLD AUTO: 0.05 K/UL (ref 0–0.04)
IMM GRANULOCYTES NFR BLD AUTO: 0.4 % (ref 0–0.5)
KETONES UR QL STRIP: NEGATIVE
LEUKOCYTE ESTERASE UR QL STRIP: ABNORMAL
LYMPHOCYTES # BLD AUTO: 2 K/UL (ref 1–4.8)
LYMPHOCYTES NFR BLD: 17.4 % (ref 18–48)
MCH RBC QN AUTO: 25.6 PG (ref 27–31)
MCHC RBC AUTO-ENTMCNC: 31.4 G/DL (ref 32–36)
MCV RBC AUTO: 82 FL (ref 82–98)
MICROSCOPIC COMMENT: ABNORMAL
MONOCYTES # BLD AUTO: 1 K/UL (ref 0.3–1)
MONOCYTES NFR BLD: 8.6 % (ref 4–15)
NEUTROPHILS # BLD AUTO: 8.1 K/UL (ref 1.8–7.7)
NEUTROPHILS NFR BLD: 72 % (ref 38–73)
NITRITE UR QL STRIP: POSITIVE
NRBC BLD-RTO: 0 /100 WBC
PH UR STRIP: 7 [PH] (ref 5–8)
PLATELET # BLD AUTO: 463 K/UL (ref 150–450)
PMV BLD AUTO: 9.9 FL (ref 9.2–12.9)
POTASSIUM SERPL-SCNC: 4.7 MMOL/L (ref 3.5–5.1)
PROT SERPL-MCNC: 7.8 G/DL (ref 6–8.4)
PROT UR QL STRIP: ABNORMAL
RBC # BLD AUTO: 5.35 M/UL (ref 4–5.4)
RBC #/AREA URNS AUTO: 5 /HPF (ref 0–4)
SODIUM SERPL-SCNC: 140 MMOL/L (ref 136–145)
SP GR UR STRIP: 1.01 (ref 1–1.03)
URN SPEC COLLECT METH UR: ABNORMAL
UROBILINOGEN UR STRIP-ACNC: NEGATIVE EU/DL
UUN UR-MCNC: 12 MG/DL (ref 7–17)
WBC # BLD AUTO: 11.18 K/UL (ref 3.9–12.7)
WBC #/AREA URNS AUTO: 12 /HPF (ref 0–5)

## 2022-11-05 PROCEDURE — 87186 SC STD MICRODIL/AGAR DIL: CPT | Mod: ER | Performed by: FAMILY MEDICINE

## 2022-11-05 PROCEDURE — G0378 HOSPITAL OBSERVATION PER HR: HCPCS

## 2022-11-05 PROCEDURE — 87086 URINE CULTURE/COLONY COUNT: CPT | Mod: ER | Performed by: FAMILY MEDICINE

## 2022-11-05 PROCEDURE — 85025 COMPLETE CBC W/AUTO DIFF WBC: CPT | Mod: ER | Performed by: FAMILY MEDICINE

## 2022-11-05 PROCEDURE — 99285 EMERGENCY DEPT VISIT HI MDM: CPT | Mod: 25,ER

## 2022-11-05 PROCEDURE — 63600175 PHARM REV CODE 636 W HCPCS: Mod: ER | Performed by: FAMILY MEDICINE

## 2022-11-05 PROCEDURE — 81000 URINALYSIS NONAUTO W/SCOPE: CPT | Mod: ER | Performed by: FAMILY MEDICINE

## 2022-11-05 PROCEDURE — 87077 CULTURE AEROBIC IDENTIFY: CPT | Mod: ER | Performed by: FAMILY MEDICINE

## 2022-11-05 PROCEDURE — 96375 TX/PRO/DX INJ NEW DRUG ADDON: CPT | Mod: ER

## 2022-11-05 PROCEDURE — 96366 THER/PROPH/DIAG IV INF ADDON: CPT | Mod: ER

## 2022-11-05 PROCEDURE — 96365 THER/PROPH/DIAG IV INF INIT: CPT | Mod: ER

## 2022-11-05 PROCEDURE — 87088 URINE BACTERIA CULTURE: CPT | Mod: ER | Performed by: FAMILY MEDICINE

## 2022-11-05 PROCEDURE — 25000003 PHARM REV CODE 250: Mod: ER | Performed by: FAMILY MEDICINE

## 2022-11-05 PROCEDURE — 80053 COMPREHEN METABOLIC PANEL: CPT | Mod: ER | Performed by: FAMILY MEDICINE

## 2022-11-05 PROCEDURE — 63600175 PHARM REV CODE 636 W HCPCS: Mod: ER | Performed by: EMERGENCY MEDICINE

## 2022-11-05 RX ORDER — DIPHENHYDRAMINE HYDROCHLORIDE 50 MG/ML
25 INJECTION INTRAMUSCULAR; INTRAVENOUS
Status: COMPLETED | OUTPATIENT
Start: 2022-11-05 | End: 2022-11-05

## 2022-11-05 RX ORDER — ONDANSETRON 2 MG/ML
4 INJECTION INTRAMUSCULAR; INTRAVENOUS
Status: COMPLETED | OUTPATIENT
Start: 2022-11-05 | End: 2022-11-05

## 2022-11-05 RX ORDER — KETOROLAC TROMETHAMINE 30 MG/ML
15 INJECTION, SOLUTION INTRAMUSCULAR; INTRAVENOUS
Status: COMPLETED | OUTPATIENT
Start: 2022-11-05 | End: 2022-11-05

## 2022-11-05 RX ORDER — TAMSULOSIN HYDROCHLORIDE 0.4 MG/1
0.4 CAPSULE ORAL DAILY
Status: DISCONTINUED | OUTPATIENT
Start: 2022-11-06 | End: 2022-11-08 | Stop reason: HOSPADM

## 2022-11-05 RX ORDER — TAMSULOSIN HYDROCHLORIDE 0.4 MG/1
0.4 CAPSULE ORAL
Status: COMPLETED | OUTPATIENT
Start: 2022-11-05 | End: 2022-11-05

## 2022-11-05 RX ORDER — NALOXONE HCL 0.4 MG/ML
0.02 VIAL (ML) INJECTION
Status: DISCONTINUED | OUTPATIENT
Start: 2022-11-05 | End: 2022-11-08 | Stop reason: HOSPADM

## 2022-11-05 RX ORDER — GLUCAGON 1 MG
1 KIT INJECTION
Status: DISCONTINUED | OUTPATIENT
Start: 2022-11-05 | End: 2022-11-08 | Stop reason: HOSPADM

## 2022-11-05 RX ORDER — IBUPROFEN 200 MG
16 TABLET ORAL
Status: DISCONTINUED | OUTPATIENT
Start: 2022-11-05 | End: 2022-11-08 | Stop reason: HOSPADM

## 2022-11-05 RX ORDER — HEPARIN SODIUM 5000 [USP'U]/ML
5000 INJECTION, SOLUTION INTRAVENOUS; SUBCUTANEOUS EVERY 8 HOURS
Status: DISCONTINUED | OUTPATIENT
Start: 2022-11-05 | End: 2022-11-08 | Stop reason: HOSPADM

## 2022-11-05 RX ORDER — METOPROLOL SUCCINATE 50 MG/1
200 TABLET, EXTENDED RELEASE ORAL DAILY
Status: DISCONTINUED | OUTPATIENT
Start: 2022-11-06 | End: 2022-11-08 | Stop reason: HOSPADM

## 2022-11-05 RX ORDER — TORSEMIDE 20 MG/1
20 TABLET ORAL EVERY OTHER DAY
Status: DISCONTINUED | OUTPATIENT
Start: 2022-11-06 | End: 2022-11-08 | Stop reason: HOSPADM

## 2022-11-05 RX ORDER — SPIRONOLACTONE 25 MG/1
25 TABLET ORAL DAILY
Status: DISCONTINUED | OUTPATIENT
Start: 2022-11-06 | End: 2022-11-08 | Stop reason: HOSPADM

## 2022-11-05 RX ORDER — MORPHINE SULFATE 4 MG/ML
4 INJECTION, SOLUTION INTRAMUSCULAR; INTRAVENOUS
Status: COMPLETED | OUTPATIENT
Start: 2022-11-05 | End: 2022-11-05

## 2022-11-05 RX ORDER — SODIUM CHLORIDE 9 MG/ML
1000 INJECTION, SOLUTION INTRAVENOUS
Status: COMPLETED | OUTPATIENT
Start: 2022-11-05 | End: 2022-11-05

## 2022-11-05 RX ORDER — ONDANSETRON 2 MG/ML
4 INJECTION INTRAMUSCULAR; INTRAVENOUS EVERY 8 HOURS PRN
Status: DISCONTINUED | OUTPATIENT
Start: 2022-11-05 | End: 2022-11-08 | Stop reason: HOSPADM

## 2022-11-05 RX ORDER — TALC
6 POWDER (GRAM) TOPICAL NIGHTLY PRN
Status: DISCONTINUED | OUTPATIENT
Start: 2022-11-05 | End: 2022-11-08 | Stop reason: HOSPADM

## 2022-11-05 RX ORDER — SODIUM CHLORIDE 0.9 % (FLUSH) 0.9 %
10 SYRINGE (ML) INJECTION
Status: DISCONTINUED | OUTPATIENT
Start: 2022-11-05 | End: 2022-11-08 | Stop reason: HOSPADM

## 2022-11-05 RX ORDER — SODIUM CHLORIDE 0.9 % (FLUSH) 0.9 %
10 SYRINGE (ML) INJECTION EVERY 12 HOURS PRN
Status: DISCONTINUED | OUTPATIENT
Start: 2022-11-05 | End: 2022-11-08 | Stop reason: HOSPADM

## 2022-11-05 RX ORDER — SIMETHICONE 80 MG
1 TABLET,CHEWABLE ORAL 4 TIMES DAILY PRN
Status: DISCONTINUED | OUTPATIENT
Start: 2022-11-05 | End: 2022-11-08 | Stop reason: HOSPADM

## 2022-11-05 RX ORDER — HYDROMORPHONE HYDROCHLORIDE 1 MG/ML
1 INJECTION, SOLUTION INTRAMUSCULAR; INTRAVENOUS; SUBCUTANEOUS EVERY 4 HOURS PRN
Status: DISCONTINUED | OUTPATIENT
Start: 2022-11-05 | End: 2022-11-06

## 2022-11-05 RX ORDER — ALBUTEROL SULFATE 90 UG/1
2 AEROSOL, METERED RESPIRATORY (INHALATION) EVERY 8 HOURS
Status: DISCONTINUED | OUTPATIENT
Start: 2022-11-06 | End: 2022-11-05

## 2022-11-05 RX ORDER — HYDROMORPHONE HYDROCHLORIDE 1 MG/ML
1 INJECTION, SOLUTION INTRAMUSCULAR; INTRAVENOUS; SUBCUTANEOUS
Status: COMPLETED | OUTPATIENT
Start: 2022-11-05 | End: 2022-11-05

## 2022-11-05 RX ORDER — TALC
6 POWDER (GRAM) TOPICAL NIGHTLY PRN
Status: DISCONTINUED | OUTPATIENT
Start: 2022-11-05 | End: 2022-11-05 | Stop reason: SDUPTHER

## 2022-11-05 RX ORDER — INSULIN ASPART 100 [IU]/ML
1-10 INJECTION, SOLUTION INTRAVENOUS; SUBCUTANEOUS
Status: DISCONTINUED | OUTPATIENT
Start: 2022-11-05 | End: 2022-11-08 | Stop reason: HOSPADM

## 2022-11-05 RX ORDER — IBUPROFEN 200 MG
24 TABLET ORAL
Status: DISCONTINUED | OUTPATIENT
Start: 2022-11-05 | End: 2022-11-08 | Stop reason: HOSPADM

## 2022-11-05 RX ADMIN — SODIUM CHLORIDE 1000 ML: 9 INJECTION, SOLUTION INTRAVENOUS at 03:11

## 2022-11-05 RX ADMIN — TAMSULOSIN HYDROCHLORIDE 0.4 MG: 0.4 CAPSULE ORAL at 03:11

## 2022-11-05 RX ADMIN — DIPHENHYDRAMINE HYDROCHLORIDE 25 MG: 50 INJECTION, SOLUTION INTRAMUSCULAR; INTRAVENOUS at 07:11

## 2022-11-05 RX ADMIN — KETOROLAC TROMETHAMINE 15 MG: 30 INJECTION, SOLUTION INTRAMUSCULAR; INTRAVENOUS at 03:11

## 2022-11-05 RX ADMIN — ONDANSETRON HYDROCHLORIDE 4 MG: 2 INJECTION, SOLUTION INTRAMUSCULAR; INTRAVENOUS at 02:11

## 2022-11-05 RX ADMIN — HYDROMORPHONE HYDROCHLORIDE 1 MG: 1 INJECTION, SOLUTION INTRAMUSCULAR; INTRAVENOUS; SUBCUTANEOUS at 03:11

## 2022-11-05 RX ADMIN — CEFTRIAXONE 2 G: 2 INJECTION, SOLUTION INTRAVENOUS at 03:11

## 2022-11-05 RX ADMIN — MORPHINE SULFATE 4 MG: 4 INJECTION INTRAVENOUS at 02:11

## 2022-11-05 NOTE — SUBJECTIVE & OBJECTIVE
Past Medical History:   Diagnosis Date    Anemia     Hypertension     SVT (supraventricular tachycardia)     Uterine fibroid        Past Surgical History:   Procedure Laterality Date    ABLATION N/A 06/04/2021    Procedure: Ablation;  Surgeon: NICHELLE Zepeda MD;  Location: [facility] EP LAB;  Service: Cardiology;  Laterality: N/A;  SVT, RFA, Carto, anes, EH, 3prep    CYSTOSCOPY N/A 08/03/2021    Procedure: CYSTOSCOPY;  Surgeon: Jamarcus Ventura MD;  Location: [facility] OR;  Service: OB/GYN;  Laterality: N/A;    HYSTERECTOMY      ROBOT-ASSISTED LAPAROSCOPIC HYSTERECTOMY N/A 08/03/2021    Procedure: ROBOTIC HYSTERECTOMY;  Surgeon: Jamarcus Ventura MD;  Location: [facility] OR;  Service: OB/GYN;  Laterality: N/A;    ROBOT-ASSISTED SALPINGECTOMY Bilateral 08/03/2021    Procedure: ROBOTIC SALPINGECTOMY;  Surgeon: Jamarcus Ventura MD;  Location: [facility] OR;  Service: OB/GYN;  Laterality: Bilateral;       Review of patient's allergies indicates:  No Known Allergies    No current facility-administered medications on file prior to encounter.     Current Outpatient Medications on File Prior to Encounter   Medication Sig    dapagliflozin (FARXIGA) 10 mg tablet Take 1 tablet (10 mg total) by mouth once daily.    metoprolol succinate (TOPROL-XL) 200 MG 24 hr tablet Take 1 tablet (200 mg total) by mouth once daily.    sacubitriL-valsartan (ENTRESTO)  mg per tablet Take 1 tablet by mouth 2 (two) times daily.    spironolactone (ALDACTONE) 25 MG tablet Take 1 tablet (25 mg total) by mouth once daily.    torsemide (DEMADEX) 20 MG Tab Take 1 tablet (20 mg total) by mouth every other day.    diclofenac (VOLTAREN) 75 MG EC tablet Take 1 tablet (75 mg total) by mouth 2 (two) times daily.    [DISCONTINUED] lisinopriL (PRINIVIL,ZESTRIL) 5 MG tablet Take 1 tablet (5 mg total) by mouth once daily.    [DISCONTINUED] lisinopriL-hydrochlorothiazide (PRINZIDE,ZESTORETIC) 10-12.5 mg per tablet Take 1 tablet by mouth once daily.    [DISCONTINUED]  metoprolol tartrate (LOPRESSOR) 50 MG tablet Take 1 tablet (50 mg total) by mouth once daily.     Family History    None       Tobacco Use    Smoking status: Never    Smokeless tobacco: Never   Substance and Sexual Activity    Alcohol use: No    Drug use: No    Sexual activity: Not Currently     Partners: Male     Birth control/protection: Injection     Review of Systems   Constitutional: Negative.    HENT: Negative.     Eyes: Negative.    Respiratory: Negative.     Cardiovascular: Negative.    Gastrointestinal:  Positive for abdominal pain.   Endocrine: Negative.    Genitourinary:  Positive for dysuria, flank pain and frequency.   Skin: Negative.    Allergic/Immunologic: Negative.    Neurological: Negative.    Hematological: Negative.    Psychiatric/Behavioral: Negative.     Objective:     Vital Signs (Most Recent):  Temp: 98.4 °F (36.9 °C) (11/05/22 1335)  Pulse: 92 (11/05/22 1335)  Resp: 18 (11/05/22 1539)  BP: (!) 192/99 (11/05/22 1335)  SpO2: 97 % (11/05/22 1335)   Vital Signs (24h Range):  Temp:  [98.4 °F (36.9 °C)] 98.4 °F (36.9 °C)  Pulse:  [92] 92  Resp:  [18] 18  SpO2:  [97 %] 97 %  BP: (192)/(99) 192/99     Weight: 133.8 kg (295 lb)  Body mass index is 50.64 kg/m².    Physical Exam        Significant Labs: All pertinent labs within the past 24 hours have been reviewed.  CBC:   Recent Labs   Lab 11/05/22  1351   WBC 11.18   HGB 13.7   HCT 43.6   *     CMP:   Recent Labs   Lab 11/05/22  1351      K 4.7      CO2 29   *   BUN 12   CREATININE 1.00   CALCIUM 9.1   PROT 7.8   ALBUMIN 4.2   BILITOT 0.5   ALKPHOS 76   AST 13*   ALT 20   ANIONGAP 6*     Urine Culture: No results for input(s): LABURIN in the last 48 hours.  Urine Studies:   Recent Labs   Lab 11/05/22  1353   COLORU Yellow   APPEARANCEUA Clear   PHUR 7.0   SPECGRAV 1.015   PROTEINUA 1+*   GLUCUA Negative   KETONESU Negative   BILIRUBINUA Negative   OCCULTUA 1+*   NITRITE Positive*   UROBILINOGEN Negative   LEUKOCYTESUR 1+*    RBCUA 5*   WBCUA 12*   BACTERIA Moderate*   HYALINECASTS 0       Significant Imaging: I have reviewed all pertinent imaging results/findings within the past 24 hours.

## 2022-11-05 NOTE — ANESTHESIA PREPROCEDURE EVALUATION
11/05/2022  Ursula Smallwood is a 37 y.o., female.  Pre-operative evaluation for Procedure(s) (LRB):  CYSTOURETEROSCOPY, WITH BILATERAL RETROGRADE PYELOGRAM AND URETERAL STENT INSERTION (Bilateral)    NPO >8 instruction    Vitals:    11/05/22 1400 11/05/22 1539 11/05/22 1600 11/05/22 1700   BP:   (!) 141/66 133/77   Pulse:   81 82   Resp: 18 18 19 18   Temp:       TempSrc:       SpO2:   97% 98%   Weight:           Patient Active Problem List   Diagnosis    Symptomatic anemia    Morbid obesity    Uterine fibroid    Menorrhagia with irregular cycle    H/O cardiac radiofrequency ablation    H/O abdominal hysterectomy    COVID-19    Hx of SVT (supraventricular tachycardia)    Chronic systolic heart failure    Hypertension    Nonischemic cardiomyopathy    Morbid obesity with BMI of 40.0-44.9, adult    Ureteral stone with hydronephrosis    Acute cystitis with hematuria    Type 2 diabetes mellitus, without long-term current use of insulin       Review of patient's allergies indicates:  No Known Allergies    No current facility-administered medications on file prior to encounter.     Current Outpatient Medications on File Prior to Encounter   Medication Sig Dispense Refill    dapagliflozin (FARXIGA) 10 mg tablet Take 1 tablet (10 mg total) by mouth once daily. 30 tablet 11    metoprolol succinate (TOPROL-XL) 200 MG 24 hr tablet Take 1 tablet (200 mg total) by mouth once daily. 30 tablet 11    sacubitriL-valsartan (ENTRESTO)  mg per tablet Take 1 tablet by mouth 2 (two) times daily. 60 tablet 6    spironolactone (ALDACTONE) 25 MG tablet Take 1 tablet (25 mg total) by mouth once daily. 30 tablet 11    torsemide (DEMADEX) 20 MG Tab Take 1 tablet (20 mg total) by mouth every other day. 15 tablet 11    diclofenac (VOLTAREN) 75 MG EC tablet Take 1 tablet (75 mg total) by mouth 2 (two) times daily. 60  tablet 0    [DISCONTINUED] lisinopriL (PRINIVIL,ZESTRIL) 5 MG tablet Take 1 tablet (5 mg total) by mouth once daily. 90 tablet 3    [DISCONTINUED] lisinopriL-hydrochlorothiazide (PRINZIDE,ZESTORETIC) 10-12.5 mg per tablet Take 1 tablet by mouth once daily. 30 tablet 0    [DISCONTINUED] metoprolol tartrate (LOPRESSOR) 50 MG tablet Take 1 tablet (50 mg total) by mouth once daily. 30 tablet 0       Past Surgical History:   Procedure Laterality Date    ABLATION N/A 06/04/2021    Procedure: Ablation;  Surgeon: NICHELLE Zepeda MD;  Location: [facility];  Service: Cardiology;  Laterality: N/A;  SVT, RFA, Carto, anes, EH, 3prep    CYSTOSCOPY N/A 08/03/2021    Procedure: CYSTOSCOPY;  Surgeon: Jamarcus Ventura MD;  Location: [facility] OR;  Service: OB/GYN;  Laterality: N/A;    HYSTERECTOMY      ROBOT-ASSISTED LAPAROSCOPIC HYSTERECTOMY N/A 08/03/2021    Procedure: ROBOTIC HYSTERECTOMY;  Surgeon: Jamarcus Ventura MD;  Location: [facility] OR;  Service: OB/GYN;  Laterality: N/A;    ROBOT-ASSISTED SALPINGECTOMY Bilateral 08/03/2021    Procedure: ROBOTIC SALPINGECTOMY;  Surgeon: Jamarcus Ventura MD;  Location: [facility] OR;  Service: OB/GYN;  Laterality: Bilateral;       Social History     Socioeconomic History    Marital status: Single   Tobacco Use    Smoking status: Never    Smokeless tobacco: Never   Substance and Sexual Activity    Alcohol use: No    Drug use: No    Sexual activity: Not Currently     Partners: Male     Birth control/protection: Injection         CBC:   Recent Labs     11/05/22  1351   WBC 11.18   RBC 5.35   HGB 13.7   HCT 43.6   *   MCV 82   MCH 25.6*   MCHC 31.4*       CMP:   Recent Labs     11/05/22  1351      K 4.7      CO2 29   BUN 12   CREATININE 1.00   *   CALCIUM 9.1   ALBUMIN 4.2   PROT 7.8   ALKPHOS 76   ALT 20   AST 13*   BILITOT 0.5       INR  No results for input(s): PT, INR, PROTIME, APTT in the last 72 hours.        Diagnostic Studies:    CT Abd  11/5/22  Impression:     Bilateral hydroureteronephrosis related to distal calcified stones     EKG:  Vent. Rate : 090 BPM     Atrial Rate : 090 BPM      P-R Int : 164 ms          QRS Dur : 098 ms       QT Int : 386 ms       P-R-T Axes : 043 022 019 degrees      QTc Int : 472 ms     Normal sinus rhythm   Normal ECG   When compared with ECG of 29-APR-2022 11:11,   No significant change was found   Confirmed by Judy Hamlin MD ([id number]) on 10/31/2022 1:58:13 PM     2D Echo:  9/7/22   The left ventricle is mildly enlarged with moderately decreased systolic function. The estimated ejection fraction is 45%.   Normal right ventricular size with normal right ventricular systolic function.   Grade I left ventricular diastolic dysfunction.   The estimated PA systolic pressure is 24 mmHg.   Normal central venous pressure (3 mmHg).    No results found for this or any previous visit.        Pre-op Assessment    I have reviewed the Patient Summary Reports.       I have reviewed the Medications.     Review of Systems  Anesthesia Hx:  No problems with previous Anesthesia  History of prior surgery of interest to airway management or planning:  Denies Personal Hx of Anesthesia complications.   Social:  Non-Smoker    Hematology/Oncology:         -- Anemia:   Cardiovascular:   Exercise tolerance: good Denies Pacemaker. Hypertension  CHF SVT s/p ablation  Hx CHF w/ EF 10% per pt in Feb 2022, now 45%  Able to walk 4 miles each evening   Pulmonary:  Pulmonary Normal Hx covid end of 2021, now fully recovered   Renal/:   renal calculi    Hepatic/GI:  Hepatic/GI Normal    Neurological:  Neurology Normal    Endocrine:   Diabetes  Morbid Obesity / BMI > 40      Physical Exam  General: Well nourished, Cooperative, Alert and Oriented    Airway:  Mallampati: II   Mouth Opening: Normal  TM Distance: Normal  Tongue: Normal  Neck ROM: Normal ROM    Dental:  Intact        Anesthesia Plan  Type of Anesthesia, risks & benefits  discussed:    Anesthesia Type: Gen ETT  Intra-op Monitoring Plan: Standard ASA Monitors  Post Op Pain Control Plan: multimodal analgesia  Induction:  IV  Airway Plan: Video, Post-Induction  Informed Consent: Informed consent signed with the Patient and all parties understand the risks and agree with anesthesia plan.  All questions answered.   ASA Score: 3  Day of Surgery Review of History & Physical: H&P Update referred to the surgeon/provider.  Anesthesia Plan Notes: Pt en route from outside hospital and has not arrived to facility; chart review only.    Ready For Surgery From Anesthesia Perspective.     .

## 2022-11-05 NOTE — HPI
Ms. Ursula Smallwood is pleasant 36 yo female with significant medical history of nonischemic cardiomyopathy with chronic systolic chf due to covid infection, DM type II, HTN, morbid obesity, history of svt s/p radiofrequency ablation and history of kidney stones.   She presented to ED complaining of right flank pain for several day.   She described the pain as sharp, worsen over time, intermittent, radiates to groin area.   She also reports dark cloudy malodor urine and pain with urination.     She denies vaginal discharge, fever, chills, nausea, emesis, diarrhea or chest pain.     ED coarse;  Patient was found to be hypertensive with bp 192/99, other vital signs are wnl.  Her lab work that include cbc, cmp and UA is remarkable for UA positive with nitrates, many bacteria and wbc.   UC in process.     Ct renal stone study shows Bilateral hydroureteronephrosis related to distal calcified stones    Patient was given iv Toradol, flomax and iv ceftriaxone x one dose.     ER provider discussed the case with urology.

## 2022-11-05 NOTE — ASSESSMENT & PLAN NOTE
Due to non ischemic Cardiomyopathy attributed to prior covid infection.  Per follow up echo done in 9/2022, ef improving to 45%  Plan: will resume home medication. Avoid fluid over load.

## 2022-11-05 NOTE — PHARMACY MED REC
"  [facility]           Pharmacy  Admission Medication History     The home medication history was taken by Linda Chopra.      Medication history obtained from Medications listed below were obtained from: Medical records PATIENT MEDICATED, UNABLE TO ASSESS. VERIFIED PER [facility] FIRST PROFILE    Based on information gathered for medication list, you may go to "Admission" then "Reconcile Home Medications" tabs to review and/or act upon those items.     The home medication list has been updated by the Pharmacy department.   Please read ALL comments highlighted in yellow.   Please address this information as you see fit.    Feel free to contact us if you have any questions or require assistance.        No current facility-administered medications on file prior to encounter.     Current Outpatient Medications on File Prior to Encounter   Medication Sig Dispense Refill    dapagliflozin (FARXIGA) 10 mg tablet Take 1 tablet (10 mg total) by mouth once daily. 30 tablet 11    metoprolol succinate (TOPROL-XL) 200 MG 24 hr tablet Take 1 tablet (200 mg total) by mouth once daily. 30 tablet 11    sacubitriL-valsartan (ENTRESTO)  mg per tablet Take 1 tablet by mouth 2 (two) times daily. 60 tablet 6    spironolactone (ALDACTONE) 25 MG tablet Take 1 tablet (25 mg total) by mouth once daily. 30 tablet 11    torsemide (DEMADEX) 20 MG Tab Take 1 tablet (20 mg total) by mouth every other day. 15 tablet 11    diclofenac (VOLTAREN) 75 MG EC tablet Take 1 tablet (75 mg total) by mouth 2 (two) times daily. 60 tablet 0       Please address this information as you see fit.  Feel free to contact us if you have any questions or require assistance.    Linda Chopra  [phone]                .        "

## 2022-11-05 NOTE — ED NOTES
LOC:The patient is awake, alert and cooperative with a calm affect, patient is aware of environment and behaving in an age appropriate manor, patient recognizes caregiver and is speaking appropriately for age.    APPEARANCE: Resting comfortably, in no acute distress, the patient has clean hair, skin and nails, patient's clothing is properly fastened.    RESPIRATORY: Airway is open and patent, respirations are spontaneous, normal respiratory effort and rate noted.     MUSCULOSKELETAL: Patient moving all extremities well, no obvious deformities noted.    SKIN: The skin is warm and dry, patient has normal skin turgor and moist mucus membranes, no breakdown or brusing noted.    ABDOMEN: Soft and non tender in all four quadrants.     Pt presents to ER with c/o right sided flank pain started a couple of days ago. Thought she pulled a muscle. Pain is radiating around to right lower pelvis.  States her urine has been dark, cloudy, and foul smell. Denies any fever. Pt has hx of kidney stones.

## 2022-11-05 NOTE — ASSESSMENT & PLAN NOTE
Patient's FSGs are controlled on current medication regimen.  Last A1c reviewed-   Lab Results   Component Value Date    HGBA1C 6.3 (H) 02/27/2022     Most recent fingerstick glucose reviewed- No results for input(s): POCTGLUCOSE in the last 24 hours.  Current correctional scale  Medium  Maintain anti-hyperglycemic dose as follows-   Antihyperglycemics (From admission, onward)    None        Hold Oral hypoglycemics while patient is in the hospital.

## 2022-11-05 NOTE — ASSESSMENT & PLAN NOTE
ischemic Cardiomyopathy attributed to prior covid infection.  Per follow up echo done in 9/2022, ef improving to 45%  Plan: will resume home medication. Avoid fluid over load

## 2022-11-05 NOTE — ED PROVIDER NOTES
Encounter Date: 11/5/2022       History     Chief Complaint   Patient presents with    Flank Pain     Right sided flank pain. Ongoing for a few days states she thought she pulled a muscle. States urine has been dark and cloudy with an odor. Pain with urination. Hx of kidney stones.     37-year-old female patient complains of right flank pain radiating to right groin since yesterday.  She thought it is a pulled muscle.  Today dark urine.  One episode of vomiting.  Previous history of kidney stones.  History of CHF, hypertension.    The history is provided by the patient.   Review of patient's allergies indicates:  No Known Allergies  Past Medical History:   Diagnosis Date    Anemia     Hypertension     SVT (supraventricular tachycardia)     Uterine fibroid      Past Surgical History:   Procedure Laterality Date    ABLATION N/A 06/04/2021    Procedure: Ablation;  Surgeon: NICHELLE Zepeda MD;  Location: [facility];  Service: Cardiology;  Laterality: N/A;  SVT, RFA, Carto, anes, EH, 3prep    CYSTOSCOPY N/A 08/03/2021    Procedure: CYSTOSCOPY;  Surgeon: Jamarcus Ventura MD;  Location: [facility];  Service: OB/GYN;  Laterality: N/A;    HYSTERECTOMY      ROBOT-ASSISTED LAPAROSCOPIC HYSTERECTOMY N/A 08/03/2021    Procedure: ROBOTIC HYSTERECTOMY;  Surgeon: Jamarcus Ventura MD;  Location: [facility];  Service: OB/GYN;  Laterality: N/A;    ROBOT-ASSISTED SALPINGECTOMY Bilateral 08/03/2021    Procedure: ROBOTIC SALPINGECTOMY;  Surgeon: Jamarcus Ventura MD;  Location: [facility];  Service: OB/GYN;  Laterality: Bilateral;     History reviewed. No pertinent family history.  Social History     Tobacco Use    Smoking status: Never    Smokeless tobacco: Never   Substance Use Topics    Alcohol use: No    Drug use: No     Review of Systems   Constitutional:  Negative for activity change, appetite change, chills and fever.   HENT:  Negative for congestion and sore throat.    Respiratory:  Negative for shortness of breath.     Cardiovascular:  Negative for chest pain.   Gastrointestinal:  Positive for abdominal pain and vomiting. Negative for nausea.   Genitourinary:  Positive for dysuria, flank pain, pelvic pain and vaginal pain. Negative for frequency, menstrual problem, vaginal bleeding and vaginal discharge.   Musculoskeletal:  Negative for back pain.   Skin:  Negative for rash.   Neurological:  Negative for dizziness and weakness.   Hematological:  Does not bruise/bleed easily.   All other systems reviewed and are negative.    Physical Exam     Initial Vitals [11/05/22 1335]   BP Pulse Resp Temp SpO2   (!) 192/99 92 18 98.4 °F (36.9 °C) 97 %      MAP       --         Physical Exam    Nursing note and vitals reviewed.  Constitutional: Vital signs are normal. She appears well-developed and well-nourished. She is active. No distress.   HENT:   Head: Normocephalic.   Nose: Nose normal.   Mouth/Throat: Oropharynx is clear and moist and mucous membranes are normal.   Eyes: Conjunctivae, EOM and lids are normal.   Neck: Neck supple.   Normal range of motion.  Cardiovascular:  Normal rate, regular rhythm, S1 normal, S2 normal and normal heart sounds.           Pulmonary/Chest: Breath sounds normal. No respiratory distress. She has no wheezes. She has no rhonchi. She has no rales. She exhibits no tenderness.   Abdominal: Abdomen is soft. Bowel sounds are normal. She exhibits no distension. There is abdominal tenderness.   Right flank pain radiating to right groin area.     There is guarding. There is no rebound.   Musculoskeletal:         General: Normal range of motion.      Right upper arm: Normal.      Left upper arm: Normal.      Cervical back: Normal range of motion and neck supple.      Right lower leg: Normal.      Left lower leg: Normal.     Neurological: She is alert and oriented to person, place, and time. She has normal strength. GCS score is 15. GCS eye subscore is 4. GCS verbal subscore is 5. GCS motor subscore is 6.   Skin:  Skin is warm. Capillary refill takes less than 2 seconds.   Psychiatric: She has a normal mood and affect. Her speech is normal and behavior is normal. Thought content normal. Cognition and memory are normal.       ED Course   Procedures  Labs Reviewed   URINALYSIS, REFLEX TO URINE CULTURE - Abnormal; Notable for the following components:       Result Value    Protein, UA 1+ (*)     Occult Blood UA 1+ (*)     Nitrite, UA Positive (*)     Leukocytes, UA 1+ (*)     All other components within normal limits    Narrative:     Preferred Collection Type->Urine, Clean Catch  Specimen Source->Urine   CBC W/ AUTO DIFFERENTIAL - Abnormal; Notable for the following components:    MCH 25.6 (*)     MCHC 31.4 (*)     Platelets 463 (*)     Gran # (ANC) 8.1 (*)     Immature Grans (Abs) 0.05 (*)     Lymph % 17.4 (*)     All other components within normal limits   COMPREHENSIVE METABOLIC PANEL - Abnormal; Notable for the following components:    Glucose 123 (*)     AST 13 (*)     Anion Gap 6 (*)     All other components within normal limits   URINALYSIS MICROSCOPIC - Abnormal; Notable for the following components:    RBC, UA 5 (*)     WBC, UA 12 (*)     Bacteria Moderate (*)     All other components within normal limits    Narrative:     Preferred Collection Type->Urine, Clean Catch  Specimen Source->Urine   CULTURE, URINE          Imaging Results              CT Renal Stone Study ABD Pelvis WO (Final result)  Result time 11/05/22 14:45:14      Final result by Gertrude Gibbs MD (11/05/22 14:45:14)                   Impression:      Bilateral hydroureteronephrosis related to distal calcified stones    All CT scans at this facility use dose modulation, iterative reconstruction, and/or weight based dosing when appropriate to reduce radiation dose to as low as reasonable achievable.      Electronically signed by: Gertrude Gibbs  Date:    11/05/2022  Time:    14:45               Narrative:    EXAMINATION:  CT RENAL STONE STUDY ABD  PELVIS WO    CLINICAL HISTORY:  Flank pain, kidney stone suspected;    TECHNIQUE:  Renal stone protocol unenhanced imaging with 2D reformations provided    Automated exposure dose utilized    COMPARISON:  None    FINDINGS:  There is left ureteral calcified stone at the pelvic inlet measuring 4 X 8 mm with proximal hydronephrosis.  There is additional calcified stone distal right ureter similar size 4 mm X 7-8 mm with proximal hydronephrosis.  Urinary bladder unremarkable decompressed    Unenhanced liver and spleen normal size.    Gallbladder present.    Pancreas unremarkable.    No obstructive or inflammatory bowel findings                                       Medications   cefTRIAXone (ROCEPHIN) 2 g/50 mL D5W IVPB (2 g Intravenous New Bag 11/5/22 1540)   0.9%  NaCl infusion (has no administration in time range)   morphine injection 4 mg (4 mg Intravenous Given 11/5/22 1400)   ondansetron injection 4 mg (4 mg Intravenous Given 11/5/22 1400)   HYDROmorphone injection 1 mg (1 mg Intravenous Given 11/5/22 1539)   ketorolac injection 15 mg (15 mg Intravenous Given 11/5/22 1539)   tamsulosin 24 hr capsule 0.4 mg (0.4 mg Oral Given 11/5/22 1539)     Medical Decision Making:   Initial Assessment:   Right flank pain radiating to right groin since 2 days..  History of kidney stones.  Differential Diagnosis:   Pyelonephritis UTI, kidney stone.  Slow skeletal pain.  Clinical Tests:   Lab Tests: Ordered and Reviewed  Radiological Study: Ordered and Reviewed  ED Management:  Bilateral kidney stones.  Right in distal ureter causing pain and symptomatic.  Mild UTI.  Treated with pain medication morphine, Toradol and Dilaudid.  Flomax.  Rocephin 2 g IV.  Discussed with - admit to [facility] hospitalist if patient agrees for stenting tomorrow  Discussed with patient agreed for stenting tomorrow.  Discussed with hospitalist accepted patient for admission.                        Clinical Impression:   Final diagnoses:  [N20.1]  Calculus of ureter (Primary)  [N30.01] Acute cystitis with hematuria      ED Disposition Condition    Observation Stable                Grabiel Alejandra MD  11/05/22 2863

## 2022-11-05 NOTE — ED NOTES
[facility] ambulance called to check status of transfer. Dispatch states will be another 30-45 minutes

## 2022-11-05 NOTE — ASSESSMENT & PLAN NOTE
Supportive care with pain and antinausea medication  Flomax  May give her gentle iv fluid  Npo after midnight for anticipated urology procedure in am  Urology consult in am

## 2022-11-06 ENCOUNTER — ANESTHESIA (OUTPATIENT)
Dept: SURGERY | Facility: HOSPITAL | Age: 37
DRG: 660 | End: 2022-11-06
Payer: COMMERCIAL

## 2022-11-06 LAB
ALBUMIN SERPL BCP-MCNC: 3.1 G/DL (ref 3.5–5.2)
ALP SERPL-CCNC: 67 U/L (ref 55–135)
ALT SERPL W/O P-5'-P-CCNC: 16 U/L (ref 10–44)
ANION GAP SERPL CALC-SCNC: 11 MMOL/L (ref 8–16)
AST SERPL-CCNC: 8 U/L (ref 10–40)
BILIRUB SERPL-MCNC: 0.3 MG/DL (ref 0.1–1)
BUN SERPL-MCNC: 12 MG/DL (ref 6–20)
CALCIUM SERPL-MCNC: 9.1 MG/DL (ref 8.7–10.5)
CHLORIDE SERPL-SCNC: 106 MMOL/L (ref 95–110)
CO2 SERPL-SCNC: 19 MMOL/L (ref 23–29)
CREAT SERPL-MCNC: 1 MG/DL (ref 0.5–1.4)
EST. GFR  (NO RACE VARIABLE): >60 ML/MIN/1.73 M^2
GLUCOSE SERPL-MCNC: 117 MG/DL (ref 70–110)
MAGNESIUM SERPL-MCNC: 1.8 MG/DL (ref 1.6–2.6)
POCT GLUCOSE: 113 MG/DL (ref 70–110)
POCT GLUCOSE: 133 MG/DL (ref 70–110)
POCT GLUCOSE: 195 MG/DL (ref 70–110)
POTASSIUM SERPL-SCNC: 4.3 MMOL/L (ref 3.5–5.1)
PROT SERPL-MCNC: 7.1 G/DL (ref 6–8.4)
SODIUM SERPL-SCNC: 136 MMOL/L (ref 136–145)

## 2022-11-06 PROCEDURE — 99223 1ST HOSP IP/OBS HIGH 75: CPT | Mod: 25,,, | Performed by: STUDENT IN AN ORGANIZED HEALTH CARE EDUCATION/TRAINING PROGRAM

## 2022-11-06 PROCEDURE — 96376 TX/PRO/DX INJ SAME DRUG ADON: CPT

## 2022-11-06 PROCEDURE — 71000039 HC RECOVERY, EACH ADD'L HOUR: Performed by: STUDENT IN AN ORGANIZED HEALTH CARE EDUCATION/TRAINING PROGRAM

## 2022-11-06 PROCEDURE — 63600175 PHARM REV CODE 636 W HCPCS: Performed by: NURSE ANESTHETIST, CERTIFIED REGISTERED

## 2022-11-06 PROCEDURE — 94761 N-INVAS EAR/PLS OXIMETRY MLT: CPT

## 2022-11-06 PROCEDURE — 96366 THER/PROPH/DIAG IV INF ADDON: CPT

## 2022-11-06 PROCEDURE — 52332 PR CYSTOSCOPY,INSERT URETERAL STENT: ICD-10-PCS | Mod: 50,22,, | Performed by: STUDENT IN AN ORGANIZED HEALTH CARE EDUCATION/TRAINING PROGRAM

## 2022-11-06 PROCEDURE — 25000003 PHARM REV CODE 250: Performed by: NURSE ANESTHETIST, CERTIFIED REGISTERED

## 2022-11-06 PROCEDURE — 37000009 HC ANESTHESIA EA ADD 15 MINS: Performed by: STUDENT IN AN ORGANIZED HEALTH CARE EDUCATION/TRAINING PROGRAM

## 2022-11-06 PROCEDURE — 36415 COLL VENOUS BLD VENIPUNCTURE: CPT | Performed by: HOSPITALIST

## 2022-11-06 PROCEDURE — D9220A PRA ANESTHESIA: ICD-10-PCS | Mod: CRNA,,, | Performed by: NURSE ANESTHETIST, CERTIFIED REGISTERED

## 2022-11-06 PROCEDURE — 99223 PR INITIAL HOSPITAL CARE,LEVL III: ICD-10-PCS | Mod: 25,,, | Performed by: STUDENT IN AN ORGANIZED HEALTH CARE EDUCATION/TRAINING PROGRAM

## 2022-11-06 PROCEDURE — 63600175 PHARM REV CODE 636 W HCPCS: Performed by: HOSPITALIST

## 2022-11-06 PROCEDURE — C1769 GUIDE WIRE: HCPCS | Performed by: STUDENT IN AN ORGANIZED HEALTH CARE EDUCATION/TRAINING PROGRAM

## 2022-11-06 PROCEDURE — 52332 CYSTOSCOPY AND TREATMENT: CPT | Mod: 50,22,, | Performed by: STUDENT IN AN ORGANIZED HEALTH CARE EDUCATION/TRAINING PROGRAM

## 2022-11-06 PROCEDURE — C2617 STENT, NON-COR, TEM W/O DEL: HCPCS | Performed by: STUDENT IN AN ORGANIZED HEALTH CARE EDUCATION/TRAINING PROGRAM

## 2022-11-06 PROCEDURE — D9220A PRA ANESTHESIA: Mod: CRNA,,, | Performed by: NURSE ANESTHETIST, CERTIFIED REGISTERED

## 2022-11-06 PROCEDURE — 36000707: Performed by: STUDENT IN AN ORGANIZED HEALTH CARE EDUCATION/TRAINING PROGRAM

## 2022-11-06 PROCEDURE — 36000706: Performed by: STUDENT IN AN ORGANIZED HEALTH CARE EDUCATION/TRAINING PROGRAM

## 2022-11-06 PROCEDURE — D9220A PRA ANESTHESIA: ICD-10-PCS | Mod: ANES,,, | Performed by: ANESTHESIOLOGY

## 2022-11-06 PROCEDURE — 74420 UROGRAPHY RTRGR +-KUB: CPT | Mod: 26,,, | Performed by: STUDENT IN AN ORGANIZED HEALTH CARE EDUCATION/TRAINING PROGRAM

## 2022-11-06 PROCEDURE — 74420 PR  X-RAY RETROGRADE PYELOGRAM: ICD-10-PCS | Mod: 26,,, | Performed by: STUDENT IN AN ORGANIZED HEALTH CARE EDUCATION/TRAINING PROGRAM

## 2022-11-06 PROCEDURE — 37000008 HC ANESTHESIA 1ST 15 MINUTES: Performed by: STUDENT IN AN ORGANIZED HEALTH CARE EDUCATION/TRAINING PROGRAM

## 2022-11-06 PROCEDURE — 96372 THER/PROPH/DIAG INJ SC/IM: CPT | Performed by: HOSPITALIST

## 2022-11-06 PROCEDURE — 71000033 HC RECOVERY, INTIAL HOUR: Performed by: STUDENT IN AN ORGANIZED HEALTH CARE EDUCATION/TRAINING PROGRAM

## 2022-11-06 PROCEDURE — 25000003 PHARM REV CODE 250: Performed by: NURSE PRACTITIONER

## 2022-11-06 PROCEDURE — 80053 COMPREHEN METABOLIC PANEL: CPT | Performed by: HOSPITALIST

## 2022-11-06 PROCEDURE — G0378 HOSPITAL OBSERVATION PER HR: HCPCS

## 2022-11-06 PROCEDURE — 83735 ASSAY OF MAGNESIUM: CPT | Performed by: HOSPITALIST

## 2022-11-06 PROCEDURE — C1758 CATHETER, URETERAL: HCPCS | Performed by: STUDENT IN AN ORGANIZED HEALTH CARE EDUCATION/TRAINING PROGRAM

## 2022-11-06 PROCEDURE — D9220A PRA ANESTHESIA: Mod: ANES,,, | Performed by: ANESTHESIOLOGY

## 2022-11-06 PROCEDURE — 25500020 PHARM REV CODE 255: Performed by: STUDENT IN AN ORGANIZED HEALTH CARE EDUCATION/TRAINING PROGRAM

## 2022-11-06 PROCEDURE — 99900035 HC TECH TIME PER 15 MIN (STAT)

## 2022-11-06 PROCEDURE — 25000003 PHARM REV CODE 250: Performed by: HOSPITALIST

## 2022-11-06 DEVICE — STENT SET URETERAL 6X26CM: Type: IMPLANTABLE DEVICE | Site: URETER | Status: FUNCTIONAL

## 2022-11-06 RX ORDER — KETOROLAC TROMETHAMINE 30 MG/ML
30 INJECTION, SOLUTION INTRAMUSCULAR; INTRAVENOUS EVERY 8 HOURS
Status: DISCONTINUED | OUTPATIENT
Start: 2022-11-07 | End: 2022-11-08 | Stop reason: HOSPADM

## 2022-11-06 RX ORDER — MIDAZOLAM HYDROCHLORIDE 1 MG/ML
INJECTION INTRAMUSCULAR; INTRAVENOUS
Status: DISCONTINUED | OUTPATIENT
Start: 2022-11-06 | End: 2022-11-06

## 2022-11-06 RX ORDER — OXYCODONE AND ACETAMINOPHEN 10; 325 MG/1; MG/1
1 TABLET ORAL EVERY 4 HOURS PRN
Status: DISCONTINUED | OUTPATIENT
Start: 2022-11-06 | End: 2022-11-08 | Stop reason: HOSPADM

## 2022-11-06 RX ORDER — PROPOFOL 10 MG/ML
VIAL (ML) INTRAVENOUS
Status: DISCONTINUED | OUTPATIENT
Start: 2022-11-06 | End: 2022-11-06

## 2022-11-06 RX ORDER — OXYBUTYNIN CHLORIDE 5 MG/1
5 TABLET ORAL 3 TIMES DAILY PRN
Status: DISCONTINUED | OUTPATIENT
Start: 2022-11-06 | End: 2022-11-08 | Stop reason: HOSPADM

## 2022-11-06 RX ORDER — ROCURONIUM BROMIDE 10 MG/ML
INJECTION, SOLUTION INTRAVENOUS
Status: DISCONTINUED | OUTPATIENT
Start: 2022-11-06 | End: 2022-11-06

## 2022-11-06 RX ORDER — SODIUM CHLORIDE, SODIUM LACTATE, POTASSIUM CHLORIDE, CALCIUM CHLORIDE 600; 310; 30; 20 MG/100ML; MG/100ML; MG/100ML; MG/100ML
INJECTION, SOLUTION INTRAVENOUS CONTINUOUS PRN
Status: DISCONTINUED | OUTPATIENT
Start: 2022-11-06 | End: 2022-11-06

## 2022-11-06 RX ORDER — DEXAMETHASONE SODIUM PHOSPHATE 4 MG/ML
INJECTION, SOLUTION INTRA-ARTICULAR; INTRALESIONAL; INTRAMUSCULAR; INTRAVENOUS; SOFT TISSUE
Status: DISCONTINUED | OUTPATIENT
Start: 2022-11-06 | End: 2022-11-06

## 2022-11-06 RX ORDER — HYDROMORPHONE HYDROCHLORIDE 2 MG/ML
0.2 INJECTION, SOLUTION INTRAMUSCULAR; INTRAVENOUS; SUBCUTANEOUS EVERY 5 MIN PRN
Status: DISCONTINUED | OUTPATIENT
Start: 2022-11-06 | End: 2022-11-06

## 2022-11-06 RX ORDER — ETOMIDATE 2 MG/ML
INJECTION INTRAVENOUS
Status: DISCONTINUED | OUTPATIENT
Start: 2022-11-06 | End: 2022-11-06

## 2022-11-06 RX ORDER — CEFAZOLIN SODIUM 1 G/3ML
INJECTION, POWDER, FOR SOLUTION INTRAMUSCULAR; INTRAVENOUS
Status: DISCONTINUED | OUTPATIENT
Start: 2022-11-06 | End: 2022-11-06

## 2022-11-06 RX ORDER — AMOXICILLIN 250 MG
1 CAPSULE ORAL 2 TIMES DAILY PRN
Status: DISCONTINUED | OUTPATIENT
Start: 2022-11-06 | End: 2022-11-08 | Stop reason: HOSPADM

## 2022-11-06 RX ORDER — POLYETHYLENE GLYCOL 3350 17 G/17G
17 POWDER, FOR SOLUTION ORAL DAILY
Status: DISCONTINUED | OUTPATIENT
Start: 2022-11-07 | End: 2022-11-08 | Stop reason: HOSPADM

## 2022-11-06 RX ORDER — ACETAMINOPHEN 10 MG/ML
INJECTION, SOLUTION INTRAVENOUS
Status: DISCONTINUED | OUTPATIENT
Start: 2022-11-06 | End: 2022-11-06

## 2022-11-06 RX ORDER — HYDROMORPHONE HYDROCHLORIDE 1 MG/ML
0.5 INJECTION, SOLUTION INTRAMUSCULAR; INTRAVENOUS; SUBCUTANEOUS EVERY 6 HOURS PRN
Status: DISCONTINUED | OUTPATIENT
Start: 2022-11-06 | End: 2022-11-08 | Stop reason: HOSPADM

## 2022-11-06 RX ORDER — LIDOCAINE HYDROCHLORIDE 10 MG/ML
INJECTION, SOLUTION EPIDURAL; INFILTRATION; INTRACAUDAL; PERINEURAL
Status: DISCONTINUED | OUTPATIENT
Start: 2022-11-06 | End: 2022-11-06

## 2022-11-06 RX ORDER — SUCCINYLCHOLINE CHLORIDE 20 MG/ML
INJECTION INTRAMUSCULAR; INTRAVENOUS
Status: DISCONTINUED | OUTPATIENT
Start: 2022-11-06 | End: 2022-11-06

## 2022-11-06 RX ORDER — SODIUM CHLORIDE 0.9 % (FLUSH) 0.9 %
3 SYRINGE (ML) INJECTION
Status: DISCONTINUED | OUTPATIENT
Start: 2022-11-06 | End: 2022-11-08 | Stop reason: HOSPADM

## 2022-11-06 RX ORDER — FENTANYL CITRATE 50 UG/ML
25 INJECTION, SOLUTION INTRAMUSCULAR; INTRAVENOUS EVERY 5 MIN PRN
Status: DISCONTINUED | OUTPATIENT
Start: 2022-11-06 | End: 2022-11-06

## 2022-11-06 RX ORDER — PHENYLEPHRINE HYDROCHLORIDE 10 MG/ML
INJECTION INTRAVENOUS
Status: DISCONTINUED | OUTPATIENT
Start: 2022-11-06 | End: 2022-11-06

## 2022-11-06 RX ORDER — KETOROLAC TROMETHAMINE 30 MG/ML
INJECTION, SOLUTION INTRAMUSCULAR; INTRAVENOUS
Status: DISCONTINUED | OUTPATIENT
Start: 2022-11-06 | End: 2022-11-06

## 2022-11-06 RX ORDER — FENTANYL CITRATE 50 UG/ML
INJECTION, SOLUTION INTRAMUSCULAR; INTRAVENOUS
Status: DISCONTINUED | OUTPATIENT
Start: 2022-11-06 | End: 2022-11-06

## 2022-11-06 RX ORDER — OXYCODONE AND ACETAMINOPHEN 5; 325 MG/1; MG/1
1 TABLET ORAL EVERY 4 HOURS PRN
Status: DISCONTINUED | OUTPATIENT
Start: 2022-11-06 | End: 2022-11-08 | Stop reason: HOSPADM

## 2022-11-06 RX ORDER — ONDANSETRON 2 MG/ML
INJECTION INTRAMUSCULAR; INTRAVENOUS
Status: DISCONTINUED | OUTPATIENT
Start: 2022-11-06 | End: 2022-11-06

## 2022-11-06 RX ADMIN — HYDROMORPHONE HYDROCHLORIDE 1 MG: 1 INJECTION, SOLUTION INTRAMUSCULAR; INTRAVENOUS; SUBCUTANEOUS at 11:11

## 2022-11-06 RX ADMIN — TAMSULOSIN HYDROCHLORIDE 0.4 MG: 0.4 CAPSULE ORAL at 08:11

## 2022-11-06 RX ADMIN — ROCURONIUM BROMIDE 5 MG: 10 INJECTION, SOLUTION INTRAVENOUS at 08:11

## 2022-11-06 RX ADMIN — HEPARIN SODIUM 5000 UNITS: 5000 INJECTION, SOLUTION INTRAVENOUS; SUBCUTANEOUS at 12:11

## 2022-11-06 RX ADMIN — ACETAMINOPHEN 1000 MG: 10 INJECTION, SOLUTION INTRAVENOUS at 09:11

## 2022-11-06 RX ADMIN — HYDROMORPHONE HYDROCHLORIDE 1 MG: 1 INJECTION, SOLUTION INTRAMUSCULAR; INTRAVENOUS; SUBCUTANEOUS at 10:11

## 2022-11-06 RX ADMIN — PROPOFOL 100 MG: 10 INJECTION, EMULSION INTRAVENOUS at 08:11

## 2022-11-06 RX ADMIN — TORSEMIDE 20 MG: 20 TABLET ORAL at 08:11

## 2022-11-06 RX ADMIN — DOCUSATE SODIUM - SENNOSIDES 1 TABLET: 50; 8.6 TABLET, FILM COATED ORAL at 03:11

## 2022-11-06 RX ADMIN — ETOMIDATE 18 MG: 2 INJECTION, SOLUTION INTRAVENOUS at 08:11

## 2022-11-06 RX ADMIN — ONDANSETRON 4 MG: 2 INJECTION, SOLUTION INTRAMUSCULAR; INTRAVENOUS at 08:11

## 2022-11-06 RX ADMIN — PHENYLEPHRINE HYDROCHLORIDE 200 MCG: 10 INJECTION INTRAVENOUS at 09:11

## 2022-11-06 RX ADMIN — SACUBITRIL AND VALSARTAN 1 TABLET: 97; 103 TABLET, FILM COATED ORAL at 12:11

## 2022-11-06 RX ADMIN — SUCCINYLCHOLINE CHLORIDE 200 MG: 20 INJECTION, SOLUTION INTRAMUSCULAR; INTRAVENOUS at 08:11

## 2022-11-06 RX ADMIN — HYDROMORPHONE HYDROCHLORIDE 1 MG: 1 INJECTION, SOLUTION INTRAMUSCULAR; INTRAVENOUS; SUBCUTANEOUS at 06:11

## 2022-11-06 RX ADMIN — FENTANYL CITRATE 100 MCG: 50 INJECTION, SOLUTION INTRAMUSCULAR; INTRAVENOUS at 08:11

## 2022-11-06 RX ADMIN — DEXAMETHASONE SODIUM PHOSPHATE 8 MG: 4 INJECTION, SOLUTION INTRA-ARTICULAR; INTRALESIONAL; INTRAMUSCULAR; INTRAVENOUS; SOFT TISSUE at 08:11

## 2022-11-06 RX ADMIN — SACUBITRIL AND VALSARTAN 1 TABLET: 97; 103 TABLET, FILM COATED ORAL at 08:11

## 2022-11-06 RX ADMIN — METOPROLOL SUCCINATE 200 MG: 50 TABLET, EXTENDED RELEASE ORAL at 08:11

## 2022-11-06 RX ADMIN — KETOROLAC TROMETHAMINE 30 MG: 30 INJECTION, SOLUTION INTRAMUSCULAR; INTRAVENOUS at 09:11

## 2022-11-06 RX ADMIN — SODIUM CHLORIDE, SODIUM LACTATE, POTASSIUM CHLORIDE, AND CALCIUM CHLORIDE: .6; .31; .03; .02 INJECTION, SOLUTION INTRAVENOUS at 08:11

## 2022-11-06 RX ADMIN — SPIRONOLACTONE 25 MG: 25 TABLET ORAL at 08:11

## 2022-11-06 RX ADMIN — HEPARIN SODIUM 5000 UNITS: 5000 INJECTION, SOLUTION INTRAVENOUS; SUBCUTANEOUS at 01:11

## 2022-11-06 RX ADMIN — MIDAZOLAM HYDROCHLORIDE 2 MG: 1 INJECTION, SOLUTION INTRAMUSCULAR; INTRAVENOUS at 08:11

## 2022-11-06 RX ADMIN — HEPARIN SODIUM 5000 UNITS: 5000 INJECTION, SOLUTION INTRAVENOUS; SUBCUTANEOUS at 08:11

## 2022-11-06 RX ADMIN — LIDOCAINE HYDROCHLORIDE 5 ML: 10 INJECTION, SOLUTION EPIDURAL; INFILTRATION; INTRACAUDAL; PERINEURAL at 08:11

## 2022-11-06 RX ADMIN — CEFAZOLIN 3 G: 330 INJECTION, POWDER, FOR SOLUTION INTRAMUSCULAR; INTRAVENOUS at 09:11

## 2022-11-06 RX ADMIN — CEFTRIAXONE 1 G: 1 INJECTION, SOLUTION INTRAVENOUS at 02:11

## 2022-11-06 RX ADMIN — ROCURONIUM BROMIDE 25 MG: 10 INJECTION, SOLUTION INTRAVENOUS at 09:11

## 2022-11-06 NOTE — ASSESSMENT & PLAN NOTE
Patient's FSGs are controlled on current medication regimen.  Last A1c reviewed-   Lab Results   Component Value Date    HGBA1C 6.3 (H) 02/27/2022     Most recent fingerstick glucose reviewed-   Recent Labs   Lab 11/06/22  0534 11/06/22  1226   POCTGLUCOSE 113* 133*     Current correctional scale  Medium  Maintain anti-hyperglycemic dose as follows-   Antihyperglycemics (From admission, onward)    Start     Stop Route Frequency Ordered    11/05/22 2217  insulin aspart U-100 pen 1-10 Units         -- SubQ Before meals & nightly PRN 11/05/22 2217        Hold Oral hypoglycemics while patient is in the hospital.

## 2022-11-06 NOTE — OP NOTE
OPERATIVE DICTATION  DATE OF OPERATION: 11/06/2022    SERVICE: Urology    SURGEONS:  1. Yanira Alexander MD    ANESTHESIA:  Anesthesiologist: Henrique Rose MD  CRNA: Victor Hugo Guidry CRNA    STAFF:  Circulator: Shwetha Gutierrez RN; Salome Devlin RN  Scrub Person: Ursula Mojica    ANESTHESIA: General    PREOPERATIVE DIAGNOSIS: Pre-Op Diagnosis Codes:     * Calculus of ureter [N20.1]     * Hydronephrosis, unspecified hydronephrosis type [N13.30]    POSTOPERATIVE DIAGNOSIS: Post-Op Diagnosis Codes:     * Calculus of ureter [N20.1]     * Hydronephrosis, unspecified hydronephrosis type [N13.30]    PROCEDURES:   1. Left ureteroscopy (to pass guidewire) modifier 22  2. Cystoscopy, placement of bilateral ureteral stent(s) 6 Grenadian x 26cm JJ ureteral stent OFF A STRING  modifier 22  3. Cystoscopy, bilateral retrograde pyelogram   4. Fluoroscopy < 1 hour   5. Interpretation of fluoroscopic images   6. 16 FrFoley catheter placement by MD       COMPLICATIONS: * No complications entered in OR log *    DRAINS: none    TUBES: none    IMPLANTS: bilateral 6 x 26 cm JJ ureteral stent off a string    FLUIDS: see anesthesia record     ESTIMATED BLOOD LOSS: * No values recorded between 11/6/2022 12:00 AM and 11/6/2022  8:39 AM *    FINDINGS:   1. Right stent very difficult to place, able to pass after numerous attempts with guidewire.   2. A guidewire was not able to be passed beyond the left ureteral stone after attempts with motion, hydrophilic guidewire or amplatz stiff guidewire via a 5 South Sudanese nor through a dual lumen. To avoid a nephrostomy tube, I advanced the long rigid ureteroscope into the left ureter in order to pass the guidewire beyond the impacted ureteral stone. Left stent was placed with much resistance as feedback, but successful.   3. Cloudy urine noted to drain from both stents once placed.     SPECIMEN(S):   * No specimens in log *    CONDITION: stable    INDICATIONS FOR THE PROCEDURE:  37 y.o. female with PMH below  presented with right flank pain since yesterday. She does have a history of kidney stones. Urinalysis suggestive of UTI. No fevers. +vomiting x 1 episode. No chills. CT 11/5/22 actually demonstrated bilateral ureteral stones with bilateral hydronephrosis - right 4x8mm distal ureteral stone and left 4x8mm distal ureteral stone.     37 y.o. female with CT 11/5/22 actually demonstrated bilateral ureteral stones with bilateral hydronephrosis - right 4x8mm distal ureteral stone and left 4x8mm distal ureteral stone and urinalysis suggestive of UTI     Plan:  Explained situation with ureteral stones bilaterally and UTI, concerns that she is completely obstructed. Offered procedure below and I do not recommend treating the ureteral stones in the setting of infections due to complications.   I have explained the indication and benefits of proceeding with a cystoscopy, bilateral ureteral stent placement, bilateral retrograde pyelogram.  Alternatives of the procedure were also discussed. The risks included but were not limited to pain, infection (urinary tract infection), bleeding (hematuria), ureteral or urethral stricture,  injury to the urethra, bladder, ureter, need for additional treatments, inability or incomplete removal of kidney stones, pain, and discomfort related to the stent were discussed in depth with the patient.  The patient understands that if I am unable to pass the cameras up to the level of the stone or if visibility is poor, then I will only place a ureteral stent and pursue a staged procedure.      The ureteral stent was discussed at length. The patient will need to have it removed and the time period in which it should remain indwelling is to be determined after surgery. If left indwelling, the sequelae include pain, infection, lower urinary tract symptoms, development of calcifications on the ureteral stent, worsening kidney function, and complete loss of kidney function.      The patient voiced  understanding and all questions have been answered and informed consents were signed and witnessed by her floor nurse Isidra.      PROCEDURE IN DETAIL:  After appropriate informed consent was obtained, the patient was taken to the operating room and placed in the supine position. After induction of General, she was placed in the dorsal lithotomy position. she was prepped and draped in the usual sterile fashion.     Thereafter a WHO timeout was performed and the procedure was initiated. The procedure began by inserting a 21 Serbian rigid cystoscope into the bladder via the urethra. The findings in the urethra included no abnormalities.  The bladder was systematically inspected and the findings included  no abnormalities.     Attention was then turned towards the right ureteral orifice. It was cannulated with a Motion guidewire. It took about 5 attempts with the motion wire through the 5 Danish open ended catheter for the guidewire to pass beyond the right ureteral stone. The wire was then advanced and noted to coil in the right renal pelvis fluoroscopically.     A right retrograde pyelogram was performed to confirm that the guidewire was in the correct location. A 5 Danish open ended catheter was advanced over the guidewire and the guidewire was removed leaving the 5 Danish open ended catheter in place. Dilute isovue was injected through the 5 Danish open ended catheter to perform the right retrograde pyelogram. The findings demonstrated hydronephrosis, and that the guidewire was in the correct location. .     The guidewire was replaced through the 5 Danish open ended catheter and the 5 Danish open ended catheter was removed.     A 6 Serbian x 26 cm JJ ureteral stent OFF A STRING was placed over the guidewire. It was loaded over the guidewire and then the pusher was loaded second to position the stent in place. The radiopaque marker was positioned over the pubic bone and the guidewire was removed noting a good proximal  coil formation fluoroscopically in the right renal pelvis as well as a distal coil in the bladder.        Attention was then turned towards the left ureteral orifice. It was cannulated with a Motion guidewire. This would not pass despite usage of a 5 Bangladeshi open ended catheter nor a dual lumen catheter. I attempted to pass a Motion wire, a hydrophilic guidewire, and an amplatz stiff guidewire, all did not give me the feedback that it was passing in the correct location. The retrograde pyelogram performed through the dual lumen demonstrated some extravasation of contrast.     I passed a rigid ureteroscope into the distal ureter and noted the impacted ureteral stone and gently passed a guidewire with the aid of the ureteroscope which passed in the expected location of the left renal pelvis - due to the difficulty of the stent placement because of her large impacted bilateral ureteral stones, required greater than 75% more time and effort than the norm.    The wire was then advanced and noted to coil in the left renal pelvis fluoroscopically.     A left retrograde pyelogram was performed to confirm that the guidewire was in the correct location. A 5 Bangladeshi open ended catheter was advanced over the guidewire and the guidewire was removed leaving the 5 Bangladeshi open ended catheter in place. Dilute isovue was injected through the 5 Bangladeshi open ended catheter to perform the left retrograde pyelogram. The findings demonstrated hydronephrosis and cloudy urine was noted when I was withdrawing.     The guidewire was replaced through the 5 Bangladeshi open ended catheter and the 5 Bangladeshi open ended catheter was removed.     A 6 Sinhala x 26 cm JJ ureteral stent OFF A STRING was placed over the guidewire. It was loaded over the guidewire and then the pusher was loaded second to position the stent in place. The radiopaque marker was positioned over the pubic bone and the guidewire was removed noting a good proximal coil formation  fluoroscopically in the left renal pelvis as well as a distal coil in the bladder.     Both ureteral stents had cloudy urine draining through the eyelets. Therefore I proceeded with a santamaria catheter placement.     A santamaria catheter was inserted into the bladder and inflated with 10cc of sterile saline to drain the bladder of all fluid contents.      This concluded the procedure.  All surgical counts were correct.     ATTENDING ATTESTATION  I was present and scrubbed for the entire duration  of the procedure.      CASE DURATION:  * Missing case tracking time(s) *    DISPOSITION:   The patient tolerated the procedure well. she was extubated, and taken to post-anesthesia care unit in satisfactory condition.  She will be transferred back to hospitalist service for convalescence. Once convalesces will need discharge with pain medications, flomax, antibiotics (likely 10 day course for presumptive pyelonephritis tx).     Yanira Alexander MD

## 2022-11-06 NOTE — ASSESSMENT & PLAN NOTE
Supportive care with pain and antinausea medication  Flomax  May give her gentle iv fluid  Npo after midnight for anticipated urology procedure in am  Appreciate urology--s/p bilateral ureteral stents

## 2022-11-06 NOTE — PLAN OF CARE
Problem: Adult Inpatient Plan of Care  Goal: Plan of Care Review  Outcome: Ongoing, Progressing  Goal: Absence of Hospital-Acquired Illness or Injury  Outcome: Ongoing, Progressing  Goal: Optimal Comfort and Wellbeing  Outcome: Ongoing, Progressing     Problem: Hypertension Comorbidity  Goal: Blood Pressure in Desired Range  Outcome: Ongoing, Progressing     POC reviewed with patient. All questions and concerns reviewed. Vital signs stable throughout shift. For full assessment please refer to flowsheet. Fall/ safety precautions implemented & maintained. Bed locked in lowest position, bed alarm activated & audible, & call light in reach. Will continue to monitor.

## 2022-11-06 NOTE — PROGRESS NOTES
[facility]           Pharmacy        Current Drug Shortage     Due to national backorder and [facility] is critically low on inventory of Dextrose 50% (D50) Syringes and Vials, pharmacy has automatically switched from D50% to D10% IVPB at the equivalent dose until resolution of the shortage per P&T approved protocol.               Lea Zendejas, PharmD  [phone]

## 2022-11-06 NOTE — TRANSFER OF CARE
"Anesthesia Transfer of Care Note    Patient: Ursula Smallwood    Procedure(s) Performed: Procedure(s) (LRB):  CYSTOURETEROSCOPY, WITH BILATERAL RETROGRADE PYELOGRAM AND URETERAL STENT INSERTION (Bilateral)  URETEROSCOPY (Left)    Patient location: Other: pacu RN recovered in pt's room on the floor, with appropriate pacu cart in room.    Anesthesia Type: general    Transport from OR: Transported from OR on 2-3 L/min O2 by NC with adequate spontaneous ventilation    Post pain: adequate analgesia    Post assessment: no apparent anesthetic complications    Post vital signs: stable    Level of consciousness: awake and alert (sleeping but awakens to voice and responds appropriately)    Nausea/Vomiting: no nausea/vomiting    Complications: none    Transfer of care protocol was followedComments: Vitals in pt's room was I signed her out to PACU RN  Sinus rhythm of 84, 154/94; 94% on 2 L NC      Last vitals:   Visit Vitals  BP (!) 144/80   Pulse 84   Temp 36.6 °C (97.9 °F)   Resp 15   Ht 5' 4" (1.626 m)   Wt 135 kg (297 lb 9.9 oz)   LMP 06/04/2021   SpO2 95%   Breastfeeding No   BMI 51.09 kg/m²     "

## 2022-11-06 NOTE — INTERVAL H&P NOTE
The patient has been examined and the H&P has been reviewed:    I concur with the findings and no changes have occurred since H&P was written.    Surgery risks, benefits and alternative options discussed and understood by patient/family.          Active Hospital Problems    Diagnosis  POA    *Ureteral stone with hydronephrosis [N13.2]  Yes    Acute cystitis with hematuria [N30.01]  Yes    Type 2 diabetes mellitus, without long-term current use of insulin [E11.9]  Yes    Nonischemic cardiomyopathy [I42.8]  Yes    Hypertension [I10]  Yes    Chronic systolic heart failure [I50.22]  Yes      Resolved Hospital Problems   No resolved problems to display.

## 2022-11-06 NOTE — H&P
[facility] Medicine  History & Physical    Patient Name: Ursula Smallwood  MRN: [id number]  Patient Class: OP- Observation  Admission Date: 11/5/2022  Attending Physician: Phyllis Ayala*   Primary Care Provider: Simone Pro DO         Patient information was obtained from patient and ER records.     Subjective:     Principal Problem:Ureteral stone with hydronephrosis    Chief Complaint:   Chief Complaint   Patient presents with    Flank Pain     Right sided flank pain. Ongoing for a few days states she thought she pulled a muscle. States urine has been dark and cloudy with an odor. Pain with urination. Hx of kidney stones.        HPI: Ms. Ursula Smallwood is pleasant 38 yo female with significant medical history of nonischemic cardiomyopathy with chronic systolic chf due to covid infection, DM type II, HTN, morbid obesity, history of svt s/p radiofrequency ablation and history of kidney stones.   She presented to ED complaining of right flank pain for several day.   She described the pain as sharp, worsen over time, intermittent, radiates to groin area.   She also reports dark cloudy malodor urine and pain with urination.     She denies vaginal discharge, fever, chills, nausea, emesis, diarrhea or chest pain.     ED coarse;  Patient was found to be hypertensive with bp 192/99, other vital signs are wnl.  Her lab work that include cbc, cmp and UA is remarkable for UA positive with nitrates, many bacteria and wbc.   UC in process.     Ct renal stone study shows Bilateral hydroureteronephrosis related to distal calcified stones    Patient was given iv Toradol, flomax and iv ceftriaxone x one dose.     ER provider discussed the case with urology.          Past Medical History:   Diagnosis Date    Anemia     Hypertension     SVT (supraventricular tachycardia)     Uterine fibroid        Past Surgical History:   Procedure Laterality Date    ABLATION N/A 06/04/2021    Procedure: Ablation;  Surgeon: NICHELLE  Ava Zepeda MD;  Location: [facility] EP LAB;  Service: Cardiology;  Laterality: N/A;  SVT, RFA, Carto, anes, EH, 3prep    CYSTOSCOPY N/A 08/03/2021    Procedure: CYSTOSCOPY;  Surgeon: Jamarcus Ventura MD;  Location: [facility] OR;  Service: OB/GYN;  Laterality: N/A;    HYSTERECTOMY      ROBOT-ASSISTED LAPAROSCOPIC HYSTERECTOMY N/A 08/03/2021    Procedure: ROBOTIC HYSTERECTOMY;  Surgeon: Jamarcus Ventura MD;  Location: [facility] OR;  Service: OB/GYN;  Laterality: N/A;    ROBOT-ASSISTED SALPINGECTOMY Bilateral 08/03/2021    Procedure: ROBOTIC SALPINGECTOMY;  Surgeon: Jamarcus Ventura MD;  Location: [facility] OR;  Service: OB/GYN;  Laterality: Bilateral;       Review of patient's allergies indicates:  No Known Allergies    No current facility-administered medications on file prior to encounter.     Current Outpatient Medications on File Prior to Encounter   Medication Sig    dapagliflozin (FARXIGA) 10 mg tablet Take 1 tablet (10 mg total) by mouth once daily.    metoprolol succinate (TOPROL-XL) 200 MG 24 hr tablet Take 1 tablet (200 mg total) by mouth once daily.    sacubitriL-valsartan (ENTRESTO)  mg per tablet Take 1 tablet by mouth 2 (two) times daily.    spironolactone (ALDACTONE) 25 MG tablet Take 1 tablet (25 mg total) by mouth once daily.    torsemide (DEMADEX) 20 MG Tab Take 1 tablet (20 mg total) by mouth every other day.    diclofenac (VOLTAREN) 75 MG EC tablet Take 1 tablet (75 mg total) by mouth 2 (two) times daily.    [DISCONTINUED] lisinopriL (PRINIVIL,ZESTRIL) 5 MG tablet Take 1 tablet (5 mg total) by mouth once daily.    [DISCONTINUED] lisinopriL-hydrochlorothiazide (PRINZIDE,ZESTORETIC) 10-12.5 mg per tablet Take 1 tablet by mouth once daily.    [DISCONTINUED] metoprolol tartrate (LOPRESSOR) 50 MG tablet Take 1 tablet (50 mg total) by mouth once daily.     Family History    None       Tobacco Use    Smoking status: Never    Smokeless tobacco: Never   Substance and Sexual Activity    Alcohol use: No    Drug  use: No    Sexual activity: Not Currently     Partners: Male     Birth control/protection: Injection     Review of Systems   Constitutional: Negative.    HENT: Negative.     Eyes: Negative.    Respiratory: Negative.     Cardiovascular: Negative.    Gastrointestinal:  Positive for abdominal pain.   Endocrine: Negative.    Genitourinary:  Positive for dysuria, flank pain and frequency.   Skin: Negative.    Allergic/Immunologic: Negative.    Neurological: Negative.    Hematological: Negative.    Psychiatric/Behavioral: Negative.     Objective:     Vital Signs (Most Recent):  Temp: 98.4 °F (36.9 °C) (11/05/22 1335)  Pulse: 92 (11/05/22 1335)  Resp: 18 (11/05/22 1539)  BP: (!) 192/99 (11/05/22 1335)  SpO2: 97 % (11/05/22 1335)   Vital Signs (24h Range):  Temp:  [98.4 °F (36.9 °C)] 98.4 °F (36.9 °C)  Pulse:  [92] 92  Resp:  [18] 18  SpO2:  [97 %] 97 %  BP: (192)/(99) 192/99     Weight: 133.8 kg (295 lb)  Body mass index is 50.64 kg/m².    Physical Exam:  General appearance:  Appears in distress with pain  HEENT:  Normal exam  CARDIAC:  Normal sinus rhythm  Respiratory: Clear lung sounds  Abdomen:  Soft nontender positive bowel sounds.  Positive right CVA tenderness.    Musculoskeletal:  Normal exam   Skin:  Warm and dry.    Neuro:  Alert and oriented x3.  No focal neuro deficit       Significant Labs: All pertinent labs within the past 24 hours have been reviewed.  CBC:   Recent Labs   Lab 11/05/22  1351   WBC 11.18   HGB 13.7   HCT 43.6   *     CMP:   Recent Labs   Lab 11/05/22  1351      K 4.7      CO2 29   *   BUN 12   CREATININE 1.00   CALCIUM 9.1   PROT 7.8   ALBUMIN 4.2   BILITOT 0.5   ALKPHOS 76   AST 13*   ALT 20   ANIONGAP 6*     Urine Culture: No results for input(s): LABURIN in the last 48 hours.  Urine Studies:   Recent Labs   Lab 11/05/22  1353   COLORU Yellow   APPEARANCEUA Clear   PHUR 7.0   SPECGRAV 1.015   PROTEINUA 1+*   GLUCUA Negative   KETONESU Negative   BILIRUBINUA Negative    OCCULTUA 1+*   NITRITE Positive*   UROBILINOGEN Negative   LEUKOCYTESUR 1+*   RBCUA 5*   WBCUA 12*   BACTERIA Moderate*   HYALINECASTS 0       Significant Imaging: I have reviewed all pertinent imaging results/findings within the past 24 hours.    Assessment/Plan:     * Ureteral stone with hydronephrosis  Supportive care with pain and antinausea medication  Flomax  May give her gentle iv fluid  Npo after midnight for anticipated urology procedure in am  Urology consult in am      Acute cystitis with hematuria  Continue ceftriaxone x 3 days  Follow up urine culture results.       Type 2 diabetes mellitus, without long-term current use of insulin  Patient's FSGs are controlled on current medication regimen.  Last A1c reviewed-   Lab Results   Component Value Date    HGBA1C 6.3 (H) 02/27/2022     Most recent fingerstick glucose reviewed- No results for input(s): POCTGLUCOSE in the last 24 hours.  Current correctional scale  Medium  Maintain anti-hyperglycemic dose as follows-   Antihyperglycemics (From admission, onward)      None          Hold Oral hypoglycemics while patient is in the hospital.    Nonischemic cardiomyopathy   ischemic Cardiomyopathy attributed to prior covid infection.  Per follow up echo done in 9/2022, ef improving to 45%  Plan: will resume home medication. Avoid fluid over load      Hypertension  Elevated BP is likely related to pain.  Resume home medication.       Chronic systolic heart failure  Due to non ischemic Cardiomyopathy attributed to prior covid infection.  Per follow up echo done in 9/2022, ef improving to 45%  Plan: will resume home medication. Avoid fluid over load.         VTE Risk Mitigation (From admission, onward)           Ordered     heparin (porcine) injection 5,000 Units  Every 8 hours         11/05/22 2217     IP VTE HIGH RISK PATIENT  Once         11/05/22 2217     Place sequential compression device  Until discontinued         11/05/22 2217                   Code Status  : full code  Observation status due to anticipated length of stay less than 2 overnight    Son Mariano MD  Department of Hospital Medicine   [facility]

## 2022-11-06 NOTE — PROGRESS NOTES
[facility] Medicine  Progress Note    Patient Name: Ursula Smallwood  MRN: [id number]  Patient Class: OP- Observation   Admission Date: 11/5/2022  Length of Stay: 0 days  Attending Physician: Phyllis Ayala*  Primary Care Provider: Simone Pro DO        Subjective:     Principal Problem:Ureteral stone with hydronephrosis        HPI:  Ms. Ursula Smallwood is pleasant 36 yo female with significant medical history of nonischemic cardiomyopathy with chronic systolic chf due to covid infection, DM type II, HTN, morbid obesity, history of svt s/p radiofrequency ablation and history of kidney stones.   She presented to ED complaining of right flank pain for several day.   She described the pain as sharp, worsen over time, intermittent, radiates to groin area.   She also reports dark cloudy malodor urine and pain with urination.     She denies vaginal discharge, fever, chills, nausea, emesis, diarrhea or chest pain.     ED coarse;  Patient was found to be hypertensive with bp 192/99, other vital signs are wnl.  Her lab work that include cbc, cmp and UA is remarkable for UA positive with nitrates, many bacteria and wbc.   UC in process.     Ct renal stone study shows Bilateral hydroureteronephrosis related to distal calcified stones    Patient was given iv Toradol, flomax and iv ceftriaxone x one dose.     ER provider discussed the case with urology.          Overview/Hospital Course:  No notes on file    Interval History: seen post cycto; santamaria in place. Family at the bedside, no distress noted.     Review of Systems   Constitutional:  Negative for fatigue and fever.   HENT:  Negative for trouble swallowing.    Respiratory:  Negative for shortness of breath.    Cardiovascular:  Negative for chest pain.   Gastrointestinal:  Negative for diarrhea, nausea and vomiting.   Genitourinary:  Negative for hematuria.   Musculoskeletal:  Negative for gait problem.   Skin:  Negative for wound.   Neurological:   Negative for weakness.   Hematological:  Does not bruise/bleed easily.   Psychiatric/Behavioral:  Negative for confusion. The patient is not nervous/anxious.    Objective:     Vital Signs (Most Recent):  Temp: 96.9 °F (36.1 °C) (11/06/22 1223)  Pulse: 89 (11/06/22 1223)  Resp: 18 (11/06/22 1223)  BP: (!) 166/98 (11/06/22 1223)  SpO2: 98 % (11/06/22 1223)   Vital Signs (24h Range):  Temp:  [96.9 °F (36.1 °C)-98.1 °F (36.7 °C)] 96.9 °F (36.1 °C)  Pulse:  [80-91] 89  Resp:  [13-20] 18  SpO2:  [93 %-100 %] 98 %  BP: (117-166)/(59-98) 166/98     Weight: 135 kg (297 lb 9.9 oz)  Body mass index is 51.09 kg/m².    Intake/Output Summary (Last 24 hours) at 11/6/2022 1430  Last data filed at 11/6/2022 1210  Gross per 24 hour   Intake 1800 ml   Output 800 ml   Net 1000 ml      Physical Exam  Vitals and nursing note reviewed.   Constitutional:       Appearance: She is obese.   HENT:      Head: Normocephalic and atraumatic.      Nose: Nose normal.      Mouth/Throat:      Mouth: Mucous membranes are moist.   Eyes:      Extraocular Movements: Extraocular movements intact.      Conjunctiva/sclera: Conjunctivae normal.   Cardiovascular:      Rate and Rhythm: Normal rate.      Pulses: Normal pulses.   Pulmonary:      Effort: Pulmonary effort is normal.      Breath sounds: Normal breath sounds.   Abdominal:      General: Bowel sounds are normal. There is no distension.      Palpations: Abdomen is soft.      Tenderness: There is no abdominal tenderness. There is no guarding.   Genitourinary:     Comments: Haddad catheter present  Musculoskeletal:      Right lower leg: No edema.      Left lower leg: No edema.   Skin:     General: Skin is warm and dry.      Capillary Refill: Capillary refill takes 2 to 3 seconds.   Neurological:      Mental Status: She is alert and oriented to person, place, and time. Mental status is at baseline.      Motor: No weakness.   Psychiatric:         Mood and Affect: Mood normal.         Behavior: Behavior  normal.       Significant Labs: All pertinent labs within the past 24 hours have been reviewed.    Significant Imaging: I have reviewed all pertinent imaging results/findings within the past 24 hours.      Assessment/Plan:      * Ureteral stone with hydronephrosis  Supportive care with pain and antinausea medication  Flomax  May give her gentle iv fluid  Npo after midnight for anticipated urology procedure in am  Appreciate urology--s/p bilateral ureteral stents       Type 2 diabetes mellitus, without long-term current use of insulin  Patient's FSGs are controlled on current medication regimen.  Last A1c reviewed-   Lab Results   Component Value Date    HGBA1C 6.3 (H) 02/27/2022     Most recent fingerstick glucose reviewed-   Recent Labs   Lab 11/06/22  0534 11/06/22  1226   POCTGLUCOSE 113* 133*     Current correctional scale  Medium  Maintain anti-hyperglycemic dose as follows-   Antihyperglycemics (From admission, onward)    Start     Stop Route Frequency Ordered    11/05/22 2217  insulin aspart U-100 pen 1-10 Units         -- SubQ Before meals & nightly PRN 11/05/22 2217        Hold Oral hypoglycemics while patient is in the hospital.    Acute cystitis with hematuria  Continue ceftriaxone  Follow up urine culture results.       Nonischemic cardiomyopathy  Ischemic Cardiomyopathy attributed to prior covid infection.  Per follow up echo done in 9/2022, ef improving to 45%  Plan: will resume home medication. Avoid fluid over load      Hypertension  Elevated BP is likely related to pain.  Resume home medication.       Chronic systolic heart failure  Due to non ischemic Cardiomyopathy attributed to prior covid infection.  Per follow up echo done in 9/2022, ef improving to 45%  Plan: will resume home medication. Avoid fluid over load.         VTE Risk Mitigation (From admission, onward)         Ordered     heparin (porcine) injection 5,000 Units  Every 8 hours         11/05/22 2217     IP VTE HIGH RISK PATIENT  Once          11/05/22 2217     Place sequential compression device  Until discontinued         11/05/22 2217                Discharge Planning   KRYSTIAN:      Code Status: Full Code   Is the patient medically ready for discharge?:     Reason for patient still in hospital (select all that apply): Patient trending condition, Laboratory test, Treatment, Imaging and Consult recommendations                     Romario Mckeon NP  Department of [facility] Medicine   [facility]

## 2022-11-06 NOTE — H&P (VIEW-ONLY)
[facility]  Urology  Consult Note    Patient Name: Ursula Smallwood  MRN: [id number]  Admission Date: 11/5/2022  Hospital Length of Stay: 0 days  Code Status: Full Code   Attending Provider: Phyllis Ayala*   Consulting Provider: Yanira Alexander MD  Primary Care Physician: Simone Pro DO  Principal Problem:Ureteral stone with hydronephrosis    Inpatient consult to Urology  Consult performed by: Yanira Alexander MD  Consult ordered by: Son Mariano MD        Subjective:     HPI: 37 y.o. female with PMH below presented with right flank pain since yesterday. She does have a history of kidney stones. Urinalysis suggestive of UTI. No fevers. +vomiting x 1 episode. No chills. CT 11/5/22 actually demonstrated bilateral ureteral stones with bilateral hydronephrosis - right 4x8mm distal ureteral stone and left 4x8mm distal ureteral stone.     Past Medical History:   Diagnosis Date    Anemia     Hypertension     SVT (supraventricular tachycardia)     Uterine fibroid        Past Surgical History:   Procedure Laterality Date    ABLATION N/A 06/04/2021    Procedure: Ablation;  Surgeon: NCIHELLE Zepeda MD;  Location: [facility] EP LAB;  Service: Cardiology;  Laterality: N/A;  SVT, RFA, Carto, anes, EH, 3prep    CYSTOSCOPY N/A 08/03/2021    Procedure: CYSTOSCOPY;  Surgeon: Jamarcus Ventura MD;  Location: [facility] OR;  Service: OB/GYN;  Laterality: N/A;    HYSTERECTOMY      ROBOT-ASSISTED LAPAROSCOPIC HYSTERECTOMY N/A 08/03/2021    Procedure: ROBOTIC HYSTERECTOMY;  Surgeon: Jamarcus Ventura MD;  Location: [facility] OR;  Service: OB/GYN;  Laterality: N/A;    ROBOT-ASSISTED SALPINGECTOMY Bilateral 08/03/2021    Procedure: ROBOTIC SALPINGECTOMY;  Surgeon: Jamarcus Ventura MD;  Location: [facility] OR;  Service: OB/GYN;  Laterality: Bilateral;       Review of patient's allergies indicates:  No Known Allergies    Family History    None         Tobacco Use    Smoking status: Never    Smokeless tobacco: Never   Substance and Sexual  Activity    Alcohol use: No    Drug use: No    Sexual activity: Not Currently     Partners: Male     Birth control/protection: Injection       Review of Systems   Constitutional:  Negative for diaphoresis.   HENT:  Negative for facial swelling.    Eyes:  Negative for visual disturbance.   Respiratory:  Negative for shortness of breath.    Cardiovascular:  Negative for chest pain.   Gastrointestinal:  Negative for abdominal distention.   Musculoskeletal:  Negative for gait problem.   Skin:  Negative for color change.   Neurological:  Negative for speech difficulty.   Hematological:  Does not bruise/bleed easily.   Psychiatric/Behavioral:  Negative for agitation and behavioral problems.      Objective:     Temp:  [97.4 °F (36.3 °C)-98.4 °F (36.9 °C)] 97.4 °F (36.3 °C)  Pulse:  [81-92] 87  Resp:  [16-20] 18  SpO2:  [97 %-100 %] 99 %  BP: (117-192)/(59-99) 137/92     Body mass index is 51.09 kg/m².           Lines/Drains/Airways       Peripheral Intravenous Line  Duration                  Peripheral IV - Single Lumen 11/05/22 1401 20 G Left Antecubital <1 day                    Physical Exam  Constitutional:       Appearance: She is well-developed.   HENT:      Head: Normocephalic and atraumatic.   Eyes:      Pupils: Pupils are equal, round, and reactive to light.   Pulmonary:      Effort: Pulmonary effort is normal. No respiratory distress.   Abdominal:      General: There is no distension.      Palpations: Abdomen is soft.   Musculoskeletal:         General: Normal range of motion.      Cervical back: Normal range of motion and neck supple.   Skin:     General: Skin is warm and dry.   Neurological:      Mental Status: She is alert and oriented to person, place, and time.   Psychiatric:         Behavior: Behavior normal.       Significant Labs:  BMP:  Recent Labs   Lab 11/05/22  1351 11/06/22  0543    136   K 4.7 4.3    106   CO2 29 19*   BUN 12 12   CREATININE 1.00 1.0   CALCIUM 9.1 9.1       CBC:  Recent  Labs   Lab 11/05/22  1351   WBC 11.18   HGB 13.7   HCT 43.6   *       All pertinent labs results from the past 24 hours have been reviewed.  Recent Lab Results         11/06/22  0543   11/06/22  0534   11/05/22  1353   11/05/22  1351        Albumin 3.1       4.2       Alkaline Phosphatase 67       76       ALT 16       20       Anion Gap 11       6       Appearance, UA     Clear         AST 8       13       Bacteria, UA     Moderate         Baso #       0.03       Basophil %       0.3       Bilirubin (UA)     Negative         BILIRUBIN TOTAL 0.3  Comment: For infants and newborns, interpretation of results should be based  on gestational age, weight and in agreement with clinical  observations.    Premature Infant recommended reference ranges:  Up to 24 hours.............<8.0 mg/dL  Up to 48 hours............<12.0 mg/dL  3-5 days..................<15.0 mg/dL  6-29 days.................<15.0 mg/dL         0.5  Comment: For infants and newborns, interpretation of results should be based  on gestational age, weight and in agreement with clinical  observations.    Premature Infant recommended reference ranges:  Up to 24 hours.............<8.0 mg/dL  Up to 48 hours............<12.0 mg/dL  3-5 days..................<15.0 mg/dL  6-29 days.................<15.0 mg/dL         BUN 12       12       Calcium 9.1       9.1       Chloride 106       105       CO2 19       29       Color, UA     Yellow         Creatinine 1.0       1.00       Differential Method       Automated       eGFR >60       >60.0       Eos #       0.1       Eosinophil %       1.3       Glucose 117       123       Glucose, UA     Negative         Gran # (ANC)       8.1       Gran %       72.0       Hematocrit       43.6       Hemoglobin       13.7       Hyaline Casts, UA     0         Immature Grans (Abs)       0.05  Comment: Mild elevation in immature granulocytes is non specific and   can be seen in a variety of conditions including stress response,    acute inflammation, trauma and pregnancy. Correlation with other   laboratory and clinical findings is essential.         Immature Granulocytes       0.4       Ketones, UA     Negative         Leukocytes, UA     1+         Lymph #       2.0       Lymph %       17.4       Magnesium 1.8             MCH       25.6       MCHC       31.4       MCV       82       Microscopic Comment     SEE COMMENT  Comment: Other formed elements not mentioned in the report are not   present in the microscopic examination.            Mono #       1.0       Mono %       8.6       MPV       9.9       NITRITE UA     Positive         nRBC       0       Occult Blood UA     1+         pH, UA     7.0         Platelets       463       POCT Glucose   113           Potassium 4.3       4.7       PROTEIN TOTAL 7.1       7.8       Protein, UA     1+  Comment: Recommend a 24 hour urine protein or a urine   protein/creatinine ratio if globulin induced proteinuria is  clinically suspected.           RBC       5.35       RBC, UA     5         RDW       14.3       Sodium 136       140       Specific Carver, UA     1.015         Specimen UA     Urine, Clean Catch         UROBILINOGEN UA     Negative         WBC, UA     12         WBC       11.18               Significant Imaging:  All pertinent imaging results/findings from the past 24 hours have been reviewed.    Assessment and Plan:     37 y.o. female with CT 11/5/22 actually demonstrated bilateral ureteral stones with bilateral hydronephrosis - right 4x8mm distal ureteral stone and left 4x8mm distal ureteral stone and urinalysis suggestive of UTI    Plan:  Explained situation with ureteral stones bilaterally and UTI, concerns that she is completely obstructed. Offered procedure below and I do not recommend treating the ureteral stones in the setting of infections due to complications.   I have explained the indication and benefits of proceeding with a cystoscopy, bilateral ureteral stent placement,  bilateral retrograde pyelogram.  Alternatives of the procedure were also discussed. The risks included but were not limited to pain, infection (urinary tract infection), bleeding (hematuria), ureteral or urethral stricture,  injury to the urethra, bladder, ureter, need for additional treatments, inability or incomplete removal of kidney stones, pain, and discomfort related to the stent were discussed in depth with the patient.  The patient understands that if I am unable to pass the cameras up to the level of the stone or if visibility is poor, then I will only place a ureteral stent and pursue a staged procedure.     The ureteral stent was discussed at length. The patient will need to have it removed and the time period in which it should remain indwelling is to be determined after surgery. If left indwelling, the sequelae include pain, infection, lower urinary tract symptoms, development of calcifications on the ureteral stent, worsening kidney function, and complete loss of kidney function.     The patient voiced understanding and all questions have been answered and informed consents were signed and witnessed by her floor nurse Isidra.      Active Diagnoses:    Diagnosis Date Noted POA    PRINCIPAL PROBLEM:  Ureteral stone with hydronephrosis [N13.2] 11/05/2022 Yes    Acute cystitis with hematuria [N30.01] 11/05/2022 Yes    Type 2 diabetes mellitus, without long-term current use of insulin [E11.9] 11/05/2022 Yes    Nonischemic cardiomyopathy [I42.8] 05/01/2022 Yes    Hypertension [I10]  Yes    Chronic systolic heart failure [I50.22] 03/08/2022 Yes      Problems Resolved During this Admission:       VTE Risk Mitigation (From admission, onward)           Ordered     heparin (porcine) injection 5,000 Units  Every 8 hours         11/05/22 2217     IP VTE HIGH RISK PATIENT  Once         11/05/22 2217     Place sequential compression device  Until discontinued         11/05/22 2217                    Thank you for  your consult.     Yanira Alexander MD  Urology  [facility]

## 2022-11-06 NOTE — PROGRESS NOTES
11/05/22 2204   Admission   Initial VN Admission Questions Complete   Shift   Virtual Nurse - Patient Verbalized Approval Of Camera Use;VN Rounding   Safety/Activity   Patient Rounds bed in low position;call light in patient/parent reach;clutter free environment maintained;visualized patient;placement of personal items at bedside   Safety Promotion/Fall Prevention assistive device/personal item within reach;family to remain at bedside;side rails raised x 2   Positioning   Body Position supine   Head of Bed (HOB) Positioning HOB at 30-45 degrees   Pain/Comfort/Sleep   Comfort/Acceptable Pain Level 5   VN cued in to pt's room with permission. Pt's sister and boyfriend at bedside. Admission questions completed with pt. Dr Mariano to bedside to see pt-plan of care review deferred to bedside nursing. Call bell w/in reach. .

## 2022-11-06 NOTE — ANESTHESIA PROCEDURE NOTES
Intubation    Date/Time: 11/6/2022 8:48 AM  Performed by: Victor Hugo Guidry CRNA  Authorized by: Henrique Rose MD     Intubation:     Induction:  Intravenous    Intubated:  Postinduction    Mask Ventilation:  Easy mask    Attempts:  1    Attempted By:  CRNA    Method of Intubation:  Direct    Blade:  Glaser 3    Laryngeal View Grade: Grade I - full view of cords      Difficult Airway Encountered?: No      Complications:  None    Airway Device:  Direct and oral endotracheal tube    Airway Device Size:  7.5    Style/Cuff Inflation:  Cuffed (inflated to minimal occlusive pressure)    Tube secured:  21    Secured at:  The lips    Placement Verified By:  Auscultation and Capnometry    Complicating Factors:  None    Findings Post-Intubation:  Bilateral breath sounds, positive ETCO2 and atraumatic / condition of teeth unchanged

## 2022-11-06 NOTE — SUBJECTIVE & OBJECTIVE
Interval History: seen post cycto; santamaria in place. Family at the bedside, no distress noted.     Review of Systems   Constitutional:  Negative for fatigue and fever.   HENT:  Negative for trouble swallowing.    Respiratory:  Negative for shortness of breath.    Cardiovascular:  Negative for chest pain.   Gastrointestinal:  Negative for diarrhea, nausea and vomiting.   Genitourinary:  Negative for hematuria.   Musculoskeletal:  Negative for gait problem.   Skin:  Negative for wound.   Neurological:  Negative for weakness.   Hematological:  Does not bruise/bleed easily.   Psychiatric/Behavioral:  Negative for confusion. The patient is not nervous/anxious.    Objective:     Vital Signs (Most Recent):  Temp: 96.9 °F (36.1 °C) (11/06/22 1223)  Pulse: 89 (11/06/22 1223)  Resp: 18 (11/06/22 1223)  BP: (!) 166/98 (11/06/22 1223)  SpO2: 98 % (11/06/22 1223)   Vital Signs (24h Range):  Temp:  [96.9 °F (36.1 °C)-98.1 °F (36.7 °C)] 96.9 °F (36.1 °C)  Pulse:  [80-91] 89  Resp:  [13-20] 18  SpO2:  [93 %-100 %] 98 %  BP: (117-166)/(59-98) 166/98     Weight: 135 kg (297 lb 9.9 oz)  Body mass index is 51.09 kg/m².    Intake/Output Summary (Last 24 hours) at 11/6/2022 1430  Last data filed at 11/6/2022 1210  Gross per 24 hour   Intake 1800 ml   Output 800 ml   Net 1000 ml      Physical Exam  Vitals and nursing note reviewed.   Constitutional:       Appearance: She is obese.   HENT:      Head: Normocephalic and atraumatic.      Nose: Nose normal.      Mouth/Throat:      Mouth: Mucous membranes are moist.   Eyes:      Extraocular Movements: Extraocular movements intact.      Conjunctiva/sclera: Conjunctivae normal.   Cardiovascular:      Rate and Rhythm: Normal rate.      Pulses: Normal pulses.   Pulmonary:      Effort: Pulmonary effort is normal.      Breath sounds: Normal breath sounds.   Abdominal:      General: Bowel sounds are normal. There is no distension.      Palpations: Abdomen is soft.      Tenderness: There is no abdominal  tenderness. There is no guarding.   Genitourinary:     Comments: Haddad catheter present  Musculoskeletal:      Right lower leg: No edema.      Left lower leg: No edema.   Skin:     General: Skin is warm and dry.      Capillary Refill: Capillary refill takes 2 to 3 seconds.   Neurological:      Mental Status: She is alert and oriented to person, place, and time. Mental status is at baseline.      Motor: No weakness.   Psychiatric:         Mood and Affect: Mood normal.         Behavior: Behavior normal.       Significant Labs: All pertinent labs within the past 24 hours have been reviewed.    Significant Imaging: I have reviewed all pertinent imaging results/findings within the past 24 hours.

## 2022-11-06 NOTE — CONSULTS
[facility]  Urology  Consult Note    Patient Name: Ursula Smallwood  MRN: [id number]  Admission Date: 11/5/2022  Hospital Length of Stay: 0 days  Code Status: Full Code   Attending Provider: Phyllis Ayala*   Consulting Provider: Yanira Alexander MD  Primary Care Physician: Simone Pro DO  Principal Problem:Ureteral stone with hydronephrosis    Inpatient consult to Urology  Consult performed by: Yanira Alexander MD  Consult ordered by: Son Mariano MD        Subjective:     HPI: 37 y.o. female with PMH below presented with right flank pain since yesterday. She does have a history of kidney stones. Urinalysis suggestive of UTI. No fevers. +vomiting x 1 episode. No chills. CT 11/5/22 actually demonstrated bilateral ureteral stones with bilateral hydronephrosis - right 4x8mm distal ureteral stone and left 4x8mm distal ureteral stone.     Past Medical History:   Diagnosis Date    Anemia     Hypertension     SVT (supraventricular tachycardia)     Uterine fibroid        Past Surgical History:   Procedure Laterality Date    ABLATION N/A 06/04/2021    Procedure: Ablation;  Surgeon: NICHELLE Zepeda MD;  Location: [facility] EP LAB;  Service: Cardiology;  Laterality: N/A;  SVT, RFA, Carto, anes, EH, 3prep    CYSTOSCOPY N/A 08/03/2021    Procedure: CYSTOSCOPY;  Surgeon: Jamarcus Ventura MD;  Location: [facility] OR;  Service: OB/GYN;  Laterality: N/A;    HYSTERECTOMY      ROBOT-ASSISTED LAPAROSCOPIC HYSTERECTOMY N/A 08/03/2021    Procedure: ROBOTIC HYSTERECTOMY;  Surgeon: Jamarcus Ventura MD;  Location: [facility] OR;  Service: OB/GYN;  Laterality: N/A;    ROBOT-ASSISTED SALPINGECTOMY Bilateral 08/03/2021    Procedure: ROBOTIC SALPINGECTOMY;  Surgeon: Jamarcus Ventura MD;  Location: [facility] OR;  Service: OB/GYN;  Laterality: Bilateral;       Review of patient's allergies indicates:  No Known Allergies    Family History    None         Tobacco Use    Smoking status: Never    Smokeless tobacco: Never   Substance and Sexual  Activity    Alcohol use: No    Drug use: No    Sexual activity: Not Currently     Partners: Male     Birth control/protection: Injection       Review of Systems   Constitutional:  Negative for diaphoresis.   HENT:  Negative for facial swelling.    Eyes:  Negative for visual disturbance.   Respiratory:  Negative for shortness of breath.    Cardiovascular:  Negative for chest pain.   Gastrointestinal:  Negative for abdominal distention.   Musculoskeletal:  Negative for gait problem.   Skin:  Negative for color change.   Neurological:  Negative for speech difficulty.   Hematological:  Does not bruise/bleed easily.   Psychiatric/Behavioral:  Negative for agitation and behavioral problems.      Objective:     Temp:  [97.4 °F (36.3 °C)-98.4 °F (36.9 °C)] 97.4 °F (36.3 °C)  Pulse:  [81-92] 87  Resp:  [16-20] 18  SpO2:  [97 %-100 %] 99 %  BP: (117-192)/(59-99) 137/92     Body mass index is 51.09 kg/m².           Lines/Drains/Airways       Peripheral Intravenous Line  Duration                  Peripheral IV - Single Lumen 11/05/22 1401 20 G Left Antecubital <1 day                    Physical Exam  Constitutional:       Appearance: She is well-developed.   HENT:      Head: Normocephalic and atraumatic.   Eyes:      Pupils: Pupils are equal, round, and reactive to light.   Pulmonary:      Effort: Pulmonary effort is normal. No respiratory distress.   Abdominal:      General: There is no distension.      Palpations: Abdomen is soft.   Musculoskeletal:         General: Normal range of motion.      Cervical back: Normal range of motion and neck supple.   Skin:     General: Skin is warm and dry.   Neurological:      Mental Status: She is alert and oriented to person, place, and time.   Psychiatric:         Behavior: Behavior normal.       Significant Labs:  BMP:  Recent Labs   Lab 11/05/22  1351 11/06/22  0543    136   K 4.7 4.3    106   CO2 29 19*   BUN 12 12   CREATININE 1.00 1.0   CALCIUM 9.1 9.1       CBC:  Recent  Labs   Lab 11/05/22  1351   WBC 11.18   HGB 13.7   HCT 43.6   *       All pertinent labs results from the past 24 hours have been reviewed.  Recent Lab Results         11/06/22  0543   11/06/22  0534   11/05/22  1353   11/05/22  1351        Albumin 3.1       4.2       Alkaline Phosphatase 67       76       ALT 16       20       Anion Gap 11       6       Appearance, UA     Clear         AST 8       13       Bacteria, UA     Moderate         Baso #       0.03       Basophil %       0.3       Bilirubin (UA)     Negative         BILIRUBIN TOTAL 0.3  Comment: For infants and newborns, interpretation of results should be based  on gestational age, weight and in agreement with clinical  observations.    Premature Infant recommended reference ranges:  Up to 24 hours.............<8.0 mg/dL  Up to 48 hours............<12.0 mg/dL  3-5 days..................<15.0 mg/dL  6-29 days.................<15.0 mg/dL         0.5  Comment: For infants and newborns, interpretation of results should be based  on gestational age, weight and in agreement with clinical  observations.    Premature Infant recommended reference ranges:  Up to 24 hours.............<8.0 mg/dL  Up to 48 hours............<12.0 mg/dL  3-5 days..................<15.0 mg/dL  6-29 days.................<15.0 mg/dL         BUN 12       12       Calcium 9.1       9.1       Chloride 106       105       CO2 19       29       Color, UA     Yellow         Creatinine 1.0       1.00       Differential Method       Automated       eGFR >60       >60.0       Eos #       0.1       Eosinophil %       1.3       Glucose 117       123       Glucose, UA     Negative         Gran # (ANC)       8.1       Gran %       72.0       Hematocrit       43.6       Hemoglobin       13.7       Hyaline Casts, UA     0         Immature Grans (Abs)       0.05  Comment: Mild elevation in immature granulocytes is non specific and   can be seen in a variety of conditions including stress response,    acute inflammation, trauma and pregnancy. Correlation with other   laboratory and clinical findings is essential.         Immature Granulocytes       0.4       Ketones, UA     Negative         Leukocytes, UA     1+         Lymph #       2.0       Lymph %       17.4       Magnesium 1.8             MCH       25.6       MCHC       31.4       MCV       82       Microscopic Comment     SEE COMMENT  Comment: Other formed elements not mentioned in the report are not   present in the microscopic examination.            Mono #       1.0       Mono %       8.6       MPV       9.9       NITRITE UA     Positive         nRBC       0       Occult Blood UA     1+         pH, UA     7.0         Platelets       463       POCT Glucose   113           Potassium 4.3       4.7       PROTEIN TOTAL 7.1       7.8       Protein, UA     1+  Comment: Recommend a 24 hour urine protein or a urine   protein/creatinine ratio if globulin induced proteinuria is  clinically suspected.           RBC       5.35       RBC, UA     5         RDW       14.3       Sodium 136       140       Specific [place], UA     1.015         Specimen UA     Urine, Clean Catch         UROBILINOGEN UA     Negative         WBC, UA     12         WBC       11.18               Significant Imaging:  All pertinent imaging results/findings from the past 24 hours have been reviewed.    Assessment and Plan:     37 y.o. female with CT 11/5/22 actually demonstrated bilateral ureteral stones with bilateral hydronephrosis - right 4x8mm distal ureteral stone and left 4x8mm distal ureteral stone and urinalysis suggestive of UTI    Plan:  Explained situation with ureteral stones bilaterally and UTI, concerns that she is completely obstructed. Offered procedure below and I do not recommend treating the ureteral stones in the setting of infections due to complications.   I have explained the indication and benefits of proceeding with a cystoscopy, bilateral ureteral stent placement,  bilateral retrograde pyelogram.  Alternatives of the procedure were also discussed. The risks included but were not limited to pain, infection (urinary tract infection), bleeding (hematuria), ureteral or urethral stricture,  injury to the urethra, bladder, ureter, need for additional treatments, inability or incomplete removal of kidney stones, pain, and discomfort related to the stent were discussed in depth with the patient.  The patient understands that if I am unable to pass the cameras up to the level of the stone or if visibility is poor, then I will only place a ureteral stent and pursue a staged procedure.     The ureteral stent was discussed at length. The patient will need to have it removed and the time period in which it should remain indwelling is to be determined after surgery. If left indwelling, the sequelae include pain, infection, lower urinary tract symptoms, development of calcifications on the ureteral stent, worsening kidney function, and complete loss of kidney function.     The patient voiced understanding and all questions have been answered and informed consents were signed and witnessed by her floor nurse Isidra.      Active Diagnoses:    Diagnosis Date Noted POA    PRINCIPAL PROBLEM:  Ureteral stone with hydronephrosis [N13.2] 11/05/2022 Yes    Acute cystitis with hematuria [N30.01] 11/05/2022 Yes    Type 2 diabetes mellitus, without long-term current use of insulin [E11.9] 11/05/2022 Yes    Nonischemic cardiomyopathy [I42.8] 05/01/2022 Yes    Hypertension [I10]  Yes    Chronic systolic heart failure [I50.22] 03/08/2022 Yes      Problems Resolved During this Admission:       VTE Risk Mitigation (From admission, onward)           Ordered     heparin (porcine) injection 5,000 Units  Every 8 hours         11/05/22 2217     IP VTE HIGH RISK PATIENT  Once         11/05/22 2217     Place sequential compression device  Until discontinued         11/05/22 2217                    Thank you for  your consult.     Yanira Alexander MD  Urology  [place] [facility]

## 2022-11-07 LAB
ALBUMIN SERPL BCP-MCNC: 3.4 G/DL (ref 3.5–5.2)
ALP SERPL-CCNC: 69 U/L (ref 55–135)
ALT SERPL W/O P-5'-P-CCNC: 11 U/L (ref 10–44)
ANION GAP SERPL CALC-SCNC: 12 MMOL/L (ref 8–16)
AST SERPL-CCNC: 6 U/L (ref 10–40)
BACTERIA UR CULT: ABNORMAL
BILIRUB SERPL-MCNC: 0.3 MG/DL (ref 0.1–1)
BUN SERPL-MCNC: 16 MG/DL (ref 6–20)
CALCIUM SERPL-MCNC: 9.2 MG/DL (ref 8.7–10.5)
CHLORIDE SERPL-SCNC: 102 MMOL/L (ref 95–110)
CO2 SERPL-SCNC: 17 MMOL/L (ref 23–29)
CREAT SERPL-MCNC: 1 MG/DL (ref 0.5–1.4)
EST. GFR  (NO RACE VARIABLE): >60 ML/MIN/1.73 M^2
GLUCOSE SERPL-MCNC: 176 MG/DL (ref 70–110)
MAGNESIUM SERPL-MCNC: 1.8 MG/DL (ref 1.6–2.6)
POTASSIUM SERPL-SCNC: 4.5 MMOL/L (ref 3.5–5.1)
PROT SERPL-MCNC: 7.7 G/DL (ref 6–8.4)
SODIUM SERPL-SCNC: 131 MMOL/L (ref 136–145)

## 2022-11-07 PROCEDURE — 96372 THER/PROPH/DIAG INJ SC/IM: CPT | Performed by: HOSPITALIST

## 2022-11-07 PROCEDURE — 96376 TX/PRO/DX INJ SAME DRUG ADON: CPT

## 2022-11-07 PROCEDURE — 99233 SBSQ HOSP IP/OBS HIGH 50: CPT | Mod: ,,, | Performed by: STUDENT IN AN ORGANIZED HEALTH CARE EDUCATION/TRAINING PROGRAM

## 2022-11-07 PROCEDURE — G0378 HOSPITAL OBSERVATION PER HR: HCPCS

## 2022-11-07 PROCEDURE — 63600175 PHARM REV CODE 636 W HCPCS: Performed by: STUDENT IN AN ORGANIZED HEALTH CARE EDUCATION/TRAINING PROGRAM

## 2022-11-07 PROCEDURE — 94761 N-INVAS EAR/PLS OXIMETRY MLT: CPT

## 2022-11-07 PROCEDURE — 11000001 HC ACUTE MED/SURG PRIVATE ROOM

## 2022-11-07 PROCEDURE — 83735 ASSAY OF MAGNESIUM: CPT | Performed by: HOSPITALIST

## 2022-11-07 PROCEDURE — 25000003 PHARM REV CODE 250: Performed by: STUDENT IN AN ORGANIZED HEALTH CARE EDUCATION/TRAINING PROGRAM

## 2022-11-07 PROCEDURE — 25000003 PHARM REV CODE 250: Performed by: NURSE PRACTITIONER

## 2022-11-07 PROCEDURE — 36415 COLL VENOUS BLD VENIPUNCTURE: CPT | Performed by: HOSPITALIST

## 2022-11-07 PROCEDURE — 63600175 PHARM REV CODE 636 W HCPCS: Performed by: HOSPITALIST

## 2022-11-07 PROCEDURE — 80053 COMPREHEN METABOLIC PANEL: CPT | Performed by: HOSPITALIST

## 2022-11-07 PROCEDURE — 99233 PR SUBSEQUENT HOSPITAL CARE,LEVL III: ICD-10-PCS | Mod: ,,, | Performed by: STUDENT IN AN ORGANIZED HEALTH CARE EDUCATION/TRAINING PROGRAM

## 2022-11-07 PROCEDURE — 25000003 PHARM REV CODE 250: Performed by: HOSPITALIST

## 2022-11-07 RX ADMIN — SACUBITRIL AND VALSARTAN 1 TABLET: 97; 103 TABLET, FILM COATED ORAL at 09:11

## 2022-11-07 RX ADMIN — HEPARIN SODIUM 5000 UNITS: 5000 INJECTION, SOLUTION INTRAVENOUS; SUBCUTANEOUS at 09:11

## 2022-11-07 RX ADMIN — HEPARIN SODIUM 5000 UNITS: 5000 INJECTION, SOLUTION INTRAVENOUS; SUBCUTANEOUS at 05:11

## 2022-11-07 RX ADMIN — KETOROLAC TROMETHAMINE 30 MG: 30 INJECTION, SOLUTION INTRAMUSCULAR; INTRAVENOUS at 09:11

## 2022-11-07 RX ADMIN — KETOROLAC TROMETHAMINE 30 MG: 30 INJECTION, SOLUTION INTRAMUSCULAR; INTRAVENOUS at 02:11

## 2022-11-07 RX ADMIN — OXYCODONE HYDROCHLORIDE AND ACETAMINOPHEN 1 TABLET: 10; 325 TABLET ORAL at 03:11

## 2022-11-07 RX ADMIN — HEPARIN SODIUM 5000 UNITS: 5000 INJECTION, SOLUTION INTRAVENOUS; SUBCUTANEOUS at 02:11

## 2022-11-07 RX ADMIN — METOPROLOL SUCCINATE 200 MG: 50 TABLET, EXTENDED RELEASE ORAL at 09:11

## 2022-11-07 RX ADMIN — KETOROLAC TROMETHAMINE 30 MG: 30 INJECTION, SOLUTION INTRAMUSCULAR; INTRAVENOUS at 05:11

## 2022-11-07 RX ADMIN — DOCUSATE SODIUM - SENNOSIDES 1 TABLET: 50; 8.6 TABLET, FILM COATED ORAL at 09:11

## 2022-11-07 RX ADMIN — SPIRONOLACTONE 25 MG: 25 TABLET ORAL at 09:11

## 2022-11-07 RX ADMIN — POLYETHYLENE GLYCOL 3350 17 G: 17 POWDER, FOR SOLUTION ORAL at 09:11

## 2022-11-07 RX ADMIN — TAMSULOSIN HYDROCHLORIDE 0.4 MG: 0.4 CAPSULE ORAL at 09:11

## 2022-11-07 RX ADMIN — CEFTRIAXONE 1 G: 1 INJECTION, SOLUTION INTRAVENOUS at 03:11

## 2022-11-07 NOTE — ASSESSMENT & PLAN NOTE
Supportive care with pain and antinausea medication  Flomax  Appreciate urology--s/p bilateral ureteral stents

## 2022-11-07 NOTE — HOSPITAL COURSE
She was admitted to the [facility] service for further care. Urology was consulted. Ct renal stone study shows Bilateral hydroureteronephrosis related to distal calcified stones. She was given IV Toradol, flomax and IV ceftriaxone. Urine culture with E. Coli noted. POD#1 cystoscopy, bilateral ureteral stent placement, bilateral retrograde pyelogram per Dr. Alexander.     11/8: Care assumed. EMR reviewed. Urine culture showed pan sensitive E coli. No fevers. Some flank pain but overall improving. Urology & ID notes reviewed.  She reports vulvar candidiasis with antibiotics. Diflucan prescribed. She is constipated but doesn't want to take something in house as she still needs to go vote. She has been prescribed Dulcolax suppository for home usage and educated on what to do if that doesn't work.As she is stable she will discharge home on Cipro x 14 days with as needed antispasmodic and Flomax per urology recommendations. She is aware of necessary timely follow up with urology.

## 2022-11-07 NOTE — PLAN OF CARE
Problem: Adult Inpatient Plan of Care  Goal: Plan of Care Review  Outcome: Ongoing, Progressing  Goal: Absence of Hospital-Acquired Illness or Injury  Outcome: Ongoing, Progressing  Goal: Optimal Comfort and Wellbeing  Outcome: Ongoing, Progressing     Problem: UTI (Urinary Tract Infection)  Goal: Improved Infection Symptoms  Outcome: Ongoing, Progressing     Problem: Pain Acute  Goal: Acceptable Pain Control and Functional Ability  Outcome: Ongoing, Progressing     POC reviewed with patient. All questions and concerns reviewed. Vital signs stable throughout shift. For full assessment please refer to flowsheet. Fall/ safety precautions implemented & maintained. Bed locked in lowest position, bed alarm activated & audible, & call light in reach. Will continue to monitor.

## 2022-11-07 NOTE — ASSESSMENT & PLAN NOTE
Patient's FSGs are controlled on current medication regimen.  Last A1c reviewed-   Lab Results   Component Value Date    HGBA1C 6.3 (H) 02/27/2022     Most recent fingerstick glucose reviewed-   Recent Labs   Lab 11/06/22  1226 11/06/22  1716   POCTGLUCOSE 133* 195*     Current correctional scale  Medium  Maintain anti-hyperglycemic dose as follows-   Antihyperglycemics (From admission, onward)    Start     Stop Route Frequency Ordered    11/05/22 2217  insulin aspart U-100 pen 1-10 Units         -- SubQ Before meals & nightly PRN 11/05/22 2217        Hold Oral hypoglycemics while patient is in the hospital.

## 2022-11-07 NOTE — PLAN OF CARE
SW met with pt via [employer] to complete assessment. Pt is AxO x3 and able to verbally answer assessment questions. Pt able to confirm demographic information. Pt reports to live at home with significant other who is also at bedside. Pt reports to be independent of Adl's. Pt has some DME at home but none in use currently. Pt confirmed PCP and emergency contact. At time of discharge pt to have family transport back home. SW updated whiteboard with SWCM name and contact information. SW confirmed pt understanding of Observation unit and expected discharge plan. SW will continue to follow pt throughout care and assist with any discharge needs.         11/07/22 1000   Discharge Planning   Assessment Type Discharge Planning Brief Assessment   Resource/Environmental Concerns none   Support Systems Spouse/significant other;Family members   Equipment Currently Used at Home none   Current Living Arrangements home/apartment/condo   Patient/Family Anticipates Transition to home with family   Patient/Family Anticipated Services at Transition none   DME Needed Upon Discharge  none   Discharge Plan A Home with family     Future Appointments   Date Time Provider Department Center   11/17/2022  9:45 AM Yanira Alexander MD [facility] UROLOGY [facility] Case Management  [phone]

## 2022-11-07 NOTE — ANESTHESIA POSTPROCEDURE EVALUATION
Anesthesia Post Evaluation    Patient: Ursula A Cl    Procedure(s) Performed: Procedure(s) (LRB):  CYSTOURETEROSCOPY, WITH BILATERAL RETROGRADE PYELOGRAM AND URETERAL STENT INSERTION (Bilateral)  URETEROSCOPY (Left)    Final Anesthesia Type: general      Patient location during evaluation: PACU  Patient participation: Yes- Able to Participate  Level of consciousness: awake and alert  Post-procedure vital signs: reviewed and stable  Pain management: adequate  Airway patency: patent    PONV status at discharge: No PONV  Anesthetic complications: no      Cardiovascular status: blood pressure returned to baseline and hemodynamically stable  Respiratory status: unassisted and spontaneous ventilation  Hydration status: euvolemic  Follow-up not needed.          Vitals Value Taken Time   /84 11/07/22 0026   Temp 36.1 °C (97 °F) 11/07/22 0026   Pulse 88 11/07/22 0400   Resp 16 11/07/22 0354   SpO2 94 % 11/07/22 0026         Event Time   Out of Recovery 11/06/2022 11:05:34         Pain/Linda Score: Pain Rating Prior to Med Admin: 7 (11/7/2022  3:54 AM)  Pain Rating Post Med Admin: 0 (11/6/2022 11:35 PM)  Linda Score: 9 (11/6/2022 11:00 AM)

## 2022-11-07 NOTE — PROGRESS NOTES
[facility]  Urology Progress Note    Problems:   Patient Active Problem List   Diagnosis    Symptomatic anemia    Morbid obesity    Uterine fibroid    Menorrhagia with irregular cycle    H/O cardiac radiofrequency ablation    H/O abdominal hysterectomy    COVID-19    Hx of SVT (supraventricular tachycardia)    Chronic systolic heart failure    Hypertension    Nonischemic cardiomyopathy    Morbid obesity with BMI of 40.0-44.9, adult    Ureteral stone with hydronephrosis    Acute cystitis with hematuria    Type 2 diabetes mellitus, without long-term current use of insulin       Subjective   NAEO. Afebrile. James PO. Pt states pain is controlled. Haddad removed this AM.    Meds:   cefTRIAXone (ROCEPHIN) IVPB  1 g Intravenous Q24H    heparin (porcine)  5,000 Units Subcutaneous Q8H    ketorolac  30 mg Intravenous Q8H    metoprolol succinate  200 mg Oral Daily    polyethylene glycol  17 g Oral Daily    sacubitriL-valsartan  1 tablet Oral BID    spironolactone  25 mg Oral Daily    tamsulosin  0.4 mg Oral Daily    torsemide  20 mg Oral Every other day       dextrose 10%, dextrose 10%, glucagon (human recombinant), glucose, glucose, HYDROmorphone, influenza, insulin aspart U-100, melatonin, naloxone, ondansetron, oxybutynin, oxyCODONE-acetaminophen, oxyCODONE-acetaminophen, senna-docusate 8.6-50 mg, simethicone, sodium chloride 0.9%, sodium chloride 0.9%, sodium chloride 0.9%    Temp:  [96.2 °F (35.7 °C)-97.1 °F (36.2 °C)] 96.9 °F (36.1 °C)  Pulse:  [] 85  Resp:  [16-20] 18  SpO2:  [94 %-100 %] 96 %  BP: (136-169)/(79-96) 169/96    I/O last 3 completed shifts:  In: 2650 [P.O.:2100; I.V.:500; IV Piggyback:50]  Out: 2650 [Urine:2650]    General:  Alert, cooperative, no distress, appears stated age   Head:  Normocephalic, without obvious abnormality, atraumatic   Eyes:  PERRL, conjunctiva/corneas clear   Lungs:   Respirations unlabored    Heart:  Warm and well perfused   Abdomen:   abdomen is soft  without significant tenderness, masses, organomegaly or guarding   : No santamaria   Drains: none   Extremities: Extremities normal, atraumatic, no cyanosis or edema   Skin: Skin color, texture, turgor normal, no rashes or lesions   Psych: Appropriate   Neurologic: Non-focal     Recent Results (from the past 24 hour(s))   POCT glucose    Collection Time: 11/06/22  5:16 PM   Result Value Ref Range    POCT Glucose 195 (H) 70 - 110 mg/dL   Comprehensive Metabolic Panel (CMP)    Collection Time: 11/07/22  4:17 AM   Result Value Ref Range    Sodium 131 (L) 136 - 145 mmol/L    Potassium 4.5 3.5 - 5.1 mmol/L    Chloride 102 95 - 110 mmol/L    CO2 17 (L) 23 - 29 mmol/L    Glucose 176 (H) 70 - 110 mg/dL    BUN 16 6 - 20 mg/dL    Creatinine 1.0 0.5 - 1.4 mg/dL    Calcium 9.2 8.7 - 10.5 mg/dL    Total Protein 7.7 6.0 - 8.4 g/dL    Albumin 3.4 (L) 3.5 - 5.2 g/dL    Total Bilirubin 0.3 0.1 - 1.0 mg/dL    Alkaline Phosphatase 69 55 - 135 U/L    AST 6 (L) 10 - 40 U/L    ALT 11 10 - 44 U/L    Anion Gap 12 8 - 16 mmol/L    eGFR >60 >60 mL/min/1.73 m^2   Magnesium    Collection Time: 11/07/22  4:17 AM   Result Value Ref Range    Magnesium 1.8 1.6 - 2.6 mg/dL       Assessment   37 y.o. female with bilateral ureteral stones, bilateral hydronephrosis s/p cysto, bilateral ureteral stent placement 11/6/22    Plan   Pt recovering well from cysto bilateral stents.  Agree with abx, would recommend treat for 10-14 days presumptively for UTI/pyelonephritis.  When pt ready for discharge would rec: flomax, antibiotics, pain medications. Can add oxybutynin PRN bladder spasms if pt requiring while in hospital for bothersome stent sx. My staff have made f/u with me for next week in the office.

## 2022-11-07 NOTE — PROGRESS NOTES
[facility] Medicine  Progress Note    Patient Name: Ursula Smallwood  MRN: [id number]  Patient Class: OP- Observation   Admission Date: 11/5/2022  Length of Stay: 0 days  Attending Physician: Phyllis Ayala*  Primary Care Provider: Simone Pro DO        Subjective:     Principal Problem:Ureteral stone with hydronephrosis        HPI:  Ms. Ursula Smallwood is pleasant 36 yo female with significant medical history of nonischemic cardiomyopathy with chronic systolic chf due to covid infection, DM type II, HTN, morbid obesity, history of svt s/p radiofrequency ablation and history of kidney stones.   She presented to ED complaining of right flank pain for several day.   She described the pain as sharp, worsen over time, intermittent, radiates to groin area.   She also reports dark cloudy malodor urine and pain with urination.     She denies vaginal discharge, fever, chills, nausea, emesis, diarrhea or chest pain.     ED coarse;  Patient was found to be hypertensive with bp 192/99, other vital signs are wnl.  Her lab work that include cbc, cmp and UA is remarkable for UA positive with nitrates, many bacteria and wbc.   UC in process.     Ct renal stone study shows Bilateral hydroureteronephrosis related to distal calcified stones    Patient was given iv Toradol, flomax and iv ceftriaxone x one dose.     ER provider discussed the case with urology.          Overview/Hospital Course:  She was admitted to the [facility] service for further care. Urology was consulted. Ct renal stone study shows Bilateral hydroureteronephrosis related to distal calcified stones. She was given IV Toradol, flomax and IV ceftriaxone. Urine culture with E. Coli noted. POD#1 cystoscopy, bilateral ureteral stent placement, bilateral retrograde pyelogram per Dr. Alexander.       Interval History: seen post cysto and stent placement; santamaria removed this am. No distress noted.     Review of Systems   Constitutional:  Negative for fatigue  and fever.   HENT:  Negative for trouble swallowing.    Respiratory:  Negative for shortness of breath.    Cardiovascular:  Negative for chest pain.   Gastrointestinal:  Negative for diarrhea, nausea and vomiting.   Genitourinary:  Negative for hematuria.   Musculoskeletal:  Negative for gait problem.   Skin:  Negative for wound.   Neurological:  Negative for weakness.   Hematological:  Does not bruise/bleed easily.   Psychiatric/Behavioral:  Negative for confusion. The patient is not nervous/anxious.    Objective:     Vital Signs (Most Recent):  Temp: 96.2 °F (35.7 °C) (11/07/22 0810)  Pulse: 81 (11/07/22 0810)  Resp: 18 (11/07/22 0810)  BP: 136/87 (11/07/22 0810)  SpO2: 99 % (11/07/22 0810)   Vital Signs (24h Range):  Temp:  [96.2 °F (35.7 °C)-97.7 °F (36.5 °C)] 96.2 °F (35.7 °C)  Pulse:  [] 81  Resp:  [13-20] 18  SpO2:  [93 %-100 %] 99 %  BP: (131-166)/(76-98) 136/87     Weight: 135 kg (297 lb 9.9 oz)  Body mass index is 51.09 kg/m².    Intake/Output Summary (Last 24 hours) at 11/7/2022 0939  Last data filed at 11/7/2022 0500  Gross per 24 hour   Intake 2400 ml   Output 2650 ml   Net -250 ml        Physical Exam  Vitals and nursing note reviewed.   Constitutional:       Appearance: She is obese.   HENT:      Head: Normocephalic and atraumatic.      Nose: Nose normal.      Mouth/Throat:      Mouth: Mucous membranes are moist.   Eyes:      Extraocular Movements: Extraocular movements intact.      Conjunctiva/sclera: Conjunctivae normal.   Cardiovascular:      Rate and Rhythm: Normal rate.      Pulses: Normal pulses.   Pulmonary:      Effort: Pulmonary effort is normal.      Breath sounds: Normal breath sounds.   Abdominal:      General: Bowel sounds are normal. There is no distension.      Palpations: Abdomen is soft.      Tenderness: There is no abdominal tenderness. There is no guarding.   Musculoskeletal:      Right lower leg: No edema.      Left lower leg: No edema.   Skin:     General: Skin is warm and  dry.      Capillary Refill: Capillary refill takes 2 to 3 seconds.   Neurological:      Mental Status: She is alert and oriented to person, place, and time. Mental status is at baseline.      Motor: No weakness.   Psychiatric:         Mood and Affect: Mood normal.         Behavior: Behavior normal.       Significant Labs: All pertinent labs within the past 24 hours have been reviewed.    Significant Imaging: I have reviewed all pertinent imaging results/findings within the past 24 hours.      Assessment/Plan:      * Ureteral stone with hydronephrosis  Supportive care with pain and antinausea medication  Flomax  Appreciate urology--s/p bilateral ureteral stents       Type 2 diabetes mellitus, without long-term current use of insulin  Patient's FSGs are controlled on current medication regimen.  Last A1c reviewed-   Lab Results   Component Value Date    HGBA1C 6.3 (H) 02/27/2022     Most recent fingerstick glucose reviewed-   Recent Labs   Lab 11/06/22  1226 11/06/22  1716   POCTGLUCOSE 133* 195*     Current correctional scale  Medium  Maintain anti-hyperglycemic dose as follows-   Antihyperglycemics (From admission, onward)    Start     Stop Route Frequency Ordered    11/05/22 2217  insulin aspart U-100 pen 1-10 Units         -- SubQ Before meals & nightly PRN 11/05/22 2217        Hold Oral hypoglycemics while patient is in the hospital.    Acute cystitis with hematuria  Continue ceftriaxone  Urine culture with E. coli      Nonischemic cardiomyopathy  Ischemic Cardiomyopathy attributed to prior covid infection.  Per follow up echo done in 9/2022, ef improving to 45%  Plan: will resume home medication. Avoid fluid over load      Hypertension  Elevated BP is likely related to pain.  Resume home medication.       Chronic systolic heart failure  Due to non ischemic Cardiomyopathy attributed to prior covid infection.  Per follow up echo done in 9/2022, ef improving to 45%  Plan: will resume home medication. Avoid fluid  over load.         VTE Risk Mitigation (From admission, onward)         Ordered     IP VTE HIGH RISK PATIENT  Once         11/06/22 2313     heparin (porcine) injection 5,000 Units  Every 8 hours         11/05/22 2217     Place sequential compression device  Until discontinued         11/05/22 2217                Discharge Planning   KRYSTIAN:      Code Status: Full Code   Is the patient medically ready for discharge?:     Reason for patient still in hospital (select all that apply): Patient trending condition, Laboratory test, Treatment, Imaging and Consult recommendations.                     Romario Mckeon NP  Department of Hospital Medicine   [facility]

## 2022-11-07 NOTE — PROGRESS NOTES
"[facility]           Pharmacy  Admission Medication Reconciliation     Based on information gathered for medication list, you may go to "Admission" then "Reconcile Home Medications" tabs to review and/or act upon those items.     The home medication list has been updated by the Pharmacy department.   Please read ALL comments highlighted in red.   Please address this information as you see fit.    Feel free to contact us if you have any questions or require assistance.    Home medication list has been compared to current inpatient medications. Please review the following discrepancies noted below:    Patient reports STILL TAKING the following medication(s) which was not ordered upon admit  Farxiga 10mg daily  Diclofenac 75twice daily    Feel free to contact us if you have any questions or require assistance.    Dale Cantor, PharmD  [phone]    "

## 2022-11-07 NOTE — SUBJECTIVE & OBJECTIVE
Interval History: seen post cysto and stent placement; santamaria removed this am. No distress noted.     Review of Systems   Constitutional:  Negative for fatigue and fever.   HENT:  Negative for trouble swallowing.    Respiratory:  Negative for shortness of breath.    Cardiovascular:  Negative for chest pain.   Gastrointestinal:  Negative for diarrhea, nausea and vomiting.   Genitourinary:  Negative for hematuria.   Musculoskeletal:  Negative for gait problem.   Skin:  Negative for wound.   Neurological:  Negative for weakness.   Hematological:  Does not bruise/bleed easily.   Psychiatric/Behavioral:  Negative for confusion. The patient is not nervous/anxious.    Objective:     Vital Signs (Most Recent):  Temp: 96.2 °F (35.7 °C) (11/07/22 0810)  Pulse: 81 (11/07/22 0810)  Resp: 18 (11/07/22 0810)  BP: 136/87 (11/07/22 0810)  SpO2: 99 % (11/07/22 0810)   Vital Signs (24h Range):  Temp:  [96.2 °F (35.7 °C)-97.7 °F (36.5 °C)] 96.2 °F (35.7 °C)  Pulse:  [] 81  Resp:  [13-20] 18  SpO2:  [93 %-100 %] 99 %  BP: (131-166)/(76-98) 136/87     Weight: 135 kg (297 lb 9.9 oz)  Body mass index is 51.09 kg/m².    Intake/Output Summary (Last 24 hours) at 11/7/2022 0939  Last data filed at 11/7/2022 0500  Gross per 24 hour   Intake 2400 ml   Output 2650 ml   Net -250 ml        Physical Exam  Vitals and nursing note reviewed.   Constitutional:       Appearance: She is obese.   HENT:      Head: Normocephalic and atraumatic.      Nose: Nose normal.      Mouth/Throat:      Mouth: Mucous membranes are moist.   Eyes:      Extraocular Movements: Extraocular movements intact.      Conjunctiva/sclera: Conjunctivae normal.   Cardiovascular:      Rate and Rhythm: Normal rate.      Pulses: Normal pulses.   Pulmonary:      Effort: Pulmonary effort is normal.      Breath sounds: Normal breath sounds.   Abdominal:      General: Bowel sounds are normal. There is no distension.      Palpations: Abdomen is soft.      Tenderness: There is no  abdominal tenderness. There is no guarding.   Musculoskeletal:      Right lower leg: No edema.      Left lower leg: No edema.   Skin:     General: Skin is warm and dry.      Capillary Refill: Capillary refill takes 2 to 3 seconds.   Neurological:      Mental Status: She is alert and oriented to person, place, and time. Mental status is at baseline.      Motor: No weakness.   Psychiatric:         Mood and Affect: Mood normal.         Behavior: Behavior normal.       Significant Labs: All pertinent labs within the past 24 hours have been reviewed.    Significant Imaging: I have reviewed all pertinent imaging results/findings within the past 24 hours.

## 2022-11-08 VITALS
SYSTOLIC BLOOD PRESSURE: 142 MMHG | WEIGHT: 293 LBS | HEIGHT: 64 IN | BODY MASS INDEX: 50.02 KG/M2 | DIASTOLIC BLOOD PRESSURE: 86 MMHG | RESPIRATION RATE: 18 BRPM | TEMPERATURE: 98 F | HEART RATE: 82 BPM | OXYGEN SATURATION: 98 %

## 2022-11-08 LAB
ALBUMIN SERPL BCP-MCNC: 3.3 G/DL (ref 3.5–5.2)
ALP SERPL-CCNC: 60 U/L (ref 55–135)
ALT SERPL W/O P-5'-P-CCNC: 11 U/L (ref 10–44)
ANION GAP SERPL CALC-SCNC: 10 MMOL/L (ref 8–16)
AST SERPL-CCNC: 7 U/L (ref 10–40)
BILIRUB SERPL-MCNC: 0.2 MG/DL (ref 0.1–1)
BUN SERPL-MCNC: 18 MG/DL (ref 6–20)
CALCIUM SERPL-MCNC: 8.9 MG/DL (ref 8.7–10.5)
CHLORIDE SERPL-SCNC: 104 MMOL/L (ref 95–110)
CO2 SERPL-SCNC: 20 MMOL/L (ref 23–29)
CREAT SERPL-MCNC: 1 MG/DL (ref 0.5–1.4)
EST. GFR  (NO RACE VARIABLE): >60 ML/MIN/1.73 M^2
GLUCOSE SERPL-MCNC: 106 MG/DL (ref 70–110)
MAGNESIUM SERPL-MCNC: 1.8 MG/DL (ref 1.6–2.6)
POTASSIUM SERPL-SCNC: 4.3 MMOL/L (ref 3.5–5.1)
PROT SERPL-MCNC: 7.1 G/DL (ref 6–8.4)
SODIUM SERPL-SCNC: 134 MMOL/L (ref 136–145)

## 2022-11-08 PROCEDURE — 25000003 PHARM REV CODE 250: Performed by: STUDENT IN AN ORGANIZED HEALTH CARE EDUCATION/TRAINING PROGRAM

## 2022-11-08 PROCEDURE — 25000003 PHARM REV CODE 250: Performed by: HOSPITALIST

## 2022-11-08 PROCEDURE — 83735 ASSAY OF MAGNESIUM: CPT | Performed by: HOSPITALIST

## 2022-11-08 PROCEDURE — 25000003 PHARM REV CODE 250: Performed by: NURSE PRACTITIONER

## 2022-11-08 PROCEDURE — 63600175 PHARM REV CODE 636 W HCPCS: Performed by: STUDENT IN AN ORGANIZED HEALTH CARE EDUCATION/TRAINING PROGRAM

## 2022-11-08 PROCEDURE — G0378 HOSPITAL OBSERVATION PER HR: HCPCS

## 2022-11-08 PROCEDURE — 80053 COMPREHEN METABOLIC PANEL: CPT | Performed by: HOSPITALIST

## 2022-11-08 PROCEDURE — 36415 COLL VENOUS BLD VENIPUNCTURE: CPT | Performed by: HOSPITALIST

## 2022-11-08 RX ORDER — BISACODYL 10 MG
10 SUPPOSITORY, RECTAL RECTAL DAILY PRN
Qty: 5 SUPPOSITORY | Refills: 0 | Status: SHIPPED | OUTPATIENT
Start: 2022-11-08 | End: 2023-09-25

## 2022-11-08 RX ORDER — OXYCODONE AND ACETAMINOPHEN 10; 325 MG/1; MG/1
1 TABLET ORAL EVERY 6 HOURS PRN
Qty: 28 TABLET | Refills: 0 | Status: SHIPPED | OUTPATIENT
Start: 2022-11-08 | End: 2022-11-11

## 2022-11-08 RX ORDER — CIPROFLOXACIN 500 MG/1
500 TABLET ORAL EVERY 12 HOURS
Status: DISCONTINUED | OUTPATIENT
Start: 2022-11-08 | End: 2022-11-08 | Stop reason: HOSPADM

## 2022-11-08 RX ORDER — TAMSULOSIN HYDROCHLORIDE 0.4 MG/1
0.4 CAPSULE ORAL DAILY
Qty: 30 CAPSULE | Refills: 0 | Status: SHIPPED | OUTPATIENT
Start: 2022-11-08 | End: 2023-11-08

## 2022-11-08 RX ORDER — CIPROFLOXACIN 500 MG/1
500 TABLET ORAL EVERY 12 HOURS
Qty: 28 TABLET | Refills: 0 | Status: SHIPPED | OUTPATIENT
Start: 2022-11-08 | End: 2022-11-22

## 2022-11-08 RX ORDER — OXYBUTYNIN CHLORIDE 5 MG/1
5 TABLET ORAL 3 TIMES DAILY PRN
Qty: 30 TABLET | Refills: 0 | Status: SHIPPED | OUTPATIENT
Start: 2022-11-08 | End: 2023-09-25

## 2022-11-08 RX ORDER — FLUCONAZOLE 100 MG/1
100 TABLET ORAL DAILY
Qty: 5 TABLET | Refills: 2 | Status: SHIPPED | OUTPATIENT
Start: 2022-11-08 | End: 2022-11-13

## 2022-11-08 RX ADMIN — TAMSULOSIN HYDROCHLORIDE 0.4 MG: 0.4 CAPSULE ORAL at 10:11

## 2022-11-08 RX ADMIN — SPIRONOLACTONE 25 MG: 25 TABLET ORAL at 10:11

## 2022-11-08 RX ADMIN — KETOROLAC TROMETHAMINE 30 MG: 30 INJECTION, SOLUTION INTRAMUSCULAR; INTRAVENOUS at 05:11

## 2022-11-08 RX ADMIN — TORSEMIDE 20 MG: 20 TABLET ORAL at 10:11

## 2022-11-08 RX ADMIN — METOPROLOL SUCCINATE 200 MG: 50 TABLET, EXTENDED RELEASE ORAL at 10:11

## 2022-11-08 RX ADMIN — CIPROFLOXACIN 500 MG: 500 TABLET, FILM COATED ORAL at 10:11

## 2022-11-08 RX ADMIN — POLYETHYLENE GLYCOL 3350 17 G: 17 POWDER, FOR SOLUTION ORAL at 10:11

## 2022-11-08 RX ADMIN — SACUBITRIL AND VALSARTAN 1 TABLET: 97; 103 TABLET, FILM COATED ORAL at 10:11

## 2022-11-08 NOTE — DISCHARGE SUMMARY
[facility] Medicine  Discharge Summary      Patient Name: Ursula Smallwood  MRN: [id number]  ARLEN: [id number]  Patient Class: IP- Inpatient  Admission Date: 11/5/2022  Hospital Length of Stay: 1 days  Discharge Date and Time:  11/08/2022 1:02 PM  Attending Physician: Jennie att. providers found   Discharging Provider: Zaina Hu NP  Primary Care Provider: Simone Pro DO    Primary Care Team: Networked reference to record PCT     HPI:   Ms. Ursula Smallwood is pleasant 38 yo female with significant medical history of nonischemic cardiomyopathy with chronic systolic chf due to covid infection, DM type II, HTN, morbid obesity, history of svt s/p radiofrequency ablation and history of kidney stones.   She presented to ED complaining of right flank pain for several day.   She described the pain as sharp, worsen over time, intermittent, radiates to groin area.   She also reports dark cloudy malodor urine and pain with urination.     She denies vaginal discharge, fever, chills, nausea, emesis, diarrhea or chest pain.     ED coarse;  Patient was found to be hypertensive with bp 192/99, other vital signs are wnl.  Her lab work that include cbc, cmp and UA is remarkable for UA positive with nitrates, many bacteria and wbc.   UC in process.     Ct renal stone study shows Bilateral hydroureteronephrosis related to distal calcified stones    Patient was given iv Toradol, flomax and iv ceftriaxone x one dose.     ER provider discussed the case with urology.          Procedure(s) (LRB):  CYSTOURETEROSCOPY, WITH BILATERAL RETROGRADE PYELOGRAM AND URETERAL STENT INSERTION (Bilateral)  URETEROSCOPY (Left)  FLUOROSCOPY      Hospital Course:   She was admitted to the [facility] service for further care. Urology was consulted. Ct renal stone study shows Bilateral hydroureteronephrosis related to distal calcified stones. She was given IV Toradol, flomax and IV ceftriaxone. Urine culture with E. Coli noted. POD#1 cystoscopy,  bilateral ureteral stent placement, bilateral retrograde pyelogram per Dr. Alexander.     11/8: Care assumed. EMR reviewed. Urine culture showed pan sensitive E coli. No fevers. Some flank pain but overall improving. Urology & ID notes reviewed.  She reports vulvar candidiasis with antibiotics. Diflucan prescribed. She is constipated but doesn't want to take something in house as she still needs to go vote. She has been prescribed Dulcolax suppository for home usage and educated on what to do if that doesn't work.As she is stable she will discharge home on Cipro x 14 days with as needed antispasmodic and Flomax per urology recommendations. She is aware of necessary timely follow up with urology.       Goals of Care Treatment Preferences:  Code Status: Full Code      Consults:   Consults (From admission, onward)        Status Ordering Provider     Inpatient consult to Urology  Once        Provider:  Yanira Alexander MD    Completed VONDA HAMILTON          * Ureteral stone with hydronephrosis  Supportive care with pain and antinausea medication  Flomax  Appreciate urology--s/p bilateral ureteral stents     Cipro x 14 days  Ditropan as needed  Analgesia as needed  Urology follow up        Type 2 diabetes mellitus, without long-term current use of insulin  Patient's FSGs are controlled on current medication regimen.  Last A1c reviewed-   Lab Results   Component Value Date    HGBA1C 6.3 (H) 02/27/2022     Most recent fingerstick glucose reviewed-   No results for input(s): POCTGLUCOSE in the last 24 hours.  Current correctional scale  Medium  Maintain anti-hyperglycemic dose as follows-   Antihyperglycemics (From admission, onward)    Start     Stop Route Frequency Ordered    11/05/22 2217  insulin aspart U-100 pen 1-10 Units         -- SubQ Before meals & nightly PRN 11/05/22 2217        She reports no diabetes  Should discuss treatment options for insulin resistance OP    Acute cystitis with hematuria  Continue  ceftriaxone  Urine culture with E. Coli    Cipro x 14 days  Ditropan as needed  Analgesia as needed  Urology follow up        Final Active Diagnoses:    Diagnosis Date Noted POA    PRINCIPAL PROBLEM:  Ureteral stone with hydronephrosis [N13.2] 11/05/2022 Yes    Acute cystitis with hematuria [N30.01] 11/05/2022 Yes    Type 2 diabetes mellitus, without long-term current use of insulin [E11.9] 11/05/2022 Yes    Nonischemic cardiomyopathy [I42.8] 05/01/2022 Yes    Hypertension [I10]  Yes    Chronic systolic heart failure [I50.22] 03/08/2022 Yes      Problems Resolved During this Admission:       Discharged Condition: stable    Disposition: Home or Self Care    Follow Up:   Follow-up Information     Simone Pro DO. Schedule an appointment as soon as possible for a visit in 1 day(s).    Specialty: Family Medicine  Contact information:  [address]  [address]  [place] [address]  [phone]             Yanira Alexander MD Follow up on 11/17/2022.    Specialty: Urology  Why: at 9:45 AM; urology hospital follow up appointment  Contact information:  [address]  [address]  [place] [address]  [phone]                       Patient Instructions:      Activity as tolerated       Significant Diagnostic Studies:  Recent Labs   Lab 11/05/22  1351   WBC 11.18   HGB 13.7   HCT 43.6   *   MONO 8.6  1.0     Recent Labs   Lab 11/06/22  0543 11/07/22  0417 11/08/22  0311    131* 134*   K 4.3 4.5 4.3    102 104   CO2 19* 17* 20*   BUN 12 16 18   CREATININE 1.0 1.0 1.0   CALCIUM 9.1 9.2 8.9   PROT 7.1 7.7 7.1   BILITOT 0.3 0.3 0.2   ALKPHOS 67 69 60   ALT 16 11 11   AST 8* 6* 7*     Microbiology Results (last 7 days)     Procedure Component Value Units Date/Time    Urine culture [[id number]]  (Abnormal)  (Susceptibility) Collected: 11/05/22 1353    Order Status: Completed Specimen: Urine Updated: 11/07/22 2046     Urine Culture, Routine ESCHERICHIA COLI  >100,000 cfu/ml      Narrative:       Preferred Collection Type->Urine, Clean Catch  Specimen Source->Urine        Pending Diagnostic Studies:     None         Medications:  Reconciled Home Medications:      Medication List      START taking these medications    bisacodyL 10 mg Supp  Commonly known as: DULCOLAX  Place 1 suppository (10 mg total) rectally daily as needed (constipation).     ciprofloxacin HCl 500 MG tablet  Commonly known as: CIPRO  Take 1 tablet (500 mg total) by mouth every 12 (twelve) hours. for 14 days     fluconazole 100 MG tablet  Commonly known as: DIFLUCAN  Take 1 tablet (100 mg total) by mouth once daily. for 5 days     oxybutynin 5 MG Tab  Commonly known as: DITROPAN  Take 1 tablet (5 mg total) by mouth 3 (three) times daily as needed (bladder spasms or urgency/frequency of urination).     oxyCODONE-acetaminophen  mg per tablet  Commonly known as: PERCOCET  Take 1 tablet by mouth every 6 (six) hours as needed for Pain.     tamsulosin 0.4 mg Cap  Commonly known as: FLOMAX  Take 1 capsule (0.4 mg total) by mouth once daily.        CONTINUE taking these medications    diclofenac 75 MG EC tablet  Commonly known as: VOLTAREN  Take 1 tablet (75 mg total) by mouth 2 (two) times daily.     ENTRESTO  mg per tablet  Generic drug: sacubitriL-valsartan  Take 1 tablet by mouth 2 (two) times daily.     FARXIGA 10 mg tablet  Generic drug: dapagliflozin  Take 1 tablet (10 mg total) by mouth once daily.     metoprolol succinate 200 MG 24 hr tablet  Commonly known as: TOPROL-XL  Take 1 tablet (200 mg total) by mouth once daily.     spironolactone 25 MG tablet  Commonly known as: ALDACTONE  Take 1 tablet (25 mg total) by mouth once daily.     torsemide 20 MG Tab  Commonly known as: DEMADEX  Take 1 tablet (20 mg total) by mouth every other day.            Indwelling Lines/Drains at time of discharge:   Lines/Drains/Airways     None                 Time spent on the discharge of patient: 45 minutes         Zaina Hu NP  Department  [facility] Medicine  [place] [facility]

## 2022-11-08 NOTE — ASSESSMENT & PLAN NOTE
Patient's FSGs are controlled on current medication regimen.  Last A1c reviewed-   Lab Results   Component Value Date    HGBA1C 6.3 (H) 02/27/2022     Most recent fingerstick glucose reviewed-   No results for input(s): POCTGLUCOSE in the last 24 hours.  Current correctional scale  Medium  Maintain anti-hyperglycemic dose as follows-   Antihyperglycemics (From admission, onward)    Start     Stop Route Frequency Ordered    11/05/22 2217  insulin aspart U-100 pen 1-10 Units         -- SubQ Before meals & nightly PRN 11/05/22 2217        She reports no diabetes  Should discuss treatment options for insulin resistance OP

## 2022-11-08 NOTE — ASSESSMENT & PLAN NOTE
Continue ceftriaxone  Urine culture with E. Coli    Cipro x 14 days  Ditropan as needed  Analgesia as needed  Urology follow up

## 2022-11-08 NOTE — ASSESSMENT & PLAN NOTE
Supportive care with pain and antinausea medication  Flomax  Appreciate urology--s/p bilateral ureteral stents     Cipro x 14 days  Ditropan as needed  Analgesia as needed  Urology follow up

## 2022-11-08 NOTE — PLAN OF CARE
Problem: Adult Inpatient Plan of Care  Goal: Plan of Care Review  Outcome: Ongoing, Progressing     Problem: Pain Acute  Goal: Acceptable Pain Control and Functional Ability  Outcome: Ongoing, Progressing     Problem: Infection  Goal: Absence of Infection Signs and Symptoms  Outcome: Ongoing, Progressing

## 2022-11-08 NOTE — PROGRESS NOTES
[facility]                    Pharmacy       Discharge Medication Education    Patient ACCEPTED medication education. Pharmacy has provided education on the name, indication, and possible side effects of the medication(s) prescribed, using teach-back method.     The following medications have also been discussed, during this admission.        Medication List        START taking these medications      bisacodyL 10 mg Supp  Commonly known as: DULCOLAX  Place 1 suppository (10 mg total) rectally daily as needed (constipation).     ciprofloxacin HCl 500 MG tablet  Commonly known as: CIPRO  Take 1 tablet (500 mg total) by mouth every 12 (twelve) hours. for 14 days     fluconazole 100 MG tablet  Commonly known as: DIFLUCAN  Take 1 tablet (100 mg total) by mouth once daily. for 5 days     oxybutynin 5 MG Tab  Commonly known as: DITROPAN  Take 1 tablet (5 mg total) by mouth 3 (three) times daily as needed (bladder spasms or urgency/frequency of urination).     oxyCODONE-acetaminophen  mg per tablet  Commonly known as: PERCOCET  Take 1 tablet by mouth every 6 (six) hours as needed for Pain.     tamsulosin 0.4 mg Cap  Commonly known as: FLOMAX  Take 1 capsule (0.4 mg total) by mouth once daily.            CONTINUE taking these medications      diclofenac 75 MG EC tablet  Commonly known as: VOLTAREN  Take 1 tablet (75 mg total) by mouth 2 (two) times daily.     ENTRESTO  mg per tablet  Generic drug: sacubitriL-valsartan  Take 1 tablet by mouth 2 (two) times daily.     FARXIGA 10 mg tablet  Generic drug: dapagliflozin  Take 1 tablet (10 mg total) by mouth once daily.     metoprolol succinate 200 MG 24 hr tablet  Commonly known as: TOPROL-XL  Take 1 tablet (200 mg total) by mouth once daily.     spironolactone 25 MG tablet  Commonly known as: ALDACTONE  Take 1 tablet (25 mg total) by mouth once daily.     torsemide 20 MG Tab  Commonly known as: DEMADEX  Take 1 tablet (20 mg total) by mouth every  other day.               Where to Get Your Medications        These medications were sent to [facility] [address], [place] [address]      Hours: Mon-Fri, 8a-5:30p Phone: [phone]   bisacodyL 10 mg Supp  ciprofloxacin HCl 500 MG tablet  fluconazole 100 MG tablet  oxybutynin 5 MG Tab  oxyCODONE-acetaminophen  mg per tablet  tamsulosin 0.4 mg Cap          Thank you  Joyce Conrad, PharmD  [phone]

## 2022-11-09 NOTE — PLAN OF CARE
[facility] - Telemetry  Discharge Final Note    Primary Care Provider: Simone Pro DO    Expected Discharge Date: 11/8/2022    Final Discharge Note (most recent)       Final Note - 11/09/22 0736          Final Note    Assessment Type Final Discharge Note (P)      Anticipated Discharge Disposition Home or Self Care (P)      Hospital Resources/Appts/Education Provided Appointments scheduled and added to AVS (P)                          Contact Info       Simone Pro DO   Specialty: Family Medicine   Relationship: PCP - General    [address]  [address]  [place] [address]   Phone: [phone]       Next Steps: Schedule an appointment as soon as possible for a visit in 1 day(s)    Yanira Alexander MD   Specialty: Urology    [address]  [address]  [place] [address]   Phone: [phone]       Next Steps: Follow up on 11/17/2022    Instructions: at 9:45 AM; urology hospital follow up appointment

## 2022-11-10 ENCOUNTER — PATIENT MESSAGE (OUTPATIENT)
Dept: FAMILY MEDICINE | Facility: HOSPITAL | Age: 37
End: 2022-11-10
Payer: COMMERCIAL

## 2022-11-11 ENCOUNTER — OFFICE VISIT (OUTPATIENT)
Dept: FAMILY MEDICINE | Facility: HOSPITAL | Age: 37
End: 2022-11-11
Payer: COMMERCIAL

## 2022-11-11 VITALS
SYSTOLIC BLOOD PRESSURE: 135 MMHG | BODY MASS INDEX: 45.99 KG/M2 | DIASTOLIC BLOOD PRESSURE: 76 MMHG | WEIGHT: 293 LBS | HEIGHT: 67 IN | HEART RATE: 99 BPM

## 2022-11-11 DIAGNOSIS — N13.2 URETERAL STONE WITH HYDRONEPHROSIS: Primary | ICD-10-CM

## 2022-11-11 PROCEDURE — 99214 OFFICE O/P EST MOD 30 MIN: CPT

## 2022-11-11 NOTE — PROGRESS NOTES
History & Physical  [facility]      SUBJECTIVE:     History of Present Illness:  Patient is a 37 y.o. female presents to clinic hospital follow-up for bilateral Ureteral stone with hydronephrosis and bilateral stent placement. Patient discharged 11/8/2022 from [facility] with Dr. Alexander on board. Will follow-up with Dr. Alexander within one week. Reports decreased pain than prior to hospitalizations. Reports still having hematuria with gross blood. Denies any other complaints at this time including fever, chills, nausea, vomiting. Patient reports compliance with all medications including antibiotics from hospitalization.      Review of patient's allergies indicates:  No Known Allergies    Past Medical History:   Diagnosis Date    Anemia     Hypertension     SVT (supraventricular tachycardia)     Uterine fibroid        Current Outpatient Medications   Medication Instructions    bisacodyL (DULCOLAX) 10 mg, Rectal, Daily PRN    ciprofloxacin HCl (CIPRO) 500 mg, Oral, Every 12 hours    diclofenac (VOLTAREN) 75 mg, Oral, 2 times daily    FARXIGA 10 mg, Oral, Daily    fluconazole (DIFLUCAN) 100 mg, Oral, Daily    metoprolol succinate (TOPROL-XL) 200 mg, Oral, Daily    oxybutynin (DITROPAN) 5 mg, Oral, 3 times daily PRN    sacubitriL-valsartan (ENTRESTO)  mg per tablet 1 tablet, Oral, 2 times daily    spironolactone (ALDACTONE) 25 mg, Oral, Daily    tamsulosin (FLOMAX) 0.4 mg, Oral, Daily    torsemide (DEMADEX) 20 mg, Oral, Every other day       Past Surgical History:   Procedure Laterality Date    ABLATION N/A 06/04/2021    Procedure: Ablation;  Surgeon: NICHELLE Zepeda MD;  Location: [facility] EP LAB;  Service: Cardiology;  Laterality: N/A;  SVT, RFA, Carto, anes, EH, 3prep    CYSTOSCOPY N/A 08/03/2021    Procedure: CYSTOSCOPY;  Surgeon: Jamarcus Ventura MD;  Location: [facility] OR;  Service: OB/GYN;  Laterality: N/A;    CYSTOURETEROSCOPY WITH RETROGRADE PYELOGRAPHY AND INSERTION OF STENT INTO URETER Bilateral  "11/6/2022    Procedure: CYSTOURETEROSCOPY, WITH BILATERAL RETROGRADE PYELOGRAM AND URETERAL STENT INSERTION;  Surgeon: Yanira Alexander MD;  Location: [facility] OR;  Service: Urology;  Laterality: Bilateral;    FLUOROSCOPY  11/6/2022    Procedure: FLUOROSCOPY;  Surgeon: Yanira Alexander MD;  Location: [facility] OR;  Service: Urology;;    HYSTERECTOMY      ROBOT-ASSISTED LAPAROSCOPIC HYSTERECTOMY N/A 08/03/2021    Procedure: ROBOTIC HYSTERECTOMY;  Surgeon: Jamarcus Ventura MD;  Location: [facility] OR;  Service: OB/GYN;  Laterality: N/A;    ROBOT-ASSISTED SALPINGECTOMY Bilateral 08/03/2021    Procedure: ROBOTIC SALPINGECTOMY;  Surgeon: Jamarcus Ventura MD;  Location: [facility] OR;  Service: OB/GYN;  Laterality: Bilateral;    URETEROSCOPY Left 11/6/2022    Procedure: URETEROSCOPY;  Surgeon: Yanira Alexander MD;  Location: [facility] OR;  Service: Urology;  Laterality: Left;       History reviewed. No pertinent family history.    Social History     Tobacco Use    Smoking status: Never    Smokeless tobacco: Never   Substance Use Topics    Alcohol use: No    Drug use: No        OBJECTIVE:     Vital Signs (Most Recent)  Vitals:    11/11/22 1025   BP: 135/76   Pulse: 99   Weight: 134.4 kg (296 lb 4.8 oz)   Height: 5' 7" (1.702 m)     BMI: 46.41    Physical Exam:  Physical Exam  Vitals and nursing note reviewed.   Constitutional:       Appearance: Normal appearance.   HENT:      Head: Normocephalic.      Right Ear: External ear normal.      Left Ear: External ear normal.      Nose: Nose normal.      Mouth/Throat:      Mouth: Mucous membranes are moist.      Pharynx: Oropharynx is clear.   Eyes:      Extraocular Movements: Extraocular movements intact.      Pupils: Pupils are equal, round, and reactive to light.   Cardiovascular:      Rate and Rhythm: Normal rate and regular rhythm.      Pulses: Normal pulses.      Heart sounds: Normal heart sounds.   Pulmonary:      Effort: Pulmonary effort is normal.      Breath sounds: Normal breath sounds. "   Abdominal:      General: Abdomen is flat.      Palpations: Abdomen is soft.   Musculoskeletal:         General: Normal range of motion.      Cervical back: Normal range of motion.   Skin:     General: Skin is warm.   Neurological:      Mental Status: She is alert and oriented to person, place, and time.   Psychiatric:         Mood and Affect: Mood normal.         Behavior: Behavior normal.          ASSESSMENT/PLAN:   37 y.o.female presents to clinic to follow-up hospitalization. Patient with continued hematuria but improved pain. Has follow-up with Urology, Dr. Alexander, within 1 week.    Ureteral stone with hydronephrosis   - Appears to be stable at this time. Will follow-up post urology follow-up.      Follow-up in: 1 months      A total of approximately 30 minutes was spent on patient care during this encounter which included chart review, examining the patient, formulating a treatment plan and documentation.     Complex medical decision making performed due to multiple medical problems addressed during the visit.       Case discussed with Dr. MARILEE Morales    ________________________  Maxi Kramer MD  [facility] PGY-1  11/11/2022  4:01 PM

## 2022-11-13 ENCOUNTER — NURSE TRIAGE (OUTPATIENT)
Dept: ADMINISTRATIVE | Facility: CLINIC | Age: 37
End: 2022-11-13
Payer: COMMERCIAL

## 2022-11-13 ENCOUNTER — PATIENT MESSAGE (OUTPATIENT)
Dept: UROLOGY | Facility: CLINIC | Age: 37
End: 2022-11-13
Payer: COMMERCIAL

## 2022-11-13 NOTE — TELEPHONE ENCOUNTER
Dx with kidney stones last Saturday, unable to do procedure because had infection that needed to be treated first. Has stents, was doing well but now having severe pain, prescribed pain meds but afraid to keep taking them. Has procedure scheduled for 11/17 with Dr. MARITZA Alexander. Pain does improve with percocet but worried about keep taking them. Current pain is 15/10 to stomach, right flank and back, has not taken any pain meds today.  Felt feverish throughout the night. No fever today (98.2). Passing small amounts of urine but still passing urine. Hematuria now cleared.     Advised per protocol. Encouraged caller to take pain meds. If pain still severe or unable to urinate, fever, etc, go to ED. Caller requesting to speak with on-call provider. Again encouraged caller to take pain meds and call back for any worsening ss or go to ED for eval.    Reason for Disposition   [1] Kidney stone diagnosed by doctor (or NP/PA) AND [2] normal symptoms (e.g., nausea, pain) AND [3] no complications   Kidney stones, questions about    Additional Information   Negative: Shock suspected (e.g., cold/pale/clammy skin, too weak to stand, low BP, rapid pulse)   Negative: Sounds like a life-threatening emergency to the triager   Negative: [1] Unable to urinate (or only a few drops) > 4 hours AND [2] bladder feels very full (e.g., palpable bladder or strong urge to urinate)   Negative: [1] SEVERE pain (e.g., excruciating, scale 8-10) AND [2] not improved after pain medicine   Negative: SEVERE vomiting   Negative: Fever > 103 F (39.4 C)   Negative: Severe chills (i.e., feeling extremely cold WITH shaking chills)   Negative: Patient sounds very sick or weak to the triager   Negative: [1] MODERATE pain (e.g., interferes with normal activities) AND [2] constant AND [3] lasting longer than 4 hours AND [4] NOT improved with pain medicine   Negative: Passing blood or large blood clots (i.e., size > a dime)  (Exception: pink or tea-colored urine,  flecks or small strands of blood)   Negative: Fever > 100.4 F (38.0 C)   Negative: [1] Caller has URGENT question (includes prescribed medication questions) AND [2] triager unable to answer question   Negative: SEVERE burning or pain with passing urine (urination)   Negative: [1] MODERATE pain (e.g., interferes with normal activities) AND [2] pain comes and goes AND [3] present > 24 hours   Negative: Pregnant   Negative: Ureteral stent accidentally came out   Negative: [1] Caller has NON-URGENT question (includes prescribed medication questions) AND [2] triager unable to answer   Negative: Stone has not passed after 4 weeks   Negative: [1] Pain persists AND [2] stone passed > 3 days ago   Negative: [1] Blood in urine (red or tea-colored) AND [2] lasts > 3 days  (Exception: Has a ureteral stent in place.)   Negative: [1] MILD pain (e.g., does not interfere with normal activities) AND [2] pain comes and goes (cramps) AND [3] present > 7 days    Protocols used: Kidney Stone Follow-up Call-A-

## 2022-11-14 RX ORDER — KETOROLAC TROMETHAMINE 10 MG/1
10 TABLET, FILM COATED ORAL EVERY 6 HOURS PRN
Qty: 20 TABLET | Refills: 0 | Status: SHIPPED | OUTPATIENT
Start: 2022-11-14 | End: 2022-11-19

## 2022-11-17 ENCOUNTER — OFFICE VISIT (OUTPATIENT)
Dept: UROLOGY | Facility: CLINIC | Age: 37
End: 2022-11-17
Payer: COMMERCIAL

## 2022-11-17 VITALS
HEART RATE: 92 BPM | SYSTOLIC BLOOD PRESSURE: 137 MMHG | DIASTOLIC BLOOD PRESSURE: 89 MMHG | HEIGHT: 67 IN | WEIGHT: 293 LBS | BODY MASS INDEX: 45.99 KG/M2

## 2022-11-17 DIAGNOSIS — N20.0 KIDNEY STONE: Primary | ICD-10-CM

## 2022-11-17 DIAGNOSIS — N39.0 URINARY TRACT INFECTION WITHOUT HEMATURIA, SITE UNSPECIFIED: ICD-10-CM

## 2022-11-17 LAB
BILIRUB SERPL-MCNC: ABNORMAL MG/DL
BLOOD URINE, POC: 250
CLARITY, POC UA: CLEAR
COLOR, POC UA: ABNORMAL
GLUCOSE UR QL STRIP: ABNORMAL
KETONES UR QL STRIP: ABNORMAL
LEUKOCYTE ESTERASE URINE, POC: ABNORMAL
NITRITE, POC UA: ABNORMAL
PH, POC UA: 5
PROTEIN, POC: 30
SPECIFIC GRAVITY, POC UA: 1.01
UROBILINOGEN, POC UA: ABNORMAL

## 2022-11-17 PROCEDURE — 3079F DIAST BP 80-89 MM HG: CPT | Mod: CPTII,S$GLB,, | Performed by: STUDENT IN AN ORGANIZED HEALTH CARE EDUCATION/TRAINING PROGRAM

## 2022-11-17 PROCEDURE — 1160F RVW MEDS BY RX/DR IN RCRD: CPT | Mod: CPTII,S$GLB,, | Performed by: STUDENT IN AN ORGANIZED HEALTH CARE EDUCATION/TRAINING PROGRAM

## 2022-11-17 PROCEDURE — 1160F PR REVIEW ALL MEDS BY PRESCRIBER/CLIN PHARMACIST DOCUMENTED: ICD-10-PCS | Mod: CPTII,S$GLB,, | Performed by: STUDENT IN AN ORGANIZED HEALTH CARE EDUCATION/TRAINING PROGRAM

## 2022-11-17 PROCEDURE — 99999 PR PBB SHADOW E&M-EST. PATIENT-LVL V: ICD-10-PCS | Mod: PBBFAC,,, | Performed by: STUDENT IN AN ORGANIZED HEALTH CARE EDUCATION/TRAINING PROGRAM

## 2022-11-17 PROCEDURE — 4010F ACE/ARB THERAPY RXD/TAKEN: CPT | Mod: CPTII,S$GLB,, | Performed by: STUDENT IN AN ORGANIZED HEALTH CARE EDUCATION/TRAINING PROGRAM

## 2022-11-17 PROCEDURE — 99213 OFFICE O/P EST LOW 20 MIN: CPT | Mod: S$GLB,,, | Performed by: STUDENT IN AN ORGANIZED HEALTH CARE EDUCATION/TRAINING PROGRAM

## 2022-11-17 PROCEDURE — 3008F BODY MASS INDEX DOCD: CPT | Mod: CPTII,S$GLB,, | Performed by: STUDENT IN AN ORGANIZED HEALTH CARE EDUCATION/TRAINING PROGRAM

## 2022-11-17 PROCEDURE — 4010F PR ACE/ARB THEARPY RXD/TAKEN: ICD-10-PCS | Mod: CPTII,S$GLB,, | Performed by: STUDENT IN AN ORGANIZED HEALTH CARE EDUCATION/TRAINING PROGRAM

## 2022-11-17 PROCEDURE — 1111F PR DISCHARGE MEDS RECONCILED W/ CURRENT OUTPATIENT MED LIST: ICD-10-PCS | Mod: CPTII,S$GLB,, | Performed by: STUDENT IN AN ORGANIZED HEALTH CARE EDUCATION/TRAINING PROGRAM

## 2022-11-17 PROCEDURE — 3075F SYST BP GE 130 - 139MM HG: CPT | Mod: CPTII,S$GLB,, | Performed by: STUDENT IN AN ORGANIZED HEALTH CARE EDUCATION/TRAINING PROGRAM

## 2022-11-17 PROCEDURE — 1159F MED LIST DOCD IN RCRD: CPT | Mod: CPTII,S$GLB,, | Performed by: STUDENT IN AN ORGANIZED HEALTH CARE EDUCATION/TRAINING PROGRAM

## 2022-11-17 PROCEDURE — 81002 URINALYSIS NONAUTO W/O SCOPE: CPT | Mod: S$GLB,,, | Performed by: STUDENT IN AN ORGANIZED HEALTH CARE EDUCATION/TRAINING PROGRAM

## 2022-11-17 PROCEDURE — 3079F PR MOST RECENT DIASTOLIC BLOOD PRESSURE 80-89 MM HG: ICD-10-PCS | Mod: CPTII,S$GLB,, | Performed by: STUDENT IN AN ORGANIZED HEALTH CARE EDUCATION/TRAINING PROGRAM

## 2022-11-17 PROCEDURE — 1111F DSCHRG MED/CURRENT MED MERGE: CPT | Mod: CPTII,S$GLB,, | Performed by: STUDENT IN AN ORGANIZED HEALTH CARE EDUCATION/TRAINING PROGRAM

## 2022-11-17 PROCEDURE — 3044F HG A1C LEVEL LT 7.0%: CPT | Mod: CPTII,S$GLB,, | Performed by: STUDENT IN AN ORGANIZED HEALTH CARE EDUCATION/TRAINING PROGRAM

## 2022-11-17 PROCEDURE — 3044F PR MOST RECENT HEMOGLOBIN A1C LEVEL <7.0%: ICD-10-PCS | Mod: CPTII,S$GLB,, | Performed by: STUDENT IN AN ORGANIZED HEALTH CARE EDUCATION/TRAINING PROGRAM

## 2022-11-17 PROCEDURE — 99213 PR OFFICE/OUTPT VISIT, EST, LEVL III, 20-29 MIN: ICD-10-PCS | Mod: S$GLB,,, | Performed by: STUDENT IN AN ORGANIZED HEALTH CARE EDUCATION/TRAINING PROGRAM

## 2022-11-17 PROCEDURE — 81002 POCT URINE DIPSTICK WITHOUT MICROSCOPE: ICD-10-PCS | Mod: S$GLB,,, | Performed by: STUDENT IN AN ORGANIZED HEALTH CARE EDUCATION/TRAINING PROGRAM

## 2022-11-17 PROCEDURE — 1159F PR MEDICATION LIST DOCUMENTED IN MEDICAL RECORD: ICD-10-PCS | Mod: CPTII,S$GLB,, | Performed by: STUDENT IN AN ORGANIZED HEALTH CARE EDUCATION/TRAINING PROGRAM

## 2022-11-17 PROCEDURE — 99999 PR PBB SHADOW E&M-EST. PATIENT-LVL V: CPT | Mod: PBBFAC,,, | Performed by: STUDENT IN AN ORGANIZED HEALTH CARE EDUCATION/TRAINING PROGRAM

## 2022-11-17 PROCEDURE — 3008F PR BODY MASS INDEX (BMI) DOCUMENTED: ICD-10-PCS | Mod: CPTII,S$GLB,, | Performed by: STUDENT IN AN ORGANIZED HEALTH CARE EDUCATION/TRAINING PROGRAM

## 2022-11-17 PROCEDURE — 87086 URINE CULTURE/COLONY COUNT: CPT | Performed by: STUDENT IN AN ORGANIZED HEALTH CARE EDUCATION/TRAINING PROGRAM

## 2022-11-17 PROCEDURE — 3075F PR MOST RECENT SYSTOLIC BLOOD PRESS GE 130-139MM HG: ICD-10-PCS | Mod: CPTII,S$GLB,, | Performed by: STUDENT IN AN ORGANIZED HEALTH CARE EDUCATION/TRAINING PROGRAM

## 2022-11-17 RX ORDER — SODIUM CHLORIDE 9 MG/ML
INJECTION, SOLUTION INTRAVENOUS CONTINUOUS
Status: CANCELLED | OUTPATIENT
Start: 2022-11-17

## 2022-11-17 RX ORDER — CEFAZOLIN SODIUM 2 G/50ML
2 SOLUTION INTRAVENOUS
Status: CANCELLED | OUTPATIENT
Start: 2022-11-17

## 2022-11-17 NOTE — H&P (VIEW-ONLY)
Subjective:       Patient ID: Ursula Smallwood is a 37 y.o. female.    Chief Complaint: Nephrolithiasis (Discuss procedure) and Hospital Follow Up     This is a 37 y.o.  female patient that is  new to me in the clinic, I met her as an inpatient consult . Presented with right flank pain 11/5/22- transferred to [place]. She does have a history of kidney stones. Urinalysis suggestive of UTI. No fevers. +vomiting x 1 episode. No chills. CT 11/5/22 actually demonstrated bilateral ureteral stones with bilateral hydronephrosis - right 4x8mm distal ureteral stone and left 4x8mm distal ureteral stone.      CT 11/5/22 actually demonstrated bilateral ureteral stones with bilateral hydronephrosis - right 4x8mm distal ureteral stone and left 4x8mm distal ureteral stone and urinalysis suggestive of UTI    S/p cysto bilateral ureteral stent placement 11/6/22 - left ureteral stent was very difficult to place. Here today to f/u, obtain urine sample and sign consents.           Lab Results   Component Value Date    CREATININE 1.0 11/08/2022       ---  Past Medical History:   Diagnosis Date    Anemia     Hypertension     Kidney stone     SVT (supraventricular tachycardia)     Uterine fibroid        Past Surgical History:   Procedure Laterality Date    ABLATION N/A 06/04/2021    Procedure: Ablation;  Surgeon: NICHELLE Zepeda MD;  Location: [facility] EP LAB;  Service: Cardiology;  Laterality: N/A;  SVT, RFA, Carto, anes, EH, 3prep    CYSTOSCOPY N/A 08/03/2021    Procedure: CYSTOSCOPY;  Surgeon: Jamarcus Ventura MD;  Location: [facility] OR;  Service: OB/GYN;  Laterality: N/A;    CYSTOURETEROSCOPY WITH RETROGRADE PYELOGRAPHY AND INSERTION OF STENT INTO URETER Bilateral 11/6/2022    Procedure: CYSTOURETEROSCOPY, WITH BILATERAL RETROGRADE PYELOGRAM AND URETERAL STENT INSERTION;  Surgeon: Yanira Alexander MD;  Location: [facility] OR;  Service: Urology;  Laterality: Bilateral;    FLUOROSCOPY  11/6/2022    Procedure: FLUOROSCOPY;  Surgeon: Yanira Alexander MD;   Location: [facility] OR;  Service: Urology;;    HYSTERECTOMY      ROBOT-ASSISTED LAPAROSCOPIC HYSTERECTOMY N/A 08/03/2021    Procedure: ROBOTIC HYSTERECTOMY;  Surgeon: Jamarcus Ventura MD;  Location: [facility] OR;  Service: OB/GYN;  Laterality: N/A;    ROBOT-ASSISTED SALPINGECTOMY Bilateral 08/03/2021    Procedure: ROBOTIC SALPINGECTOMY;  Surgeon: Jamarcus Ventura MD;  Location: [facility] OR;  Service: OB/GYN;  Laterality: Bilateral;    URETEROSCOPY Left 11/6/2022    Procedure: URETEROSCOPY;  Surgeon: Yanira Alexander MD;  Location: [facility] OR;  Service: Urology;  Laterality: Left;       History reviewed. No pertinent family history.    Social History     Tobacco Use    Smoking status: Never    Smokeless tobacco: Never   Substance Use Topics    Alcohol use: No    Drug use: No       Current Outpatient Medications on File Prior to Visit   Medication Sig Dispense Refill    bisacodyL (DULCOLAX) 10 mg Supp Place 1 suppository (10 mg total) rectally daily as needed (constipation). 5 suppository 0    ciprofloxacin HCl (CIPRO) 500 MG tablet Take 1 tablet (500 mg total) by mouth every 12 (twelve) hours. for 14 days 28 tablet 0    dapagliflozin (FARXIGA) 10 mg tablet Take 1 tablet (10 mg total) by mouth once daily. 30 tablet 11    diclofenac (VOLTAREN) 75 MG EC tablet Take 1 tablet (75 mg total) by mouth 2 (two) times daily. 60 tablet 0    ketorolac (TORADOL) 10 mg tablet Take 1 tablet (10 mg total) by mouth every 6 (six) hours as needed for Pain. 20 tablet 0    metoprolol succinate (TOPROL-XL) 200 MG 24 hr tablet Take 1 tablet (200 mg total) by mouth once daily. 30 tablet 11    oxybutynin (DITROPAN) 5 MG Tab Take 1 tablet (5 mg total) by mouth 3 (three) times daily as needed (bladder spasms or urgency/frequency of urination). 30 tablet 0    sacubitriL-valsartan (ENTRESTO)  mg per tablet Take 1 tablet by mouth 2 (two) times daily. 60 tablet 6    spironolactone (ALDACTONE) 25 MG tablet Take 1 tablet (25 mg total) by mouth once daily. 30  tablet 11    tamsulosin (FLOMAX) 0.4 mg Cap Take 1 capsule (0.4 mg total) by mouth once daily. 30 capsule 0    torsemide (DEMADEX) 20 MG Tab Take 1 tablet (20 mg total) by mouth every other day. 15 tablet 11    [DISCONTINUED] lisinopriL (PRINIVIL,ZESTRIL) 5 MG tablet Take 1 tablet (5 mg total) by mouth once daily. 90 tablet 3    [DISCONTINUED] lisinopriL-hydrochlorothiazide (PRINZIDE,ZESTORETIC) 10-12.5 mg per tablet Take 1 tablet by mouth once daily. 30 tablet 0    [DISCONTINUED] metoprolol tartrate (LOPRESSOR) 50 MG tablet Take 1 tablet (50 mg total) by mouth once daily. 30 tablet 0     No current facility-administered medications on file prior to visit.       Review of patient's allergies indicates:  No Known Allergies    Review of Systems   Constitutional:  Negative for activity change.   HENT:  Negative for congestion.    Eyes:  Negative for visual disturbance.   Respiratory:  Negative for shortness of breath.    Cardiovascular:  Negative for chest pain.   Gastrointestinal:  Negative for abdominal distention.   Musculoskeletal:  Negative for gait problem.   Skin:  Negative for color change.   Neurological:  Negative for dizziness.   Psychiatric/Behavioral:  Negative for agitation.      Objective:      Physical Exam  Constitutional:       Appearance: She is well-developed.   HENT:      Head: Normocephalic and atraumatic.   Pulmonary:      Effort: Pulmonary effort is normal.   Musculoskeletal:         General: Normal range of motion.      Cervical back: Normal range of motion.   Skin:     General: Skin is warm and dry.   Neurological:      Mental Status: She is alert and oriented to person, place, and time.       Assessment:       1. Kidney stone    2. Urinary tract infection without hematuria, site unspecified          Plan:           Urine for culture.  Plan for bilateral URS/HLL/SBE/stent exchange on 11/30/22.   I have explained the indication and benefits of proceeding with a bilateral ureteroscopy, holmium  laser lithotripsy, stone basketing, retrograde pyelogram, stent placement and all other indicated procedures with me in the operating room on 11/30/22 - WILL TRY TO LEAVE STENTS ON STRINGS. Alternatives of the procedure were also discussed. The risks included but were not limited to pain, infection (urinary tract infection), bleeding (hematuria), ureteral or urethral stricture,  injury to the urethra, bladder, ureter, need for additional treatments, inability or incomplete removal of kidney stones, pain, and discomfort related to the stent were discussed in depth with the patient.  The patient understands that if I am unable to pass the cameras up to the level of the stone or if visibility is poor, then I will only place a ureteral stent and pursue a staged procedure.     The ureteral stent was discussed at length. The patient will need to have it removed and the time period in which it should remain indwelling is to be determined after surgery. If left indwelling, the sequelae include pain, infection, lower urinary tract symptoms, development of calcifications on the ureteral stent, worsening kidney function, and complete loss of kidney function.     The patient voiced understanding and all questions have been answered and informed consents were signed today 11/17/22.        Kidney stone  -     POCT URINE DIPSTICK WITHOUT MICROSCOPE  -     Place sequential compression device; Standing  -     Case Request Operating Room: bilateral ureteroscopy, holmium laser lithotripsy, stone basketing, retrograde pyelogram, stent placement and all other indicated procedures  -     Place in Outpatient; Standing  -     Vital Signs ; Standing  -     Insert peripheral IV; Standing    Urinary tract infection without hematuria, site unspecified  -     Urine culture  -     Place sequential compression device; Standing  -     Case Request Operating Room: bilateral ureteroscopy, holmium laser lithotripsy, stone basketing, retrograde  pyelogram, stent placement and all other indicated procedures  -     Place in Outpatient; Standing  -     Vital Signs ; Standing  -     Insert peripheral IV; Standing    Other orders  -     IP VTE HIGH RISK PATIENT; Standing  -     0.9%  NaCl infusion  -     cefazolin (ANCEF) 2 gram in dextrose 5% 50 mL IVPB (premix)

## 2022-11-17 NOTE — PATIENT INSTRUCTIONS
Make sure you take flomax once daily  Take the oxybutynin scheduled every 8 hours - for stent symptoms.   Can try some pyridium also known as azo (over the counter) - urine turning orange is a normal side effect.

## 2022-11-17 NOTE — PROGRESS NOTES
Subjective:       Patient ID: Ursula Smallwood is a 37 y.o. female.    Chief Complaint: Nephrolithiasis (Discuss procedure) and Hospital Follow Up     This is a 37 y.o.  female patient that is  new to me in the clinic, I met her as an inpatient consult . Presented with right flank pain 11/5/22- transferred to [place]. She does have a history of kidney stones. Urinalysis suggestive of UTI. No fevers. +vomiting x 1 episode. No chills. CT 11/5/22 actually demonstrated bilateral ureteral stones with bilateral hydronephrosis - right 4x8mm distal ureteral stone and left 4x8mm distal ureteral stone.      CT 11/5/22 actually demonstrated bilateral ureteral stones with bilateral hydronephrosis - right 4x8mm distal ureteral stone and left 4x8mm distal ureteral stone and urinalysis suggestive of UTI    S/p cysto bilateral ureteral stent placement 11/6/22 - left ureteral stent was very difficult to place. Here today to f/u, obtain urine sample and sign consents.           Lab Results   Component Value Date    CREATININE 1.0 11/08/2022       ---  Past Medical History:   Diagnosis Date    Anemia     Hypertension     Kidney stone     SVT (supraventricular tachycardia)     Uterine fibroid        Past Surgical History:   Procedure Laterality Date    ABLATION N/A 06/04/2021    Procedure: Ablation;  Surgeon: NICHELLE Zepeda MD;  Location: [facility] EP LAB;  Service: Cardiology;  Laterality: N/A;  SVT, RFA, Carto, anes, EH, 3prep    CYSTOSCOPY N/A 08/03/2021    Procedure: CYSTOSCOPY;  Surgeon: Jamarcus Ventura MD;  Location: [facility] OR;  Service: OB/GYN;  Laterality: N/A;    CYSTOURETEROSCOPY WITH RETROGRADE PYELOGRAPHY AND INSERTION OF STENT INTO URETER Bilateral 11/6/2022    Procedure: CYSTOURETEROSCOPY, WITH BILATERAL RETROGRADE PYELOGRAM AND URETERAL STENT INSERTION;  Surgeon: Yanira Alexander MD;  Location: [facility] OR;  Service: Urology;  Laterality: Bilateral;    FLUOROSCOPY  11/6/2022    Procedure: FLUOROSCOPY;  Surgeon: Yanira Alexander MD;   Location: [facility] OR;  Service: Urology;;    HYSTERECTOMY      ROBOT-ASSISTED LAPAROSCOPIC HYSTERECTOMY N/A 08/03/2021    Procedure: ROBOTIC HYSTERECTOMY;  Surgeon: Jamarcus Ventura MD;  Location: [facility] OR;  Service: OB/GYN;  Laterality: N/A;    ROBOT-ASSISTED SALPINGECTOMY Bilateral 08/03/2021    Procedure: ROBOTIC SALPINGECTOMY;  Surgeon: Jamarcus Ventura MD;  Location: [facility] OR;  Service: OB/GYN;  Laterality: Bilateral;    URETEROSCOPY Left 11/6/2022    Procedure: URETEROSCOPY;  Surgeon: Yanira Alexander MD;  Location: [facility] OR;  Service: Urology;  Laterality: Left;       History reviewed. No pertinent family history.    Social History     Tobacco Use    Smoking status: Never    Smokeless tobacco: Never   Substance Use Topics    Alcohol use: No    Drug use: No       Current Outpatient Medications on File Prior to Visit   Medication Sig Dispense Refill    bisacodyL (DULCOLAX) 10 mg Supp Place 1 suppository (10 mg total) rectally daily as needed (constipation). 5 suppository 0    ciprofloxacin HCl (CIPRO) 500 MG tablet Take 1 tablet (500 mg total) by mouth every 12 (twelve) hours. for 14 days 28 tablet 0    dapagliflozin (FARXIGA) 10 mg tablet Take 1 tablet (10 mg total) by mouth once daily. 30 tablet 11    diclofenac (VOLTAREN) 75 MG EC tablet Take 1 tablet (75 mg total) by mouth 2 (two) times daily. 60 tablet 0    ketorolac (TORADOL) 10 mg tablet Take 1 tablet (10 mg total) by mouth every 6 (six) hours as needed for Pain. 20 tablet 0    metoprolol succinate (TOPROL-XL) 200 MG 24 hr tablet Take 1 tablet (200 mg total) by mouth once daily. 30 tablet 11    oxybutynin (DITROPAN) 5 MG Tab Take 1 tablet (5 mg total) by mouth 3 (three) times daily as needed (bladder spasms or urgency/frequency of urination). 30 tablet 0    sacubitriL-valsartan (ENTRESTO)  mg per tablet Take 1 tablet by mouth 2 (two) times daily. 60 tablet 6    spironolactone (ALDACTONE) 25 MG tablet Take 1 tablet (25 mg total) by mouth once daily. 30  tablet 11    tamsulosin (FLOMAX) 0.4 mg Cap Take 1 capsule (0.4 mg total) by mouth once daily. 30 capsule 0    torsemide (DEMADEX) 20 MG Tab Take 1 tablet (20 mg total) by mouth every other day. 15 tablet 11    [DISCONTINUED] lisinopriL (PRINIVIL,ZESTRIL) 5 MG tablet Take 1 tablet (5 mg total) by mouth once daily. 90 tablet 3    [DISCONTINUED] lisinopriL-hydrochlorothiazide (PRINZIDE,ZESTORETIC) 10-12.5 mg per tablet Take 1 tablet by mouth once daily. 30 tablet 0    [DISCONTINUED] metoprolol tartrate (LOPRESSOR) 50 MG tablet Take 1 tablet (50 mg total) by mouth once daily. 30 tablet 0     No current facility-administered medications on file prior to visit.       Review of patient's allergies indicates:  No Known Allergies    Review of Systems   Constitutional:  Negative for activity change.   HENT:  Negative for congestion.    Eyes:  Negative for visual disturbance.   Respiratory:  Negative for shortness of breath.    Cardiovascular:  Negative for chest pain.   Gastrointestinal:  Negative for abdominal distention.   Musculoskeletal:  Negative for gait problem.   Skin:  Negative for color change.   Neurological:  Negative for dizziness.   Psychiatric/Behavioral:  Negative for agitation.      Objective:      Physical Exam  Constitutional:       Appearance: She is well-developed.   HENT:      Head: Normocephalic and atraumatic.   Pulmonary:      Effort: Pulmonary effort is normal.   Musculoskeletal:         General: Normal range of motion.      Cervical back: Normal range of motion.   Skin:     General: Skin is warm and dry.   Neurological:      Mental Status: She is alert and oriented to person, place, and time.       Assessment:       1. Kidney stone    2. Urinary tract infection without hematuria, site unspecified          Plan:           Urine for culture.  Plan for bilateral URS/HLL/SBE/stent exchange on 11/30/22.   I have explained the indication and benefits of proceeding with a bilateral ureteroscopy, holmium  laser lithotripsy, stone basketing, retrograde pyelogram, stent placement and all other indicated procedures with me in the operating room on 11/30/22 - WILL TRY TO LEAVE STENTS ON STRINGS. Alternatives of the procedure were also discussed. The risks included but were not limited to pain, infection (urinary tract infection), bleeding (hematuria), ureteral or urethral stricture,  injury to the urethra, bladder, ureter, need for additional treatments, inability or incomplete removal of kidney stones, pain, and discomfort related to the stent were discussed in depth with the patient.  The patient understands that if I am unable to pass the cameras up to the level of the stone or if visibility is poor, then I will only place a ureteral stent and pursue a staged procedure.     The ureteral stent was discussed at length. The patient will need to have it removed and the time period in which it should remain indwelling is to be determined after surgery. If left indwelling, the sequelae include pain, infection, lower urinary tract symptoms, development of calcifications on the ureteral stent, worsening kidney function, and complete loss of kidney function.     The patient voiced understanding and all questions have been answered and informed consents were signed today 11/17/22.        Kidney stone  -     POCT URINE DIPSTICK WITHOUT MICROSCOPE  -     Place sequential compression device; Standing  -     Case Request Operating Room: bilateral ureteroscopy, holmium laser lithotripsy, stone basketing, retrograde pyelogram, stent placement and all other indicated procedures  -     Place in Outpatient; Standing  -     Vital Signs ; Standing  -     Insert peripheral IV; Standing    Urinary tract infection without hematuria, site unspecified  -     Urine culture  -     Place sequential compression device; Standing  -     Case Request Operating Room: bilateral ureteroscopy, holmium laser lithotripsy, stone basketing, retrograde  pyelogram, stent placement and all other indicated procedures  -     Place in Outpatient; Standing  -     Vital Signs ; Standing  -     Insert peripheral IV; Standing    Other orders  -     IP VTE HIGH RISK PATIENT; Standing  -     0.9%  NaCl infusion  -     cefazolin (ANCEF) 2 gram in dextrose 5% 50 mL IVPB (premix)

## 2022-11-18 LAB — BACTERIA UR CULT: NO GROWTH

## 2022-11-21 ENCOUNTER — HOSPITAL ENCOUNTER (OUTPATIENT)
Dept: PREADMISSION TESTING | Facility: HOSPITAL | Age: 37
Discharge: HOME | End: 2022-11-21
Attending: STUDENT IN AN ORGANIZED HEALTH CARE EDUCATION/TRAINING PROGRAM
Payer: COMMERCIAL

## 2022-11-21 ENCOUNTER — CLINICAL SUPPORT (OUTPATIENT)
Dept: LAB | Facility: HOSPITAL | Age: 37
End: 2022-11-21
Attending: STUDENT IN AN ORGANIZED HEALTH CARE EDUCATION/TRAINING PROGRAM
Payer: COMMERCIAL

## 2022-11-21 ENCOUNTER — TELEPHONE (OUTPATIENT)
Dept: PREADMISSION TESTING | Facility: HOSPITAL | Age: 37
End: 2022-11-21
Payer: COMMERCIAL

## 2022-11-21 ENCOUNTER — ANESTHESIA EVENT (OUTPATIENT)
Dept: SURGERY | Facility: HOSPITAL | Age: 37
End: 2022-11-21
Payer: COMMERCIAL

## 2022-11-21 VITALS
HEIGHT: 67 IN | BODY MASS INDEX: 45.99 KG/M2 | WEIGHT: 293 LBS | SYSTOLIC BLOOD PRESSURE: 129 MMHG | HEART RATE: 76 BPM | RESPIRATION RATE: 16 BRPM | DIASTOLIC BLOOD PRESSURE: 74 MMHG | OXYGEN SATURATION: 97 %

## 2022-11-21 DIAGNOSIS — Z01.818 PREOP TESTING: ICD-10-CM

## 2022-11-21 DIAGNOSIS — Z01.818 PREOP TESTING: Primary | ICD-10-CM

## 2022-11-21 DIAGNOSIS — Z01.818 PRE-OP TESTING: Primary | ICD-10-CM

## 2022-11-21 PROCEDURE — 93005 ELECTROCARDIOGRAM TRACING: CPT

## 2022-11-21 PROCEDURE — 93010 ELECTROCARDIOGRAM REPORT: CPT | Mod: ,,, | Performed by: INTERNAL MEDICINE

## 2022-11-21 PROCEDURE — 93010 EKG 12-LEAD: ICD-10-PCS | Mod: ,,, | Performed by: INTERNAL MEDICINE

## 2022-11-21 RX ORDER — SODIUM CHLORIDE, SODIUM LACTATE, POTASSIUM CHLORIDE, CALCIUM CHLORIDE 600; 310; 30; 20 MG/100ML; MG/100ML; MG/100ML; MG/100ML
INJECTION, SOLUTION INTRAVENOUS CONTINUOUS
Status: CANCELLED | OUTPATIENT
Start: 2022-11-21

## 2022-11-21 RX ORDER — LIDOCAINE HYDROCHLORIDE 10 MG/ML
1 INJECTION, SOLUTION EPIDURAL; INFILTRATION; INTRACAUDAL; PERINEURAL ONCE
Status: CANCELLED | OUTPATIENT
Start: 2022-11-21 | End: 2022-11-21

## 2022-11-21 RX ORDER — SCOLOPAMINE TRANSDERMAL SYSTEM 1 MG/1
1 PATCH, EXTENDED RELEASE TRANSDERMAL
Status: CANCELLED | OUTPATIENT
Start: 2022-11-21

## 2022-11-21 NOTE — PRE-PROCEDURE INSTRUCTIONS
Rosalba Smallwood [phone]  or Jonel Ray [phone]    Allergies, medical, surgical, family and psychosocial histories reviewed with patient. Periop plan of care reviewed. Preop instructions given, including medications to take and to hold. Hibiclens soap and instructions on use given. Time allotted for questions to be addressed.  Patient verbalized understanding.

## 2022-11-21 NOTE — DISCHARGE INSTRUCTIONS
Your surgery is scheduled for 11/30/22.    Please report to [facility] on the 1st Floor at 1000 a.m.    THIS TIME IS SUBJECT TO CHANGE.  YOU WILL RECEIVE A PHONE CALL THE DAY BEFORE SURGERY BY 3:30 PM TO CONFIRM YOUR TIME OF ARRIVAL.  IF YOU HAVE NOT RECEIVED A PHONE CALL BY 3:30 PM THE DAY BEFORE YOUR SURGERY PLEASE CALL [phone]     INSTRUCTIONS IMPORTANT!!!  ¨ Do not eat or drink after 12 midnight-including water, candy, gum, & mints. OK to brush teeth.      ____  Proceed to [facility] on *** for additional testing.        ____  Do not wear makeup, including mascara.  ____  No powder, lotions or creams to surgical area.  ____  Please remove all jewelry, including piercings and leave at home.  ____  No money or valuables needed. Please leave at home.  ____  Please bring any documents given by your doctor.  ____  If going home the same day, arrange for a ride home. You will not be able to             drive if Anesthesia was used.  ____  Children under 18 years require a parent / guardian present the entire time             they are in surgery / recovery.  ____  Wear loose fitting clothing. Allow for dressings, bandages.  ____  Stop Aspirin, Ibuprofen, Motrin, Aleve, Goody's/BC powders, Excedrine and Naproxen (NSAIDS) at least 3-5 days before surgery, unless otherwise instructed by your doctor, or the nurse.   You MAY use Tylenol/acetaminophen until day of surgery.  ____  Wash the surgical area with Hibiclens the night before surgery, and again the             morning of surgery.  Be sure to rinse hibiclens off completely (if instructed by   nurse).  ____  If you take diabetic medication, do not take am of surgery unless instructed by Doctor.  ____  Call MD for temperature above 101 degrees or any other signs of infection such as Urinary (bladder) infection, Upper respiratory infection, skin boils, etc.  ____ Stop taking any Fish Oil supplement or any Vitamins that contain Vitamin E at  least 5 days prior to surgery.  ____ Do Not wear your contact lenses the day of your procedure.  You may wear your glasses.      ____Do not shave surgical site for 3 days prior to surgery.  ____ Practice Good hand washing before, during, and after procedure.      I have read or had read and explained to me, and understand the above information.  Additional comments or instructions:  For additional questions call [phone]      ANESTHESIA SIDE EFFECTS  -For the first 24 hours after surgery:  Do not drive, use heavy equipment, make important decisions, or drink alcohol  -It is normal to feel sleepy for several hours.  Rest until you are more awake.  -Have someone stay with you, if needed.  They can watch for problems and help keep you safe.  -Some possible post anesthesia side effects include: nausea and vomiting, sore throat and hoarseness, sleepiness, and dizziness.        Pre-Op Bathing Instructions    Before surgery, you can play an important role in your own health.    Because skin is not sterile, we need to be sure that your skin is as free of germs as possible. By following the instructions below, you can reduce the number of germs on your skin before surgery.    IMPORTANT: You will need to shower with a special soap called Hibiclens*, available at any pharmacy.  If you are allergic to Chlorhexidine (the antiseptic in Hibiclens), use an antibacterial soap such as Dial Soap for your preoperative shower.  You will shower with Hibiclens both the night before your surgery and the morning of your surgery.  Do not use Hibiclens on the head, face or genitals to avoid injury to those areas.    STEP #1: THE NIGHT BEFORE YOUR SURGERY     Do not shave the area of your body where your surgery will be performed.  Shower and wash your hair and body as usual with your normal soap and shampoo.  Rinse your hair and body thoroughly after you shower to remove all soap residue.  With your hand, apply one packet of Hibiclens soap to  the surgical site.   Wash the site gently for five (5) minutes. Do not scrub your skin too hard.   Do not wash with your regular soap after Hibiclens is used.  Rinse your body thoroughly.  Pat yourself dry with a clean, soft towel.  Do not use lotion, cream, or powder.  Wear clean clothes.    STEP #2: THE MORNING OF YOUR SURGERY     Repeat Step #1.    * Not to be used by people allergic to Chlorhexidine.

## 2022-11-21 NOTE — ANESTHESIA PREPROCEDURE EVALUATION
11/21/2022  Ursula Smallwood is a 37 y.o., female scheduled for bilateral ureteroscopy, holmium laser lithotripsy, stone basketing, retrograde pyelogram, stent placement and all other indicated procedures 11/30/22 .    Past Medical History:   Diagnosis Date    Anemia     Hypertension     Kidney stone     SVT (supraventricular tachycardia)     Uterine fibroid      Past Surgical History:   Procedure Laterality Date    ABLATION N/A 06/04/2021    Procedure: Ablation;  Surgeon: NICHELLE Zepeda MD;  Location: [facility] EP LAB;  Service: Cardiology;  Laterality: N/A;  SVT, RFA, Carto, anes, EH, 3prep    CYSTOSCOPY N/A 08/03/2021    Procedure: CYSTOSCOPY;  Surgeon: Jamarcus Ventura MD;  Location: [facility];  Service: OB/GYN;  Laterality: N/A;    CYSTOURETEROSCOPY WITH RETROGRADE PYELOGRAPHY AND INSERTION OF STENT INTO URETER Bilateral 11/6/2022    Procedure: CYSTOURETEROSCOPY, WITH BILATERAL RETROGRADE PYELOGRAM AND URETERAL STENT INSERTION;  Surgeon: Yanira Alexander MD;  Location: [facility] OR;  Service: Urology;  Laterality: Bilateral;    FLUOROSCOPY  11/6/2022    Procedure: FLUOROSCOPY;  Surgeon: Yanira Alexander MD;  Location: [facility] OR;  Service: Urology;;    HYSTERECTOMY      ROBOT-ASSISTED LAPAROSCOPIC HYSTERECTOMY N/A 08/03/2021    Procedure: ROBOTIC HYSTERECTOMY;  Surgeon: Jamarcus Ventura MD;  Location: [facility];  Service: OB/GYN;  Laterality: N/A;    ROBOT-ASSISTED SALPINGECTOMY Bilateral 08/03/2021    Procedure: ROBOTIC SALPINGECTOMY;  Surgeon: Jamarcus Ventura MD;  Location: [facility] OR;  Service: OB/GYN;  Laterality: Bilateral;    URETEROSCOPY Left 11/6/2022    Procedure: URETEROSCOPY;  Surgeon: Yanira Alexander MD;  Location: [facility] OR;  Service: Urology;  Laterality: Left;       Pre-op Assessment    I have reviewed the Patient Summary Reports.     I have reviewed the Nursing Notes.    I have reviewed the  [address] contacted at [phone], spoke with pharmacy , Lisa Seals. Received fax from [facility] asking for refill of [address]. Rx for Xolair was sent to [facility] electronically on 8/23/2022. Rep informed of above. Rep confirms that Xolair Rx was received 8/23/2022 and med is ready to have scheduled for delivery. Xolair 150 mg/mL prefilled syringe x2 scheduled for delivery: 9/1/2022. Patient's next Xolair injection appt: 9/6/2022. Medications.     Review of Systems  Anesthesia Hx:  No problems with previous Anesthesia   Denies Personal Hx of Anesthesia complications.   Social:  No Alcohol Use, Non-Smoker    Hematology/Oncology:         -- Anemia:   Cardiovascular:   Exercise tolerance: good Hypertension, well controlled Dysrhythmias  Denies Angina. CHF    Pulmonary:  Pulmonary Normal  Denies Shortness of breath.    Renal/:   renal calculi    Hepatic/GI:  Hepatic/GI Normal    Musculoskeletal:  Musculoskeletal Normal    Neurological:  Neurology Normal Denies TIA.  Denies CVA. Denies Seizures.    Endocrine:   Diabetes, well controlled, type 2  Morbid Obesity / BMI > 40  Psych:  Psychiatric Normal           Physical Exam  General: Well nourished and Cooperative    Airway:  Mallampati: III / I  Mouth Opening: Normal  TM Distance: Normal  Tongue: Normal  Neck ROM: Normal ROM    Dental:  Intact    Chest/Lungs:  Clear to auscultation, Normal Respiratory Rate    Heart:  Rate: Normal  Rhythm: Regular Rhythm  Sounds: Normal      Lab Results   Component Value Date    WBC 11.18 11/05/2022    HGB 13.7 11/05/2022    HCT 43.6 11/05/2022     (H) 11/05/2022    CHOL 190 04/13/2022    TRIG 159 (H) 04/13/2022    HDL 33 (L) 04/13/2022    ALT 11 11/08/2022    AST 7 (L) 11/08/2022     (L) 11/08/2022    K 4.3 11/08/2022     11/08/2022    CREATININE 1.0 11/08/2022    BUN 18 11/08/2022    CO2 20 (L) 11/08/2022    TSH 2.685 03/08/2022    INR 1.1 07/18/2021    HGBA1C 6.3 (H) 02/27/2022     Results for orders placed or performed during the hospital encounter of 02/26/22   EKG 12-lead    Collection Time: 02/28/22  9:03 AM    Narrative    Test Reason : R00.0,    Vent. Rate : 123 BPM     Atrial Rate : 123 BPM     P-R Int : 174 ms          QRS Dur : 090 ms      QT Int : 282 ms       P-R-T Axes : 009 053 -37 degrees     QTc Int : 403 ms    Sinus tachycardia  Possible Left atrial enlargement  Nonspecific T wave abnormality  Abnormal ECG  When compared with  ECG of 26-FEB-2022 19:10,  No significant change was found  Confirmed by Gregory Moore MD ([id number]) on 2/28/2022 3:43:23 PM    Referred By: [email or web address]   SELF           Confirmed By:Gregory Moore MD     ECHO 9/7/22   The left ventricle is mildly enlarged with moderately decreased systolic function. The estimated ejection fraction is 45%.   Normal right ventricular size with normal right ventricular systolic function.   Grade I left ventricular diastolic dysfunction.   The estimated PA systolic pressure is 24 mmHg.   Normal central venous pressure (3 mmHg).        Anesthesia Plan  Type of Anesthesia, risks & benefits discussed:    Anesthesia Type: Gen ETT  Intra-op Monitoring Plan: Standard ASA Monitors  Post Op Pain Control Plan: multimodal analgesia  Induction:  IV  Informed Consent: Informed consent signed with the Patient and all parties understand the risks and agree with anesthesia plan.  All questions answered. Patient consented to blood products? No  ASA Score: 3  Day of Surgery Review of History & Physical: H&P Update referred to the surgeon/provider.  Anesthesia Plan Notes: Anesthesia consent to be signed prior to surgery 11/30/22      Ready For Surgery From Anesthesia Perspective.     .

## 2022-11-23 NOTE — PROGRESS NOTES
I assume primary medical responsibility for this patient. I have reviewed the history, physical, and assessement & treatment plan with the resident and agree that the care is reasonable and necessary. This service has been performed by a resident with the presence of a teaching physician for the key parts of the history/exam. If necessary, an addendum of additional findings or evaluation beyond the resident documentation will be noted below.     Cristy Morales MD

## 2022-11-30 ENCOUNTER — ANESTHESIA (OUTPATIENT)
Dept: SURGERY | Facility: HOSPITAL | Age: 37
End: 2022-11-30
Payer: COMMERCIAL

## 2022-11-30 ENCOUNTER — HOSPITAL ENCOUNTER (OUTPATIENT)
Facility: HOSPITAL | Age: 37
Discharge: HOME | End: 2022-11-30
Attending: STUDENT IN AN ORGANIZED HEALTH CARE EDUCATION/TRAINING PROGRAM | Admitting: STUDENT IN AN ORGANIZED HEALTH CARE EDUCATION/TRAINING PROGRAM
Payer: COMMERCIAL

## 2022-11-30 DIAGNOSIS — N39.0 URINARY TRACT INFECTION WITHOUT HEMATURIA, SITE UNSPECIFIED: ICD-10-CM

## 2022-11-30 DIAGNOSIS — N20.0 KIDNEY STONE: ICD-10-CM

## 2022-11-30 PROCEDURE — D9220A PRA ANESTHESIA: Mod: CRNA,,, | Performed by: NURSE ANESTHETIST, CERTIFIED REGISTERED

## 2022-11-30 PROCEDURE — 63600175 PHARM REV CODE 636 W HCPCS: Performed by: NURSE ANESTHETIST, CERTIFIED REGISTERED

## 2022-11-30 PROCEDURE — 71000033 HC RECOVERY, INTIAL HOUR: Performed by: STUDENT IN AN ORGANIZED HEALTH CARE EDUCATION/TRAINING PROGRAM

## 2022-11-30 PROCEDURE — C1769 GUIDE WIRE: HCPCS | Performed by: STUDENT IN AN ORGANIZED HEALTH CARE EDUCATION/TRAINING PROGRAM

## 2022-11-30 PROCEDURE — 71000016 HC POSTOP RECOV ADDL HR: Performed by: STUDENT IN AN ORGANIZED HEALTH CARE EDUCATION/TRAINING PROGRAM

## 2022-11-30 PROCEDURE — 25000003 PHARM REV CODE 250: Performed by: NURSE ANESTHETIST, CERTIFIED REGISTERED

## 2022-11-30 PROCEDURE — 74420 UROGRAPHY RTRGR +-KUB: CPT | Mod: 26,,, | Performed by: STUDENT IN AN ORGANIZED HEALTH CARE EDUCATION/TRAINING PROGRAM

## 2022-11-30 PROCEDURE — 27201423 OPTIME MED/SURG SUP & DEVICES STERILE SUPPLY: Performed by: STUDENT IN AN ORGANIZED HEALTH CARE EDUCATION/TRAINING PROGRAM

## 2022-11-30 PROCEDURE — 36000706: Performed by: STUDENT IN AN ORGANIZED HEALTH CARE EDUCATION/TRAINING PROGRAM

## 2022-11-30 PROCEDURE — D9220A PRA ANESTHESIA: ICD-10-PCS | Mod: ANES,,, | Performed by: ANESTHESIOLOGY

## 2022-11-30 PROCEDURE — 37000009 HC ANESTHESIA EA ADD 15 MINS: Performed by: STUDENT IN AN ORGANIZED HEALTH CARE EDUCATION/TRAINING PROGRAM

## 2022-11-30 PROCEDURE — 37000008 HC ANESTHESIA 1ST 15 MINUTES: Performed by: STUDENT IN AN ORGANIZED HEALTH CARE EDUCATION/TRAINING PROGRAM

## 2022-11-30 PROCEDURE — 71000015 HC POSTOP RECOV 1ST HR: Performed by: STUDENT IN AN ORGANIZED HEALTH CARE EDUCATION/TRAINING PROGRAM

## 2022-11-30 PROCEDURE — 63600175 PHARM REV CODE 636 W HCPCS: Performed by: NURSE PRACTITIONER

## 2022-11-30 PROCEDURE — 52356 CYSTO/URETERO W/LITHOTRIPSY: CPT | Mod: 50,22,, | Performed by: STUDENT IN AN ORGANIZED HEALTH CARE EDUCATION/TRAINING PROGRAM

## 2022-11-30 PROCEDURE — 74420 PR  X-RAY RETROGRADE PYELOGRAM: ICD-10-PCS | Mod: 26,,, | Performed by: STUDENT IN AN ORGANIZED HEALTH CARE EDUCATION/TRAINING PROGRAM

## 2022-11-30 PROCEDURE — 25000003 PHARM REV CODE 250: Performed by: NURSE PRACTITIONER

## 2022-11-30 PROCEDURE — C2617 STENT, NON-COR, TEM W/O DEL: HCPCS | Performed by: STUDENT IN AN ORGANIZED HEALTH CARE EDUCATION/TRAINING PROGRAM

## 2022-11-30 PROCEDURE — 36000707: Performed by: STUDENT IN AN ORGANIZED HEALTH CARE EDUCATION/TRAINING PROGRAM

## 2022-11-30 PROCEDURE — 52356 PR CYSTO/URETERO W/LITHOTRIPSY: ICD-10-PCS | Mod: 50,22,, | Performed by: STUDENT IN AN ORGANIZED HEALTH CARE EDUCATION/TRAINING PROGRAM

## 2022-11-30 PROCEDURE — D9220A PRA ANESTHESIA: ICD-10-PCS | Mod: CRNA,,, | Performed by: NURSE ANESTHETIST, CERTIFIED REGISTERED

## 2022-11-30 PROCEDURE — D9220A PRA ANESTHESIA: Mod: ANES,,, | Performed by: ANESTHESIOLOGY

## 2022-11-30 PROCEDURE — 82365 CALCULUS SPECTROSCOPY: CPT | Performed by: STUDENT IN AN ORGANIZED HEALTH CARE EDUCATION/TRAINING PROGRAM

## 2022-11-30 PROCEDURE — 25500020 PHARM REV CODE 255: Performed by: STUDENT IN AN ORGANIZED HEALTH CARE EDUCATION/TRAINING PROGRAM

## 2022-11-30 PROCEDURE — 63600175 PHARM REV CODE 636 W HCPCS: Performed by: STUDENT IN AN ORGANIZED HEALTH CARE EDUCATION/TRAINING PROGRAM

## 2022-11-30 DEVICE — STENT SET URETERAL 6X28CM: Type: IMPLANTABLE DEVICE | Site: URETER | Status: FUNCTIONAL

## 2022-11-30 RX ORDER — ONDANSETRON 2 MG/ML
4 INJECTION INTRAMUSCULAR; INTRAVENOUS DAILY PRN
Status: DISCONTINUED | OUTPATIENT
Start: 2022-11-30 | End: 2022-11-30 | Stop reason: HOSPADM

## 2022-11-30 RX ORDER — PROPOFOL 10 MG/ML
VIAL (ML) INTRAVENOUS
Status: DISCONTINUED | OUTPATIENT
Start: 2022-11-30 | End: 2022-11-30

## 2022-11-30 RX ORDER — MIDAZOLAM HYDROCHLORIDE 1 MG/ML
INJECTION, SOLUTION INTRAMUSCULAR; INTRAVENOUS
Status: DISCONTINUED | OUTPATIENT
Start: 2022-11-30 | End: 2022-11-30

## 2022-11-30 RX ORDER — ONDANSETRON 2 MG/ML
INJECTION INTRAMUSCULAR; INTRAVENOUS
Status: DISCONTINUED | OUTPATIENT
Start: 2022-11-30 | End: 2022-11-30

## 2022-11-30 RX ORDER — ACETAMINOPHEN 10 MG/ML
INJECTION, SOLUTION INTRAVENOUS
Status: DISCONTINUED | OUTPATIENT
Start: 2022-11-30 | End: 2022-11-30

## 2022-11-30 RX ORDER — FENTANYL CITRATE 50 UG/ML
INJECTION, SOLUTION INTRAMUSCULAR; INTRAVENOUS
Status: DISCONTINUED | OUTPATIENT
Start: 2022-11-30 | End: 2022-11-30

## 2022-11-30 RX ORDER — HYDROMORPHONE HYDROCHLORIDE 2 MG/ML
0.2 INJECTION, SOLUTION INTRAMUSCULAR; INTRAVENOUS; SUBCUTANEOUS EVERY 5 MIN PRN
Status: DISCONTINUED | OUTPATIENT
Start: 2022-11-30 | End: 2022-11-30 | Stop reason: HOSPADM

## 2022-11-30 RX ORDER — KETOROLAC TROMETHAMINE 10 MG/1
10 TABLET, FILM COATED ORAL EVERY 6 HOURS PRN
Qty: 20 TABLET | Refills: 0 | Status: SHIPPED | OUTPATIENT
Start: 2022-11-30 | End: 2022-12-05

## 2022-11-30 RX ORDER — OXYCODONE AND ACETAMINOPHEN 5; 325 MG/1; MG/1
1 TABLET ORAL EVERY 6 HOURS PRN
Qty: 15 TABLET | Refills: 0 | Status: SHIPPED | OUTPATIENT
Start: 2022-11-30 | End: 2023-09-25

## 2022-11-30 RX ORDER — ROCURONIUM BROMIDE 10 MG/ML
INJECTION, SOLUTION INTRAVENOUS
Status: DISCONTINUED | OUTPATIENT
Start: 2022-11-30 | End: 2022-11-30

## 2022-11-30 RX ORDER — AMOXICILLIN AND CLAVULANATE POTASSIUM 875; 125 MG/1; MG/1
1 TABLET, FILM COATED ORAL EVERY 12 HOURS
Qty: 20 TABLET | Refills: 0 | Status: SHIPPED | OUTPATIENT
Start: 2022-11-30 | End: 2022-12-10

## 2022-11-30 RX ORDER — PHENYLEPHRINE HYDROCHLORIDE 10 MG/ML
INJECTION INTRAVENOUS
Status: DISCONTINUED | OUTPATIENT
Start: 2022-11-30 | End: 2022-11-30

## 2022-11-30 RX ORDER — DEXAMETHASONE SODIUM PHOSPHATE 4 MG/ML
INJECTION, SOLUTION INTRA-ARTICULAR; INTRALESIONAL; INTRAMUSCULAR; INTRAVENOUS; SOFT TISSUE
Status: DISCONTINUED | OUTPATIENT
Start: 2022-11-30 | End: 2022-11-30

## 2022-11-30 RX ORDER — SUCCINYLCHOLINE CHLORIDE 20 MG/ML
INJECTION INTRAMUSCULAR; INTRAVENOUS
Status: DISCONTINUED | OUTPATIENT
Start: 2022-11-30 | End: 2022-11-30

## 2022-11-30 RX ORDER — SCOLOPAMINE TRANSDERMAL SYSTEM 1 MG/1
1 PATCH, EXTENDED RELEASE TRANSDERMAL
Status: DISCONTINUED | OUTPATIENT
Start: 2022-11-30 | End: 2022-11-30 | Stop reason: HOSPADM

## 2022-11-30 RX ORDER — LIDOCAINE HYDROCHLORIDE 10 MG/ML
1 INJECTION, SOLUTION EPIDURAL; INFILTRATION; INTRACAUDAL; PERINEURAL ONCE
Status: DISCONTINUED | OUTPATIENT
Start: 2022-11-30 | End: 2022-11-30 | Stop reason: HOSPADM

## 2022-11-30 RX ORDER — SODIUM CHLORIDE, SODIUM LACTATE, POTASSIUM CHLORIDE, CALCIUM CHLORIDE 600; 310; 30; 20 MG/100ML; MG/100ML; MG/100ML; MG/100ML
INJECTION, SOLUTION INTRAVENOUS CONTINUOUS
Status: DISCONTINUED | OUTPATIENT
Start: 2022-11-30 | End: 2022-11-30 | Stop reason: HOSPADM

## 2022-11-30 RX ORDER — SODIUM CHLORIDE 0.9 % (FLUSH) 0.9 %
10 SYRINGE (ML) INJECTION
Status: DISCONTINUED | OUTPATIENT
Start: 2022-11-30 | End: 2022-11-30 | Stop reason: HOSPADM

## 2022-11-30 RX ORDER — CEFAZOLIN SODIUM 2 G/50ML
2 SOLUTION INTRAVENOUS
Status: COMPLETED | OUTPATIENT
Start: 2022-11-30 | End: 2022-11-30

## 2022-11-30 RX ORDER — LIDOCAINE HYDROCHLORIDE 20 MG/ML
INJECTION INTRAVENOUS
Status: DISCONTINUED | OUTPATIENT
Start: 2022-11-30 | End: 2022-11-30

## 2022-11-30 RX ORDER — SODIUM CHLORIDE 9 MG/ML
INJECTION, SOLUTION INTRAVENOUS CONTINUOUS
Status: DISCONTINUED | OUTPATIENT
Start: 2022-11-30 | End: 2022-11-30 | Stop reason: HOSPADM

## 2022-11-30 RX ADMIN — FENTANYL CITRATE 50 MCG: 50 INJECTION, SOLUTION INTRAMUSCULAR; INTRAVENOUS at 12:11

## 2022-11-30 RX ADMIN — PHENYLEPHRINE HYDROCHLORIDE 200 MCG: 10 INJECTION INTRAVENOUS at 12:11

## 2022-11-30 RX ADMIN — ACETAMINOPHEN 1000 MG: 10 INJECTION, SOLUTION INTRAVENOUS at 12:11

## 2022-11-30 RX ADMIN — SUCCINYLCHOLINE CHLORIDE 140 MG: 20 INJECTION, SOLUTION INTRAMUSCULAR; INTRAVENOUS at 11:11

## 2022-11-30 RX ADMIN — LIDOCAINE HYDROCHLORIDE 100 MG: 20 INJECTION, SOLUTION INTRAVENOUS at 11:11

## 2022-11-30 RX ADMIN — SODIUM CHLORIDE, SODIUM LACTATE, POTASSIUM CHLORIDE, AND CALCIUM CHLORIDE: .6; .31; .03; .02 INJECTION, SOLUTION INTRAVENOUS at 10:11

## 2022-11-30 RX ADMIN — SCOPALAMINE 1 PATCH: 1 PATCH, EXTENDED RELEASE TRANSDERMAL at 09:11

## 2022-11-30 RX ADMIN — MIDAZOLAM 2 MG: 1 INJECTION INTRAMUSCULAR; INTRAVENOUS at 11:11

## 2022-11-30 RX ADMIN — PROPOFOL 150 MG: 10 INJECTION, EMULSION INTRAVENOUS at 11:11

## 2022-11-30 RX ADMIN — FENTANYL CITRATE 50 MCG: 50 INJECTION, SOLUTION INTRAMUSCULAR; INTRAVENOUS at 01:11

## 2022-11-30 RX ADMIN — FENTANYL CITRATE 100 MCG: 50 INJECTION, SOLUTION INTRAMUSCULAR; INTRAVENOUS at 11:11

## 2022-11-30 RX ADMIN — CEFAZOLIN SODIUM 3 G: 2 SOLUTION INTRAVENOUS at 11:11

## 2022-11-30 RX ADMIN — SUGAMMADEX 200 MG: 100 INJECTION, SOLUTION INTRAVENOUS at 12:11

## 2022-11-30 RX ADMIN — ONDANSETRON 8 MG: 2 INJECTION, SOLUTION INTRAMUSCULAR; INTRAVENOUS at 12:11

## 2022-11-30 RX ADMIN — PHENYLEPHRINE HYDROCHLORIDE 100 MCG: 10 INJECTION INTRAVENOUS at 12:11

## 2022-11-30 RX ADMIN — DEXAMETHASONE SODIUM PHOSPHATE 8 MG: 4 INJECTION, SOLUTION INTRA-ARTICULAR; INTRALESIONAL; INTRAMUSCULAR; INTRAVENOUS; SOFT TISSUE at 12:11

## 2022-11-30 RX ADMIN — SODIUM CHLORIDE, SODIUM LACTATE, POTASSIUM CHLORIDE, AND CALCIUM CHLORIDE: .6; .31; .03; .02 INJECTION, SOLUTION INTRAVENOUS at 12:11

## 2022-11-30 RX ADMIN — ROCURONIUM BROMIDE 30 MG: 10 INJECTION, SOLUTION INTRAVENOUS at 12:11

## 2022-11-30 RX ADMIN — ROCURONIUM BROMIDE 20 MG: 10 INJECTION, SOLUTION INTRAVENOUS at 12:11

## 2022-11-30 NOTE — OP NOTE
OPERATIVE DICTATION  DATE OF OPERATION: 11/30/2022    SERVICE: Urology    SURGEONS:  1. Yanira Alexander MD    ANESTHESIA:  Anesthesiologist: Ted Mendoza MD  CRNA: Drew Ledesma CRNA    STAFF:  Circulator: Tameka Lorenzo RN  Scrub Person: ST Cisco  Technician: Ken Mason, RT    ANESTHESIA: General    PREOPERATIVE DIAGNOSIS: Pre-Op Diagnosis Codes:     * Kidney stone [N20.0]     * Urinary tract infection without hematuria, site unspecified [N39.0]    POSTOPERATIVE DIAGNOSIS: Post-Op Diagnosis Codes:     * Kidney stone [N20.0]     * Urinary tract infection without hematuria, site unspecified [N39.0]    PROCEDURES:   1.  Bilateral  ureteroscopy, holmium laser lithotripsy, stone basket extraction modifier 22   2. Cystoscopy,  bilateral  retrograde pyelogram  3. Cystoscopy, placement of  bilateral  6 Arabic by 28cm JJ ureteral stents both left on A STRING  4. Fluoroscopy < 1 hour  5. Interpretation of fluoroscopic images       COMPLICATIONS: * No complications entered in OR log *    DRAINS: none    TUBES: none    IMPLANTS: bilateral 6x28 stents both were left on a string per pt request  Implant Name Type Inv. Item Serial No.  Lot No. LRB No. Used Action   STENT SET URETERAL 6X28CM - [id number]  STENT SET URETERAL 6X28CM  [employer] [id number] Left 1 Implanted   STENT SET URETERAL 6X28CM - [id number]  STENT SET URETERAL 6X28CM  [employer] [id number] Right 1 Implanted       FLUIDS: see anesthesia record     ESTIMATED BLOOD LOSS: * No values recorded between 11/30/2022 12:04 PM and 11/30/2022 12:55 PM *    FINDINGS:   1. Right and left ureteral stones impacted, surrounding ureteral mucosa noted to be edematous, traumatized, blanched, excoriated .     SPECIMEN(S):   Right and left ureteral stones for analysis      CONDITION: stable    INDICATIONS FOR THE PROCEDURE:  See H&P    PROCEDURE IN DETAIL:  After appropriate informed consent was obtained, the patient was taken to the operating room  and placed in the supine position. After induction of General, she was placed in the dorsal lithotomy position.  Then she was prepped and draped in the usual sterile fashion.     Thereafter a WHO timeout was performed and the procedure was initiated. The rigid 22 Bulgarian cystoscope was inserted into the bladder via the urethra.     I visualized the right ureteral orifice and cannulated with a Motion guidewire. I advanced the wire until I noted a coil in the renal pelvis fluoroscopically. The right ureteral stent was removed with the alligator grasper.     I kept the guidewire in place and advanced a rigid ureteroscope into the right ureter and inspected as proximally as possible with the rigid scope. I identified the calculus in the mid-ureter and it was impacted, surrounded by traumatized edematous blanched tissue likely due to the impacted stone.      I used the 365 holmium micron laser fiber to dust the stones and also break the stones into smaller manageable fragments. A nitinol tipless basket was used to remove the larger fragments of stones. These stones were sent off for analysis.     Afterwards I performed a right retrograde pyelogram by injecting dilute isovue through the rigid ureteroscope. Right hydroureter was noted, no extravasation of contrast seen.     I removed the rigid ureteroscope and there were no basketable stones identified.     Then we proceeded with the right ureteral stent placement. A 6 Bulgarian x 28 cm JJ ureteral stent was advanced over the guidewire. Using the radiopaque marker of the pusher the stent was positioned in the correct location. As the guidewire was being removed, the right proximal coil in the renal pelvis was formed and visualized fluoroscopically. A coil in the bladder was also noted fluoroscopically.   Stent was left on a string.     I visualized the left ureteral orifice and cannulated with a Motion guidewire. I advanced the wire until I noted a coil in the renal pelvis  fluoroscopically. The left ureteral stent was removed with the alligator graspers.    Rigid ureteroscope  I kept the guidewire in place and advanced a rigid ureteroscope into the left ureter and inspected as proximally as possible with the rigid scope. I noted the large impacted proximal ureteral stone associated with traumatized edematous blanched tissue likely due to the impacted stone.       I used the 365 holmium micron laser fiber to dust the stones and also break the stones into smaller manageable fragments. A nitinol tipless basket was used to remove the larger fragments of stones. These stones were sent off for analysis.     Afterwards I performed a left retrograde pyelogram by injecting dilute isovue through the rigid ureteroscope. Left hydroureter was noted, no extravasation of contrast seen.     I removed the rigid ureteroscope and no additional basketable stones were seen.     Then we proceeded with the left ureteral stent placement. A 6 Japanese x 28 cm JJ ureteral stent was advanced over the guidewire. Using the radiopaque marker of the pusher the stent was positioned in the correct location. As the guidewire was being removed, the left proximal coil in the renal pelvis was formed and visualized fluoroscopically. A coil in the bladder was also noted fluoroscopically. The stent was also left on a string.     The rigid cystoscope was re-inserted into the bladder via the urethra to drain the bladder of all urinary contents and the distal coils of the stents was visualized directly and this concluded the procedure. The strings of the bilateral stents were secured to the inner thigh of the patient with mastisol and tegerm.     Modifier 22 as the procedure required greater than 75% more effort than the norm due to bilateral impacted ureteral stones with surrounding ureteral mucosa edema.     ATTENDING ATTESTATION  I was present and scrubbed for the entire duration  of the procedure.      CASE DURATION:  *  Missing case tracking time(s) *    DISPOSITION:   The patient tolerated the procedure well, she was extubated, and taken to post-anesthesia care unit in satisfactory condition.  She will remove the stents that were left on strings in 7 days ideally.     Yanira Alexander MD

## 2022-11-30 NOTE — PLAN OF CARE
Discharge criteria met,voicing desire to go home. Discharge instructions given to patient,sister & daughter, verbalized understanding. Discharge home via wheelchair in care of her sister.

## 2022-11-30 NOTE — TRANSFER OF CARE
"Anesthesia Transfer of Care Note    Patient: Ursula Smallwood    Procedure(s) Performed: Procedure(s) (LRB):  bilateral ureteroscopy, holmium laser lithotripsy, stone basketing, retrograde pyelogram, stent placement and all other indicated procedures (Bilateral)  EXTRACTION - STONE (Bilateral)  CYSTOSCOPY, WITH RETROGRADE PYELOGRAM (Bilateral)    Patient location: PACU    Anesthesia Type: general    Transport from OR: Transported from OR on 6-10 L/min O2 by face mask with adequate spontaneous ventilation    Post pain: adequate analgesia    Post assessment: no apparent anesthetic complications and tolerated procedure well    Post vital signs: stable    Level of consciousness: awake, alert and oriented    Nausea/Vomiting: no nausea/vomiting    Complications: none    Transfer of care protocol was followed      Last vitals:   Visit Vitals  BP (!) 144/81 (BP Location: Left arm, Patient Position: Lying)   Pulse 74   Temp 36.9 °C (98.4 °F) (Skin)   Resp 20   Ht 5' 7" (1.702 m)   Wt 132.9 kg (293 lb)   LMP 06/04/2021   SpO2 98%   Breastfeeding No   BMI 45.89 kg/m²     "

## 2022-11-30 NOTE — DISCHARGE SUMMARY
[facility] ([facility])  Urology  Discharge Summary      Patient Name: Ursula Smallwood  MRN: [id number]  Admission Date: 11/30/2022  Hospital Length of Stay: 0 days  Discharge Date: 11/30/2022  Attending Physician: Yanira Alexander MD   Discharging Provider: Yanira Alexander MD  Primary Care Physician: Simone Pro DO    HPI: The patient is a 37 y.o. female with past medical history (listed below) bilateral ureteral stones.    The patient elected to proceed with the procedure(s) below. Please see H&P and/or clinic progress note(s) for full details.     Procedure(s) (LRB):  bilateral ureteroscopy, holmium laser lithotripsy, stone basketing, retrograde pyelogram, stent placement and all other indicated procedures (Bilateral)  EXTRACTION - STONE (Bilateral)  CYSTOSCOPY, WITH RETROGRADE PYELOGRAM (Bilateral)     Past Medical History:   Diagnosis Date    Anemia     Hypertension     Kidney stone     SVT (supraventricular tachycardia)     Uterine fibroid        Hospital Course (synopsis of major diagnoses, care, treatment, and services provided during the course of the hospital stay): pt tolerated procedure well, was transferred to pacu and discharged home in stable condition.       Consults:     Significant Diagnostic Studies:      Pending Diagnostic Studies:       Procedure Component Value Units Date/Time    Urinary Stone Analysis [[id number]] Collected: 11/30/22 1249    Order Status: Sent Lab Status: In process Updated: 11/30/22 1250    Specimen: Urine from Stone     Urinary Stone Analysis [[id number]] Collected: 11/30/22 1249    Order Status: Sent Lab Status: In process Updated: 11/30/22 1250    Specimen: Urine from Stone             There are no hospital problems to display for this patient.      Discharged Condition: good    Disposition: Home or Self Care    Follow Up:      Patient Instructions:      Diet Adult Regular     Change dressing (specify)   Order Comments: The bilateral stents were both left on strings which  are secured to the patient's inner thigh with a mastisol and tegederm. In 7 days (12/7) the patient should remove the stents by pulling the strings. Usually patients do this while in a shower/tub. Can take pain medications about 30 min before removing stents.     Activity as tolerated       Medications:  Reconciled Home Medications:      Medication List        START taking these medications      amoxicillin-clavulanate 875-125mg 875-125 mg per tablet  Commonly known as: AUGMENTIN  Take 1 tablet by mouth every 12 (twelve) hours. for 10 days     ketorolac 10 mg tablet  Commonly known as: TORADOL  Take 1 tablet (10 mg total) by mouth every 6 (six) hours as needed for Pain (moderate pain).     oxyCODONE-acetaminophen 5-325 mg per tablet  Commonly known as: PERCOCET  Take 1 tablet by mouth every 6 (six) hours as needed for Pain (severe pain).            CONTINUE taking these medications      bisacodyL 10 mg Supp  Commonly known as: DULCOLAX  Place 1 suppository (10 mg total) rectally daily as needed (constipation).     diclofenac 75 MG EC tablet  Commonly known as: VOLTAREN  Take 1 tablet (75 mg total) by mouth 2 (two) times daily.     ENTRESTO  mg per tablet  Generic drug: sacubitriL-valsartan  Take 1 tablet by mouth 2 (two) times daily.     FARXIGA 10 mg tablet  Generic drug: dapagliflozin  Take 1 tablet (10 mg total) by mouth once daily.     metoprolol succinate 200 MG 24 hr tablet  Commonly known as: TOPROL-XL  Take 1 tablet (200 mg total) by mouth once daily.     oxybutynin 5 MG Tab  Commonly known as: DITROPAN  Take 1 tablet (5 mg total) by mouth 3 (three) times daily as needed (bladder spasms or urgency/frequency of urination).     spironolactone 25 MG tablet  Commonly known as: ALDACTONE  Take 1 tablet (25 mg total) by mouth once daily.     tamsulosin 0.4 mg Cap  Commonly known as: FLOMAX  Take 1 capsule (0.4 mg total) by mouth once daily.     torsemide 20 MG Tab  Commonly known as: DEMADEX  Take 1 tablet  (20 mg total) by mouth every other day.              Time spent on the discharge of patient: 15 minutes    Yanira Alexander MD  Urology  [facility] ([facility])

## 2022-11-30 NOTE — ANESTHESIA POSTPROCEDURE EVALUATION
Anesthesia Post Evaluation    Patient: Ursula ELSI Cl    Procedure(s) Performed: Procedure(s) (LRB):  bilateral ureteroscopy, holmium laser lithotripsy, stone basketing, retrograde pyelogram, stent placement and all other indicated procedures (Bilateral)  EXTRACTION - STONE (Bilateral)  CYSTOSCOPY, WITH RETROGRADE PYELOGRAM (Bilateral)    Final Anesthesia Type: general      Patient location during evaluation: PACU  Patient participation: Yes- Able to Participate  Level of consciousness: awake and alert  Post-procedure vital signs: reviewed and stable  Pain management: adequate  Airway patency: patent    PONV status at discharge: No PONV  Anesthetic complications: no      Cardiovascular status: blood pressure returned to baseline  Respiratory status: unassisted  Hydration status: euvolemic  Follow-up not needed.          Vitals Value Taken Time   /59 11/30/22 1435   Temp 36.6 °C (97.9 °F) 11/30/22 1405   Pulse 66 11/30/22 1435   Resp 20 11/30/22 1435   SpO2 95 % 11/30/22 1435         Event Time   Out of Recovery 14:04:53         Pain/Linda Score: Linda Score: 9 (11/30/2022  2:05 PM)

## 2022-11-30 NOTE — ANESTHESIA PROCEDURE NOTES
Intubation    Date/Time: 11/30/2022 11:52 AM  Performed by: Drew Ledesma CRNA  Authorized by: Ted Mendoza MD     Intubation:     Induction:  Intravenous    Intubated:  Postinduction    Mask Ventilation:  Easy with oral airway    Attempts:  1    Attempted By:  CRNA    Method of Intubation:  Video laryngoscopy    Blade:  Glaser 4    Laryngeal View Grade: Grade I - full view of cords      Difficult Airway Encountered?: No      Complications:  None    Airway Device:  Oral endotracheal tube    Airway Device Size:  7.0    Style/Cuff Inflation:  Cuffed (inflated to minimal occlusive pressure)    Inflation Amount (mL):  6    Tube secured:  22    Secured at:  The lips    Placement Verified By:  Capnometry    Complicating Factors:  None    Findings Post-Intubation:  BS equal bilateral and atraumatic/condition of teeth unchanged

## 2022-12-01 ENCOUNTER — PATIENT MESSAGE (OUTPATIENT)
Dept: UROLOGY | Facility: CLINIC | Age: 37
End: 2022-12-01
Payer: COMMERCIAL

## 2022-12-02 VITALS
WEIGHT: 293 LBS | HEIGHT: 67 IN | RESPIRATION RATE: 20 BRPM | DIASTOLIC BLOOD PRESSURE: 59 MMHG | HEART RATE: 65 BPM | SYSTOLIC BLOOD PRESSURE: 120 MMHG | BODY MASS INDEX: 45.99 KG/M2 | OXYGEN SATURATION: 95 % | TEMPERATURE: 98 F

## 2022-12-05 ENCOUNTER — PATIENT MESSAGE (OUTPATIENT)
Dept: UROLOGY | Facility: CLINIC | Age: 37
End: 2022-12-05
Payer: COMMERCIAL

## 2022-12-05 LAB
COMPN STONE: NORMAL
SPECIMEN SOURCE: NORMAL
STONE ANALYSIS IR-IMP: NORMAL

## 2022-12-13 ENCOUNTER — HOSPITAL ENCOUNTER (EMERGENCY)
Facility: HOSPITAL | Age: 37
Discharge: HOME | End: 2022-12-13
Attending: EMERGENCY MEDICINE
Payer: COMMERCIAL

## 2022-12-13 VITALS
HEART RATE: 89 BPM | HEIGHT: 67 IN | DIASTOLIC BLOOD PRESSURE: 84 MMHG | WEIGHT: 293 LBS | SYSTOLIC BLOOD PRESSURE: 145 MMHG | RESPIRATION RATE: 16 BRPM | OXYGEN SATURATION: 100 % | TEMPERATURE: 99 F | BODY MASS INDEX: 45.99 KG/M2

## 2022-12-13 DIAGNOSIS — L02.511 ABSCESS OF RIGHT MIDDLE FINGER: Primary | ICD-10-CM

## 2022-12-13 PROCEDURE — 99284 EMERGENCY DEPT VISIT MOD MDM: CPT | Mod: ER

## 2022-12-13 PROCEDURE — 25000003 PHARM REV CODE 250: Mod: ER | Performed by: EMERGENCY MEDICINE

## 2022-12-13 RX ORDER — MUPIROCIN 20 MG/G
OINTMENT TOPICAL 3 TIMES DAILY
Qty: 22 G | Refills: 0 | Status: SHIPPED | OUTPATIENT
Start: 2022-12-13 | End: 2022-12-22

## 2022-12-13 RX ORDER — SULFAMETHOXAZOLE AND TRIMETHOPRIM 800; 160 MG/1; MG/1
1 TABLET ORAL 2 TIMES DAILY
Qty: 14 TABLET | Refills: 0 | Status: SHIPPED | OUTPATIENT
Start: 2022-12-13 | End: 2022-12-22

## 2022-12-13 RX ORDER — SULFAMETHOXAZOLE AND TRIMETHOPRIM 800; 160 MG/1; MG/1
1 TABLET ORAL
Status: COMPLETED | OUTPATIENT
Start: 2022-12-13 | End: 2022-12-13

## 2022-12-13 RX ADMIN — SULFAMETHOXAZOLE AND TRIMETHOPRIM 1 TABLET: 800; 160 TABLET ORAL at 11:12

## 2022-12-14 NOTE — ED PROVIDER NOTES
Encounter Date: 12/13/2022       History     Chief Complaint   Patient presents with    Wound Infection     Reports to ED c c/o wound infection to R middle finger x 1 week. States punctured on nail. +Puss. +tenderness. No redness or fever     Patient currently presents with concern regarding infection to the right middle finger.  This was 1st noted nearly a week ago.  They note that she developed yellow drainage from the site on the dorsum of the middle phalanx.  Patient maintains full active range of motion that the digit.  There does not appear to be extension of the pain or gross changes to the remainder of the finger.    Review of patient's allergies indicates:  No Known Allergies  Past Medical History:   Diagnosis Date    Anemia     Hypertension     Kidney stone     SVT (supraventricular tachycardia)     Uterine fibroid      Past Surgical History:   Procedure Laterality Date    ABLATION N/A 06/04/2021    Procedure: Ablation;  Surgeon: NICHELLE Zepeda MD;  Location: [facility] EP LAB;  Service: Cardiology;  Laterality: N/A;  SVT, RFA, Carto, anes, EH, 3prep    CYSTOSCOPY N/A 08/03/2021    Procedure: CYSTOSCOPY;  Surgeon: Jamarcus Ventura MD;  Location: [facility] OR;  Service: OB/GYN;  Laterality: N/A;    CYSTOSCOPY W/ RETROGRADES Bilateral 11/30/2022    Procedure: CYSTOSCOPY, WITH RETROGRADE PYELOGRAM;  Surgeon: Yanira Alexander MD;  Location: [facility] OR;  Service: Urology;  Laterality: Bilateral;    CYSTOURETEROSCOPY WITH RETROGRADE PYELOGRAPHY AND INSERTION OF STENT INTO URETER Bilateral 11/6/2022    Procedure: CYSTOURETEROSCOPY, WITH BILATERAL RETROGRADE PYELOGRAM AND URETERAL STENT INSERTION;  Surgeon: Yanira Alexander MD;  Location: [facility] OR;  Service: Urology;  Laterality: Bilateral;    FLUOROSCOPY  11/6/2022    Procedure: FLUOROSCOPY;  Surgeon: Yanira Alexander MD;  Location: [facility] OR;  Service: Urology;;    HYSTERECTOMY      ROBOT-ASSISTED LAPAROSCOPIC HYSTERECTOMY N/A 08/03/2021    Procedure: ROBOTIC HYSTERECTOMY;   Surgeon: Jamarcus Ventura MD;  Location: [facility] OR;  Service: OB/GYN;  Laterality: N/A;    ROBOT-ASSISTED SALPINGECTOMY Bilateral 08/03/2021    Procedure: ROBOTIC SALPINGECTOMY;  Surgeon: Jamarcus Ventura MD;  Location: [facility] OR;  Service: OB/GYN;  Laterality: Bilateral;    URETEROSCOPY Left 11/6/2022    Procedure: URETEROSCOPY;  Surgeon: Yanira Alexander MD;  Location: [facility] OR;  Service: Urology;  Laterality: Left;     History reviewed. No pertinent family history.  Social History     Tobacco Use    Smoking status: Never    Smokeless tobacco: Never   Substance Use Topics    Alcohol use: No    Drug use: No     Review of Systems   Constitutional:  Negative for chills and fever.   HENT:  Negative for congestion and rhinorrhea.    Respiratory:  Negative for cough and shortness of breath.    Cardiovascular:  Negative for chest pain and palpitations.   Gastrointestinal:  Negative for abdominal pain, diarrhea and vomiting.   Skin:  Negative for color change and rash.   Neurological:  Negative for dizziness and light-headedness.   Hematological:  Negative for adenopathy. Does not bruise/bleed easily.     Physical Exam     Initial Vitals [12/13/22 2241]   BP Pulse Resp Temp SpO2   (!) 165/91 92 17 98.6 °F (37 °C) 100 %      MAP       --         Physical Exam    Nursing note and vitals reviewed.  Constitutional: She appears well-developed and well-nourished. She is not diaphoretic. No distress.   HENT:   Head: Normocephalic and atraumatic.   Cardiovascular:  Normal rate, regular rhythm, normal heart sounds and intact distal pulses.           Pulmonary/Chest: Breath sounds normal. No respiratory distress.   Musculoskeletal:        Hands:      Neurological: She is alert and oriented to person, place, and time.   Skin: Skin is warm and dry.       ED Course   Procedures  Labs Reviewed - No data to display       Imaging Results    None          Medications   sulfamethoxazole-trimethoprim 800-160mg per tablet 1 tablet (1 tablet  Oral Given 12/13/22 4487)     Medical Decision Making:   ED Management:  The area in question appears to be drained at this point.  I see no evidence of tenosynovitis.  We will start the patient on a course of Bactroban and Bactrim.  All findings were reviewed with the patient/family in detail.  I see no indication of an emergent process beyond that addressed during our encounter but have duly counseled the patient/family regarding the need for prompt follow-up as well as the indications that should prompt immediate return to the emergency room should new or worrisome developments occur.  The patient has additionally been provided with printed information regarding diagnosis as well as instructions regarding follow up and any medications intended to manage the patient's aforementioned conditions.  The patient/family communicates understanding of all this information and all remaining questions and concerns were addressed at this time.                                Clinical Impression:   Final diagnoses:  [L02.511] Abscess of right middle finger (Primary)        ED Disposition Condition    Discharge Stable          ED Prescriptions       Medication Sig Dispense Start Date End Date Auth. Provider    mupirocin (BACTROBAN) 2 % ointment Apply topically 3 (three) times daily. for 7 days 22 g 12/13/2022 12/21/2022 Jermain Vergara MD    sulfamethoxazole-trimethoprim 800-160mg (BACTRIM DS) 800-160 mg Tab Take 1 tablet by mouth 2 (two) times daily. for 7 days 14 tablet 12/13/2022 12/21/2022 Jermain Vergara MD          Follow-up Information       Follow up With Specialties Details Why Contact Info    Simone Pro, DO Family Medicine Schedule an appointment as soon as possible for a visit  for reassessment [address]  [address]  [facility] [address]  [phone]      [facility] - Emergency Dept Emergency Medicine Go to  As needed, If symptoms worsen [address] [facility]  [address]  [phone]             Jermain Vergara MD  12/14/22 1829

## 2022-12-14 NOTE — ED TRIAGE NOTES
Reports to ED c c/o wound infection to R middle finger x 1 week. States punctured on nail. +Puss. +tenderness. No redness or fever

## 2023-01-09 ENCOUNTER — HOSPITAL ENCOUNTER (EMERGENCY)
Facility: HOSPITAL | Age: 38
Discharge: HOME | End: 2023-01-09
Attending: EMERGENCY MEDICINE
Payer: COMMERCIAL

## 2023-01-09 VITALS
WEIGHT: 275 LBS | DIASTOLIC BLOOD PRESSURE: 82 MMHG | RESPIRATION RATE: 10 BRPM | BODY MASS INDEX: 43.07 KG/M2 | SYSTOLIC BLOOD PRESSURE: 131 MMHG | TEMPERATURE: 100 F | OXYGEN SATURATION: 99 % | HEART RATE: 98 BPM

## 2023-01-09 DIAGNOSIS — U07.1 COVID-19: Primary | ICD-10-CM

## 2023-01-09 DIAGNOSIS — R07.9 CHEST PAIN: ICD-10-CM

## 2023-01-09 LAB
ALBUMIN SERPL BCP-MCNC: 4.5 G/DL (ref 3.5–5.2)
ALP SERPL-CCNC: 72 U/L (ref 38–126)
ALT SERPL W/O P-5'-P-CCNC: 18 U/L (ref 10–44)
ANION GAP SERPL CALC-SCNC: 7 MMOL/L (ref 8–16)
AST SERPL-CCNC: 14 U/L (ref 15–46)
BASOPHILS # BLD AUTO: 0.03 K/UL (ref 0–0.2)
BASOPHILS NFR BLD: 0.4 % (ref 0–1.9)
BILIRUB SERPL-MCNC: 0.4 MG/DL (ref 0.1–1)
CALCIUM SERPL-MCNC: 9.3 MG/DL (ref 8.7–10.5)
CHLORIDE SERPL-SCNC: 102 MMOL/L (ref 95–110)
CO2 SERPL-SCNC: 30 MMOL/L (ref 23–29)
CREAT SERPL-MCNC: 1.1 MG/DL (ref 0.5–1.4)
DIFFERENTIAL METHOD: ABNORMAL
EOSINOPHIL # BLD AUTO: 0.1 K/UL (ref 0–0.5)
EOSINOPHIL NFR BLD: 1.2 % (ref 0–8)
ERYTHROCYTE [DISTWIDTH] IN BLOOD BY AUTOMATED COUNT: 15.3 % (ref 11.5–14.5)
EST. GFR  (NO RACE VARIABLE): >60 ML/MIN/1.73 M^2
GLUCOSE SERPL-MCNC: 109 MG/DL (ref 70–110)
HCT VFR BLD AUTO: 41.7 % (ref 37–48.5)
HGB BLD-MCNC: 13 G/DL (ref 12–16)
IMM GRANULOCYTES # BLD AUTO: 0.01 K/UL (ref 0–0.04)
IMM GRANULOCYTES NFR BLD AUTO: 0.1 % (ref 0–0.5)
INFLUENZA A, MOLECULAR: NEGATIVE
INFLUENZA B, MOLECULAR: NEGATIVE
LYMPHOCYTES # BLD AUTO: 1.7 K/UL (ref 1–4.8)
LYMPHOCYTES NFR BLD: 21.8 % (ref 18–48)
MCH RBC QN AUTO: 25.2 PG (ref 27–31)
MCHC RBC AUTO-ENTMCNC: 31.2 G/DL (ref 32–36)
MCV RBC AUTO: 81 FL (ref 82–98)
MONOCYTES # BLD AUTO: 0.6 K/UL (ref 0.3–1)
MONOCYTES NFR BLD: 7.5 % (ref 4–15)
NEUTROPHILS # BLD AUTO: 5.3 K/UL (ref 1.8–7.7)
NEUTROPHILS NFR BLD: 69 % (ref 38–73)
NRBC BLD-RTO: 0 /100 WBC
NT-PROBNP SERPL-MCNC: 140 PG/ML (ref 5–450)
PLATELET # BLD AUTO: 401 K/UL (ref 150–450)
PMV BLD AUTO: 9.4 FL (ref 9.2–12.9)
POTASSIUM SERPL-SCNC: 4.2 MMOL/L (ref 3.5–5.1)
PROT SERPL-MCNC: 8.2 G/DL (ref 6–8.4)
RBC # BLD AUTO: 5.15 M/UL (ref 4–5.4)
SARS-COV-2 RDRP RESP QL NAA+PROBE: POSITIVE
SODIUM SERPL-SCNC: 139 MMOL/L (ref 136–145)
SPECIMEN SOURCE: NORMAL
TROPONIN I SERPL-MCNC: <0.012 NG/ML (ref 0.01–0.03)
UUN UR-MCNC: 18 MG/DL (ref 7–17)
WBC # BLD AUTO: 7.71 K/UL (ref 3.9–12.7)

## 2023-01-09 PROCEDURE — 87502 INFLUENZA DNA AMP PROBE: CPT | Mod: ER

## 2023-01-09 PROCEDURE — 99285 EMERGENCY DEPT VISIT HI MDM: CPT | Mod: 25,ER

## 2023-01-09 PROCEDURE — 87502 INFLUENZA DNA AMP PROBE: CPT | Mod: ER | Performed by: EMERGENCY MEDICINE

## 2023-01-09 PROCEDURE — U0002 COVID-19 LAB TEST NON-CDC: HCPCS | Mod: ER | Performed by: EMERGENCY MEDICINE

## 2023-01-09 PROCEDURE — 80053 COMPREHEN METABOLIC PANEL: CPT | Mod: ER | Performed by: EMERGENCY MEDICINE

## 2023-01-09 PROCEDURE — 83880 ASSAY OF NATRIURETIC PEPTIDE: CPT | Mod: ER | Performed by: EMERGENCY MEDICINE

## 2023-01-09 PROCEDURE — 25000003 PHARM REV CODE 250: Mod: ER | Performed by: EMERGENCY MEDICINE

## 2023-01-09 PROCEDURE — 85025 COMPLETE CBC W/AUTO DIFF WBC: CPT | Mod: ER | Performed by: EMERGENCY MEDICINE

## 2023-01-09 PROCEDURE — 94760 N-INVAS EAR/PLS OXIMETRY 1: CPT | Mod: ER

## 2023-01-09 PROCEDURE — 84484 ASSAY OF TROPONIN QUANT: CPT | Mod: ER | Performed by: EMERGENCY MEDICINE

## 2023-01-09 RX ORDER — BENZONATATE 200 MG/1
200 CAPSULE ORAL 3 TIMES DAILY PRN
Qty: 20 CAPSULE | Refills: 0 | Status: SHIPPED | OUTPATIENT
Start: 2023-01-09 | End: 2023-01-19

## 2023-01-09 RX ORDER — BENZONATATE 100 MG/1
200 CAPSULE ORAL
Status: COMPLETED | OUTPATIENT
Start: 2023-01-09 | End: 2023-01-09

## 2023-01-09 RX ADMIN — BENZONATATE 200 MG: 100 CAPSULE ORAL at 10:01

## 2023-01-10 DIAGNOSIS — U07.1 COVID-19 VIRUS DETECTED: ICD-10-CM

## 2023-01-10 NOTE — ED PROVIDER NOTES
"Encounter Date: 1/9/2023       History     Chief Complaint   Patient presents with    Shortness of Breath     States "last time I was coughing like this it was my CHF" Pt able to speak in complete sentences, no distress noted +cough +chest discomfort when coughing     HPI  37 y.o.   H/o CHF EF 45% co coughing x 1 day, np  No leg pain nor swelling  +pain with coughing  No hemoptysis  No uri sx, sore throat    Review of patient's allergies indicates:  No Known Allergies  Past Medical History:   Diagnosis Date    Anemia     Hypertension     Kidney stone     SVT (supraventricular tachycardia)     Uterine fibroid      Past Surgical History:   Procedure Laterality Date    ABLATION N/A 06/04/2021    Procedure: Ablation;  Surgeon: NICHELLE Zepeda MD;  Location: [facility] EP LAB;  Service: Cardiology;  Laterality: N/A;  SVT, RFA, Carto, anes, EH, 3prep    CYSTOSCOPY N/A 08/03/2021    Procedure: CYSTOSCOPY;  Surgeon: Jamarcus Ventura MD;  Location: [facility];  Service: OB/GYN;  Laterality: N/A;    CYSTOSCOPY W/ RETROGRADES Bilateral 11/30/2022    Procedure: CYSTOSCOPY, WITH RETROGRADE PYELOGRAM;  Surgeon: Yanira Alexander MD;  Location: [facility] OR;  Service: Urology;  Laterality: Bilateral;    CYSTOURETEROSCOPY WITH RETROGRADE PYELOGRAPHY AND INSERTION OF STENT INTO URETER Bilateral 11/6/2022    Procedure: CYSTOURETEROSCOPY, WITH BILATERAL RETROGRADE PYELOGRAM AND URETERAL STENT INSERTION;  Surgeon: Yanira Alexander MD;  Location: [facility];  Service: Urology;  Laterality: Bilateral;    FLUOROSCOPY  11/6/2022    Procedure: FLUOROSCOPY;  Surgeon: Yanira Alexander MD;  Location: [facility] OR;  Service: Urology;;    HYSTERECTOMY      ROBOT-ASSISTED LAPAROSCOPIC HYSTERECTOMY N/A 08/03/2021    Procedure: ROBOTIC HYSTERECTOMY;  Surgeon: Jamarcus Ventura MD;  Location: [facility];  Service: OB/GYN;  Laterality: N/A;    ROBOT-ASSISTED SALPINGECTOMY Bilateral 08/03/2021    Procedure: ROBOTIC SALPINGECTOMY;  Surgeon: Jamarcus Ventura MD;  Location: [facility] OR;  Service: OB/GYN;  Laterality: Bilateral;    URETEROSCOPY Left 11/6/2022    Procedure: URETEROSCOPY;  Surgeon: Yanira Alexander MD;  Location: [facility];  Service: Urology;  Laterality: Left;     History reviewed. No pertinent family history.  Social History     Tobacco Use    Smoking status: Never    Smokeless tobacco: Never   Substance Use Topics    Alcohol use: No    Drug use: No     Review of Systems  All systems were reviewed/examined and were negative except as noted in the HPI.    Physical Exam     Initial Vitals [01/09/23 2124]   BP Pulse Resp Temp SpO2   131/82 99 20 100 °F (37.8 °C) 99 %      MAP       --         Physical Exam    General: the patient is awake, alert, and in no apparent distress.  Head: normocephalic and atraumatic, sclera are clear  Neck: supple without meningismus  Chest: clear to auscultation bilaterally, no respiratory distress  Heart: regular rate and rhythm  ABD soft, nontender, nondistended, no peritoneal signs  Extremities: warm and well perfused    No calf t no edema  Skin: warm and dry  Psych conversant  Neuro: awake, alert, moving all extremities    ED Course   Procedures  Labs Reviewed   CBC W/ AUTO DIFFERENTIAL - Abnormal; Notable for the following components:       Result Value    MCV 81 (*)     MCH 25.2 (*)     MCHC 31.2 (*)     RDW 15.3 (*)     All other components within normal limits   COMPREHENSIVE METABOLIC PANEL - Abnormal; Notable for the following components:    CO2 30 (*)     BUN 18 (*)     AST 14 (*)     Anion Gap 7 (*)     All other components within normal limits   SARS-COV-2 RNA AMPLIFICATION, QUAL - Abnormal; Notable for the following components:    SARS-CoV-2 RNA, Amplification, Qual Positive (*)     All other components within normal limits    Narrative:     Is the patient symptomatic?->Yes  Covid result(s) called and verbal readback obtained from JANN Lucas RN   by SULEIMAN 01/09/2023 22:31   INFLUENZA A & B BY MOLECULAR   TROPONIN I   NT-PRO NATRIURETIC  PEPTIDE          Imaging Results              X-Ray Chest AP Portable (Final result)  Result time 01/09/23 22:13:19      Final result by Regulo Vale MD (01/09/23 22:13:19)                   Impression:      No acute abnormality.      Electronically signed by: Regulo Vale  Date:    01/09/2023  Time:    22:13               Narrative:    EXAMINATION:  XR CHEST AP PORTABLE    CLINICAL HISTORY:  Chest Pain;    TECHNIQUE:  Single frontal view of the chest was performed.    COMPARISON:  Multiple priors.    FINDINGS:  The lungs are clear, with normal appearance of pulmonary vasculature and no pleural effusion or pneumothorax.    The cardiac silhouette is normal in size. The hilar and mediastinal contours are unremarkable.    Bones are intact.                                       Medications   benzonatate capsule 200 mg (200 mg Oral Given 1/9/23 4991)        Medical Decision Making:    This is an emergent evaluation of a patient presenting to the ED.  Nursing notes were reviewed.  I personally reviewed, read, and interpreted the ECG and any monitoring strips.  ECG: normal sinus rhythm, no critical findings with intervals, normal rate, and no ischemia.  Compared with prior if available.  Read and interpreted by me independently.      I reviewed radiology images personally along with interpretations.  Imaging reviewed by me, personally and independently, and prelims if available.  No acute/emergent findings noted on radiologic studies ordered.    I personally reviewed and interpreted the laboratory results.  +covid  I decided to obtain and review old medical records, which showed: h/o CHF    No clinical, radiographic, lab findings c/w CHF    Evaluation for Emergency Medical Condition  The patient received a medical screening exam and within a reasonable degree of clinical confidence an emergency medical condition has not been identified.  The patient is instructed on proper follow up and return precautions to the  ED.    The patient was encouraged strongly to get the COVID-19 vaccine either after asymptomatic (if COVID positive) or offered it here in the ED is COVID negative.      Twan Ruiz MD, ADRIANA                          Clinical Impression:   Final diagnoses:  [R07.9] Chest pain  [U07.1] COVID-19 (Primary)        ED Disposition Condition    Discharge Stable          ED Prescriptions       Medication Sig Dispense Start Date End Date Auth. Provider    benzonatate (TESSALON) 200 MG capsule Take 1 capsule (200 mg total) by mouth 3 (three) times daily as needed for Cough. 20 capsule 1/9/2023 1/19/2023 Riley Ruiz MD    nirmatrelvir-ritonavir 300 mg (150 mg x 2)-100 mg copackaged tablets (EUA) Take 3 tablets by mouth 2 (two) times daily for 5 days. Each dose contains 2 nirmatrelvir (pink tablets) and 1 ritonavir (white tablet). Take all 3 tablets together 30 tablet 1/9/2023 1/14/2023 Riley Ruiz MD          Follow-up Information       Follow up With Specialties Details Why Contact Info    Simone Pro,  Family Medicine Schedule an appointment as soon as possible for a visit   [address]  [address]  [place] [place] [address] [phone]            Discharged to home in stable condition, return to ED warnings given, follow up and patient care instructions given.      Twan Ruiz MD, ADRIANA, [facility]  Department of Emergency Medicine       Riley Ruiz MD  01/10/23 0421

## 2023-04-27 PROBLEM — I50.33 ACUTE ON CHRONIC DIASTOLIC CONGESTIVE HEART FAILURE: Status: ACTIVE | Noted: 2023-04-27

## 2023-06-11 RX ORDER — METOPROLOL SUCCINATE 200 MG/1
200 TABLET, EXTENDED RELEASE ORAL DAILY
Qty: 30 TABLET | Refills: 11 | Status: CANCELLED | OUTPATIENT
Start: 2023-06-11 | End: 2024-06-10

## 2023-06-11 RX ORDER — SPIRONOLACTONE 25 MG/1
25 TABLET ORAL DAILY
Qty: 30 TABLET | Refills: 11 | Status: CANCELLED | OUTPATIENT
Start: 2023-06-11 | End: 2024-06-10

## 2023-06-13 ENCOUNTER — DOCUMENTATION ONLY (OUTPATIENT)
Dept: TRANSPLANT | Facility: CLINIC | Age: 38
End: 2023-06-13
Payer: COMMERCIAL

## 2023-06-13 ENCOUNTER — LAB VISIT (OUTPATIENT)
Dept: LAB | Facility: HOSPITAL | Age: 38
End: 2023-06-13
Attending: INTERNAL MEDICINE
Payer: COMMERCIAL

## 2023-06-13 ENCOUNTER — OFFICE VISIT (OUTPATIENT)
Dept: TRANSPLANT | Facility: CLINIC | Age: 38
End: 2023-06-13
Payer: COMMERCIAL

## 2023-06-13 VITALS
SYSTOLIC BLOOD PRESSURE: 146 MMHG | HEIGHT: 67 IN | BODY MASS INDEX: 44.63 KG/M2 | HEART RATE: 99 BPM | DIASTOLIC BLOOD PRESSURE: 80 MMHG | WEIGHT: 284.38 LBS

## 2023-06-13 DIAGNOSIS — I50.22 CHRONIC SYSTOLIC CONGESTIVE HEART FAILURE: ICD-10-CM

## 2023-06-13 DIAGNOSIS — I50.22 CHRONIC SYSTOLIC HEART FAILURE: ICD-10-CM

## 2023-06-13 DIAGNOSIS — I10 PRIMARY HYPERTENSION: ICD-10-CM

## 2023-06-13 DIAGNOSIS — I42.8 NONISCHEMIC CARDIOMYOPATHY: ICD-10-CM

## 2023-06-13 DIAGNOSIS — I50.22 CHRONIC SYSTOLIC CONGESTIVE HEART FAILURE: Primary | ICD-10-CM

## 2023-06-13 DIAGNOSIS — Z98.890 H/O CARDIAC RADIOFREQUENCY ABLATION: ICD-10-CM

## 2023-06-13 PROBLEM — I50.33 ACUTE ON CHRONIC DIASTOLIC CONGESTIVE HEART FAILURE: Status: RESOLVED | Noted: 2023-04-27 | Resolved: 2023-06-13

## 2023-06-13 PROBLEM — I47.10 SVT (SUPRAVENTRICULAR TACHYCARDIA): Status: RESOLVED | Noted: 2022-02-27 | Resolved: 2023-06-13

## 2023-06-13 LAB
ALBUMIN SERPL BCP-MCNC: 3.6 G/DL (ref 3.5–5.2)
ALP SERPL-CCNC: 90 U/L (ref 55–135)
ALT SERPL W/O P-5'-P-CCNC: 12 U/L (ref 10–44)
ANION GAP SERPL CALC-SCNC: 8 MMOL/L (ref 8–16)
AST SERPL-CCNC: 9 U/L (ref 10–40)
BASOPHILS # BLD AUTO: 0.04 K/UL (ref 0–0.2)
BASOPHILS NFR BLD: 0.5 % (ref 0–1.9)
BILIRUB SERPL-MCNC: 0.4 MG/DL (ref 0.1–1)
BUN SERPL-MCNC: 10 MG/DL (ref 6–20)
CALCIUM SERPL-MCNC: 9.1 MG/DL (ref 8.7–10.5)
CHLORIDE SERPL-SCNC: 105 MMOL/L (ref 95–110)
CO2 SERPL-SCNC: 24 MMOL/L (ref 23–29)
CREAT SERPL-MCNC: 0.9 MG/DL (ref 0.5–1.4)
DIFFERENTIAL METHOD: ABNORMAL
EOSINOPHIL # BLD AUTO: 0.1 K/UL (ref 0–0.5)
EOSINOPHIL NFR BLD: 1.6 % (ref 0–8)
ERYTHROCYTE [DISTWIDTH] IN BLOOD BY AUTOMATED COUNT: 15.6 % (ref 11.5–14.5)
EST. GFR  (NO RACE VARIABLE): >60 ML/MIN/1.73 M^2
ESTIMATED AVG GLUCOSE: 140 MG/DL (ref 68–131)
GLUCOSE SERPL-MCNC: 92 MG/DL (ref 70–110)
HBA1C MFR BLD: 6.5 % (ref 4–5.6)
HCT VFR BLD AUTO: 41.9 % (ref 37–48.5)
HGB BLD-MCNC: 13 G/DL (ref 12–16)
IMM GRANULOCYTES # BLD AUTO: 0.01 K/UL (ref 0–0.04)
IMM GRANULOCYTES NFR BLD AUTO: 0.1 % (ref 0–0.5)
LYMPHOCYTES # BLD AUTO: 2.7 K/UL (ref 1–4.8)
LYMPHOCYTES NFR BLD: 32.8 % (ref 18–48)
MCH RBC QN AUTO: 24.6 PG (ref 27–31)
MCHC RBC AUTO-ENTMCNC: 31 G/DL (ref 32–36)
MCV RBC AUTO: 79 FL (ref 82–98)
MONOCYTES # BLD AUTO: 0.6 K/UL (ref 0.3–1)
MONOCYTES NFR BLD: 6.8 % (ref 4–15)
NEUTROPHILS # BLD AUTO: 4.8 K/UL (ref 1.8–7.7)
NEUTROPHILS NFR BLD: 58.2 % (ref 38–73)
NRBC BLD-RTO: 0 /100 WBC
PLATELET # BLD AUTO: 383 K/UL (ref 150–450)
PMV BLD AUTO: 9.9 FL (ref 9.2–12.9)
POTASSIUM SERPL-SCNC: 4 MMOL/L (ref 3.5–5.1)
PROT SERPL-MCNC: 7.4 G/DL (ref 6–8.4)
RBC # BLD AUTO: 5.28 M/UL (ref 4–5.4)
SODIUM SERPL-SCNC: 137 MMOL/L (ref 136–145)
WBC # BLD AUTO: 8.22 K/UL (ref 3.9–12.7)

## 2023-06-13 PROCEDURE — 99214 PR OFFICE/OUTPT VISIT, EST, LEVL IV, 30-39 MIN: ICD-10-PCS | Mod: S$GLB,,, | Performed by: INTERNAL MEDICINE

## 2023-06-13 PROCEDURE — 4010F ACE/ARB THERAPY RXD/TAKEN: CPT | Mod: CPTII,S$GLB,, | Performed by: INTERNAL MEDICINE

## 2023-06-13 PROCEDURE — 4010F PR ACE/ARB THEARPY RXD/TAKEN: ICD-10-PCS | Mod: CPTII,S$GLB,, | Performed by: INTERNAL MEDICINE

## 2023-06-13 PROCEDURE — 80053 COMPREHEN METABOLIC PANEL: CPT | Performed by: INTERNAL MEDICINE

## 2023-06-13 PROCEDURE — 99999 PR PBB SHADOW E&M-EST. PATIENT-LVL IV: CPT | Mod: PBBFAC,,, | Performed by: INTERNAL MEDICINE

## 2023-06-13 PROCEDURE — 3044F PR MOST RECENT HEMOGLOBIN A1C LEVEL <7.0%: ICD-10-PCS | Mod: CPTII,S$GLB,, | Performed by: INTERNAL MEDICINE

## 2023-06-13 PROCEDURE — 3008F PR BODY MASS INDEX (BMI) DOCUMENTED: ICD-10-PCS | Mod: CPTII,S$GLB,, | Performed by: INTERNAL MEDICINE

## 2023-06-13 PROCEDURE — 85025 COMPLETE CBC W/AUTO DIFF WBC: CPT | Performed by: INTERNAL MEDICINE

## 2023-06-13 PROCEDURE — 3008F BODY MASS INDEX DOCD: CPT | Mod: CPTII,S$GLB,, | Performed by: INTERNAL MEDICINE

## 2023-06-13 PROCEDURE — 1159F MED LIST DOCD IN RCRD: CPT | Mod: CPTII,S$GLB,, | Performed by: INTERNAL MEDICINE

## 2023-06-13 PROCEDURE — 99999 PR PBB SHADOW E&M-EST. PATIENT-LVL IV: ICD-10-PCS | Mod: PBBFAC,,, | Performed by: INTERNAL MEDICINE

## 2023-06-13 PROCEDURE — 3077F SYST BP >= 140 MM HG: CPT | Mod: CPTII,S$GLB,, | Performed by: INTERNAL MEDICINE

## 2023-06-13 PROCEDURE — 3079F PR MOST RECENT DIASTOLIC BLOOD PRESSURE 80-89 MM HG: ICD-10-PCS | Mod: CPTII,S$GLB,, | Performed by: INTERNAL MEDICINE

## 2023-06-13 PROCEDURE — 83036 HEMOGLOBIN GLYCOSYLATED A1C: CPT | Performed by: INTERNAL MEDICINE

## 2023-06-13 PROCEDURE — 3044F HG A1C LEVEL LT 7.0%: CPT | Mod: CPTII,S$GLB,, | Performed by: INTERNAL MEDICINE

## 2023-06-13 PROCEDURE — 99214 OFFICE O/P EST MOD 30 MIN: CPT | Mod: S$GLB,,, | Performed by: INTERNAL MEDICINE

## 2023-06-13 PROCEDURE — 3079F DIAST BP 80-89 MM HG: CPT | Mod: CPTII,S$GLB,, | Performed by: INTERNAL MEDICINE

## 2023-06-13 PROCEDURE — 3077F PR MOST RECENT SYSTOLIC BLOOD PRESSURE >= 140 MM HG: ICD-10-PCS | Mod: CPTII,S$GLB,, | Performed by: INTERNAL MEDICINE

## 2023-06-13 PROCEDURE — 83880 ASSAY OF NATRIURETIC PEPTIDE: CPT | Performed by: INTERNAL MEDICINE

## 2023-06-13 PROCEDURE — 1159F PR MEDICATION LIST DOCUMENTED IN MEDICAL RECORD: ICD-10-PCS | Mod: CPTII,S$GLB,, | Performed by: INTERNAL MEDICINE

## 2023-06-13 RX ORDER — METOPROLOL SUCCINATE 200 MG/1
200 TABLET, EXTENDED RELEASE ORAL DAILY
Qty: 30 TABLET | Refills: 11 | Status: CANCELLED | OUTPATIENT
Start: 2023-06-13 | End: 2024-06-12

## 2023-06-13 RX ORDER — SPIRONOLACTONE 25 MG/1
25 TABLET ORAL DAILY
Qty: 30 TABLET | Refills: 11 | Status: SHIPPED | OUTPATIENT
Start: 2023-06-13 | End: 2023-10-27

## 2023-06-13 RX ORDER — SPIRONOLACTONE 25 MG/1
25 TABLET ORAL DAILY
Qty: 30 TABLET | Refills: 11 | Status: CANCELLED | OUTPATIENT
Start: 2023-06-13 | End: 2024-06-12

## 2023-06-13 RX ORDER — TORSEMIDE 20 MG/1
20 TABLET ORAL EVERY OTHER DAY
Qty: 15 TABLET | Refills: 11 | Status: SHIPPED | OUTPATIENT
Start: 2023-06-13 | End: 2023-10-27 | Stop reason: SDUPTHER

## 2023-06-13 RX ORDER — METOPROLOL SUCCINATE 200 MG/1
200 TABLET, EXTENDED RELEASE ORAL DAILY
Qty: 30 TABLET | Refills: 11 | Status: SHIPPED | OUTPATIENT
Start: 2023-06-13 | End: 2023-09-25 | Stop reason: SDUPTHER

## 2023-06-13 NOTE — PROGRESS NOTES
Met with pt due to her request from the lobby. Pt states she hasn't had a working kitchen due to redoing her house and has ate out for every meal from the last couple months. Pt states she is ready to get back to walking everyday and cooking. Provided support to pt and stated whatever she needed that I am available to her

## 2023-06-13 NOTE — PATIENT INSTRUCTIONS
You have just the right amount of fluid on you.  Please adhere to a low sodium diet (no more than 1.5 grams of sodium in 24h).  3.   Follow fluid restriction of  1. no more than 2 liters in 24 hours..  4. No changes on medications today.  5. You have been referred to Bariatric Medicine.  6. F/u in 6 months with labs.
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
## 2023-06-14 ENCOUNTER — TELEPHONE (OUTPATIENT)
Dept: BARIATRICS | Facility: CLINIC | Age: 38
End: 2023-06-14
Payer: COMMERCIAL

## 2023-06-15 LAB — NT-PROBNP SERPL IA-MCNC: 311 PG/ML

## 2023-06-15 RX ORDER — DAPAGLIFLOZIN 10 MG/1
10 TABLET, FILM COATED ORAL DAILY
Qty: 30 TABLET | Refills: 11 | Status: SHIPPED | OUTPATIENT
Start: 2023-06-15 | End: 2023-10-27 | Stop reason: SDUPTHER

## 2023-06-19 ENCOUNTER — TELEPHONE (OUTPATIENT)
Dept: BARIATRICS | Facility: CLINIC | Age: 38
End: 2023-06-19
Payer: COMMERCIAL

## 2023-09-25 ENCOUNTER — OFFICE VISIT (OUTPATIENT)
Dept: FAMILY MEDICINE | Facility: HOSPITAL | Age: 38
End: 2023-09-25
Payer: COMMERCIAL

## 2023-09-25 VITALS
HEIGHT: 67 IN | SYSTOLIC BLOOD PRESSURE: 165 MMHG | WEIGHT: 293 LBS | DIASTOLIC BLOOD PRESSURE: 100 MMHG | BODY MASS INDEX: 45.99 KG/M2 | HEART RATE: 97 BPM

## 2023-09-25 DIAGNOSIS — E66.01 CLASS 3 SEVERE OBESITY WITH SERIOUS COMORBIDITY AND BODY MASS INDEX (BMI) OF 50.0 TO 59.9 IN ADULT, UNSPECIFIED OBESITY TYPE: ICD-10-CM

## 2023-09-25 DIAGNOSIS — I50.22 CHRONIC SYSTOLIC HEART FAILURE: Primary | ICD-10-CM

## 2023-09-25 DIAGNOSIS — E11.9 TYPE 2 DIABETES MELLITUS WITHOUT COMPLICATION, WITHOUT LONG-TERM CURRENT USE OF INSULIN: ICD-10-CM

## 2023-09-25 DIAGNOSIS — Z12.31 ENCOUNTER FOR SCREENING MAMMOGRAM FOR MALIGNANT NEOPLASM OF BREAST: ICD-10-CM

## 2023-09-25 PROCEDURE — 99215 OFFICE O/P EST HI 40 MIN: CPT | Performed by: STUDENT IN AN ORGANIZED HEALTH CARE EDUCATION/TRAINING PROGRAM

## 2023-09-25 RX ORDER — METOPROLOL SUCCINATE 200 MG/1
200 TABLET, EXTENDED RELEASE ORAL DAILY
Qty: 30 TABLET | Refills: 11 | Status: SHIPPED | OUTPATIENT
Start: 2023-09-25 | End: 2024-09-24

## 2023-09-25 RX ORDER — SEMAGLUTIDE 0.68 MG/ML
INJECTION, SOLUTION SUBCUTANEOUS
Qty: 10.5 ML | Refills: 0 | Status: SHIPPED | OUTPATIENT
Start: 2023-09-25 | End: 2023-10-23 | Stop reason: SDUPTHER

## 2023-09-25 RX ORDER — ATORVASTATIN CALCIUM 20 MG/1
20 TABLET, FILM COATED ORAL DAILY
Qty: 90 TABLET | Refills: 3 | Status: SHIPPED | OUTPATIENT
Start: 2023-09-25 | End: 2023-10-27 | Stop reason: SDUPTHER

## 2023-09-25 NOTE — PATIENT INSTRUCTIONS
INDIVIDUALIZED HYPERTENSION COMPREHENSIVE PLAN      LIFESTYLE MODIFICATION      A) DIET  EAT LESS SALT  Salt is known as sodium chloride. It is measured in grams (g) or milligrams (mg).  RESTRICT salt with consuming less than 2300 mg (2.3 g) of sodium per day  LIMIT/AVOID high salt foods such as:  canned soups, TV dinners, deli meats, hot dogs, salted potato chips, pickles, and olives  condiments: soy sauce, ketchup, mustard, mayonnaise    DO NOT ADD salt to food at your table. Use black pepper as an alternative seasoning instead of salt.    DASH DIET (Dietary Approaches to Stop Hypertension)  MEDITERRANEAN-STYLE DIET  These diets emphasize intake of fruits and vegetables, whole grains, legumes, low-fat dairy, nuts, use of olive oil (Mediterranean diet specifically), limiting red meat consumption (few times/month), and eating fish and poultry at least twice/week.    DRINK LESS ALCOHOL  In individuals who drink alcohol, reduce alcohol to:  Men: ?2 drinks daily  Women: ?1 drink daily.      B) EXERCISE  Cardiovascular exercise, at least 150 minutes of moderate-intensity physical therapy per week which corresponds to 30 minutes per day, five or more days a week.  Brisk Walking, Jogging, Bicycling, Swimming, Gardening, Pushing a Lawnmower.  If significant limitations related to chronic pain and mobility: Can try activities such as Jorge Chi or seated chair exercises.   Not enough time to exercise?  Park in a parking space far away from the entrance of a store and take the stairs instead of the elevator      CURRENT BLOOD PRESSURE MEDICATIONS    Entresto  Metoprolol   Spironolactone   Torsemide       NEXT CLINIC FOLLOW-UP    2-4 weeks      GOALS    Blood Pressure Goal: < 130/80  Today's In-Office BP: 165/100      HOME BLOOD PRESSURE READINGS    DATE  (Month/Date/Year)  Example: 01/01/2023 Morning Blood Pressure Reading  (Systolic/Diastolic)  Example: 154/92 Evening Blood Pressure Reading  (Systolic/Diastolic)  Example:  135/87

## 2023-09-25 NOTE — PROGRESS NOTES
Progress Note  [facility]    Subjective:      Patient ID: Ursula Smallwood is a 38 y.o. female    Chief Complaint: Annual Exam, Obesity, and Medication Refill    Ursula Smallwood is a 38-year-old female with a PMHx HFrEF, SVT s/p radiofrequency catheter ablation June 2021, HTN, diabetes, obesity presenting as she needs assistance with referral to bariatric surgery. Reports that her insurance denied her request for bariatric surgery and said that the referral needs to come from her primary care physician.    # obesity - Reports recent weight gain over the past year, current weight 318 lb and current BMI 49.83. From chart review, patient weighed 270 lb on 07/26/2022. Patient also has comorbidities of uncontrolled hypertension and heart failure. Patient has tried different diets as well as exercise.     # type 2 diabetes - unaware of diagnosis and currently on no medications. Last hemoglobin A1c 6.5 and 6.6 this past year.     #HFrEF - Patient was diagnosed with CHF March 2021. LVEF 45% (March 2021) decreased to 20% in 2022. Current medications include Entresto, metoprolol, spironolactone, torsemide, and Farxiga. Last seen by Cardiology June 2023. Reports tolerating medications currently requesting refill of metoprolol. Denies any worsening shortness of breath or lower extremity edema.     #HTN - chronic issue, with office blood pressure 165/100. Patient reports she is not recording her blood pressure at home but she is taking her medications as prescribed and following up with her cardiologist as requested.        Health Maintenance         Date Due Completion Date    Diabetes Urine Screening Never done ---    Pneumococcal Vaccines (Age 0-64) (1 - PCV) Never done ---    Foot Exam Never done ---    Eye Exam Never done ---    COVID-19 Vaccine (3 - Pfizer series) 10/07/2021 8/12/2021    Lipid Panel 04/13/2023 4/13/2022    Influenza Vaccine (1) 09/01/2023 10/26/2020    Mammogram 10/12/2023 10/12/2022    Hemoglobin A1c  12/13/2023 6/13/2023    Low Dose Statin 09/25/2024 9/25/2023    TETANUS VACCINE 04/13/2032 4/13/2022            Review of Systems   Constitutional:  Positive for activity change.   Respiratory:  Negative for shortness of breath.    Cardiovascular:  Negative for chest pain.   Gastrointestinal:  Negative for constipation, diarrhea, nausea and vomiting.        Objective:      Vitals:    09/25/23 1454   BP: (!) 165/100   Pulse: 97     BP Readings from Last 3 Encounters:   09/25/23 (!) 165/100   06/13/23 (!) 146/80   01/09/23 131/82     Body mass index is 49.83 kg/m².    Physical Exam  Vitals reviewed.   Constitutional:       Appearance: She is obese.   HENT:      Head: Normocephalic and atraumatic.   Cardiovascular:      Rate and Rhythm: Normal rate and regular rhythm.   Pulmonary:      Effort: Pulmonary effort is normal.      Breath sounds: Normal breath sounds.   Musculoskeletal:         General: Normal range of motion.      Right lower leg: No edema.      Left lower leg: No edema.   Skin:     General: Skin is warm.      Findings: No rash.   Neurological:      Mental Status: She is alert and oriented to person, place, and time.   Psychiatric:         Mood and Affect: Mood normal.         Behavior: Behavior normal.       The 10-year ASCVD risk score (Piedad DK, et al., 2019) is: 38%    Values used to calculate the score:      Age: 38 years      Sex: Female      Is Non- : Yes      Diabetic: Yes      Tobacco smoker: No      Systolic Blood Pressure: 165 mmHg      Is BP treated: Yes      HDL Cholesterol: 33 mg/dL      Total Cholesterol: 190 mg/dL     Assessment:     1. Chronic systolic heart failure    2. Class 3 severe obesity with serious comorbidity and body mass index (BMI) of 50.0 to 59.9 in adult, unspecified obesity type    3. Type 2 diabetes mellitus without complication, without long-term current use of insulin    4. Encounter for screening mammogram for malignant neoplasm of breast        Plan:     Chronic systolic heart failure  -     metoprolol succinate (TOPROL-XL) 200 MG 24 hr tablet; Take 1 tablet (200 mg total) by mouth once daily.  Dispense: 30 tablet; Refill: 11    Class 3 severe obesity with serious comorbidity and body mass index (BMI) of 50.0 to 59.9 in adult, unspecified obesity type  -     Ambulatory referral/consult to Bariatric Surgery; Future; Expected date: 10/02/2023  -     semaglutide (OZEMPIC) 0.25 mg or 0.5 mg (2 mg/3 mL) pen injector; Inject 0.25 mg into the skin every 7 days for 30 days, THEN 0.5 mg every 7 days.  Dispense: 10.5 mL; Refill: 0    Type 2 diabetes mellitus without complication, without long-term current use of insulin  -     Lipid Panel; Future; Expected date: 01/23/2024  -     atorvastatin (LIPITOR) 20 MG tablet; Take 1 tablet (20 mg total) by mouth once daily.  Dispense: 90 tablet; Refill: 3  -     semaglutide (OZEMPIC) 0.25 mg or 0.5 mg (2 mg/3 mL) pen injector; Inject 0.25 mg into the skin every 7 days for 30 days, THEN 0.5 mg every 7 days.  Dispense: 10.5 mL; Refill: 0    Encounter for screening mammogram for malignant neoplasm of breast  -     Mammo Digital Screening Bilat w/ Louis; Future; Expected date: 09/25/2023       For type 2 diabetes, recent diagnosis with 2 A1cs greater than or equal to 6.5. Will plan to start Ozempic 0.25 mg weekly as well as statin therapy atorvastatin. ASCVD risk 38%.   For hypertension, remains uncontrolled with in office blood pressure readings. Will plan to continue current medications but advise frequent at home blood pressure readings and follow-up in 2-4 weeks.  For obesity, referral placed to Bariatric surgery for evaluation  Mammogram ordered for breast cancer screening    Simone Pro DO  [facility], PGY-3  09/25/2023      This note was partially created using [employer] Voice Recognition software. Typographical and content errors may occur with this process. While efforts are made to detect and correct such  errors, in some cases errors will persist. For this reason, wording in this document should be considered in the proper context and not strictly verbatim

## 2023-09-26 ENCOUNTER — TELEPHONE (OUTPATIENT)
Dept: BARIATRICS | Facility: CLINIC | Age: 38
End: 2023-09-26
Payer: COMMERCIAL

## 2023-09-27 ENCOUNTER — TELEPHONE (OUTPATIENT)
Dept: SURGERY | Facility: CLINIC | Age: 38
End: 2023-09-27
Payer: COMMERCIAL

## 2023-10-04 ENCOUNTER — TELEPHONE (OUTPATIENT)
Dept: BARIATRICS | Facility: CLINIC | Age: 38
End: 2023-10-04
Payer: COMMERCIAL

## 2023-10-05 NOTE — PROGRESS NOTES
I assume primary medical responsibility for this patient. I have reviewed the history, physical, and assessement & treatment plan with the resident and agree that the care is reasonable and necessary. This service has been performed by a resident without the presence of a teaching physician under the primary care exception. If necessary, an addendum of additional findings or evaluation beyond the resident documentation will be noted below.      Grace Aguirre MD    [facility]

## 2023-10-23 ENCOUNTER — HOSPITAL ENCOUNTER (OUTPATIENT)
Dept: RADIOLOGY | Facility: HOSPITAL | Age: 38
Discharge: HOME | End: 2023-10-23
Attending: STUDENT IN AN ORGANIZED HEALTH CARE EDUCATION/TRAINING PROGRAM
Payer: COMMERCIAL

## 2023-10-23 DIAGNOSIS — E66.01 CLASS 3 SEVERE OBESITY WITH SERIOUS COMORBIDITY AND BODY MASS INDEX (BMI) OF 50.0 TO 59.9 IN ADULT, UNSPECIFIED OBESITY TYPE: ICD-10-CM

## 2023-10-23 DIAGNOSIS — E11.9 TYPE 2 DIABETES MELLITUS WITHOUT COMPLICATION, WITHOUT LONG-TERM CURRENT USE OF INSULIN: ICD-10-CM

## 2023-10-23 DIAGNOSIS — Z12.31 ENCOUNTER FOR SCREENING MAMMOGRAM FOR MALIGNANT NEOPLASM OF BREAST: ICD-10-CM

## 2023-10-23 PROCEDURE — 77067 MAMMO DIGITAL SCREENING BILAT WITH TOMO: ICD-10-PCS | Mod: 26,,, | Performed by: RADIOLOGY

## 2023-10-23 PROCEDURE — 77067 SCR MAMMO BI INCL CAD: CPT | Mod: TC

## 2023-10-23 PROCEDURE — 77063 MAMMO DIGITAL SCREENING BILAT WITH TOMO: ICD-10-PCS | Mod: 26,,, | Performed by: RADIOLOGY

## 2023-10-23 PROCEDURE — 77063 BREAST TOMOSYNTHESIS BI: CPT | Mod: 26,,, | Performed by: RADIOLOGY

## 2023-10-23 PROCEDURE — 77067 SCR MAMMO BI INCL CAD: CPT | Mod: 26,,, | Performed by: RADIOLOGY

## 2023-10-25 RX ORDER — SEMAGLUTIDE 0.68 MG/ML
INJECTION, SOLUTION SUBCUTANEOUS
Qty: 10.5 ML | Refills: 0 | Status: SHIPPED | OUTPATIENT
Start: 2023-10-25 | End: 2024-03-04 | Stop reason: SDUPTHER

## 2023-10-27 ENCOUNTER — LAB VISIT (OUTPATIENT)
Dept: LAB | Facility: HOSPITAL | Age: 38
End: 2023-10-27
Payer: COMMERCIAL

## 2023-10-27 ENCOUNTER — OFFICE VISIT (OUTPATIENT)
Dept: FAMILY MEDICINE | Facility: HOSPITAL | Age: 38
End: 2023-10-27
Payer: COMMERCIAL

## 2023-10-27 VITALS
SYSTOLIC BLOOD PRESSURE: 134 MMHG | WEIGHT: 293 LBS | HEIGHT: 67 IN | BODY MASS INDEX: 45.99 KG/M2 | HEART RATE: 82 BPM | DIASTOLIC BLOOD PRESSURE: 94 MMHG

## 2023-10-27 DIAGNOSIS — E66.01 CLASS 3 SEVERE OBESITY WITH SERIOUS COMORBIDITY AND BODY MASS INDEX (BMI) OF 50.0 TO 59.9 IN ADULT, UNSPECIFIED OBESITY TYPE: ICD-10-CM

## 2023-10-27 DIAGNOSIS — Z23 NEED FOR PNEUMOCOCCAL VACCINATION: ICD-10-CM

## 2023-10-27 DIAGNOSIS — I50.22 CHRONIC SYSTOLIC CONGESTIVE HEART FAILURE: ICD-10-CM

## 2023-10-27 DIAGNOSIS — I10 UNCONTROLLED HYPERTENSION: ICD-10-CM

## 2023-10-27 DIAGNOSIS — I10 UNCONTROLLED HYPERTENSION: Primary | ICD-10-CM

## 2023-10-27 DIAGNOSIS — Z23 NEED FOR INFLUENZA VACCINATION: ICD-10-CM

## 2023-10-27 DIAGNOSIS — E11.9 TYPE 2 DIABETES MELLITUS WITHOUT COMPLICATION, WITHOUT LONG-TERM CURRENT USE OF INSULIN: ICD-10-CM

## 2023-10-27 DIAGNOSIS — I50.20 HFREF (HEART FAILURE WITH REDUCED EJECTION FRACTION): ICD-10-CM

## 2023-10-27 LAB
ALBUMIN SERPL BCP-MCNC: 3.7 G/DL (ref 3.5–5.2)
ALBUMIN/CREAT UR: 16.4 UG/MG (ref 0–30)
ALP SERPL-CCNC: 77 U/L (ref 55–135)
ALT SERPL W/O P-5'-P-CCNC: 11 U/L (ref 10–44)
ANION GAP SERPL CALC-SCNC: 7 MMOL/L (ref 8–16)
AST SERPL-CCNC: 9 U/L (ref 10–40)
BASOPHILS # BLD AUTO: 0.04 K/UL (ref 0–0.2)
BASOPHILS NFR BLD: 0.5 % (ref 0–1.9)
BILIRUB SERPL-MCNC: 0.6 MG/DL (ref 0.1–1)
BUN SERPL-MCNC: 8 MG/DL (ref 6–20)
CALCIUM SERPL-MCNC: 9.5 MG/DL (ref 8.7–10.5)
CHLORIDE SERPL-SCNC: 105 MMOL/L (ref 95–110)
CHOLEST SERPL-MCNC: 131 MG/DL (ref 120–199)
CHOLEST/HDLC SERPL: 4.1 {RATIO} (ref 2–5)
CO2 SERPL-SCNC: 25 MMOL/L (ref 23–29)
CREAT SERPL-MCNC: 1 MG/DL (ref 0.5–1.4)
CREAT UR-MCNC: 128 MG/DL (ref 15–325)
DIFFERENTIAL METHOD: ABNORMAL
EOSINOPHIL # BLD AUTO: 0.2 K/UL (ref 0–0.5)
EOSINOPHIL NFR BLD: 1.7 % (ref 0–8)
ERYTHROCYTE [DISTWIDTH] IN BLOOD BY AUTOMATED COUNT: 14.9 % (ref 11.5–14.5)
EST. GFR  (NO RACE VARIABLE): >60 ML/MIN/1.73 M^2
GLUCOSE SERPL-MCNC: 93 MG/DL (ref 70–110)
HCT VFR BLD AUTO: 45.1 % (ref 37–48.5)
HDLC SERPL-MCNC: 32 MG/DL (ref 40–75)
HDLC SERPL: 24.4 % (ref 20–50)
HGB BLD-MCNC: 13.2 G/DL (ref 12–16)
IMM GRANULOCYTES # BLD AUTO: 0.02 K/UL (ref 0–0.04)
IMM GRANULOCYTES NFR BLD AUTO: 0.2 % (ref 0–0.5)
LDLC SERPL CALC-MCNC: 82 MG/DL (ref 63–159)
LYMPHOCYTES # BLD AUTO: 3.1 K/UL (ref 1–4.8)
LYMPHOCYTES NFR BLD: 34.9 % (ref 18–48)
MCH RBC QN AUTO: 24.3 PG (ref 27–31)
MCHC RBC AUTO-ENTMCNC: 29.3 G/DL (ref 32–36)
MCV RBC AUTO: 83 FL (ref 82–98)
MICROALBUMIN UR DL<=1MG/L-MCNC: 21 UG/ML
MONOCYTES # BLD AUTO: 0.7 K/UL (ref 0.3–1)
MONOCYTES NFR BLD: 8.1 % (ref 4–15)
NEUTROPHILS # BLD AUTO: 4.8 K/UL (ref 1.8–7.7)
NEUTROPHILS NFR BLD: 54.6 % (ref 38–73)
NONHDLC SERPL-MCNC: 99 MG/DL
NRBC BLD-RTO: 0 /100 WBC
PLATELET # BLD AUTO: 457 K/UL (ref 150–450)
PMV BLD AUTO: 10.2 FL (ref 9.2–12.9)
POTASSIUM SERPL-SCNC: 4.7 MMOL/L (ref 3.5–5.1)
PROT SERPL-MCNC: 7.6 G/DL (ref 6–8.4)
RBC # BLD AUTO: 5.43 M/UL (ref 4–5.4)
SODIUM SERPL-SCNC: 137 MMOL/L (ref 136–145)
TRIGL SERPL-MCNC: 85 MG/DL (ref 30–150)
TSH SERPL DL<=0.005 MIU/L-ACNC: 1.49 UIU/ML (ref 0.4–4)
WBC # BLD AUTO: 8.8 K/UL (ref 3.9–12.7)

## 2023-10-27 PROCEDURE — 82043 UR ALBUMIN QUANTITATIVE: CPT | Performed by: STUDENT IN AN ORGANIZED HEALTH CARE EDUCATION/TRAINING PROGRAM

## 2023-10-27 PROCEDURE — 85025 COMPLETE CBC W/AUTO DIFF WBC: CPT | Performed by: STUDENT IN AN ORGANIZED HEALTH CARE EDUCATION/TRAINING PROGRAM

## 2023-10-27 PROCEDURE — 80061 LIPID PANEL: CPT | Performed by: STUDENT IN AN ORGANIZED HEALTH CARE EDUCATION/TRAINING PROGRAM

## 2023-10-27 PROCEDURE — 80053 COMPREHEN METABOLIC PANEL: CPT | Performed by: STUDENT IN AN ORGANIZED HEALTH CARE EDUCATION/TRAINING PROGRAM

## 2023-10-27 PROCEDURE — 84443 ASSAY THYROID STIM HORMONE: CPT | Performed by: STUDENT IN AN ORGANIZED HEALTH CARE EDUCATION/TRAINING PROGRAM

## 2023-10-27 PROCEDURE — 36415 COLL VENOUS BLD VENIPUNCTURE: CPT | Performed by: STUDENT IN AN ORGANIZED HEALTH CARE EDUCATION/TRAINING PROGRAM

## 2023-10-27 PROCEDURE — 99214 OFFICE O/P EST MOD 30 MIN: CPT | Performed by: STUDENT IN AN ORGANIZED HEALTH CARE EDUCATION/TRAINING PROGRAM

## 2023-10-27 RX ORDER — DAPAGLIFLOZIN 10 MG/1
10 TABLET, FILM COATED ORAL DAILY
Qty: 90 TABLET | Refills: 3 | Status: SHIPPED | OUTPATIENT
Start: 2023-10-27

## 2023-10-27 RX ORDER — TORSEMIDE 20 MG/1
20 TABLET ORAL EVERY OTHER DAY
Qty: 15 TABLET | Refills: 2 | Status: SHIPPED | OUTPATIENT
Start: 2023-10-27 | End: 2024-10-26

## 2023-10-27 RX ORDER — METOPROLOL SUCCINATE 200 MG/1
200 TABLET, EXTENDED RELEASE ORAL DAILY
Qty: 90 TABLET | Refills: 3 | Status: SHIPPED | OUTPATIENT
Start: 2023-10-27 | End: 2024-10-26

## 2023-10-27 RX ORDER — SPIRONOLACTONE 50 MG/1
50 TABLET, FILM COATED ORAL DAILY
Qty: 90 TABLET | Refills: 1 | Status: SHIPPED | OUTPATIENT
Start: 2023-10-27 | End: 2024-10-26

## 2023-10-27 RX ORDER — ATORVASTATIN CALCIUM 20 MG/1
20 TABLET, FILM COATED ORAL DAILY
Qty: 90 TABLET | Refills: 3 | Status: SHIPPED | OUTPATIENT
Start: 2023-10-27 | End: 2024-10-26

## 2023-10-27 NOTE — PROGRESS NOTES
Progress Note  [facility]    Subjective:      Patient ID: Ursula Smallwood is a 38 y.o. female    Chief Complaint: Follow-up (DIABETES)    Ursula Smallwood is a 38-year-old female with a PMHx HFrEF, SVT s/p radiofrequency catheter ablation June 2021, HTN, diabetes, obesity presenting for follow-up regarding her type 2 diabetes and hypertension.     # type 2 diabetes -recent diagnosis as last hemoglobin A1c 6.5 and 6.6 this past year. Current medications include Ozempic 0.25 mg weekly. Tolerating medication reports weight loss since last clinic visit      #HTN - chronic issue, with office blood pressure 134/94 and at previous visit 165/100.  Current medications include Entresto, metoprolol, and spironolactone which are her GDMT medications for her heart failure.    #HFrEF - Patient was diagnosed with CHF March 2021. LVEF 45% (March 2021) decreased to 20% in 2022. Current medications include Entresto, metoprolol, spironolactone, torsemide, and Farxiga. Last seen by Cardiology June 2023. Reports tolerating medications. Denies any worsening shortness of breath or lower extremity edema.           Health Maintenance         Date Due Completion Date    Pneumococcal Vaccines (Age 0-64) (1 - PCV) Never done ---    Foot Exam Never done ---    Eye Exam Never done ---    Influenza Vaccine (1) 09/01/2023 10/26/2020    COVID-19 Vaccine (3 - 2023-24 season) 09/01/2023 8/12/2021    Hemoglobin A1c 12/13/2023 6/13/2023    Mammogram 10/23/2024 10/23/2023    Low Dose Statin 10/27/2024 10/27/2023    Diabetes Urine Screening 10/27/2024 10/27/2023    Lipid Panel 10/27/2024 10/27/2023    TETANUS VACCINE 04/13/2032 4/13/2022            Review of Systems   Constitutional:  Positive for activity change.   Respiratory:  Negative for shortness of breath.    Cardiovascular:  Negative for chest pain.   Gastrointestinal:  Negative for constipation, diarrhea, nausea and vomiting.        Objective:      Vitals:    10/27/23 1011   BP: (!) 134/94    Pulse: 82       BP Readings from Last 3 Encounters:   10/27/23 (!) 134/94   09/25/23 (!) 165/100   06/13/23 (!) 146/80     Body mass index is 49.17 kg/m².    Physical Exam  Vitals reviewed.   Constitutional:       Appearance: She is obese.   HENT:      Head: Normocephalic and atraumatic.   Cardiovascular:      Rate and Rhythm: Normal rate and regular rhythm.   Pulmonary:      Effort: Pulmonary effort is normal.      Breath sounds: Normal breath sounds.   Musculoskeletal:         General: Normal range of motion.      Right lower leg: No edema.      Left lower leg: No edema.   Skin:     General: Skin is warm.      Findings: No rash.   Neurological:      Mental Status: She is alert and oriented to person, place, and time.   Psychiatric:         Mood and Affect: Mood normal.         Behavior: Behavior normal.       The 10-year ASCVD risk score (Piedad SAAVEDRA, et al., 2019) is: 10.8%    Values used to calculate the score:      Age: 38 years      Sex: Female      Is Non- : Yes      Diabetic: Yes      Tobacco smoker: No      Systolic Blood Pressure: 134 mmHg      Is BP treated: Yes      HDL Cholesterol: 32 mg/dL      Total Cholesterol: 131 mg/dL     Assessment:     1. Uncontrolled hypertension    2. Type 2 diabetes mellitus without complication, without long-term current use of insulin    3. Class 3 severe obesity with serious comorbidity and body mass index (BMI) of 50.0 to 59.9 in adult, unspecified obesity type    4. HFrEF (heart failure with reduced ejection fraction)    5. Chronic systolic congestive heart failure    6. Chronic systolic heart failure    7. Need for pneumococcal vaccination    8. Need for influenza vaccination         Plan:     Uncontrolled hypertension  -     spironolactone (ALDACTONE) 50 MG tablet; Take 1 tablet (50 mg total) by mouth once daily.  Dispense: 90 tablet; Refill: 1  -     Comprehensive Metabolic Panel; Future; Expected date: 02/24/2024  -     CBC Auto Differential;  Future; Expected date: 10/27/2023  -     chronic issue and currently uncontrolled.  Will plan to increase dose of spironolactone 50 mg daily but I daily would probably benefit from addition of a calcium channel blocker.  Can consider amlodipine at follow-up visit if still uncontrolled blood pressure.  Continued effort with lifestyle modification.    Type 2 diabetes mellitus without complication, without long-term current use of insulin  -     Microalbumin/creatinine urine ratio; Future; Expected date: 10/27/2023  -     dapagliflozin propanediol (FARXIGA) 10 mg tablet; Take 1 tablet (10 mg total) by mouth once daily.  Dispense: 90 tablet; Refill: 3  -     atorvastatin (LIPITOR) 20 MG tablet; Take 1 tablet (20 mg total) by mouth once daily.  Dispense: 90 tablet; Refill: 3  -     Ambulatory referral/consult to Ophthalmology; Future; Expected date: 11/03/2023  -    chronic issue and currently controlled.  Continue Ozempic and Farxiga.  Follow-up A1c in 6 months as currently controlled.    Class 3 severe obesity with serious comorbidity and body mass index (BMI) of 50.0 to 59.9 in adult, unspecified obesity type  -     TSH; Future; Expected date: 10/27/2023  -     continue Ozempic 0.25 mg weekly.  Will order additional labs including TSH level to screen for hypothyroidism    HFrEF (heart failure with reduced ejection fraction)  Chronic systolic congestive heart failure  -     spironolactone (ALDACTONE) 50 MG tablet; Take 1 tablet (50 mg total) by mouth once daily.  Dispense: 90 tablet; Refill: 1  -     sacubitriL-valsartan (ENTRESTO)  mg per tablet; Take 1 tablet by mouth 2 (two) times daily.  Dispense: 120 tablet; Refill: 2  -     metoprolol succinate (TOPROL-XL) 200 MG 24 hr tablet; Take 1 tablet (200 mg total) by mouth once daily.  Dispense: 90 tablet; Refill: 3  -     dapagliflozin propanediol (FARXIGA) 10 mg tablet; Take 1 tablet (10 mg total) by mouth once daily.  Dispense: 90 tablet; Refill: 3  -      torsemide (DEMADEX) 20 MG Tab; Take 1 tablet (20 mg total) by mouth every other day.  Dispense: 15 tablet; Refill: 2  -     chronic issue and currently controlled.  Continue follow-up with Cardiology.  Continue GDMT for heart failure.      Need for pneumococcal vaccination  -     (In Office Administered) Pneumococcal Conjugate Vaccine (20 Valent) (IM) (Preferred)    Need for influenza vaccination  -     Influenza - Quadrivalent *Preferred* (6 months+) (PF)     Follow-up in 2 weeks for blood pressure check    Simone Pro DO  [facility], PGY-3  10/27/2023      This note was partially created using [employer] Voice Recognition software. Typographical and content errors may occur with this process. While efforts are made to detect and correct such errors, in some cases errors will persist. For this reason, wording in this document should be considered in the proper context and not strictly verbatim

## 2024-01-25 ENCOUNTER — NURSE TRIAGE (OUTPATIENT)
Dept: ADMINISTRATIVE | Facility: CLINIC | Age: 39
End: 2024-01-25
Payer: COMMERCIAL

## 2024-01-26 ENCOUNTER — HOSPITAL ENCOUNTER (EMERGENCY)
Facility: HOSPITAL | Age: 39
Discharge: HOME | End: 2024-01-26
Attending: EMERGENCY MEDICINE
Payer: COMMERCIAL

## 2024-01-26 ENCOUNTER — PATIENT MESSAGE (OUTPATIENT)
Dept: FAMILY MEDICINE | Facility: HOSPITAL | Age: 39
End: 2024-01-26
Payer: COMMERCIAL

## 2024-01-26 VITALS
BODY MASS INDEX: 45.99 KG/M2 | SYSTOLIC BLOOD PRESSURE: 173 MMHG | RESPIRATION RATE: 17 BRPM | HEIGHT: 67 IN | OXYGEN SATURATION: 100 % | DIASTOLIC BLOOD PRESSURE: 73 MMHG | HEART RATE: 77 BPM | TEMPERATURE: 98 F | WEIGHT: 293 LBS

## 2024-01-26 DIAGNOSIS — R07.9 CHEST PAIN: ICD-10-CM

## 2024-01-26 LAB
ALBUMIN SERPL BCP-MCNC: 3.9 G/DL (ref 3.5–5.2)
ALP SERPL-CCNC: 76 U/L (ref 38–126)
ALT SERPL W/O P-5'-P-CCNC: 15 U/L (ref 10–44)
ANION GAP SERPL CALC-SCNC: 8 MMOL/L (ref 8–16)
AST SERPL-CCNC: 14 U/L (ref 15–46)
BASOPHILS # BLD AUTO: 0.03 K/UL (ref 0–0.2)
BASOPHILS NFR BLD: 0.3 % (ref 0–1.9)
BILIRUB SERPL-MCNC: 0.2 MG/DL (ref 0.1–1)
CALCIUM SERPL-MCNC: 9.2 MG/DL (ref 8.7–10.5)
CHLORIDE SERPL-SCNC: 104 MMOL/L (ref 95–110)
CO2 SERPL-SCNC: 25 MMOL/L (ref 23–29)
CREAT SERPL-MCNC: 0.98 MG/DL (ref 0.5–1.4)
DIFFERENTIAL METHOD BLD: ABNORMAL
EOSINOPHIL # BLD AUTO: 0.1 K/UL (ref 0–0.5)
EOSINOPHIL NFR BLD: 1.2 % (ref 0–8)
ERYTHROCYTE [DISTWIDTH] IN BLOOD BY AUTOMATED COUNT: 14.9 % (ref 11.5–14.5)
EST. GFR  (NO RACE VARIABLE): >60 ML/MIN/1.73 M^2
GLUCOSE SERPL-MCNC: 100 MG/DL (ref 70–110)
HCT VFR BLD AUTO: 41.1 % (ref 37–48.5)
HGB BLD-MCNC: 12.7 G/DL (ref 12–16)
IMM GRANULOCYTES # BLD AUTO: 0.01 K/UL (ref 0–0.04)
IMM GRANULOCYTES NFR BLD AUTO: 0.1 % (ref 0–0.5)
LYMPHOCYTES # BLD AUTO: 3.5 K/UL (ref 1–4.8)
LYMPHOCYTES NFR BLD: 39 % (ref 18–48)
MCH RBC QN AUTO: 24.7 PG (ref 27–31)
MCHC RBC AUTO-ENTMCNC: 30.9 G/DL (ref 32–36)
MCV RBC AUTO: 80 FL (ref 82–98)
MONOCYTES # BLD AUTO: 0.6 K/UL (ref 0.3–1)
MONOCYTES NFR BLD: 6.8 % (ref 4–15)
NEUTROPHILS # BLD AUTO: 4.8 K/UL (ref 1.8–7.7)
NEUTROPHILS NFR BLD: 52.6 % (ref 38–73)
NRBC BLD-RTO: 0 /100 WBC
PLATELET # BLD AUTO: 386 K/UL (ref 150–450)
PMV BLD AUTO: 10.3 FL (ref 9.2–12.9)
POTASSIUM SERPL-SCNC: 3.9 MMOL/L (ref 3.5–5.1)
PROT SERPL-MCNC: 7.5 G/DL (ref 6–8.4)
RBC # BLD AUTO: 5.14 M/UL (ref 4–5.4)
SODIUM SERPL-SCNC: 137 MMOL/L (ref 136–145)
TROPONIN I SERPL-MCNC: <0.012 NG/ML (ref 0.01–0.03)
TROPONIN I SERPL-MCNC: <0.012 NG/ML (ref 0.01–0.03)
UUN UR-MCNC: 17 MG/DL (ref 7–17)
WBC # BLD AUTO: 9.08 K/UL (ref 3.9–12.7)

## 2024-01-26 PROCEDURE — 99900035 HC TECH TIME PER 15 MIN (STAT): Mod: ER

## 2024-01-26 PROCEDURE — 85025 COMPLETE CBC W/AUTO DIFF WBC: CPT | Mod: ER | Performed by: EMERGENCY MEDICINE

## 2024-01-26 PROCEDURE — 94761 N-INVAS EAR/PLS OXIMETRY MLT: CPT | Mod: ER

## 2024-01-26 PROCEDURE — 99285 EMERGENCY DEPT VISIT HI MDM: CPT | Mod: 25,ER

## 2024-01-26 PROCEDURE — 93010 ELECTROCARDIOGRAM REPORT: CPT | Mod: ,,, | Performed by: INTERNAL MEDICINE

## 2024-01-26 PROCEDURE — 63600175 PHARM REV CODE 636 W HCPCS: Mod: ER | Performed by: EMERGENCY MEDICINE

## 2024-01-26 PROCEDURE — 80053 COMPREHEN METABOLIC PANEL: CPT | Mod: ER | Performed by: EMERGENCY MEDICINE

## 2024-01-26 PROCEDURE — 84484 ASSAY OF TROPONIN QUANT: CPT | Mod: ER | Performed by: EMERGENCY MEDICINE

## 2024-01-26 PROCEDURE — 25000003 PHARM REV CODE 250: Mod: ER | Performed by: EMERGENCY MEDICINE

## 2024-01-26 PROCEDURE — 93005 ELECTROCARDIOGRAM TRACING: CPT | Mod: ER

## 2024-01-26 PROCEDURE — 96374 THER/PROPH/DIAG INJ IV PUSH: CPT | Mod: ER

## 2024-01-26 RX ORDER — ASPIRIN 325 MG
325 TABLET ORAL
Status: COMPLETED | OUTPATIENT
Start: 2024-01-26 | End: 2024-01-26

## 2024-01-26 RX ORDER — KETOROLAC TROMETHAMINE 30 MG/ML
15 INJECTION, SOLUTION INTRAMUSCULAR; INTRAVENOUS
Status: COMPLETED | OUTPATIENT
Start: 2024-01-26 | End: 2024-01-26

## 2024-01-26 RX ORDER — NAPROXEN 500 MG/1
500 TABLET ORAL 2 TIMES DAILY PRN
Qty: 14 TABLET | Refills: 0 | Status: SHIPPED | OUTPATIENT
Start: 2024-01-26

## 2024-01-26 RX ADMIN — ASPIRIN 325 MG ORAL TABLET 325 MG: 325 PILL ORAL at 02:01

## 2024-01-26 RX ADMIN — KETOROLAC TROMETHAMINE 15 MG: 30 INJECTION, SOLUTION INTRAMUSCULAR; INTRAVENOUS at 02:01

## 2024-01-26 NOTE — DISCHARGE INSTRUCTIONS
Additional instructions  Followup with your PCP and with cardiology for further evaluation of your chest pain. Take all your medications as prescribed. Return to the emergency department if you have increasing pain, chest pain, difficulty breathing,  nonstop vomiting, or any other concerns. Be sure to drink plenty of fluids to stay hydrated. Get plenty of rest. Please refer to additional educational material for further instructions.

## 2024-01-26 NOTE — ED NOTES
Pt resting with eyes closed. Resp e/u. Cardiac monitor remains in place. VS stable. Pt updated on current status. Pt denies further needs at this time.

## 2024-01-26 NOTE — ED PROVIDER NOTES
History       Chief complaint:  Chest pain    HPI:    Ursula Smallwood 38 y.o. who  has a past medical history of Anemia, Hypertension, Kidney stone, SVT (supraventricular tachycardia), and Uterine fibroid. who presents to the emergency department today with a complaint of chest pain.  Patient has been having chest pain on and off for the last week.  It was substernal.  It is sharp.  It lasts less than a minute at a time.  It was coming and going.  It has not associated with exertion, eating or drinking.  Patient occasionally feel short of breath when her pain comes on.  No diaphoresis.  No nausea.  She had a cold over a week ago.  No current sputum or fevers. No hemoptysis    Risk factors:  Hypertension          ROS    Constitutional: No fever, no chills.  Eyes: No discharge. No pain.  HENT: No nasal drainage. No ear ache. No sore throat.  Cardiovascular: See above.  Respiratory: See above.  Gastrointestinal: No abdominal pain, no vomiting. No diarrhea.  Genitourinary: No hematuria, dysuria, urgency.  Musculoskeletal: No back pain.   Skin: No rashes, no lesions.  Neurological: No headache, no focal weakness.    Otherwise remaining ROS negative     The history is provided by the patient        Reviewed and verified by myself:   ALLERGIES REVIEWED  MEDICATIONS REVIEWED  PMH/PSH/SOC/FH REVIEWED       Past Medical History:   Diagnosis Date    Anemia     Hypertension     Kidney stone     SVT (supraventricular tachycardia)     Uterine fibroid          Past Surgical History:   Procedure Laterality Date    ABLATION N/A 06/04/2021    Procedure: Ablation;  Surgeon: NICHELLE Zepeda MD;  Location: [facility] EP LAB;  Service: Cardiology;  Laterality: N/A;  SVT, RFA, Carto, anes, EH, 3prep    CYSTOSCOPY N/A 08/03/2021    Procedure: CYSTOSCOPY;  Surgeon: Jamarcus Ventura MD;  Location: [facility] OR;  Service: OB/GYN;  Laterality: N/A;    CYSTOSCOPY W/ RETROGRADES Bilateral 11/30/2022    Procedure: CYSTOSCOPY, WITH RETROGRADE PYELOGRAM;   "Surgeon: Yanira Alexander MD;  Location: [facility] OR;  Service: Urology;  Laterality: Bilateral;    CYSTOURETEROSCOPY WITH RETROGRADE PYELOGRAPHY AND INSERTION OF STENT INTO URETER Bilateral 11/6/2022    Procedure: CYSTOURETEROSCOPY, WITH BILATERAL RETROGRADE PYELOGRAM AND URETERAL STENT INSERTION;  Surgeon: Yanira Alexander MD;  Location: [facility] OR;  Service: Urology;  Laterality: Bilateral;    FLUOROSCOPY  11/6/2022    Procedure: FLUOROSCOPY;  Surgeon: Yanira Alexander MD;  Location: [facility] OR;  Service: Urology;;    HYSTERECTOMY      ROBOT-ASSISTED LAPAROSCOPIC HYSTERECTOMY N/A 08/03/2021    Procedure: ROBOTIC HYSTERECTOMY;  Surgeon: Jamarcus Ventura MD;  Location: [facility] OR;  Service: OB/GYN;  Laterality: N/A;    ROBOT-ASSISTED SALPINGECTOMY Bilateral 08/03/2021    Procedure: ROBOTIC SALPINGECTOMY;  Surgeon: Jamarcus Ventura MD;  Location: [facility] OR;  Service: OB/GYN;  Laterality: Bilateral;    URETEROSCOPY Left 11/6/2022    Procedure: URETEROSCOPY;  Surgeon: Yanira Alexander MD;  Location: [facility] OR;  Service: Urology;  Laterality: Left;         Social History     Tobacco Use    Smoking status: Never    Smokeless tobacco: Never   Substance Use Topics    Alcohol use: No    Drug use: No         Nursing/Ancillary staff note reviewed.  VS reviewed         Physical Exam     BP (!) 165/81   Pulse 83   Temp 98.1 °F (36.7 °C) (Oral)   Resp 16   Ht 5' 7" (1.702 m)   Wt (!) 142 kg (313 lb 0.9 oz)   LMP 04/11/2021 Comment: abnormal bleeding since 4/11/21  SpO2 98%   BMI 49.03 kg/m²     Physical Exam    General Appearance: The patient is alert, has no immediate need for airway protection and no signs of toxicity. No acute distress. Lying in bed but able to sit up without difficulty.   HEENT: Eyes: Pupils equal and round no pallor or injection. Extra ocular movements intact. No drainage.       Mouth: Mucous membranes are moist. Oropharynx clear.   Neck:Neck is supple non-tender. No lymphadenopathy. No stridor.   Respiratory: There " are no retractions, lungs are clear to auscultation. No wheezing, no crackles. Chest wall nontender to palpation.   Cardiovascular: Regular rate and rhythm. No murmurs, rubs or gallops. 2+radial pulse, 2+DP pulse.   Gastrointestinal:  Abdomen is soft and non-tender, no masses, bowel sounds normal. No guarding, no rebound.  No pulsatile mass.   Neurological: Alert and oriented x 4. CN II-XII grossly intact. No focal weakness. Strength intact 5/5 bilaterally in upper and lower extremities.   Skin: Warm and dry, no rashes.  Musculoskeletal:Musculoskeletal: Extremities are non-tender, non-swollen and have full range of motion. Back NTTP along the midline.        ED Course     Additional MDM:   PERC Rule:   Age is greater than or equal to 50 = 0.0  Heart Rate is greater than or equal to 100 = 0.0  SaO2 on room air < 95% = 0.0  Unilateral leg swelling = 0.0  Hemoptysis = 0.0  Recent surgery or trauma = 0.0  Prior PE or DVT =  0.0  Hormone use = 0.00  PERC Score = 0      Heart Score:    History:          Slightly suspicious.  ECG:             Normal  Age:               Less than 45 years  Risk factors: 1-2 risk factors  Troponin:       Less than or equal to normal limit  Heart Score = 1                ED Course as of 01/26/24 0549   Fri Jan 26, 2024   0317 CBC unremarkable.  [JA]   0400 Troponin I: <0.012  Normal [JA]   0400 CMP normal [JA]   0400 I independently interpreted the chest x-ray:  No consolidation, no widened mediastinum  Denice Hallman MD [JA]   0455 Troponin I: <0.012  Initial troponin normal.  Awaiting delta troponin   [JA]   0543 Troponin I: <0.012  Delta trop normal. [JA]      ED Course User Index  [JA] Denice Hallman MD              Medical Decision Making  Problems Addressed:  Chest pain: complicated acute illness or injury with systemic symptoms    Amount and/or Complexity of Data Reviewed  External Data Reviewed: notes.     Details: Patient was seen 10/27/2023 by primary care physician for  follow-up.    Labs: ordered. Decision-making details documented in ED Course.  Radiology: ordered and independent interpretation performed.     Details: Chest x-ray without consolidation, no pneumothorax, no widened mediastinum  ECG/medicine tests: ordered and independent interpretation performed.     Details: 85 beats per minute, sinus rhythm, no ST elevations,    Risk  OTC drugs.  Prescription drug management.      Social determinants of health taken into consideration during development of our treatment plan include   Body mass index is 49.03 kg/m². - Cumulative social disadvantage, denoted by higher SDOH burden, was associated with increased odds of obesity, independent of clinical and demographic factors. PMID: 51939492 DOI: 10.1002/titi.95802            Medications   ketorolac injection 15 mg (15 mg Intravenous Given 1/26/24 0251)   aspirin tablet 325 mg (325 mg Oral Given 1/26/24 0251)           DIFFERENTIAL DIAGNOSIS: After history and physical exam a differential diagnosis was considered, but was not limited to, Myocardial ischemia, pericarditis, pulmonary embolus, chest wall pain, pleural inflammation and pulmonary infectious causes.    Initial management: Ursula Smallwood presents to the ED today with a new, acute, complicated problem of chest pain. VS are stable.  BS are clear, normal S1, S2, pulses appropriate.  Concerns high at this time for an ischemic etiology to her pain vs infectious vs inflammatory.     Comorbidity:  has a past medical history of Anemia, Hypertension, Kidney stone, SVT (supraventricular tachycardia), and Uterine fibroid.    Orders I ordered to further evaluate include: EKG, CXR, CBC, CMP, troponin      MDM CONT:   Ursula Smallwood presents to the ED today with chest pain.  EKG without signs of acute ACS, troponin normal, low HEART Score, delta troponin normal. No need for emergent cardiology evaluation today. No risk factors for PE. PERC Neg.  I doubt pulmonary embolism. CXR without signs of  penumothorax, infectious etiology, pneumomediastinum. No PMH or FH for concerns for aortic dissection, no evidence of perfusion deficit, no widened mediastinum, unlikely to be aortic dissection. There is no need for admission at this time. They are appropriate for discharge home with followup.  The pt is comfortable with this plan and comfortable going home at this time.     After taking into careful account the historical factors and physical exam findings of the patient's presentation today, in conjunction with the empirical and objective data obtained on ED workup, no acute emergent medical condition requiring admission has been identified. The patient appears to be low risk for an emergent medical condition and I feel it is safe and appropriate at this time for the patient to be discharged to follow-up as detailed in their discharge instructions for reevaluation and possible continued outpatient workup and management. Regardless, an unremarkable evaluation in the ED does not preclude the development or presence of a serious or life threatening condition. As such, patient was instructed to return immediately for any worsening or change in current symptoms. Precautions for return discussed at length.  Discharge and follow-up instructions discussed with the patient who expressed understanding and willingness to comply with my recommendations.    Voice recognition software utilized in this note.              Impression        The encounter diagnosis was Chest pain.                New Prescriptions    NAPROXEN (NAPROSYN) 500 MG TABLET    Take 1 tablet (500 mg total) by mouth 2 (two) times daily as needed (pain).        Follow-up Information       Schedule an appointment as soon as possible for a visit  with Simone Pro DO.    Specialty: Family Medicine  Contact information:  [address]  [address]  [place] [id number] [phone]               Schedule an appointment as soon as possible for a visit   with Cindy  Cardiology.    Specialty: Cardiology  Contact information:  [facility]  [address]  [facility] [address] [phone]  Additional information:  Please park in surface lot and check in at [address].                            Denice Hallman MD  01/26/24 0549

## 2024-01-26 NOTE — Clinical Note
"Ursula Mckinley" Cl was seen and treated in our emergency department on 1/26/2024.  She may return to work on 01/29/2024.       If you have any questions or concerns, please don't hesitate to call.       RN    "

## 2024-01-26 NOTE — TELEPHONE ENCOUNTER
"States that she is a heart failure patient and has been having chest pain mostly in her back under her shoulder blade.  Reason for Disposition   Pain also in shoulder(s) or arm(s) or jaw  (Exception: Pain is clearly made worse by movement.)    Additional Information   Negative: SEVERE difficulty breathing (e.g., struggling for each breath, speaks in single words)   Negative: Difficult to awaken or acting confused (e.g., disoriented, slurred speech)   Negative: Shock suspected (e.g., cold/pale/clammy skin, too weak to stand, low BP, rapid pulse)   Negative: Passed out (i.e., lost consciousness, collapsed and was not responding)   Negative: [1] Chest pain lasts > 5 minutes AND [2] age > 44   Negative: [1] Chest pain lasts > 5 minutes AND [2] age > 30 AND [3] one or more cardiac risk factors (e.g., diabetes, high blood pressure, high cholesterol, smoker, or strong family history of heart disease)   Negative: [1] Chest pain lasts > 5 minutes AND [2] history of heart disease (i.e., angina, heart attack, heart failure, bypass surgery, takes nitroglycerin)   Negative: [1] Chest pain lasts > 5 minutes AND [2] described as crushing, pressure-like, or heavy   Negative: Heart beating < 50 beats per minute OR > 140 beats per minute   Negative: Visible sweat on face or sweat dripping down face   Negative: Sounds like a life-threatening emergency to the triager   Negative: Followed a chest injury   Negative: SEVERE chest pain   Negative: [1] Chest pain (or "angina") comes and goes AND [2] is happening more often (increasing in frequency) or getting worse (increasing in severity)  (Exception: Chest pains that last only a few seconds.)    Protocols used: Chest Pain-A-AH    "

## 2024-03-04 DIAGNOSIS — E11.9 TYPE 2 DIABETES MELLITUS WITHOUT COMPLICATION, WITHOUT LONG-TERM CURRENT USE OF INSULIN: ICD-10-CM

## 2024-03-04 DIAGNOSIS — E66.01 CLASS 3 SEVERE OBESITY WITH SERIOUS COMORBIDITY AND BODY MASS INDEX (BMI) OF 50.0 TO 59.9 IN ADULT, UNSPECIFIED OBESITY TYPE: ICD-10-CM

## 2024-03-04 RX ORDER — SEMAGLUTIDE 0.68 MG/ML
INJECTION, SOLUTION SUBCUTANEOUS
Qty: 10.5 ML | Refills: 0 | Status: SHIPPED | OUTPATIENT
Start: 2024-03-04 | End: 2024-04-08 | Stop reason: SDUPTHER

## 2024-04-08 DIAGNOSIS — E11.9 TYPE 2 DIABETES MELLITUS WITHOUT COMPLICATION, WITHOUT LONG-TERM CURRENT USE OF INSULIN: ICD-10-CM

## 2024-04-08 DIAGNOSIS — E66.01 CLASS 3 SEVERE OBESITY WITH SERIOUS COMORBIDITY AND BODY MASS INDEX (BMI) OF 50.0 TO 59.9 IN ADULT, UNSPECIFIED OBESITY TYPE: ICD-10-CM

## 2024-04-08 RX ORDER — SEMAGLUTIDE 0.68 MG/ML
INJECTION, SOLUTION SUBCUTANEOUS
Qty: 10.5 ML | Refills: 0 | Status: SHIPPED | OUTPATIENT
Start: 2024-04-08 | End: 2024-06-16 | Stop reason: SDUPTHER

## 2024-05-13 ENCOUNTER — TELEPHONE (OUTPATIENT)
Dept: PHARMACY | Facility: CLINIC | Age: 39
End: 2024-05-13
Payer: COMMERCIAL

## 2024-05-13 NOTE — TELEPHONE ENCOUNTER
Contacted the patient to perform Medication Therapy Management review, specifically in reference to refilling Farxiga to improve PDC for HEDIS measurements.   Waiting for patient response to verify

## 2024-05-19 DIAGNOSIS — I50.22 CHRONIC SYSTOLIC CONGESTIVE HEART FAILURE: ICD-10-CM

## 2024-05-19 DIAGNOSIS — E11.9 TYPE 2 DIABETES MELLITUS WITHOUT COMPLICATION, WITHOUT LONG-TERM CURRENT USE OF INSULIN: ICD-10-CM

## 2024-05-19 NOTE — TELEPHONE ENCOUNTER
Dr. Pro, my name is Vicky Thompson and I am a pharmacist with [facility]. Part of my job as a pharmacist at [facility] is to perform medication therapy management which includes reviewing the reports that [facility] sends us that show the dates of the last time any diabetic, cholesterol and certain high blood pressure medications were filled and checking with the patients to see if they are in need of any refills to be sent to their pharmacy.     Ms. Smallwood states they would like this prescription refilled so I have pended it for your review and approval. Our goal is to make sure patients stay adherent and do not miss any days of therapy due to delayed refills. Please review and take action as you see fit. Thank you.   DISPLAY PLAN FREE TEXT

## 2024-05-19 NOTE — TELEPHONE ENCOUNTER
Closing encounter after initial inquiry to patient to perform Medication Therapy Management to improve PDC for HEDIS measurements.   Sent patient [employer] message, pt requested refill

## 2024-05-20 RX ORDER — DAPAGLIFLOZIN 10 MG/1
10 TABLET, FILM COATED ORAL DAILY
Qty: 90 TABLET | Refills: 3 | Status: SHIPPED | OUTPATIENT
Start: 2024-05-20

## 2024-06-16 DIAGNOSIS — E66.01 CLASS 3 SEVERE OBESITY WITH SERIOUS COMORBIDITY AND BODY MASS INDEX (BMI) OF 50.0 TO 59.9 IN ADULT, UNSPECIFIED OBESITY TYPE: ICD-10-CM

## 2024-06-16 DIAGNOSIS — I50.22 CHRONIC SYSTOLIC CONGESTIVE HEART FAILURE: ICD-10-CM

## 2024-06-16 DIAGNOSIS — E11.9 TYPE 2 DIABETES MELLITUS WITHOUT COMPLICATION, WITHOUT LONG-TERM CURRENT USE OF INSULIN: ICD-10-CM

## 2024-06-17 RX ORDER — SACUBITRIL AND VALSARTAN 97; 103 MG/1; MG/1
1 TABLET, FILM COATED ORAL 2 TIMES DAILY
Qty: 60 TABLET | Refills: 6 | OUTPATIENT
Start: 2024-06-17 | End: 2024-10-31

## 2024-06-17 RX ORDER — SEMAGLUTIDE 0.68 MG/ML
0.5 INJECTION, SOLUTION SUBCUTANEOUS
Qty: 9 ML | Refills: 0 | Status: SHIPPED | OUTPATIENT
Start: 2024-06-17 | End: 2024-09-15

## 2024-06-26 ENCOUNTER — OFFICE VISIT (OUTPATIENT)
Dept: PODIATRY | Facility: CLINIC | Age: 39
End: 2024-06-26
Payer: COMMERCIAL

## 2024-06-26 VITALS
DIASTOLIC BLOOD PRESSURE: 78 MMHG | BODY MASS INDEX: 45.99 KG/M2 | HEART RATE: 73 BPM | WEIGHT: 293 LBS | SYSTOLIC BLOOD PRESSURE: 161 MMHG | HEIGHT: 67 IN

## 2024-06-26 DIAGNOSIS — L60.9 DISEASE OF NAIL: Primary | ICD-10-CM

## 2024-06-26 PROCEDURE — 99999 PR PBB SHADOW E&M-EST. PATIENT-LVL IV: CPT | Mod: PBBFAC,,, | Performed by: PODIATRIST

## 2024-06-26 PROCEDURE — 3008F BODY MASS INDEX DOCD: CPT | Mod: CPTII,S$GLB,, | Performed by: PODIATRIST

## 2024-06-26 PROCEDURE — 1159F MED LIST DOCD IN RCRD: CPT | Mod: CPTII,S$GLB,, | Performed by: PODIATRIST

## 2024-06-26 PROCEDURE — 3078F DIAST BP <80 MM HG: CPT | Mod: CPTII,S$GLB,, | Performed by: PODIATRIST

## 2024-06-26 PROCEDURE — 3077F SYST BP >= 140 MM HG: CPT | Mod: CPTII,S$GLB,, | Performed by: PODIATRIST

## 2024-06-26 PROCEDURE — 99203 OFFICE O/P NEW LOW 30 MIN: CPT | Mod: S$GLB,,, | Performed by: PODIATRIST

## 2024-06-26 RX ORDER — CICLOPIROX OLAMINE 7.7 MG/100ML
SUSPENSION TOPICAL 2 TIMES DAILY
Qty: 60 ML | Refills: 3 | Status: SHIPPED | OUTPATIENT
Start: 2024-06-26

## 2024-06-26 NOTE — PROGRESS NOTES
Subjective:      Patient ID: Ursula Smallwood is a 38 y.o. female.    Chief Complaint: Nail Problem (B/L great toenails)    Ursula is a 38 y.o. female who presents to the clinic complaining of thick and discolored toenails on both feet. Ursula is inquiring about treatment options.    Review of Systems   Constitutional: Negative for chills, fever and malaise/fatigue.   HENT:  Negative for hearing loss.    Cardiovascular:  Negative for claudication.   Respiratory:  Negative for shortness of breath.    Skin:  Positive for dry skin and nail changes. Negative for flushing and rash.   Musculoskeletal:  Negative for joint pain and myalgias.   Neurological:  Negative for loss of balance, numbness, paresthesias and sensory change.   Psychiatric/Behavioral:  Negative for altered mental status.            Objective:      Physical Exam  Vitals reviewed.   Cardiovascular:      Pulses:           Dorsalis pedis pulses are 2+ on the right side and 2+ on the left side.        Posterior tibial pulses are 2+ on the right side and 2+ on the left side.      Comments: No edema noted b/L  Musculoskeletal:      Comments:        Feet:      Right foot:      Protective Sensation: 5 sites tested.  5 sites sensed.      Left foot:      Protective Sensation: 5 sites tested.  5 sites sensed.   Skin:     Capillary Refill: Capillary refill takes 2 to 3 seconds.      Comments: Normal skin tugor noted.   No open lesion noted b/L  Skin temp is warm to warm from proximal to distal b/L.  Webspaces clean, dry, and intact  Hallux nails polised, but thickened   Neurological:      Mental Status: She is alert.      Comments: Gross sensation intact b/L               Assessment:       Encounter Diagnosis   Name Primary?    Disease of nail Yes         Plan:       Ursula was seen today for nail problem.    Diagnoses and all orders for this visit:    Disease of nail    Other orders  -     ciclopirox (LOPROX) 0.77 % Susp; Apply topically 2 (two) times daily.      I counseled the  patient on her conditions, their implications and medical management.    Pt was seen today for changes in toenail. Pt was advised that nail changes could be due to toenail fungus or damage/trauma to toenail or toenail matrix.   Pt advised that there are currently no prescription options for nail damage/trauma.   Rx ciclopirox    Call or return to clinic prn if these symptoms worsen or fail to improve as anticipated.    .

## 2024-06-30 ENCOUNTER — HOSPITAL ENCOUNTER (EMERGENCY)
Facility: HOSPITAL | Age: 39
Discharge: HOME | End: 2024-06-30
Attending: EMERGENCY MEDICINE
Payer: COMMERCIAL

## 2024-06-30 VITALS
WEIGHT: 293 LBS | BODY MASS INDEX: 45.99 KG/M2 | TEMPERATURE: 98 F | DIASTOLIC BLOOD PRESSURE: 68 MMHG | RESPIRATION RATE: 18 BRPM | OXYGEN SATURATION: 97 % | HEIGHT: 67 IN | HEART RATE: 89 BPM | SYSTOLIC BLOOD PRESSURE: 132 MMHG

## 2024-06-30 DIAGNOSIS — R07.9 CHEST PAIN: ICD-10-CM

## 2024-06-30 DIAGNOSIS — I50.9 CONGESTIVE HEART FAILURE, UNSPECIFIED HF CHRONICITY, UNSPECIFIED HEART FAILURE TYPE: Primary | ICD-10-CM

## 2024-06-30 DIAGNOSIS — R06.02 SHORTNESS OF BREATH: ICD-10-CM

## 2024-06-30 DIAGNOSIS — B34.9 VIRAL SYNDROME: ICD-10-CM

## 2024-06-30 LAB
ALBUMIN SERPL BCP-MCNC: 4.5 G/DL (ref 3.5–5.2)
ALP SERPL-CCNC: 79 U/L (ref 38–126)
ALT SERPL W/O P-5'-P-CCNC: 16 U/L (ref 10–44)
ANION GAP SERPL CALC-SCNC: 10 MMOL/L (ref 8–16)
AST SERPL-CCNC: 15 U/L (ref 15–46)
BASOPHILS # BLD AUTO: 0.04 K/UL (ref 0–0.2)
BASOPHILS NFR BLD: 0.4 % (ref 0–1.9)
BILIRUB SERPL-MCNC: 0.5 MG/DL (ref 0.1–1)
CALCIUM SERPL-MCNC: 9.2 MG/DL (ref 8.7–10.5)
CHLORIDE SERPL-SCNC: 104 MMOL/L (ref 95–110)
CO2 SERPL-SCNC: 24 MMOL/L (ref 23–29)
CREAT SERPL-MCNC: 1.13 MG/DL (ref 0.5–1.4)
DIFFERENTIAL METHOD BLD: ABNORMAL
EOSINOPHIL # BLD AUTO: 0.1 K/UL (ref 0–0.5)
EOSINOPHIL NFR BLD: 1.4 % (ref 0–8)
ERYTHROCYTE [DISTWIDTH] IN BLOOD BY AUTOMATED COUNT: 15.1 % (ref 11.5–14.5)
EST. GFR  (NO RACE VARIABLE): >60 ML/MIN/1.73 M^2
GLUCOSE SERPL-MCNC: 99 MG/DL (ref 70–110)
HCT VFR BLD AUTO: 45.1 % (ref 37–48.5)
HGB BLD-MCNC: 14.2 G/DL (ref 12–16)
IMM GRANULOCYTES # BLD AUTO: 0.02 K/UL (ref 0–0.04)
IMM GRANULOCYTES NFR BLD AUTO: 0.2 % (ref 0–0.5)
INFLUENZA A, MOLECULAR: NEGATIVE
INFLUENZA B, MOLECULAR: NEGATIVE
LYMPHOCYTES # BLD AUTO: 3.1 K/UL (ref 1–4.8)
LYMPHOCYTES NFR BLD: 32.6 % (ref 18–48)
MCH RBC QN AUTO: 25 PG (ref 27–31)
MCHC RBC AUTO-ENTMCNC: 31.5 G/DL (ref 32–36)
MCV RBC AUTO: 79 FL (ref 82–98)
MONOCYTES # BLD AUTO: 0.7 K/UL (ref 0.3–1)
MONOCYTES NFR BLD: 7.4 % (ref 4–15)
NEUTROPHILS # BLD AUTO: 5.4 K/UL (ref 1.8–7.7)
NEUTROPHILS NFR BLD: 58 % (ref 38–73)
NRBC BLD-RTO: 0 /100 WBC
NT-PROBNP SERPL-MCNC: 944 PG/ML (ref 5–450)
PLATELET # BLD AUTO: 438 K/UL (ref 150–450)
PMV BLD AUTO: 9.8 FL (ref 9.2–12.9)
POTASSIUM SERPL-SCNC: 3.8 MMOL/L (ref 3.5–5.1)
PROT SERPL-MCNC: 8.3 G/DL (ref 6–8.4)
RBC # BLD AUTO: 5.69 M/UL (ref 4–5.4)
SARS-COV-2 RDRP RESP QL NAA+PROBE: NEGATIVE
SODIUM SERPL-SCNC: 138 MMOL/L (ref 136–145)
SPECIMEN SOURCE: NORMAL
TROPONIN I SERPL-MCNC: <0.012 NG/ML (ref 0.01–0.03)
UUN UR-MCNC: 14 MG/DL (ref 7–17)
WBC # BLD AUTO: 9.38 K/UL (ref 3.9–12.7)

## 2024-06-30 PROCEDURE — 87502 INFLUENZA DNA AMP PROBE: CPT | Mod: ER

## 2024-06-30 PROCEDURE — 99285 EMERGENCY DEPT VISIT HI MDM: CPT | Mod: 25,ER

## 2024-06-30 PROCEDURE — 96374 THER/PROPH/DIAG INJ IV PUSH: CPT | Mod: ER

## 2024-06-30 PROCEDURE — 83880 ASSAY OF NATRIURETIC PEPTIDE: CPT | Mod: ER

## 2024-06-30 PROCEDURE — 96375 TX/PRO/DX INJ NEW DRUG ADDON: CPT | Mod: ER

## 2024-06-30 PROCEDURE — 93005 ELECTROCARDIOGRAM TRACING: CPT | Mod: ER

## 2024-06-30 PROCEDURE — U0002 COVID-19 LAB TEST NON-CDC: HCPCS | Mod: ER

## 2024-06-30 PROCEDURE — 85025 COMPLETE CBC W/AUTO DIFF WBC: CPT | Mod: ER

## 2024-06-30 PROCEDURE — 80053 COMPREHEN METABOLIC PANEL: CPT | Mod: ER

## 2024-06-30 PROCEDURE — 99900035 HC TECH TIME PER 15 MIN (STAT): Mod: ER

## 2024-06-30 PROCEDURE — 63600175 PHARM REV CODE 636 W HCPCS: Mod: ER

## 2024-06-30 PROCEDURE — 84484 ASSAY OF TROPONIN QUANT: CPT | Mod: ER

## 2024-06-30 PROCEDURE — 94761 N-INVAS EAR/PLS OXIMETRY MLT: CPT | Mod: ER

## 2024-06-30 PROCEDURE — 25000003 PHARM REV CODE 250: Mod: ER

## 2024-06-30 PROCEDURE — 93010 ELECTROCARDIOGRAM REPORT: CPT | Mod: ,,, | Performed by: INTERNAL MEDICINE

## 2024-06-30 RX ORDER — FUROSEMIDE 20 MG/1
20 TABLET ORAL DAILY
Qty: 5 TABLET | Refills: 0 | Status: SHIPPED | OUTPATIENT
Start: 2024-06-30 | End: 2024-07-05

## 2024-06-30 RX ORDER — FUROSEMIDE 10 MG/ML
40 INJECTION INTRAMUSCULAR; INTRAVENOUS
Status: COMPLETED | OUTPATIENT
Start: 2024-06-30 | End: 2024-06-30

## 2024-06-30 RX ORDER — ASPIRIN 325 MG
325 TABLET ORAL
Status: COMPLETED | OUTPATIENT
Start: 2024-06-30 | End: 2024-06-30

## 2024-06-30 RX ORDER — KETOROLAC TROMETHAMINE 30 MG/ML
15 INJECTION, SOLUTION INTRAMUSCULAR; INTRAVENOUS
Status: COMPLETED | OUTPATIENT
Start: 2024-06-30 | End: 2024-06-30

## 2024-06-30 RX ADMIN — KETOROLAC TROMETHAMINE 15 MG: 30 INJECTION, SOLUTION INTRAMUSCULAR at 07:06

## 2024-06-30 RX ADMIN — ASPIRIN 325 MG ORAL TABLET 325 MG: 325 PILL ORAL at 05:06

## 2024-06-30 RX ADMIN — FUROSEMIDE 40 MG: 10 INJECTION, SOLUTION INTRAVENOUS at 06:06

## 2024-06-30 NOTE — Clinical Note
"Ursula Mckinley" Cl was seen and treated in our emergency department on 6/30/2024.  She may return to work on 07/02/2024.       If you have any questions or concerns, please don't hesitate to call.      Randi Will PA-C"

## 2024-07-01 LAB
OHS QRS DURATION: 104 MS
OHS QTC CALCULATION: 474 MS

## 2024-07-01 NOTE — DISCHARGE INSTRUCTIONS
Please follow up with your cardiologist or family medicine doctor within the next week or 2.  Return to the emergency room with any new or worsening symptoms!    Thank you for allowing me and my emergency team to take care of you here today! I hope you feel better soon. Please do not hesitate to return with any additional concerns that may arise from this or any new problem you encounter.    Our goal in the emergency department is to always give you outstanding care and exceptional service. If you receive a survey by mail or e-mail in the next week regarding your experience in our ED, we would greatly appreciate you completing it. Your feedback provides us with a way to recognize our staff who give very good care and it helps us learn how to improve when your experience was below the excellence we aspire to be!    Brook [place], PA-C [facility]   Emergency Room Physician Assistant

## 2024-07-01 NOTE — ED PROVIDER NOTES
Encounter Date: 6/30/2024       History     Chief Complaint   Patient presents with    COVID-19 Concerns     Headache, nasal congestion, cough and chest pain that began yesterday.      Patient is a 39-year-old female with a past medical history of anemia, hypertension, SVT, and CHF who presents to emergency room for chest pain, shortness of breath, frontal headache, nasal congestion, and cough that onset yesterday.  However, patient states that symptoms worsened as of today.  Describes chest pain as a sharp sensation on the left side of her chest.  It is not alleviated or exacerbated by anything.  Denies double vision, blurry vision, numbness, weakness, tingling, nausea, vomiting, abdominal pain, back pain, dysuria, hematuria, changes in bowel movements, fever, body aches, chills, neck pain, sore throat, or others at this time.  No treatment attempted prior to arrival.  Denies any recent sick contacts.    The history is provided by the patient. No  was used.     Review of patient's allergies indicates:  No Known Allergies  Past Medical History:   Diagnosis Date    Anemia     Hypertension     Kidney stone     SVT (supraventricular tachycardia)     Uterine fibroid      Past Surgical History:   Procedure Laterality Date    ABLATION N/A 06/04/2021    Procedure: Ablation;  Surgeon: NICHELLE Zepeda MD;  Location: [facility] EP LAB;  Service: Cardiology;  Laterality: N/A;  SVT, RFA, Carto, anes, EH, 3prep    CYSTOSCOPY N/A 08/03/2021    Procedure: CYSTOSCOPY;  Surgeon: Jamarcus Ventura MD;  Location: [facility] OR;  Service: OB/GYN;  Laterality: N/A;    CYSTOSCOPY W/ RETROGRADES Bilateral 11/30/2022    Procedure: CYSTOSCOPY, WITH RETROGRADE PYELOGRAM;  Surgeon: Yanira Alexander MD;  Location: [facility] OR;  Service: Urology;  Laterality: Bilateral;    CYSTOURETEROSCOPY WITH RETROGRADE PYELOGRAPHY AND INSERTION OF STENT INTO URETER Bilateral 11/6/2022    Procedure: CYSTOURETEROSCOPY, WITH BILATERAL RETROGRADE  PYELOGRAM AND URETERAL STENT INSERTION;  Surgeon: Yanira Alexander MD;  Location: [facility] OR;  Service: Urology;  Laterality: Bilateral;    FLUOROSCOPY  11/6/2022    Procedure: FLUOROSCOPY;  Surgeon: Yanira Alexander MD;  Location: [facility] OR;  Service: Urology;;    HYSTERECTOMY      ROBOT-ASSISTED LAPAROSCOPIC HYSTERECTOMY N/A 08/03/2021    Procedure: ROBOTIC HYSTERECTOMY;  Surgeon: Jamarcus Ventura MD;  Location: [facility] OR;  Service: OB/GYN;  Laterality: N/A;    ROBOT-ASSISTED SALPINGECTOMY Bilateral 08/03/2021    Procedure: ROBOTIC SALPINGECTOMY;  Surgeon: Jamarcus Ventura MD;  Location: [facility] OR;  Service: OB/GYN;  Laterality: Bilateral;    URETEROSCOPY Left 11/6/2022    Procedure: URETEROSCOPY;  Surgeon: Yanira Alexander MD;  Location: [facility] OR;  Service: Urology;  Laterality: Left;     No family history on file.  Social History     Tobacco Use    Smoking status: Never    Smokeless tobacco: Never   Substance Use Topics    Alcohol use: No    Drug use: No     Review of Systems   Constitutional:  Negative for chills, diaphoresis, fatigue and fever.   HENT:  Positive for congestion. Negative for sore throat and trouble swallowing.    Respiratory:  Positive for cough and shortness of breath.    Cardiovascular:  Positive for chest pain. Negative for palpitations.   Gastrointestinal:  Negative for abdominal pain, blood in stool, constipation, diarrhea, nausea and vomiting.   Genitourinary:  Negative for difficulty urinating, dysuria, frequency and urgency.   Musculoskeletal:  Negative for back pain and myalgias.   Skin:  Negative for rash and wound.   Neurological:  Positive for headaches (frontal). Negative for weakness, light-headedness and numbness.       Physical Exam     Initial Vitals [06/30/24 1549]   BP Pulse Resp Temp SpO2   (!) 132/95 93 20 98.1 °F (36.7 °C) 96 %      MAP       --         Physical Exam    Nursing note and vitals reviewed.  Constitutional: She appears well-developed and well-nourished. She is not  diaphoretic. No distress.   Patient well-appearing.  Awake and alert.  No acute distress.  Maintaining airway appropriately.  Speaking in complete sentences.   HENT:   Head: Normocephalic and atraumatic.   Right Ear: External ear normal.   Left Ear: External ear normal.   Eyes: Conjunctivae and EOM are normal. Pupils are equal, round, and reactive to light.   Neck: Neck supple.   Normal range of motion.  Cardiovascular:  Normal rate, regular rhythm and normal heart sounds.     Exam reveals no friction rub.       No murmur heard.  Pulmonary/Chest: Breath sounds normal. No respiratory distress. She has no wheezes. She has no rhonchi. She has no rales.   Abdominal: Abdomen is soft. Bowel sounds are normal. She exhibits no distension. There is no abdominal tenderness. There is no rebound and no guarding.   Musculoskeletal:         General: No tenderness or edema. Normal range of motion.      Cervical back: Normal range of motion and neck supple.     Neurological: She is alert and oriented to person, place, and time. She has normal strength. No cranial nerve deficit. GCS score is 15. GCS eye subscore is 4. GCS verbal subscore is 5. GCS motor subscore is 6.   Patient able to ambulate with steady gait.  Strength 5/5 in bilateral lower and upper extremities.   strength 5/5 and equal.  Sensation intact throughout.  No cranial nerve deficits. No tremor. Patient is speaking in complete sentences.  No slurred speech.   Skin: Skin is warm and dry.   Psychiatric: She has a normal mood and affect. Her behavior is normal. Thought content normal.         ED Course   Procedures  Labs Reviewed   CBC W/ AUTO DIFFERENTIAL - Abnormal; Notable for the following components:       Result Value    RBC 5.69 (*)     MCV 79 (*)     MCH 25.0 (*)     MCHC 31.5 (*)     RDW 15.1 (*)     All other components within normal limits   NT-PRO NATRIURETIC PEPTIDE - Abnormal; Notable for the following components:    NT-proBNP 944 (*)     All other  components within normal limits   INFLUENZA A & B BY MOLECULAR   COMPREHENSIVE METABOLIC PANEL   TROPONIN I   SARS-COV-2 RNA AMPLIFICATION, QUAL          Imaging Results              X-Ray Chest AP Portable (Final result)  Result time 06/30/24 17:24:23      Final result by Gertrude Gibbs MD (06/30/24 17:24:23)                   Impression:      No acute abnormality.      Electronically signed by: Gertrude Gibbs  Date:    06/30/2024  Time:    17:24               Narrative:    EXAMINATION:  XR CHEST AP PORTABLE    CLINICAL HISTORY:  Chest Pain;.    TECHNIQUE:  Single frontal portable view of the chest was performed.    COMPARISON:  January 2024    FINDINGS:  Support devices: None    The lungs are clear, with normal appearance of pulmonary vasculature and no pleural effusion or pneumothorax.                                       Medications   aspirin tablet 325 mg (325 mg Oral Given 6/30/24 1732)   furosemide injection 40 mg (40 mg Intravenous Given 6/30/24 1849)   ketorolac injection 15 mg (15 mg Intravenous Given 6/30/24 1934)     Medical Decision Making  Patient presents to emergency room for headache, nasal congestion, cough, chest pain, and shortness of breath.  Vital signs stable and within normal limits.  Physical exam as stated above.    Differential Diagnosis includes, but is not limited to ACS/MI, PE, aortic dissection, pneumothorax, cardiac tamponade, pericarditis/myocarditis, pneumonia, infection/abscess, lung mass, trauma/fracture, costochondritis/pleurisy, MSK pain/contusion, GERD, biliary disease, pancreatitis, or anemia.  Patient without tachycardia.  O2 saturation greater than 95%.  Low suspicion for pulmonary embolism.  Chest x-ray without mediastinal widening.  Unlikely dissection.  Chest x-ray without signs of pneumothorax, pneumonia, or mass.  No cardiomegaly.  I do not suspect pericarditis/myocarditis. No anemia or electrolyte abnormalities.  EKG without acute abnormality.  Troponin  within normal limits.  BNP slightly elevated in the 900s.  However, patient maintaining O2 saturation greater than 95%.  No effusion noted on chest x-ray.  She was given IV Lasix in the emergency room along with Toradol for headache.  This relieved her symptoms.  Clinical presentation and physical exam most suggestive of acute viral illness with CHF exacerbation.  No clinical indication for admission at this time.  Will prescribe Lasix to take upon discharge.  Advised patient on strict fluid intake.    I see no indication of an emergent process beyond that addressed during our encounter. Patient stable for discharge at this time. I have counseled the patient regarding follow up with cardiologist/PCP and gave strict return precautions. I have discussed the final diagnosis and gave instructions regarding prescribed medications. Patient verbalized understanding and is agreeable.     Problems Addressed:  Chest pain: acute illness or injury  Congestive heart failure, unspecified HF chronicity, unspecified heart failure type: chronic illness or injury with exacerbation, progression, or side effects of treatment  Shortness of breath: acute illness or injury  Viral syndrome: acute illness or injury    Amount and/or Complexity of Data Reviewed  External Data Reviewed: notes.     Details: Ejection fraction done from echo in 06/2022 at 45%.  Patient currently on spironolactone and torsemide.  Labs: ordered. Decision-making details documented in ED Course.  Radiology: ordered. Decision-making details documented in ED Course.  ECG/medicine tests: ordered. Decision-making details documented in ED Course.    Risk  OTC drugs.  Prescription drug management.  Risk Details: Comorbidities taken into consideration during the patient's evaluation and treatment include anemia, hypertension, SVT, and CHF.    Social determinants of health taken into consideration during development of our treatment plan include difficulty in obtaining  follow-up, obtaining medications, health literacy, access to healthy options for preventative/conservative management, and/or support systems due to, but not limited to, transportation limitations, socioeconomic status, and environmental factors.                ED Course as of 06/30/24 2111   Sun Jun 30, 2024   1702 X-Ray Chest AP Portable  Independent interpretation of chest x-ray without effusion, consolidation, or pneumothorax.  However, poor ventilation on imaging.  Trachea midline.  Cardiomegaly noted.  Though patient with history of CHF.  Agree with radiology reading. [BJ]   1754 Independent EKG interpretation: Normal sinus rhythm, rate 90, normal intervals, normal axis, no STEMI [CS]   1817 CBC auto differential(!)  CBC relatively unremarkable.  No increase in white blood cells.  H/H stable and within normal limits.  Platelet count within normal limits. [BJ]   1817 Comprehensive metabolic panel  CMP relatively unremarkable.  Electrolytes within normal limits.  BUN and creatinine within normal limits.  LFTs within normal limits. [BJ]   1824 COVID-19 Rapid Screening  COVID negative. [BJ]   1825 NT-Pro Natriuretic Peptide(!)  BNP elevated to 944.  This is elevated from baseline. [BJ]   1825 SpO2: 98 %  Patient maintaining O2 saturation greater than 95%. [BJ]   1827 Influenza A & B by Molecular  Influenza negative. [BJ]   1827 Troponin I #1  Troponin negative. [BJ]   2024 On re-evaluation, patient states that she is feeling much better.  Will plan for discharge. [BJ]      ED Course User Index  [BJ] Randi Will PA-C  [CS] Rylee Dale MD                           Clinical Impression:  Final diagnoses:  [R07.9] Chest pain  [I50.9] Congestive heart failure, unspecified HF chronicity, unspecified heart failure type (Primary)  [R06.02] Shortness of breath  [B34.9] Viral syndrome          ED Disposition Condition    Discharge Stable          ED Prescriptions       Medication Sig Dispense Start Date End Date Auth.  Provider    furosemide (LASIX) 20 MG tablet Take 1 tablet (20 mg total) by mouth once daily. for 5 days 5 tablet 6/30/2024 7/5/2024 Randi Will PA-C          Follow-up Information       Follow up With Specialties Details Why Contact Info    Simone Pro,  [facility]   [address]  [address]  [place] [address]  [phone]            This note was partially created using [employer] Voice Recognition software. Typographical and content errors may occur with this process. While efforts are made to detect and correct such errors, in some cases errors will persist. For this reason, wording in this document should be considered in the proper context and not strictly verbatim.        Randi Will PA-C  06/30/24 7260

## 2024-07-01 NOTE — ED NOTES
Pt reports voiding one time after getting IV Lasix.   Medicated patient with Toradol for headache pain.   Plan of care ongoing.

## 2024-07-27 ENCOUNTER — TELEPHONE (OUTPATIENT)
Dept: PHARMACY | Facility: CLINIC | Age: 39
End: 2024-07-27
Payer: COMMERCIAL

## 2024-08-08 ENCOUNTER — TELEPHONE (OUTPATIENT)
Dept: PHARMACY | Facility: CLINIC | Age: 39
End: 2024-08-08
Payer: COMMERCIAL

## 2024-09-01 ENCOUNTER — HOSPITAL ENCOUNTER (EMERGENCY)
Facility: HOSPITAL | Age: 39
Discharge: HOME | End: 2024-09-01
Attending: EMERGENCY MEDICINE
Payer: COMMERCIAL

## 2024-09-01 VITALS
HEART RATE: 108 BPM | OXYGEN SATURATION: 99 % | SYSTOLIC BLOOD PRESSURE: 170 MMHG | BODY MASS INDEX: 48.24 KG/M2 | TEMPERATURE: 99 F | WEIGHT: 293 LBS | RESPIRATION RATE: 19 BRPM | DIASTOLIC BLOOD PRESSURE: 108 MMHG

## 2024-09-01 DIAGNOSIS — S61.212A LACERATION OF RIGHT MIDDLE FINGER WITHOUT FOREIGN BODY WITHOUT DAMAGE TO NAIL, INITIAL ENCOUNTER: Primary | ICD-10-CM

## 2024-09-01 PROCEDURE — 25000003 PHARM REV CODE 250: Mod: ER | Performed by: EMERGENCY MEDICINE

## 2024-09-01 PROCEDURE — 99283 EMERGENCY DEPT VISIT LOW MDM: CPT | Mod: 25,ER

## 2024-09-01 PROCEDURE — 12001 RPR S/N/AX/GEN/TRNK 2.5CM/<: CPT | Mod: ER

## 2024-09-01 RX ORDER — CEPHALEXIN 500 MG/1
500 CAPSULE ORAL 4 TIMES DAILY
Qty: 20 CAPSULE | Refills: 0 | Status: SHIPPED | OUTPATIENT
Start: 2024-09-01 | End: 2024-09-06

## 2024-09-01 RX ORDER — ACETAMINOPHEN 500 MG
1000 TABLET ORAL
Status: COMPLETED | OUTPATIENT
Start: 2024-09-01 | End: 2024-09-01

## 2024-09-01 RX ORDER — CEPHALEXIN 500 MG/1
500 CAPSULE ORAL
Status: COMPLETED | OUTPATIENT
Start: 2024-09-01 | End: 2024-09-01

## 2024-09-01 RX ADMIN — CEPHALEXIN 500 MG: 500 CAPSULE ORAL at 02:09

## 2024-09-01 RX ADMIN — ACETAMINOPHEN 1000 MG: 500 TABLET ORAL at 02:09

## 2024-09-01 NOTE — ED PROVIDER NOTES
Encounter Date: 9/1/2024       History     Chief Complaint   Patient presents with    Laceration     Reports to ED c c/o lacerationto R middle finger. Pt was cutting cucumbers with a chopper and got her finger caught     HPI  39 y.o.   Co laceration in kitchen while making cucumber salad just PTA  RMF tip   Bleeding controlled  TDAP utd    Review of patient's allergies indicates:  No Known Allergies  Past Medical History:   Diagnosis Date    Anemia     Hypertension     Kidney stone     SVT (supraventricular tachycardia)     Uterine fibroid      Past Surgical History:   Procedure Laterality Date    ABLATION N/A 06/04/2021    Procedure: Ablation;  Surgeon: NICHELLE Zepeda MD;  Location: [facility] EP LAB;  Service: Cardiology;  Laterality: N/A;  SVT, RFA, Carto, anes, EH, 3prep    CYSTOSCOPY N/A 08/03/2021    Procedure: CYSTOSCOPY;  Surgeon: Jamarcus Ventura MD;  Location: [facility];  Service: OB/GYN;  Laterality: N/A;    CYSTOSCOPY W/ RETROGRADES Bilateral 11/30/2022    Procedure: CYSTOSCOPY, WITH RETROGRADE PYELOGRAM;  Surgeon: Yanira Alexander MD;  Location: [facility];  Service: Urology;  Laterality: Bilateral;    CYSTOURETEROSCOPY WITH RETROGRADE PYELOGRAPHY AND INSERTION OF STENT INTO URETER Bilateral 11/6/2022    Procedure: CYSTOURETEROSCOPY, WITH BILATERAL RETROGRADE PYELOGRAM AND URETERAL STENT INSERTION;  Surgeon: Yanira Alexander MD;  Location: [facility];  Service: Urology;  Laterality: Bilateral;    FLUOROSCOPY  11/6/2022    Procedure: FLUOROSCOPY;  Surgeon: Yanira Alexander MD;  Location: [facility] OR;  Service: Urology;;    HYSTERECTOMY      ROBOT-ASSISTED LAPAROSCOPIC HYSTERECTOMY N/A 08/03/2021    Procedure: ROBOTIC HYSTERECTOMY;  Surgeon: Jamarcus Ventura MD;  Location: [facility];  Service: OB/GYN;  Laterality: N/A;    ROBOT-ASSISTED SALPINGECTOMY Bilateral 08/03/2021    Procedure: ROBOTIC SALPINGECTOMY;  Surgeon: Jamarcus Ventura MD;  Location: [facility];  Service: OB/GYN;  Laterality: Bilateral;    URETEROSCOPY Left  11/6/2022    Procedure: URETEROSCOPY;  Surgeon: Yanira Alexander MD;  Location: [facility];  Service: Urology;  Laterality: Left;     No family history on file.  Social History     Tobacco Use    Smoking status: Never    Smokeless tobacco: Never   Substance Use Topics    Alcohol use: No    Drug use: No     Review of Systems  All systems were reviewed/examined and were negative except as noted in the HPI.    Physical Exam     Initial Vitals [09/01/24 0105]   BP Pulse Resp Temp SpO2   (!) 170/108 108 19 98.7 °F (37.1 °C) 99 %      MAP       --         Physical Exam    General: the patient is awake, alert, and in no apparent distress.  Head: normocephalic and atraumatic, sclera are clear  Neck: supple without meningismus  Chest: no respiratory distress  Heart: regular rate and rhythm  RMF superficial laceration to distal tip   Nail not involved  Hand nvi  Extremities: warm and well perfused  Skin: warm and dry  Psych conversant  Neuro: awake, alert, moving all extremities    ED Course   Lac Repair    Date/Time: 9/1/2024 1:30 AM    Performed by: Riley Ruiz MD  Authorized by: Riley Ruiz MD    Consent:     Consent obtained:  Verbal    Consent given by:  Patient  Universal protocol:     Patient identity confirmed:  Verbally with patient  Anesthesia:     Anesthesia method:  None  Laceration details:     Location:  Finger    Finger location:  R long finger    Length (cm):  0.5  Treatment:     Area cleansed with:  Saline    Amount of cleaning:  Standard    Irrigation solution:  Sterile saline  Skin repair:     Repair method:  Tissue adhesive  Approximation:     Approximation:  Close  Repair type:     Repair type:  Intermediate  Post-procedure details:     Dressing:  Open (no dressing)    Procedure completion:  Tolerated well, no immediate complications    Labs Reviewed - No data to display       Imaging Results    None          Medications   acetaminophen tablet 1,000 mg (1,000 mg Oral Given 9/1/24 0202)    cephALEXin capsule 500 mg (500 mg Oral Given 9/1/24 0203)     Medical Decision Making  Risk  OTC drugs.  Prescription drug management.       Medical Decision Making:    This is an emergent evaluation of a patient presenting to the ED.  Nursing notes were reviewed.    I decided to obtain and review old medical records, which showed: TDAP status        Twan Ruiz MD, ADRIANA                                 Clinical Impression:  Final diagnoses:  [S61.212A] Laceration of right middle finger without foreign body without damage to nail, initial encounter (Primary)          ED Disposition Condition    Discharge Stable          ED Prescriptions       Medication Sig Dispense Start Date End Date Auth. Provider    cephALEXin (KEFLEX) 500 MG capsule Take 1 capsule (500 mg total) by mouth 4 (four) times daily. for 5 days 20 capsule 9/1/2024 9/6/2024 Riley Ruiz MD          Follow-up Information       Follow up With Specialties Details Why Contact Info    Your doctor  Schedule an appointment as soon as possible for a visit             Discharged to home in stable condition, return to ED warnings given, follow up and patient care instructions given.      Twan Ruiz MD, ADRIANA, [facility]  Department of Emergency Medicine       Riley Ruiz MD  09/01/24 [id number]

## 2024-09-03 DIAGNOSIS — I50.22 CHRONIC SYSTOLIC CONGESTIVE HEART FAILURE: ICD-10-CM

## 2024-09-03 DIAGNOSIS — I50.20 HFREF (HEART FAILURE WITH REDUCED EJECTION FRACTION): ICD-10-CM

## 2024-09-03 DIAGNOSIS — I10 UNCONTROLLED HYPERTENSION: ICD-10-CM

## 2024-09-03 RX ORDER — TORSEMIDE 20 MG/1
20 TABLET ORAL EVERY OTHER DAY
Qty: 15 TABLET | Refills: 2 | Status: CANCELLED | OUTPATIENT
Start: 2024-09-03 | End: 2025-09-03

## 2024-09-03 RX ORDER — SPIRONOLACTONE 50 MG/1
50 TABLET, FILM COATED ORAL DAILY
Qty: 90 TABLET | Refills: 1 | Status: CANCELLED | OUTPATIENT
Start: 2024-09-03 | End: 2025-09-03

## 2024-09-04 RX ORDER — SACUBITRIL AND VALSARTAN 97; 103 MG/1; MG/1
1 TABLET, FILM COATED ORAL 2 TIMES DAILY
Qty: 60 TABLET | Refills: 0 | OUTPATIENT
Start: 2024-09-04 | End: 2025-01-18

## 2024-09-04 NOTE — TELEPHONE ENCOUNTER
Called and spoke with patient in regards to her refill request I advised her she needs an appointment due to her not being here in a 1yr that the provider she was seeing is no longer here but I did send in her request for her medication I advised her they may give her a 30 day supply because it is her blood pressure medication I offered to help make that appointment but she declined and that she will call and on her on and make that appointment

## 2024-09-06 DIAGNOSIS — I50.20 HFREF (HEART FAILURE WITH REDUCED EJECTION FRACTION): ICD-10-CM

## 2024-09-06 DIAGNOSIS — I50.22 CHRONIC SYSTOLIC CONGESTIVE HEART FAILURE: ICD-10-CM

## 2024-09-06 DIAGNOSIS — I10 UNCONTROLLED HYPERTENSION: ICD-10-CM

## 2024-09-06 RX ORDER — SPIRONOLACTONE 50 MG/1
50 TABLET, FILM COATED ORAL DAILY
Qty: 90 TABLET | Refills: 1 | Status: CANCELLED | OUTPATIENT
Start: 2024-09-03 | End: 2025-09-03

## 2024-09-06 RX ORDER — TORSEMIDE 20 MG/1
20 TABLET ORAL EVERY OTHER DAY
Qty: 15 TABLET | Refills: 2 | Status: CANCELLED | OUTPATIENT
Start: 2024-09-03 | End: 2025-09-03

## 2024-09-10 RX ORDER — SPIRONOLACTONE 50 MG/1
50 TABLET, FILM COATED ORAL DAILY
Qty: 90 TABLET | Refills: 3 | Status: SHIPPED | OUTPATIENT
Start: 2024-09-10 | End: 2025-09-10

## 2024-09-10 RX ORDER — TORSEMIDE 20 MG/1
20 TABLET ORAL EVERY OTHER DAY
Qty: 45 TABLET | Refills: 3 | Status: SHIPPED | OUTPATIENT
Start: 2024-09-10 | End: 2025-09-10

## 2024-12-09 DIAGNOSIS — I50.22 CHRONIC SYSTOLIC CONGESTIVE HEART FAILURE: ICD-10-CM

## 2024-12-09 RX ORDER — METOPROLOL SUCCINATE 200 MG/1
200 TABLET, EXTENDED RELEASE ORAL DAILY
Qty: 90 TABLET | Refills: 3 | Status: CANCELLED | OUTPATIENT
Start: 2024-12-09 | End: 2025-12-09

## 2024-12-09 RX ORDER — SACUBITRIL AND VALSARTAN 97; 103 MG/1; MG/1
1 TABLET, FILM COATED ORAL 2 TIMES DAILY
Qty: 60 TABLET | Refills: 6 | Status: CANCELLED | OUTPATIENT
Start: 2024-12-09 | End: 2025-04-24

## 2024-12-10 DIAGNOSIS — I50.22 CHRONIC SYSTOLIC CONGESTIVE HEART FAILURE: Primary | ICD-10-CM

## 2024-12-10 RX ORDER — SACUBITRIL AND VALSARTAN 97; 103 MG/1; MG/1
1 TABLET, FILM COATED ORAL 2 TIMES DAILY
Qty: 60 TABLET | Refills: 1 | Status: SHIPPED | OUTPATIENT
Start: 2024-12-10

## 2024-12-12 DIAGNOSIS — I50.22 CHRONIC SYSTOLIC CONGESTIVE HEART FAILURE: ICD-10-CM

## 2024-12-12 RX ORDER — METOPROLOL SUCCINATE 200 MG/1
200 TABLET, EXTENDED RELEASE ORAL DAILY
Qty: 90 TABLET | Refills: 3 | Status: CANCELLED | OUTPATIENT
Start: 2024-12-09 | End: 2025-12-09

## 2024-12-15 DIAGNOSIS — I50.22 CHRONIC SYSTOLIC CONGESTIVE HEART FAILURE: ICD-10-CM

## 2024-12-15 RX ORDER — METOPROLOL SUCCINATE 200 MG/1
200 TABLET, EXTENDED RELEASE ORAL DAILY
Qty: 90 TABLET | Refills: 3 | Status: CANCELLED | OUTPATIENT
Start: 2024-12-09 | End: 2025-12-09

## 2024-12-16 DIAGNOSIS — I50.22 CHRONIC SYSTOLIC CONGESTIVE HEART FAILURE: ICD-10-CM

## 2024-12-16 RX ORDER — METOPROLOL SUCCINATE 200 MG/1
200 TABLET, EXTENDED RELEASE ORAL DAILY
Qty: 90 TABLET | Refills: 3 | Status: CANCELLED | OUTPATIENT
Start: 2024-12-09 | End: 2025-12-09

## 2024-12-26 ENCOUNTER — PATIENT MESSAGE (OUTPATIENT)
Dept: PODIATRY | Facility: CLINIC | Age: 39
End: 2024-12-26
Payer: COMMERCIAL

## 2025-01-06 ENCOUNTER — HOSPITAL ENCOUNTER (OUTPATIENT)
Dept: RADIOLOGY | Facility: HOSPITAL | Age: 40
Discharge: HOME | End: 2025-01-06
Attending: STUDENT IN AN ORGANIZED HEALTH CARE EDUCATION/TRAINING PROGRAM
Payer: COMMERCIAL

## 2025-01-06 ENCOUNTER — OFFICE VISIT (OUTPATIENT)
Dept: FAMILY MEDICINE | Facility: HOSPITAL | Age: 40
End: 2025-01-06
Payer: COMMERCIAL

## 2025-01-06 VITALS
OXYGEN SATURATION: 98 % | SYSTOLIC BLOOD PRESSURE: 156 MMHG | WEIGHT: 293 LBS | BODY MASS INDEX: 45.99 KG/M2 | HEIGHT: 67 IN | HEART RATE: 98 BPM | DIASTOLIC BLOOD PRESSURE: 111 MMHG

## 2025-01-06 DIAGNOSIS — E11.9 TYPE 2 DIABETES MELLITUS WITHOUT COMPLICATION, WITHOUT LONG-TERM CURRENT USE OF INSULIN: ICD-10-CM

## 2025-01-06 DIAGNOSIS — I10 UNCONTROLLED HYPERTENSION: ICD-10-CM

## 2025-01-06 DIAGNOSIS — I50.20 HFREF (HEART FAILURE WITH REDUCED EJECTION FRACTION): ICD-10-CM

## 2025-01-06 DIAGNOSIS — Z12.31 ENCOUNTER FOR SCREENING MAMMOGRAM FOR BREAST CANCER: ICD-10-CM

## 2025-01-06 DIAGNOSIS — I50.22 CHRONIC SYSTOLIC CONGESTIVE HEART FAILURE: ICD-10-CM

## 2025-01-06 PROCEDURE — 77063 BREAST TOMOSYNTHESIS BI: CPT | Mod: TC

## 2025-01-06 PROCEDURE — 99214 OFFICE O/P EST MOD 30 MIN: CPT

## 2025-01-06 PROCEDURE — 77063 BREAST TOMOSYNTHESIS BI: CPT | Mod: 26,,, | Performed by: RADIOLOGY

## 2025-01-06 PROCEDURE — 77067 SCR MAMMO BI INCL CAD: CPT | Mod: 26,,, | Performed by: RADIOLOGY

## 2025-01-06 RX ORDER — METOPROLOL SUCCINATE 200 MG/1
200 TABLET, EXTENDED RELEASE ORAL DAILY
Qty: 90 TABLET | Refills: 3 | Status: SHIPPED | OUTPATIENT
Start: 2025-01-06 | End: 2026-01-06

## 2025-01-06 RX ORDER — TORSEMIDE 20 MG/1
20 TABLET ORAL EVERY OTHER DAY
Qty: 45 TABLET | Refills: 3 | Status: SHIPPED | OUTPATIENT
Start: 2025-01-06 | End: 2026-01-06

## 2025-01-06 RX ORDER — SPIRONOLACTONE 50 MG/1
50 TABLET, FILM COATED ORAL DAILY
Qty: 90 TABLET | Refills: 3 | Status: SHIPPED | OUTPATIENT
Start: 2025-01-06 | End: 2026-01-06

## 2025-01-06 RX ORDER — SACUBITRIL AND VALSARTAN 97; 103 MG/1; MG/1
1 TABLET, FILM COATED ORAL 2 TIMES DAILY
Qty: 60 TABLET | Refills: 1 | Status: SHIPPED | OUTPATIENT
Start: 2025-01-06

## 2025-01-06 RX ORDER — DAPAGLIFLOZIN 10 MG/1
10 TABLET, FILM COATED ORAL DAILY
Qty: 90 TABLET | Refills: 3 | Status: SHIPPED | OUTPATIENT
Start: 2025-01-06

## 2025-01-07 ENCOUNTER — LAB VISIT (OUTPATIENT)
Dept: LAB | Facility: HOSPITAL | Age: 40
End: 2025-01-07
Payer: COMMERCIAL

## 2025-01-07 DIAGNOSIS — E11.9 TYPE 2 DIABETES MELLITUS WITHOUT COMPLICATION, WITHOUT LONG-TERM CURRENT USE OF INSULIN: ICD-10-CM

## 2025-01-07 LAB
ALBUMIN SERPL BCP-MCNC: 4 G/DL (ref 3.5–5.2)
ANION GAP SERPL CALC-SCNC: 4 MMOL/L (ref 8–16)
CALCIUM SERPL-MCNC: 9.3 MG/DL (ref 8.7–10.5)
CHLORIDE SERPL-SCNC: 101 MMOL/L (ref 95–110)
CO2 SERPL-SCNC: 29 MMOL/L (ref 23–29)
CREAT SERPL-MCNC: 1.07 MG/DL (ref 0.5–1.4)
EST. GFR  (NO RACE VARIABLE): >60 ML/MIN/1.73 M^2
ESTIMATED AVG GLUCOSE: 148 MG/DL (ref 68–131)
GLUCOSE SERPL-MCNC: 138 MG/DL (ref 70–110)
HBA1C MFR BLD: 6.8 % (ref 4–5.6)
PHOSPHATE SERPL-MCNC: 3.7 MG/DL (ref 2.7–4.5)
POTASSIUM SERPL-SCNC: 4.3 MMOL/L (ref 3.5–5.1)
SODIUM SERPL-SCNC: 134 MMOL/L (ref 136–145)
UUN UR-MCNC: 16 MG/DL (ref 7–17)

## 2025-01-07 PROCEDURE — 83036 HEMOGLOBIN GLYCOSYLATED A1C: CPT

## 2025-01-07 PROCEDURE — 80069 RENAL FUNCTION PANEL: CPT | Mod: PN

## 2025-01-07 PROCEDURE — 36415 COLL VENOUS BLD VENIPUNCTURE: CPT | Mod: PN

## 2025-01-08 NOTE — PROGRESS NOTES
[facility]    Subjective:     Ursula Smallwood is a 39 y.o. year old female with PMHx of Type 2 diabetes mellitus, without long-term current use of insulin, Morbid obesity with BMI of 40.0-44.9, adult, Nonischemic cardiomyopathy, Chronic systolic congestive heart failure, CHF diagnosed in March 2021. LVEF 45% (March 2021) decreased to 20% in 2022 and HTN who presents to clinic for who presents to the clinic with concerns of weight gain. The patient reports she has been off Ozempic for the past six months due to running out of the medication. She describes noticeable weight gain over this time, accompanied by increased fatigue and shortness of breath with exertion. She denies chest pain, palpitations, or orthopnea.  The patient states she has been compliant with her other prescribed medications but has not been seen in the clinic  for over a year. Patient is follow by Cory Schilling MD. She reports feeling overwhelmed by her symptoms and is seeking a refill of her medications and assistance in addressing her weight gain and associated symptoms. No recent changes in diet, physical activity, or significant stressors were noted.    Patient Active Problem List    Diagnosis Date Noted    Ureteral stone with hydronephrosis 11/05/2022    Acute cystitis with hematuria 11/05/2022    Type 2 diabetes mellitus, without long-term current use of insulin 11/05/2022    Morbid obesity with BMI of 40.0-44.9, adult 09/07/2022    Nonischemic cardiomyopathy 05/01/2022    Hypertension     Chronic systolic congestive heart failure 03/08/2022    H/O abdominal hysterectomy 01/26/2022    COVID-19 01/26/2022    H/O cardiac radiofrequency ablation 08/13/2021    Uterine fibroid 04/26/2021    Menorrhagia with irregular cycle 04/26/2021    Morbid obesity 04/22/2021    Symptomatic anemia 03/25/2021        Review of patient's allergies indicates:  No Known Allergies     Past Medical History:   Diagnosis Date    Anemia      Hypertension     Kidney stone     SVT (supraventricular tachycardia)     Uterine fibroid       Past Surgical History:   Procedure Laterality Date    ABLATION N/A 06/04/2021    Procedure: Ablation;  Surgeon: NICHELLE Zepeda MD;  Location: [facility] EP LAB;  Service: Cardiology;  Laterality: N/A;  SVT, RFA, Carto, anes, EH, 3prep    CYSTOSCOPY N/A 08/03/2021    Procedure: CYSTOSCOPY;  Surgeon: Jamarcus Ventura MD;  Location: [facility];  Service: OB/GYN;  Laterality: N/A;    CYSTOSCOPY W/ RETROGRADES Bilateral 11/30/2022    Procedure: CYSTOSCOPY, WITH RETROGRADE PYELOGRAM;  Surgeon: Yanira Alexander MD;  Location: [facility] OR;  Service: Urology;  Laterality: Bilateral;    CYSTOURETEROSCOPY WITH RETROGRADE PYELOGRAPHY AND INSERTION OF STENT INTO URETER Bilateral 11/6/2022    Procedure: CYSTOURETEROSCOPY, WITH BILATERAL RETROGRADE PYELOGRAM AND URETERAL STENT INSERTION;  Surgeon: Yanira Alexander MD;  Location: [facility];  Service: Urology;  Laterality: Bilateral;    FLUOROSCOPY  11/6/2022    Procedure: FLUOROSCOPY;  Surgeon: Yanira Alexander MD;  Location: [facility];  Service: Urology;;    HYSTERECTOMY      ROBOT-ASSISTED LAPAROSCOPIC HYSTERECTOMY N/A 08/03/2021    Procedure: ROBOTIC HYSTERECTOMY;  Surgeon: Jamarcus Ventura MD;  Location: [facility];  Service: OB/GYN;  Laterality: N/A;    ROBOT-ASSISTED SALPINGECTOMY Bilateral 08/03/2021    Procedure: ROBOTIC SALPINGECTOMY;  Surgeon: Jamarcus Ventura MD;  Location: [facility];  Service: OB/GYN;  Laterality: Bilateral;    URETEROSCOPY Left 11/6/2022    Procedure: URETEROSCOPY;  Surgeon: Yanira Alexander MD;  Location: [facility];  Service: Urology;  Laterality: Left;      No family history on file.   Social History     Tobacco Use    Smoking status: Never    Smokeless tobacco: Never   Substance Use Topics    Alcohol use: No      Review of Systems   Constitutional: Negative.    HENT: Negative.     Eyes: Negative.    Respiratory: Negative.     Cardiovascular: Negative.    Gastrointestinal:  "Negative.    Genitourinary: Negative.    Musculoskeletal: Negative.    Skin: Negative.    Neurological: Negative.    Endo/Heme/Allergies: Negative.    Psychiatric/Behavioral: Negative.      Objective:     Vitals:    01/06/25 1517   BP: (!) 156/111   Patient Position: Sitting   Pulse: 98   SpO2: 98%   Weight: (!) 144.9 kg (319 lb 7.1 oz)   Height: 5' 7" (1.702 m)     BMI: 50.03    Physical Exam  Vitals and nursing note reviewed.   Constitutional:       Appearance: She is obese.   HENT:      Head: Normocephalic and atraumatic.      Mouth/Throat:      Mouth: Mucous membranes are moist.   Eyes:      Conjunctiva/sclera: Conjunctivae normal.      Pupils: Pupils are equal, round, and reactive to light.   Cardiovascular:      Rate and Rhythm: Normal rate and regular rhythm.   Abdominal:      General: Bowel sounds are normal.      Palpations: Abdomen is soft.   Musculoskeletal:         General: Normal range of motion.      Cervical back: Normal range of motion and neck supple.   Skin:     General: Skin is warm.      Capillary Refill: Capillary refill takes less than 2 seconds.   Neurological:      General: No focal deficit present.      Mental Status: She is alert and oriented to person, place, and time.   Psychiatric:         Mood and Affect: Mood normal.         Behavior: Behavior normal.            Assessment/Plan:     Ursula Smallwood is a 39 y.o. year old female who presents to clinic for noticeable weight gain over the past months     1. Chronic systolic congestive heart failure    -The patient has a history of heart failure with reduced ejection fraction (LVEF 45% in 2021, decreased to 20% in 2022). She reports increased fatigue and shortness of breath on exertion, which may indicate a worsening of symptoms . Current medications include sacubitril-valsartan, spironolactone, torsemide, metoprolol succinate, and dapagliflozin.     Plan:  -Continue current heart failure medications:  -Sacubitril-valsartan (ENTRESTO)  " mg, 1 tablet twice daily.  -Spironolactone (ALDACTONE) 50 mg, 1 tablet daily.  -Torsemide (DEMADEX) 20 mg, 1 tablet every other day.  -Metoprolol succinate (TOPROL-XL) 200 mg, 1 tablet daily.  -Dapagliflozin (FARXIGA) 10 mg, 1 tablet daily.  -Educate the patient on the importance of medication compliance and daily weight monitoring.  -Assess for potential fluid retention by asking the patient to monitor for increased edema or sudden weight gain.    2. Uncontrolled hypertension    -The patients hypertension remains suboptimally controlled. Spironolactone is currently part of her management plan, which addresses both her CHF and blood pressure.     Plan   Continue current medication   - spironolactone (ALDACTONE) 50 MG tablet; Take 1 tablet (50 mg total) by mouth once daily.  Dispense: 90 tablet; Refill: 3  -Reinforce the importance of adherence to antihypertensive medications.  -Encourage regular home blood pressure monitoring and recording for review at the next visit.  -Assess the effectiveness of current antihypertensive therapy during the next follow-up visit.    3. Type 2 diabetes mellitus without complication, without long-term current use of insulin    Diabetes management includes dapagliflozin. The patient needs follow-up lab work to monitor glycemic control and renal function.     Plan  -Continue dapagliflozin (FARXIGA) 10 mg daily.  -Order Hemoglobin A1C and renal function panel to evaluate glycemic control and renal status. Results will guide future adjustments to therapy.    4.  Morbid obesity with BMI of 50, contributing to heart failure symptoms:  The patient has significant weight gain and obesity, contributing to her fatigue, exertional dyspnea, and exacerbation of CHF symptoms. Weight management strategies need optimization, including addressing her use of Ozempic.     Plan   -Discussed the significant impact of obesity on her comorbid conditions, including heart failure and diabetes.  -Explained  that she may be a candidate for bariatric surgery, and referral will be made to a bariatric surgery specialist for evaluation as soon as patient is ready.  -Emphasized the need to assess renal function and glycemic control before resuming Ozempic or other weight management medications.  -Encouraged gradual physical activity as tolerated.    Follow-up:2 weeks    A total of 35 minutes was spent on patient care during this encounter which included chart review, examining the patient, formulating a treatment plan and documentation.      Complex medical decision making performed due to multiple medical problems addressed during the visit.      Medical decision making straight forward and not complex during this visit.     Case discussed with staff: Nico Gunn MD  [facility], PGY-1  01/07/2025

## 2025-01-21 ENCOUNTER — PATIENT MESSAGE (OUTPATIENT)
Dept: PODIATRY | Facility: CLINIC | Age: 40
End: 2025-01-21
Payer: COMMERCIAL

## 2025-01-28 ENCOUNTER — OFFICE VISIT (OUTPATIENT)
Dept: PODIATRY | Facility: CLINIC | Age: 40
End: 2025-01-28
Payer: COMMERCIAL

## 2025-01-28 VITALS
BODY MASS INDEX: 45.99 KG/M2 | DIASTOLIC BLOOD PRESSURE: 84 MMHG | WEIGHT: 293 LBS | HEIGHT: 67 IN | HEART RATE: 89 BPM | SYSTOLIC BLOOD PRESSURE: 129 MMHG

## 2025-01-28 DIAGNOSIS — L60.9 DISEASE OF NAIL: Primary | ICD-10-CM

## 2025-01-28 PROCEDURE — 3008F BODY MASS INDEX DOCD: CPT | Mod: CPTII,S$GLB,, | Performed by: PODIATRIST

## 2025-01-28 PROCEDURE — 4010F ACE/ARB THERAPY RXD/TAKEN: CPT | Mod: CPTII,S$GLB,, | Performed by: PODIATRIST

## 2025-01-28 PROCEDURE — 3074F SYST BP LT 130 MM HG: CPT | Mod: CPTII,S$GLB,, | Performed by: PODIATRIST

## 2025-01-28 PROCEDURE — 99213 OFFICE O/P EST LOW 20 MIN: CPT | Mod: S$GLB,,, | Performed by: PODIATRIST

## 2025-01-28 PROCEDURE — 3079F DIAST BP 80-89 MM HG: CPT | Mod: CPTII,S$GLB,, | Performed by: PODIATRIST

## 2025-01-28 PROCEDURE — 3044F HG A1C LEVEL LT 7.0%: CPT | Mod: CPTII,S$GLB,, | Performed by: PODIATRIST

## 2025-01-28 PROCEDURE — 99999 PR PBB SHADOW E&M-EST. PATIENT-LVL III: CPT | Mod: PBBFAC,,, | Performed by: PODIATRIST

## 2025-01-28 PROCEDURE — 1159F MED LIST DOCD IN RCRD: CPT | Mod: CPTII,S$GLB,, | Performed by: PODIATRIST

## 2025-01-28 RX ORDER — CICLOPIROX OLAMINE 7.7 MG/100ML
SUSPENSION TOPICAL 2 TIMES DAILY
Qty: 60 ML | Refills: 3 | Status: SHIPPED | OUTPATIENT
Start: 2025-01-28

## 2025-01-28 NOTE — LETTER
January 28, 2025      [id number]  [address]  [facility] [address]  Phone: [phone]       Patient: Ursula Smallwood   YOB: 1985  Date of Visit: 01/28/2025    To Whom It May Concern:    Caroline Smallwood  was at [facility] on 01/28/2025. She may return to work/school on 1/29/2025 with no restrictions. If you have any questions or concerns, or if I can be of further assistance, please do not hesitate to contact me.    Sincerely,    Matthew Perez DPM

## 2025-01-28 NOTE — PROGRESS NOTES
Subjective:      Patient ID: Ursula Smallwood is a 39 y.o. female.    Chief Complaint: Nail Problem (Discoloration f/u)    Ursula is a 39 y.o. female who presents to the clinic complaining of thick and discolored toenails on both feet. Ursula is here for follow up. Has been using ciclopirox with some improvement.       Review of Systems   Constitutional: Negative for chills, fever and malaise/fatigue.   HENT:  Negative for hearing loss.    Cardiovascular:  Negative for claudication.   Respiratory:  Negative for shortness of breath.    Skin:  Positive for dry skin and nail changes. Negative for flushing and rash.   Musculoskeletal:  Negative for joint pain and myalgias.   Neurological:  Negative for loss of balance, numbness, paresthesias and sensory change.   Psychiatric/Behavioral:  Negative for altered mental status.            Objective:      Physical Exam  Vitals reviewed.   Cardiovascular:      Pulses:           Dorsalis pedis pulses are 2+ on the right side and 2+ on the left side.        Posterior tibial pulses are 2+ on the right side and 2+ on the left side.      Comments: No edema noted b/L  Musculoskeletal:      Comments:        Feet:      Right foot:      Protective Sensation: 5 sites tested.  5 sites sensed.      Left foot:      Protective Sensation: 5 sites tested.  5 sites sensed.   Skin:     General: Skin is warm.      Capillary Refill: Capillary refill takes 2 to 3 seconds.      Comments: Normal skin tugor noted.   No open lesion noted b/L  Skin temp is warm to warm from proximal to distal b/L.  Webspaces clean, dry, and intact  Hallux nails thickened, improved   Neurological:      Mental Status: She is alert.      Comments: Gross sensation intact b/L               Assessment:       Encounter Diagnosis   Name Primary?    Disease of nail Yes         Plan:       Ursula was seen today for nail problem.    Diagnoses and all orders for this visit:    Disease of nail    Other orders  -     ciclopirox (LOPROX) 0.77 %  Susp; Apply topically 2 (two) times daily.      I counseled the patient on her conditions, their implications and medical management.    Pt was seen today for changes in toenail. Pt was advised that nail changes could be due to toenail fungus or damage/trauma to toenail or toenail matrix.   Pt advised that there are currently no prescription options for nail damage/trauma.   Rx ciclopirox    Call or return to clinic prn if these symptoms worsen or fail to improve as anticipated.    .

## 2025-01-31 ENCOUNTER — OFFICE VISIT (OUTPATIENT)
Dept: TRANSPLANT | Facility: CLINIC | Age: 40
End: 2025-01-31
Payer: COMMERCIAL

## 2025-01-31 ENCOUNTER — LAB VISIT (OUTPATIENT)
Dept: LAB | Facility: HOSPITAL | Age: 40
End: 2025-01-31
Attending: INTERNAL MEDICINE
Payer: COMMERCIAL

## 2025-01-31 VITALS
WEIGHT: 293 LBS | HEART RATE: 97 BPM | BODY MASS INDEX: 45.99 KG/M2 | SYSTOLIC BLOOD PRESSURE: 120 MMHG | OXYGEN SATURATION: 97 % | HEIGHT: 67 IN | DIASTOLIC BLOOD PRESSURE: 83 MMHG

## 2025-01-31 DIAGNOSIS — I10 UNCONTROLLED HYPERTENSION: ICD-10-CM

## 2025-01-31 DIAGNOSIS — I50.22 CHRONIC SYSTOLIC CONGESTIVE HEART FAILURE: Primary | ICD-10-CM

## 2025-01-31 DIAGNOSIS — E11.9 TYPE 2 DIABETES MELLITUS WITHOUT COMPLICATION, WITHOUT LONG-TERM CURRENT USE OF INSULIN: ICD-10-CM

## 2025-01-31 DIAGNOSIS — I50.22 CHRONIC SYSTOLIC CONGESTIVE HEART FAILURE: ICD-10-CM

## 2025-01-31 DIAGNOSIS — I42.8 NONISCHEMIC CARDIOMYOPATHY: ICD-10-CM

## 2025-01-31 DIAGNOSIS — E66.813 CLASS 3 SEVERE OBESITY WITH SERIOUS COMORBIDITY AND BODY MASS INDEX (BMI) OF 50.0 TO 59.9 IN ADULT, UNSPECIFIED OBESITY TYPE: ICD-10-CM

## 2025-01-31 DIAGNOSIS — I10 PRIMARY HYPERTENSION: ICD-10-CM

## 2025-01-31 DIAGNOSIS — E66.01 MORBID OBESITY WITH BMI OF 40.0-44.9, ADULT: ICD-10-CM

## 2025-01-31 DIAGNOSIS — I50.20 HFREF (HEART FAILURE WITH REDUCED EJECTION FRACTION): ICD-10-CM

## 2025-01-31 DIAGNOSIS — E66.01 CLASS 3 SEVERE OBESITY WITH SERIOUS COMORBIDITY AND BODY MASS INDEX (BMI) OF 50.0 TO 59.9 IN ADULT, UNSPECIFIED OBESITY TYPE: ICD-10-CM

## 2025-01-31 LAB
ANION GAP SERPL CALC-SCNC: 7 MMOL/L (ref 8–16)
BUN SERPL-MCNC: 12 MG/DL (ref 6–20)
CALCIUM SERPL-MCNC: 8.9 MG/DL (ref 8.7–10.5)
CHLORIDE SERPL-SCNC: 106 MMOL/L (ref 95–110)
CO2 SERPL-SCNC: 26 MMOL/L (ref 23–29)
CREAT SERPL-MCNC: 0.9 MG/DL (ref 0.5–1.4)
EST. GFR  (NO RACE VARIABLE): >60 ML/MIN/1.73 M^2
GLUCOSE SERPL-MCNC: 127 MG/DL (ref 70–110)
POTASSIUM SERPL-SCNC: 4.2 MMOL/L (ref 3.5–5.1)
SODIUM SERPL-SCNC: 139 MMOL/L (ref 136–145)
T3FREE SERPL-MCNC: 3.4 PG/ML (ref 2.3–4.2)

## 2025-01-31 PROCEDURE — 3079F DIAST BP 80-89 MM HG: CPT | Mod: CPTII,S$GLB,, | Performed by: INTERNAL MEDICINE

## 2025-01-31 PROCEDURE — 1159F MED LIST DOCD IN RCRD: CPT | Mod: CPTII,S$GLB,, | Performed by: INTERNAL MEDICINE

## 2025-01-31 PROCEDURE — 3074F SYST BP LT 130 MM HG: CPT | Mod: CPTII,S$GLB,, | Performed by: INTERNAL MEDICINE

## 2025-01-31 PROCEDURE — 80048 BASIC METABOLIC PNL TOTAL CA: CPT | Performed by: INTERNAL MEDICINE

## 2025-01-31 PROCEDURE — 4010F ACE/ARB THERAPY RXD/TAKEN: CPT | Mod: CPTII,S$GLB,, | Performed by: INTERNAL MEDICINE

## 2025-01-31 PROCEDURE — 3008F BODY MASS INDEX DOCD: CPT | Mod: CPTII,S$GLB,, | Performed by: INTERNAL MEDICINE

## 2025-01-31 PROCEDURE — 99214 OFFICE O/P EST MOD 30 MIN: CPT | Mod: S$GLB,,, | Performed by: INTERNAL MEDICINE

## 2025-01-31 PROCEDURE — 3044F HG A1C LEVEL LT 7.0%: CPT | Mod: CPTII,S$GLB,, | Performed by: INTERNAL MEDICINE

## 2025-01-31 PROCEDURE — 99999 PR PBB SHADOW E&M-EST. PATIENT-LVL IV: CPT | Mod: PBBFAC,,, | Performed by: INTERNAL MEDICINE

## 2025-01-31 PROCEDURE — 83880 ASSAY OF NATRIURETIC PEPTIDE: CPT | Performed by: INTERNAL MEDICINE

## 2025-01-31 PROCEDURE — 84481 FREE ASSAY (FT-3): CPT | Performed by: INTERNAL MEDICINE

## 2025-01-31 RX ORDER — SPIRONOLACTONE 50 MG/1
50 TABLET, FILM COATED ORAL DAILY
Qty: 90 TABLET | Refills: 1 | Status: SHIPPED | OUTPATIENT
Start: 2025-01-31 | End: 2026-01-31

## 2025-01-31 RX ORDER — SACUBITRIL AND VALSARTAN 97; 103 MG/1; MG/1
1 TABLET, FILM COATED ORAL 2 TIMES DAILY
Qty: 60 TABLET | Refills: 6 | Status: SHIPPED | OUTPATIENT
Start: 2025-01-31

## 2025-01-31 RX ORDER — METOPROLOL SUCCINATE 200 MG/1
200 TABLET, EXTENDED RELEASE ORAL DAILY
Qty: 90 TABLET | Refills: 1 | Status: SHIPPED | OUTPATIENT
Start: 2025-01-31 | End: 2026-01-31

## 2025-01-31 RX ORDER — TORSEMIDE 20 MG/1
20 TABLET ORAL EVERY OTHER DAY
Qty: 45 TABLET | Refills: 3 | Status: SHIPPED | OUTPATIENT
Start: 2025-01-31 | End: 2026-01-31

## 2025-01-31 RX ORDER — DAPAGLIFLOZIN 10 MG/1
10 TABLET, FILM COATED ORAL DAILY
Qty: 90 TABLET | Refills: 1 | Status: SHIPPED | OUTPATIENT
Start: 2025-01-31

## 2025-01-31 NOTE — LETTER
January 31, 2025      [facility]  [address]  [address]  [place] [address]  Phone: [phone]  Fax: [phone]       Patient: Ursula Smallwood   YOB: 1985  Date of Visit: 01/31/2025    To Whom It May Concern:    Caroline Smallwood  was at [facility] on 01/31/2025. The patient may return to work/school on 1/31/2025 with no restrictions. If you have any questions or concerns, or if I can be of further assistance, please do not hesitate to contact me.    Sincerely,    Abi Boykin RN  On behalf of Dr Cassandra Sullivan

## 2025-01-31 NOTE — PATIENT INSTRUCTIONS
You have just the right amount of fluid on you.  Please adhere to a low sodium diet (no more than 1.5 grams of sodium in 24h).  3.   Follow fluid restriction of  1. no more than 2 liters in 24 hours..   4.  Have blood tests done today.  5.  Have echo and stress test done ASAP.  6.  F/u in 6 months with labs.
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
## 2025-02-03 ENCOUNTER — HOSPITAL ENCOUNTER (OUTPATIENT)
Dept: CARDIOLOGY | Facility: HOSPITAL | Age: 40
Discharge: HOME | End: 2025-02-03
Attending: INTERNAL MEDICINE
Payer: COMMERCIAL

## 2025-02-03 VITALS
BODY MASS INDEX: 45.99 KG/M2 | SYSTOLIC BLOOD PRESSURE: 108 MMHG | HEIGHT: 67 IN | WEIGHT: 293 LBS | DIASTOLIC BLOOD PRESSURE: 80 MMHG | HEART RATE: 80 BPM

## 2025-02-03 VITALS — HEART RATE: 90 BPM | DIASTOLIC BLOOD PRESSURE: 84 MMHG | SYSTOLIC BLOOD PRESSURE: 108 MMHG

## 2025-02-03 DIAGNOSIS — I50.22 CHRONIC SYSTOLIC CONGESTIVE HEART FAILURE: ICD-10-CM

## 2025-02-03 LAB
ASCENDING AORTA: 3.26 CM
AV AREA BY CONTINUOUS VTI: 2.4 CM2
AV INDEX (PROSTH): 0.74
AV LVOT MEAN GRADIENT: 2 MMHG
AV LVOT PEAK GRADIENT: 4 MMHG
AV MEAN GRADIENT: 4 MMHG
AV PEAK GRADIENT: 7 MMHG
AV VALVE AREA BY VELOCITY RATIO: 2.7 CM²
AV VALVE AREA: 2.3 CM2
AV VELOCITY RATIO: 0.85
BSA FOR ECHO PROCEDURE: 2.63 M2
CFR FLOW - ANTERIOR: 1.84
CFR FLOW - INFERIOR: 2.21
CFR FLOW - LATERAL: 1.84
CFR FLOW - MAX: 2.97
CFR FLOW - MIN: 1.5
CFR FLOW - SEPTAL: 1.91
CFR FLOW - WHOLE HEART: 1.95
CV ECHO LV RWT: 0.36 CM
CV STRESS BASE HR: 95 BPM
DIASTOLIC BLOOD PRESSURE: 106 MMHG
DOP CALC AO PEAK VEL: 1.3 M/S
DOP CALC AO VTI: 25.1 CM
DOP CALC LVOT AREA: 3.1 CM2
DOP CALC LVOT DIAMETER: 2 CM
DOP CALC LVOT PEAK VEL: 1.1 M/S
DOP CALC LVOT STROKE VOLUME: 58.4 CM3
DOP CALCLVOT PEAK VEL VTI: 18.6 CM
E WAVE DECELERATION TIME: 157 MS
E/A RATIO: 1.4
E/E' RATIO: 16 M/S
ECHO EF ESTIMATED: 41 %
ECHO LV POSTERIOR WALL: 1.2 CM (ref 0.6–1.1)
EJECTION FRACTION- HIGH: 59 %
EJECTION FRACTION: 25 %
END DIASTOLIC INDEX-HIGH: 155 ML/M2
END DIASTOLIC INDEX-LOW: 91 ML/M2
END SYSTOLIC INDEX-HIGH: 78 ML/M2
END SYSTOLIC INDEX-LOW: 40 ML/M2
FRACTIONAL SHORTENING: 19.4 % (ref 28–44)
INTERVENTRICULAR SEPTUM: 0.7 CM (ref 0.6–1.1)
IVC DIAMETER: 1.73 CM
IVRT: 74 MS
LA MAJOR: 6 CM
LA MINOR: 6.1 CM
LA WIDTH: 4.7 CM
LEFT ATRIUM SIZE: 4.4 CM
LEFT ATRIUM VOLUME INDEX MOD: 44 ML/M2
LEFT ATRIUM VOLUME INDEX: 43 ML/M2
LEFT ATRIUM VOLUME MOD: 110 ML
LEFT ATRIUM VOLUME: 106 CM3
LEFT INTERNAL DIMENSION IN SYSTOLE: 5.4 CM (ref 2.1–4)
LEFT VENTRICLE DIASTOLIC VOLUME INDEX: 94.67 ML/M2
LEFT VENTRICLE DIASTOLIC VOLUME: 234.79 ML
LEFT VENTRICLE MASS INDEX: 112.7 G/M2
LEFT VENTRICLE SYSTOLIC VOLUME INDEX: 56.3 ML/M2
LEFT VENTRICLE SYSTOLIC VOLUME: 139.58 ML
LEFT VENTRICULAR INTERNAL DIMENSION IN DIASTOLE: 6.7 CM (ref 3.5–6)
LEFT VENTRICULAR MASS: 279.6 G
LV LATERAL E/E' RATIO: 15.4
LV SEPTAL E/E' RATIO: 17.6
MITRAL VALVE REGURGITANT VOLUME PISA: 57.67 ML
MR PISA EROA: 0.38 CM2
MR VTI: 150 CM
MV A" WAVE DURATION": 168.41 MS
MV PEAK A VEL: 0.88 M/S
MV PEAK E VEL: 1.23 M/S
NT-PROBNP SERPL IA-MCNC: 438 PG/ML
NUC REST DIASTOLIC VOLUME INDEX: 233
NUC REST EJECTION FRACTION: 30
NUC REST SYSTOLIC VOLUME INDEX: 163
NUC STRESS DIASTOLIC VOLUME INDEX: 263
NUC STRESS EJECTION FRACTION: 26 %
NUC STRESS SYSTOLIC VOLUME INDEX: 194
OHS CV CPX 1 MINUTE RECOVERY HEART RATE: 98 BPM
OHS CV CPX 85 PERCENT MAX PREDICTED HEART RATE MALE: 152
OHS CV CPX MAX PREDICTED HEART RATE: 179
OHS CV CPX PATIENT IS FEMALE: 1
OHS CV CPX PATIENT IS MALE: 0
OHS CV CPX PEAK DIASTOLIC BLOOD PRESSURE: 82 MMHG
OHS CV CPX PEAK HEAR RATE: 89 BPM
OHS CV CPX PEAK RATE PRESSURE PRODUCT: 9345
OHS CV CPX PEAK SYSTOLIC BLOOD PRESSURE: 105 MMHG
OHS CV CPX PERCENT MAX PREDICTED HEART RATE ACHIEVED: 52
OHS CV CPX RATE PRESSURE PRODUCT PRESENTING: NORMAL
OHS CV INITIAL DOSE: 44 MCG/KG/MIN
OHS CV MODERATELY REDUCED FLOW CAPACITY: 4 %
OHS CV NO ISCHEMIA MILDLY REDUCED FLOW CAPACTY: 90 %
OHS CV NO ISCHEMIA MINIMALLY REDUCED FLOW CAPACITY: 5 %
OHS CV NORMAL FLOW CAPACITY COMPARABLE TO HEALTHY YOUNG VOLUNTEERS: 1 %
OHS CV PEAK DOSE: 43.8 MCG/KG/MIN
OHS CV PET ID: 8096
OHS CV PRE-DOMINANTLY MYOCARDIAL SCAR: 13 %
OHS CV RV/LV RATIO: 0.51 CM
OHS CV SEVERELY REDUCED FLOW CAPACITY LARGEST SINGLE CONTINUOUS REGION: 0 %
OHS CV SEVERELY REDUCED FLOW CAPACITY: 0 %
OHS CV TOTAL EXAM DLP: 626.74 MGY-CM
PISA MRMAX VEL: 4.9 M/S
PISA RADIUS: 1 CM
PISA TR MAX VEL: 2.5 M/S
PISA VN NYQUIST: 0.3 M/S
PULM VEIN A" WAVE DURATION": 168.41 MS
PULM VEIN S/D RATIO: 0.55
PULMONIC VEIN PEAK A VELOCITY: 0.2 M/S
PV PEAK D VEL: 0.66 M/S
PV PEAK S VEL: 0.36 M/S
RA MAJOR: 5.37 CM
RA PRESSURE ESTIMATED: 8 MMHG
RA WIDTH: 3.63 CM
REST FLOW - ANTERIOR: 0.47 CC/MIN/G
REST FLOW - INFERIOR: 0.45 CC/MIN/G
REST FLOW - LATERAL: 0.68 CC/MIN/G
REST FLOW - MAX: 0.91 CC/MIN/G
REST FLOW - MIN: 0.23 CC/MIN/G
REST FLOW - SEPTAL: 0.33 CC/MIN/G
REST FLOW - WHOLE HEART: 0.48 CC/MIN/G
RETIRED EF AND QEF - SEE NOTES: 47 %
RIGHT ATRIAL AREA: 14 CM2
RIGHT VENTRICLE DIASTOLIC BASEL DIMENSION: 3.4 CM
RV TB RVSP: 11 MMHG
RV TISSUE DOPPLER FREE WALL SYSTOLIC VELOCITY 1 (APICAL 4 CHAMBER VIEW): 12.33 CM/S
SINUS: 3.22 CM
STJ: 2.73 CM
STRESS FLOW - ANTERIOR: 0.86 CC/MIN/G
STRESS FLOW - INFERIOR: 0.99 CC/MIN/G
STRESS FLOW - LATERAL: 1.24 CC/MIN/G
STRESS FLOW - MAX: 1.55 CC/MIN/G
STRESS FLOW - MIN: 0.39 CC/MIN/G
STRESS FLOW - SEPTAL: 0.63 CC/MIN/G
STRESS FLOW - WHOLE HEART: 0.93 CC/MIN/G
SYSTOLIC BLOOD PRESSURE: 139 MMHG
TDI LATERAL: 0.08 M/S
TDI SEPTAL: 0.07 M/S
TDI: 0.08 M/S
TR MAX PG: 25 MMHG
TRICUSPID ANNULAR PLANE SYSTOLIC EXCURSION: 3.2 CM
TV PEAK GRADIENT: 25 MMHG
TV REST PULMONARY ARTERY PRESSURE: 33 MMHG
Z-SCORE OF LEFT VENTRICULAR DIMENSION IN END DIASTOLE: -6.8
Z-SCORE OF LEFT VENTRICULAR DIMENSION IN END SYSTOLE: -2.97

## 2025-02-03 PROCEDURE — 63600175 PHARM REV CODE 636 W HCPCS: Performed by: INTERNAL MEDICINE

## 2025-02-03 PROCEDURE — 78434 AQMBF PET REST & RX STRESS: CPT | Mod: 26,,, | Performed by: INTERNAL MEDICINE

## 2025-02-03 PROCEDURE — 93016 CV STRESS TEST SUPVJ ONLY: CPT | Mod: ,,, | Performed by: INTERNAL MEDICINE

## 2025-02-03 PROCEDURE — 78434 AQMBF PET REST & RX STRESS: CPT

## 2025-02-03 PROCEDURE — 78431 MYOCRD IMG PET RST&STRS CT: CPT | Mod: 26,,, | Performed by: INTERNAL MEDICINE

## 2025-02-03 PROCEDURE — 93306 TTE W/DOPPLER COMPLETE: CPT | Mod: 26,,, | Performed by: INTERNAL MEDICINE

## 2025-02-03 PROCEDURE — A9555 RB82 RUBIDIUM: HCPCS | Performed by: INTERNAL MEDICINE

## 2025-02-03 PROCEDURE — 93018 CV STRESS TEST I&R ONLY: CPT | Mod: ,,, | Performed by: INTERNAL MEDICINE

## 2025-02-03 PROCEDURE — 93306 TTE W/DOPPLER COMPLETE: CPT

## 2025-02-03 RX ORDER — REGADENOSON 0.08 MG/ML
0.4 INJECTION, SOLUTION INTRAVENOUS ONCE
Status: COMPLETED | OUTPATIENT
Start: 2025-02-03 | End: 2025-02-03

## 2025-02-03 RX ORDER — AMINOPHYLLINE 25 MG/ML
75 INJECTION, SOLUTION INTRAVENOUS ONCE
Status: COMPLETED | OUTPATIENT
Start: 2025-02-03 | End: 2025-02-03

## 2025-02-03 RX ADMIN — AMINOPHYLLINE 75 MG: 25 INJECTION, SOLUTION INTRAVENOUS at 12:02

## 2025-02-03 RX ADMIN — RUBIDIUM CHLORIDE RB-82 43.8 MILLICURIE: 150 INJECTION, SOLUTION INTRAVENOUS at 12:02

## 2025-02-03 RX ADMIN — RUBIDIUM CHLORIDE RB-82 44 MILLICURIE: 150 INJECTION, SOLUTION INTRAVENOUS at 12:02

## 2025-02-03 RX ADMIN — REGADENOSON 0.4 MG: 0.08 INJECTION, SOLUTION INTRAVENOUS at 12:02

## 2025-02-12 ENCOUNTER — OFFICE VISIT (OUTPATIENT)
Dept: FAMILY MEDICINE | Facility: HOSPITAL | Age: 40
End: 2025-02-12
Payer: COMMERCIAL

## 2025-02-12 VITALS
BODY MASS INDEX: 45.99 KG/M2 | HEIGHT: 67 IN | DIASTOLIC BLOOD PRESSURE: 89 MMHG | WEIGHT: 293 LBS | SYSTOLIC BLOOD PRESSURE: 126 MMHG | HEART RATE: 94 BPM | OXYGEN SATURATION: 99 %

## 2025-02-12 DIAGNOSIS — E66.813 CLASS 3 SEVERE OBESITY WITH BODY MASS INDEX (BMI) OF 50.0 TO 59.9 IN ADULT, UNSPECIFIED OBESITY TYPE, UNSPECIFIED WHETHER SERIOUS COMORBIDITY PRESENT: ICD-10-CM

## 2025-02-12 DIAGNOSIS — E11.9 TYPE 2 DIABETES MELLITUS WITHOUT COMPLICATION, WITHOUT LONG-TERM CURRENT USE OF INSULIN: Primary | ICD-10-CM

## 2025-02-12 DIAGNOSIS — E66.01 CLASS 3 SEVERE OBESITY WITH BODY MASS INDEX (BMI) OF 50.0 TO 59.9 IN ADULT, UNSPECIFIED OBESITY TYPE, UNSPECIFIED WHETHER SERIOUS COMORBIDITY PRESENT: ICD-10-CM

## 2025-02-12 PROCEDURE — 99214 OFFICE O/P EST MOD 30 MIN: CPT

## 2025-02-12 RX ORDER — TIRZEPATIDE 2.5 MG/.5ML
2.5 INJECTION, SOLUTION SUBCUTANEOUS
Qty: 2 ML | Refills: 0 | Status: SHIPPED | OUTPATIENT
Start: 2025-02-12 | End: 2025-03-14

## 2025-02-12 NOTE — PROGRESS NOTES
[facility]    Subjective:     Ursula Smallwood is a 39 y.o. year old female with PMHx of Type 2 diabetes mellitus, without long-term current use of insulin, Morbid obesity with BMI of 40.0-44.9, adult, Nonischemic cardiomyopathy, Chronic systolic congestive heart failure, CHF diagnosed in March 2021. LVEF 45% (March 2021) decreased to 20% in 2022 and HTN who presents for follow-up regarding weight management and review of recent laboratory results.    At her last visit, she reported significant weight gain after being off Ozempic for the past six months, accompanied by increased fatigue and exertional shortness of breath. She denied chest pain, palpitations, or orthopnea. She had not been seen in the clinic for over a year and expressed feeling overwhelmed by her symptoms. Labs were ordered due to the lapse in follow-up.    At todays visit, the patient reports that she has been actively following a structured diet and exercise regimen. She endorses increased efforts in physical activity and dietary modifications. She is motivated to continue her weight loss journey and seeks further guidance on optimizing her management.    Patient Active Problem List    Diagnosis Date Noted    Ureteral stone with hydronephrosis 11/05/2022    Acute cystitis with hematuria 11/05/2022    Type 2 diabetes mellitus, without long-term current use of insulin 11/05/2022    Morbid obesity with BMI of 40.0-44.9, adult 09/07/2022    Nonischemic cardiomyopathy 05/01/2022    Hypertension     Chronic systolic congestive heart failure 03/08/2022    H/O abdominal hysterectomy 01/26/2022    COVID-19 01/26/2022    H/O cardiac radiofrequency ablation 08/13/2021    Uterine fibroid 04/26/2021    Menorrhagia with irregular cycle 04/26/2021    Morbid obesity 04/22/2021    Symptomatic anemia 03/25/2021        Review of patient's allergies indicates:  No Known Allergies     Past Medical History:   Diagnosis Date    Anemia     Hypertension     Kidney  stone     SVT (supraventricular tachycardia)     Uterine fibroid       Past Surgical History:   Procedure Laterality Date    ABLATION N/A 06/04/2021    Procedure: Ablation;  Surgeon: NICHELLE Zepeda MD;  Location: [facility];  Service: Cardiology;  Laterality: N/A;  SVT, RFA, Carto, anes, EH, 3prep    CYSTOSCOPY N/A 08/03/2021    Procedure: CYSTOSCOPY;  Surgeon: Jamarcus Ventura MD;  Location: [facility];  Service: OB/GYN;  Laterality: N/A;    CYSTOSCOPY W/ RETROGRADES Bilateral 11/30/2022    Procedure: CYSTOSCOPY, WITH RETROGRADE PYELOGRAM;  Surgeon: Yanira Alexander MD;  Location: [facility] OR;  Service: Urology;  Laterality: Bilateral;    CYSTOURETEROSCOPY WITH RETROGRADE PYELOGRAPHY AND INSERTION OF STENT INTO URETER Bilateral 11/6/2022    Procedure: CYSTOURETEROSCOPY, WITH BILATERAL RETROGRADE PYELOGRAM AND URETERAL STENT INSERTION;  Surgeon: Yanira Alexander MD;  Location: [facility];  Service: Urology;  Laterality: Bilateral;    FLUOROSCOPY  11/6/2022    Procedure: FLUOROSCOPY;  Surgeon: Yanira Alexander MD;  Location: [facility];  Service: Urology;;    HYSTERECTOMY      ROBOT-ASSISTED LAPAROSCOPIC HYSTERECTOMY N/A 08/03/2021    Procedure: ROBOTIC HYSTERECTOMY;  Surgeon: Jamarcus Ventura MD;  Location: [facility];  Service: OB/GYN;  Laterality: N/A;    ROBOT-ASSISTED SALPINGECTOMY Bilateral 08/03/2021    Procedure: ROBOTIC SALPINGECTOMY;  Surgeon: Jamarcus Ventura MD;  Location: [facility];  Service: OB/GYN;  Laterality: Bilateral;    URETEROSCOPY Left 11/6/2022    Procedure: URETEROSCOPY;  Surgeon: Yanira Alexander MD;  Location: [facility];  Service: Urology;  Laterality: Left;      No family history on file.   Social History     Tobacco Use    Smoking status: Never    Smokeless tobacco: Never   Substance Use Topics    Alcohol use: No      Review of Systems   Constitutional: Negative.    HENT: Negative.     Eyes: Negative.    Respiratory: Negative.     Cardiovascular: Negative.    Gastrointestinal: Negative.    Genitourinary:  "Negative.    Musculoskeletal: Negative.    Skin: Negative.    Neurological: Negative.    Endo/Heme/Allergies: Negative.    Psychiatric/Behavioral: Negative.      Objective:     Vitals:    02/12/25 1444   BP: 126/89   Patient Position: Sitting   Pulse: 94   SpO2: 99%   Weight: (!) 146.8 kg (323 lb 10.2 oz)   Height: 5' 7" (1.702 m)     BMI: 50.69    Physical Exam  Vitals and nursing note reviewed.   Constitutional:       Appearance: She is obese.   HENT:      Head: Normocephalic and atraumatic.   Eyes:      Conjunctiva/sclera: Conjunctivae normal.      Pupils: Pupils are equal, round, and reactive to light.   Cardiovascular:      Rate and Rhythm: Normal rate and regular rhythm.      Pulses: Normal pulses.      Heart sounds: Normal heart sounds.   Pulmonary:      Effort: Pulmonary effort is normal.      Breath sounds: Normal breath sounds.   Abdominal:      General: Bowel sounds are normal.      Palpations: Abdomen is soft.   Musculoskeletal:         General: Normal range of motion.      Cervical back: Normal range of motion and neck supple.   Skin:     General: Skin is warm.      Capillary Refill: Capillary refill takes less than 2 seconds.   Neurological:      General: No focal deficit present.      Mental Status: She is alert and oriented to person, place, and time.   Psychiatric:         Mood and Affect: Mood normal.         Behavior: Behavior normal.            Assessment/Plan:     Ursula Smallwood is a 39 y.o. year old female who presents to clinic for follow-up regarding weight management and review of recent laboratory results.    1. Type 2 diabetes mellitus without complication, without long-term current use of insulin (Primary)    -Last HbA1c: 6.8%, indicating good glycemic control.  -No reported symptoms of hyper- or hypoglycemia.  -While weight loss remains the primary goal, therapy may provide additional glycemic benefits.    Plan  -No changes in diabetes regimen at this time, as HbA1c is well controlled " (6.8%).  -Mounjaro may provide additional glycemic benefits, but primary goal is weight reduction.  -Continue monitoring HbA1c and fasting glucose at next visit.    2. Class 3 severe obesity with body mass index (BMI) of 50.0 to 59.9 in adult, unspecified obesity type, unspecified whether serious comorbidity present    -Patient has been actively engaging in dietary modifications and increasing physical activity.  -Reports motivation to continue weight management interventions.  -Initiating pharmacologic therapy to aid in weight loss.    Plan   -Initiate tirzepatide (Mounjaro) 2.5 mg/0.5 mL injection every 7 days for 4 weeks. Dispense 2 mL.  -If well tolerated, increase dose to 5 mg weekly after 4 weeks.  -Monitor for gastrointestinal side effects and adherence.  -Continue dietary and lifestyle modifications with structured goals.  -Assess progress in weight reduction at follow-up.    3.Congestive Heart Failure (CHF):    -Monitor weight trends and fluid status closely.  -Encourage adherence to a heart the importance of  diet (low sodium, fluid restriction as needed).  -Follow up with cardiology as scheduled.    4.Hypertension    -Blood pressure management remains crucial, given comorbid CHF and obesity.    Plan   -Continue current antihypertensive regimen.  -Monitor blood pressure and cardiovascular symptoms.  -Reinforce lifestyle modifications, including salt restriction.    Follow-up:4 weeks    A total of 35 minutes was spent on patient care during this encounter which included chart review, examining the patient, formulating a treatment plan and documentation.      Complex medical decision making performed due to multiple medical problems addressed during the visit.      Medical decision making straight forward and not complex during this visit.     Case discussed with staff: Cristy Gunn MD  [facility], PGY-1  02/13/2025

## 2025-02-14 ENCOUNTER — PATIENT MESSAGE (OUTPATIENT)
Dept: PODIATRY | Facility: CLINIC | Age: 40
End: 2025-02-14
Payer: COMMERCIAL

## 2025-02-18 ENCOUNTER — PATIENT MESSAGE (OUTPATIENT)
Dept: PODIATRY | Facility: CLINIC | Age: 40
End: 2025-02-18
Payer: COMMERCIAL

## 2025-02-18 ENCOUNTER — HOSPITAL ENCOUNTER (EMERGENCY)
Facility: HOSPITAL | Age: 40
Discharge: HOME | End: 2025-02-18
Attending: EMERGENCY MEDICINE
Payer: COMMERCIAL

## 2025-02-18 VITALS
HEIGHT: 66 IN | DIASTOLIC BLOOD PRESSURE: 86 MMHG | HEART RATE: 104 BPM | WEIGHT: 293 LBS | SYSTOLIC BLOOD PRESSURE: 153 MMHG | RESPIRATION RATE: 20 BRPM | OXYGEN SATURATION: 97 % | BODY MASS INDEX: 47.09 KG/M2 | TEMPERATURE: 99 F

## 2025-02-18 DIAGNOSIS — J06.9 URI WITH COUGH AND CONGESTION: Primary | ICD-10-CM

## 2025-02-18 LAB
GROUP A STREP, MOLECULAR: NEGATIVE
INFLUENZA A, MOLECULAR: NEGATIVE
INFLUENZA B, MOLECULAR: NEGATIVE
SARS-COV-2 RDRP RESP QL NAA+PROBE: NEGATIVE
SPECIMEN SOURCE: NORMAL

## 2025-02-18 PROCEDURE — 87502 INFLUENZA DNA AMP PROBE: CPT | Mod: ER

## 2025-02-18 PROCEDURE — 99284 EMERGENCY DEPT VISIT MOD MDM: CPT | Mod: ER

## 2025-02-18 PROCEDURE — 87651 STREP A DNA AMP PROBE: CPT | Mod: ER

## 2025-02-18 PROCEDURE — 87635 SARS-COV-2 COVID-19 AMP PRB: CPT | Mod: ER

## 2025-02-18 RX ORDER — PREDNISONE 20 MG/1
40 TABLET ORAL DAILY
Qty: 10 TABLET | Refills: 0 | Status: SHIPPED | OUTPATIENT
Start: 2025-02-18 | End: 2025-02-23

## 2025-02-18 RX ORDER — PROMETHAZINE HYDROCHLORIDE AND DEXTROMETHORPHAN HYDROBROMIDE 6.25; 15 MG/5ML; MG/5ML
5 SYRUP ORAL NIGHTLY PRN
Qty: 50 ML | Refills: 0 | Status: SHIPPED | OUTPATIENT
Start: 2025-02-18 | End: 2025-02-28

## 2025-02-18 RX ORDER — BENZONATATE 100 MG/1
100 CAPSULE ORAL 3 TIMES DAILY PRN
Qty: 20 CAPSULE | Refills: 0 | Status: SHIPPED | OUTPATIENT
Start: 2025-02-18 | End: 2025-02-28

## 2025-02-18 NOTE — FIRST PROVIDER EVALUATION
Emergency Department TeleTriage Encounter Note      CHIEF COMPLAINT    Chief Complaint   Patient presents with    URI     Subjective fever, runny nose , sore throat and cough with green sputum.        VITAL SIGNS   Initial Vitals [02/18/25 1508]   BP Pulse Resp Temp SpO2   (!) 153/86 104 20 98.8 °F (37.1 °C) 97 %      MAP       --            ALLERGIES    Review of patient's allergies indicates:  No Known Allergies    PROVIDER TRIAGE NOTE  Patient presents with fever, sore throat, cough x2 days. No nausea/vomiting.       ORDERS  Labs Reviewed   INFLUENZA A & B BY MOLECULAR   GROUP A STREP, MOLECULAR   SARS-COV-2 RNA AMPLIFICATION, QUAL       ED Orders (720h ago, onward)      Start Ordered     Status Ordering Provider    02/18/25 1513 02/18/25 1512  COVID-19 Rapid Screening  STAT         In process KYLIE LOPEZ    02/18/25 1513 02/18/25 1512  Influenza A & B by Molecular  STAT         In process KYLIE LOPEZ    02/18/25 1513 02/18/25 1513  Group A Strep, Molecular  STAT         In process KYLIE LOPEZ    02/18/25 1512 02/18/25 1512  Airborne and Contact and Droplet Isolation Status  Continuous         Acknowledged YKLIE LOPEZ              Virtual Visit Note: The provider triage portion of this emergency department evaluation and documentation was performed via Room 8 [employer], a HIPAA-compliant telemedicine application, in concert with a tele-presenter in the room. A face to face patient evaluation with one of my colleagues will occur once the patient is placed in an emergency department room.      DISCLAIMER: This note was prepared with [employer] voice recognition transcription software. Garbled syntax, mangled pronouns, and other bizarre constructions may be attributed to that software system.

## 2025-02-18 NOTE — ED PROVIDER NOTES
Encounter Date: 2/18/2025       History     Chief Complaint   Patient presents with    URI     Subjective fever, runny nose , sore throat and cough with green sputum.      Patient is a 39-year-old female with a past medical history of anemia, hypertension, kidney stones, SVT, uterine fibroids who presents to emergency room for subjective fever, runny nose, sore throat, and productive cough over the past 3-4 days.  Patient states that symptoms have been persistent.  Nausea, vomiting, abdominal pain, chest pain, shortness of breath, headache, visual changes, weakness, numbness, tingling, or others at this time.  Prior treatment includes Tylenol without relief.    The history is provided by the patient. No  was used.     Review of patient's allergies indicates:  No Known Allergies  Past Medical History:   Diagnosis Date    Anemia     Hypertension     Kidney stone     SVT (supraventricular tachycardia)     Uterine fibroid      Past Surgical History:   Procedure Laterality Date    ABLATION N/A 06/04/2021    Procedure: Ablation;  Surgeon: NICHELLE Zepeda MD;  Location: [facility] EP [facility];  Service: Cardiology;  Laterality: N/A;  SVT, RFA, Carto, anes, EH, 3prep    CYSTOSCOPY N/A 08/03/2021    Procedure: CYSTOSCOPY;  Surgeon: Jamarcus Ventura MD;  Location: [facility] OR;  Service: OB/GYN;  Laterality: N/A;    CYSTOSCOPY W/ RETROGRADES Bilateral 11/30/2022    Procedure: CYSTOSCOPY, WITH RETROGRADE PYELOGRAM;  Surgeon: Yanira Alexander MD;  Location: [facility] OR;  Service: Urology;  Laterality: Bilateral;    CYSTOURETEROSCOPY WITH RETROGRADE PYELOGRAPHY AND INSERTION OF STENT INTO URETER Bilateral 11/6/2022    Procedure: CYSTOURETEROSCOPY, WITH BILATERAL RETROGRADE PYELOGRAM AND URETERAL STENT INSERTION;  Surgeon: Yanira Alexander MD;  Location: [facility] OR;  Service: Urology;  Laterality: Bilateral;    FLUOROSCOPY  11/6/2022    Procedure: FLUOROSCOPY;  Surgeon: Yanira Alexander MD;  Location: [facility] OR;  Service: Urology;;     HYSTERECTOMY      ROBOT-ASSISTED LAPAROSCOPIC HYSTERECTOMY N/A 08/03/2021    Procedure: ROBOTIC HYSTERECTOMY;  Surgeon: Jamarcus Ventura MD;  Location: [facility] OR;  Service: OB/GYN;  Laterality: N/A;    ROBOT-ASSISTED SALPINGECTOMY Bilateral 08/03/2021    Procedure: ROBOTIC SALPINGECTOMY;  Surgeon: Jamarcus Ventura MD;  Location: [facility] OR;  Service: OB/GYN;  Laterality: Bilateral;    URETEROSCOPY Left 11/6/2022    Procedure: URETEROSCOPY;  Surgeon: Yanira Alexander MD;  Location: [facility] OR;  Service: Urology;  Laterality: Left;     No family history on file.  Social History[1]  Review of Systems   Constitutional:  Positive for fever. Negative for chills, diaphoresis and fatigue.   HENT:  Positive for congestion, rhinorrhea and sore throat. Negative for trouble swallowing.    Respiratory:  Positive for cough. Negative for shortness of breath.    Cardiovascular:  Negative for chest pain and palpitations.   Gastrointestinal:  Negative for abdominal pain, blood in stool, constipation, diarrhea, nausea and vomiting.   Genitourinary:  Negative for difficulty urinating, dysuria, frequency and urgency.   Musculoskeletal:  Negative for back pain and myalgias.   Skin:  Negative for rash and wound.   Neurological:  Negative for weakness, light-headedness, numbness and headaches.       Physical Exam     Initial Vitals [02/18/25 1508]   BP Pulse Resp Temp SpO2   (!) 153/86 104 20 98.8 °F (37.1 °C) 97 %      MAP       --         Physical Exam    Nursing note and vitals reviewed.  Constitutional: She appears well-developed and well-nourished. She is not diaphoretic. No distress.   Patient well-appearing.  Awake and alert.  No acute distress.  Maintaining airway appropriately.  Speaking in complete sentences.   HENT:   Head: Normocephalic and atraumatic.   Right Ear: External ear normal.   Left Ear: External ear normal.   Nose: Mucosal edema present. Mouth/Throat: Uvula is midline, oropharynx is clear and moist and mucous membranes are  normal.   Eyes: Conjunctivae and EOM are normal.   Neck: Neck supple.   Normal range of motion.  Cardiovascular:  Normal rate and regular rhythm.           Pulmonary/Chest: Breath sounds normal. No respiratory distress. She has no wheezes. She has no rhonchi. She has no rales.   Musculoskeletal:         General: No tenderness or edema. Normal range of motion.      Cervical back: Normal range of motion and neck supple.     Neurological: She is alert and oriented to person, place, and time. She has normal strength.   Skin: Skin is warm.   Psychiatric: She has a normal mood and affect. Her behavior is normal. Thought content normal.         ED Course   Procedures  Labs Reviewed   INFLUENZA A & B BY MOLECULAR       Result Value    Influenza A, Molecular Negative      Influenza B, Molecular Negative      Flu A & B Source Nasal swab     GROUP A STREP, MOLECULAR    Group A Strep, Molecular Negative     SARS-COV-2 RNA AMPLIFICATION, QUAL    SARS-CoV-2 RNA, Amplification, Qual Negative            Imaging Results    None          Medications - No data to display  Medical Decision Making  Patient presents to emergency room for multiple upper respiratory symptoms.  Vital signs stable.  Physical exam as stated above.    Differential Diagnosis includes, but is not limited to bacterial sinusitis, allergic rhinitis, peritonsillar abscess, bacterial/viral pharyngitis, bronchitis, influenza, viral syndrome such as COVID.  Do not suspect bacterial sinusitis, as patient without sinus pressure/tenderness for > 10 days.  Physical exam with uvula midline. No evidence of abscess. Patient without cough for >5 days; therefore, unlikely bronchitis.  COVID test negative.  Influenza test negative.  Strep test negative.  Clinical presentation and physical exam most suggestive of other viral syndrome.  Will prescribe short course of prednisone, Tessalon Perles, and promethazine DM to use upon discharge.  Advised on over-the-counter medications  for symptoms such a Tylenol, ibuprofen, Mucinex, among others.  Also instructed on conservative management of symptoms such as adequate rest and hydration.    I see no indication of an emergent process beyond that addressed during our encounter. Patient stable for discharge at this time. I have counseled the patient regarding follow up with primary care and gave strict return precautions. I have discussed the final diagnosis and gave instructions regarding prescribed and over-the-counter medications. Patient verbalized understanding and is agreeable.     Problems Addressed:  URI with cough and congestion: acute illness or injury    Amount and/or Complexity of Data Reviewed  External Data Reviewed: notes.     Details: Last seen by primary care on 02/12/2025.  Labs: ordered. Decision-making details documented in ED Course.  Radiology:      Details: Consider ordering chest x-ray. , patient maintaining O2 saturation greater than 95%.  Afebrile.  Low suspicion for pneumonia at this time.  Lungs clear to auscultation.  Unlikely effusion or pneumothorax.  ECG/medicine tests:      Details: Considered ordering EKG, though patient without any chest pain, palpitations, leg swelling, or SOB at this time.     Risk  OTC drugs.  Prescription drug management.  Risk Details: Comorbidities taken into consideration during the patient's evaluation and treatment include anemia, hypertension, kidney stones, SVT, and uterine fibroids.    Social determinants of health taken into consideration during development of our treatment plan include difficulty in obtaining follow-up, obtaining medications, health literacy, access to healthy options for preventative/conservative management, and/or support systems due to, but not limited to, transportation limitations, socioeconomic status, and environmental factors.                ED Course as of 02/18/25 1649   Tue Feb 18, 2025   1600 Group A Strep, Molecular: Negative  Strep negative. [BJ]   1601  SARS-CoV-2 RNA, Amplification, Qual: Negative  COVID negative. [BJ]   1615 Influenza A, Molecular: Negative  Influenza negative. [BJ]      ED Course User Index  [BJ] Randi Will PA-C                           Clinical Impression:  Final diagnoses:  [J06.9] URI with cough and congestion (Primary)          ED Disposition Condition    Discharge Stable          ED Prescriptions       Medication Sig Dispense Start Date End Date Auth. Provider    predniSONE (DELTASONE) 20 MG tablet Take 2 tablets (40 mg total) by mouth once daily. for 5 days 10 tablet 2/18/2025 2/23/2025 Randi Will PA-C    promethazine-dextromethorphan (PROMETHAZINE-DM) 6.25-15 mg/5 mL Syrp Take 5 mLs by mouth nightly as needed (Cough). 50 mL 2/18/2025 2/28/2025 Randi Will PA-C    benzonatate (TESSALON) 100 MG capsule Take 1 capsule (100 mg total) by mouth 3 (three) times daily as needed for Cough. 20 capsule 2/18/2025 2/28/2025 Randi Will PA-C          Follow-up Information       Follow up With Specialties Details Why Contact Info    Your doctor  Schedule an appointment as soon as possible for a visit       [facility] - Emergency Dept Emergency Medicine Go to  If new or worsening symptoms occur [address] [facility]  Emergency Department  [facility] [id number] [phone]          This note was partially created using [employer] Voice Recognition software. Typographical and content errors may occur with this process. While efforts are made to detect and correct such errors, in some cases errors will persist. For this reason, wording in this document should be considered in the proper context and not strictly verbatim.          [1]   Social History  Tobacco Use    Smoking status: Never    Smokeless tobacco: Never   Substance Use Topics    Alcohol use: No    Drug use: No        Randi Will PA-C  02/18/25 [id number]

## 2025-02-18 NOTE — ED NOTES
APPEARANCE: Alert, oriented and in no acute distress.  HEENT: Runny nose and sore throat. Speaks without hoarseness.  CARDIAC: Normal rate and rhythm.    PERIPHERAL VASCULAR: peripheral pulses present. Normal cap refill. No edema. Warm to touch.    RESPIRATORY:Normal rate and effort. Respirations are equal and unlabored no obvious signs of distress. +Cough.   GASTRO: soft, nondistended, nontender. Denies nausea, vomiting, or diarrhea.  : voids spontaneously and without difficulty.   MUSC: Full ROM. No obvious deformity. Ambulatory with a steady gait  SKIN: Skin is warm and dry, without discoloration. Mucous membranes moist. Subjective fever.   NEURO: Pt is awake, alert, aware of environment. No neurologic deficits noted.

## 2025-03-05 ENCOUNTER — TELEPHONE (OUTPATIENT)
Dept: PODIATRY | Facility: CLINIC | Age: 40
End: 2025-03-05
Payer: COMMERCIAL

## 2025-03-12 ENCOUNTER — OFFICE VISIT (OUTPATIENT)
Dept: FAMILY MEDICINE | Facility: HOSPITAL | Age: 40
End: 2025-03-12
Payer: COMMERCIAL

## 2025-03-12 VITALS
DIASTOLIC BLOOD PRESSURE: 87 MMHG | HEIGHT: 66 IN | HEART RATE: 62 BPM | BODY MASS INDEX: 47.09 KG/M2 | WEIGHT: 293 LBS | SYSTOLIC BLOOD PRESSURE: 137 MMHG

## 2025-03-12 DIAGNOSIS — E66.813 CLASS 3 SEVERE OBESITY WITH BODY MASS INDEX (BMI) OF 50.0 TO 59.9 IN ADULT, UNSPECIFIED OBESITY TYPE, UNSPECIFIED WHETHER SERIOUS COMORBIDITY PRESENT: ICD-10-CM

## 2025-03-12 DIAGNOSIS — E11.9 TYPE 2 DIABETES MELLITUS WITHOUT COMPLICATION, WITHOUT LONG-TERM CURRENT USE OF INSULIN: ICD-10-CM

## 2025-03-12 DIAGNOSIS — E66.01 CLASS 3 SEVERE OBESITY WITH BODY MASS INDEX (BMI) OF 50.0 TO 59.9 IN ADULT, UNSPECIFIED OBESITY TYPE, UNSPECIFIED WHETHER SERIOUS COMORBIDITY PRESENT: ICD-10-CM

## 2025-03-12 PROCEDURE — 99213 OFFICE O/P EST LOW 20 MIN: CPT

## 2025-03-12 RX ORDER — TIRZEPATIDE 5 MG/.5ML
5 INJECTION, SOLUTION SUBCUTANEOUS
Qty: 2 ML | Refills: 0 | Status: SHIPPED | OUTPATIENT
Start: 2025-03-12

## 2025-03-12 RX ORDER — TIRZEPATIDE 2.5 MG/.5ML
5 INJECTION, SOLUTION SUBCUTANEOUS
Qty: 4 ML | Refills: 0 | Status: SHIPPED | OUTPATIENT
Start: 2025-03-12 | End: 2025-03-12

## 2025-03-15 NOTE — PROGRESS NOTES
[facility]    Subjective:     Ursula Smallwood is a 39 y.o. year old female with PMHx of Type 2 diabetes mellitus, without long-term current use of insulin, Class 3 severe obesity with body mass index (BMI) of 50.0 to 59.9 , Nonischemic cardiomyopathy, Chronic systolic congestive heart failure, CHF diagnosed in March 2021. LVEF 45% (March 2021) decreased to 20% in 2022 and HTN  who presents to clinic for follow-up after initiating tirzepatide (Mounjaro) 2.5 mg weekly four weeks ago as part of her weight management plan. She reports excellent adherence to the medication regimen and is very pleased with the results, noting a 10-pound weight loss since starting treatment.  She has made significant lifestyle changes, including regular physical activity, consisting of brisk walking for at least 30 minutes most days of the week and incorporating light strength training exercises. Additionally, she has been following a balanced diet, focusing on portion control, increased protein intake, and reducing processed carbohydrates and sugary foods. She also reports drinking more water and limiting late-night snacking.  She denies any adverse effects such as nausea, vomiting, or gastrointestinal discomfort.     Patient Active Problem List    Diagnosis Date Noted    Ureteral stone with hydronephrosis 11/05/2022    Acute cystitis with hematuria 11/05/2022    Type 2 diabetes mellitus, without long-term current use of insulin 11/05/2022    Morbid obesity with BMI of 40.0-44.9, adult 09/07/2022    Nonischemic cardiomyopathy 05/01/2022    Hypertension     Chronic systolic congestive heart failure 03/08/2022    H/O abdominal hysterectomy 01/26/2022    COVID-19 01/26/2022    H/O cardiac radiofrequency ablation 08/13/2021    Uterine fibroid 04/26/2021    Menorrhagia with irregular cycle 04/26/2021    Morbid obesity 04/22/2021    Symptomatic anemia 03/25/2021        Review of patient's allergies indicates:  No Known Allergies     Past  Medical History:   Diagnosis Date    Anemia     Hypertension     Kidney stone     SVT (supraventricular tachycardia)     Uterine fibroid       Past Surgical History:   Procedure Laterality Date    ABLATION N/A 06/04/2021    Procedure: Ablation;  Surgeon: NICHELLE Zepeda MD;  Location: [facility] EP LAB;  Service: Cardiology;  Laterality: N/A;  SVT, RFA, Carto, anes, EH, 3prep    CYSTOSCOPY N/A 08/03/2021    Procedure: CYSTOSCOPY;  Surgeon: Jamarcus Ventura MD;  Location: [facility];  Service: OB/GYN;  Laterality: N/A;    CYSTOSCOPY W/ RETROGRADES Bilateral 11/30/2022    Procedure: CYSTOSCOPY, WITH RETROGRADE PYELOGRAM;  Surgeon: Yanira Alexander MD;  Location: [facility] OR;  Service: Urology;  Laterality: Bilateral;    CYSTOURETEROSCOPY WITH RETROGRADE PYELOGRAPHY AND INSERTION OF STENT INTO URETER Bilateral 11/6/2022    Procedure: CYSTOURETEROSCOPY, WITH BILATERAL RETROGRADE PYELOGRAM AND URETERAL STENT INSERTION;  Surgeon: Yanira Alexander MD;  Location: [facility];  Service: Urology;  Laterality: Bilateral;    FLUOROSCOPY  11/6/2022    Procedure: FLUOROSCOPY;  Surgeon: Yanira Alexander MD;  Location: [facility];  Service: Urology;;    HYSTERECTOMY      ROBOT-ASSISTED LAPAROSCOPIC HYSTERECTOMY N/A 08/03/2021    Procedure: ROBOTIC HYSTERECTOMY;  Surgeon: Jamarcus Ventura MD;  Location: [facility];  Service: OB/GYN;  Laterality: N/A;    ROBOT-ASSISTED SALPINGECTOMY Bilateral 08/03/2021    Procedure: ROBOTIC SALPINGECTOMY;  Surgeon: Jamarcus Ventura MD;  Location: [facility];  Service: OB/GYN;  Laterality: Bilateral;    URETEROSCOPY Left 11/6/2022    Procedure: URETEROSCOPY;  Surgeon: Yanira Alexander MD;  Location: [facility];  Service: Urology;  Laterality: Left;      No family history on file.   Social History     Tobacco Use    Smoking status: Never    Smokeless tobacco: Never   Substance Use Topics    Alcohol use: No      Review of Systems   Constitutional: Negative.    HENT: Negative.     Eyes: Negative.    Respiratory: Negative.    "  Cardiovascular: Negative.    Gastrointestinal: Negative.    Genitourinary: Negative.    Musculoskeletal: Negative.    Skin: Negative.    Neurological: Negative.    Endo/Heme/Allergies: Negative.    Psychiatric/Behavioral: Negative.      Objective:     Vitals:    03/12/25 0903   BP: 137/87   BP Location: Left arm   Patient Position: Sitting   Pulse: 62   Weight: (!) 142.2 kg (313 lb 7.9 oz)   Height: 5' 6" (1.676 m)     BMI: 50.60    Physical Exam  Vitals and nursing note reviewed.   Constitutional:       Appearance: She is obese.   HENT:      Head: Normocephalic and atraumatic.      Mouth/Throat:      Mouth: Mucous membranes are moist.   Eyes:      Conjunctiva/sclera: Conjunctivae normal.   Cardiovascular:      Rate and Rhythm: Normal rate and regular rhythm.      Pulses: Normal pulses.      Heart sounds: Normal heart sounds.   Pulmonary:      Effort: Pulmonary effort is normal.      Breath sounds: Normal breath sounds.   Abdominal:      General: Bowel sounds are normal.      Palpations: Abdomen is soft.   Musculoskeletal:         General: Normal range of motion.      Cervical back: Normal range of motion and neck supple.   Skin:     General: Skin is warm.      Capillary Refill: Capillary refill takes less than 2 seconds.   Neurological:      General: No focal deficit present.      Mental Status: She is alert and oriented to person, place, and time.   Psychiatric:         Mood and Affect: Mood normal.         Behavior: Behavior normal.            Assessment/Plan:     Ursula Smallwood is a 39 y.o. year old female who presents to clinic for  follow-up after initiating tirzepatide (Mounjaro) 2.5 mg weekly four weeks. Given her progress, we will proceed with the planned dose escalation to 5 mg weekly to further optimize weight loss and metabolic control.     1. Type 2 diabetes mellitus without complication, without long-term current use of insulin    Given her weight loss and metabolic improvement, we will continue to " monitor for any changes in blood glucose levels, lipid profile, and overall metabolic health.    Plan:  -Continue current management and consider labs at the next follow-up if indicated.  -Monitor for any signs of hypoglycemia.  -Patient was counseled on continued lifestyle modifications and the importance of adherence to treatment.   - tirzepatide (MOUNJARO) 5 mg/0.5 mL PnIj; Inject 5 mg into the skin every 7 days.  Dispense: 2 mL; Refill: 0    2. Class 3 severe obesity with body mass index (BMI) of 50.0 to 59.9 in adult, unspecified obesity type, unspecified whether serious comorbidity present    Patient has shown significant improvement with tirzepatide (Mounjaro) therapy, reporting a 10-pound weight loss in four weeks.  She is engaging in regular physical activity, including brisk walking most days and light strength training, and following a balanced diet with portion control and reduced processed carbohydrates.  No reported adverse effects from medication.    Plan  -Increase tirzepatide (Mounjaro) to 5 mg weekly as scheduled.  -Continue dietary modifications and physical activity.  -Encourage ongoing hydration and monitoring for potential GI side effects.  -Reassess progress in 4 weeks  - tirzepatide (MOUNJARO) 5 mg/0.5 mL PnIj; Inject 5 mg into the skin every 7 days.  Dispense: 2 mL; Refill: 0      Follow-up:4 weeks    A total of 35 minutes was spent on patient care during this encounter which included chart review, examining the patient, formulating a treatment plan and documentation.      Complex medical decision making performed due to multiple medical problems addressed during the visit.      Medical decision making straight forward and not complex during this visit.     Case discussed with staff: Cristy Gunn MD  [facility], PGY-1  03/15/2025

## 2025-04-11 DIAGNOSIS — E66.01 CLASS 3 SEVERE OBESITY WITH BODY MASS INDEX (BMI) OF 50.0 TO 59.9 IN ADULT, UNSPECIFIED OBESITY TYPE, UNSPECIFIED WHETHER SERIOUS COMORBIDITY PRESENT: ICD-10-CM

## 2025-04-11 DIAGNOSIS — E66.813 CLASS 3 SEVERE OBESITY WITH BODY MASS INDEX (BMI) OF 50.0 TO 59.9 IN ADULT, UNSPECIFIED OBESITY TYPE, UNSPECIFIED WHETHER SERIOUS COMORBIDITY PRESENT: ICD-10-CM

## 2025-04-11 DIAGNOSIS — E11.9 TYPE 2 DIABETES MELLITUS WITHOUT COMPLICATION, WITHOUT LONG-TERM CURRENT USE OF INSULIN: ICD-10-CM

## 2025-04-11 RX ORDER — TIRZEPATIDE 5 MG/.5ML
5 INJECTION, SOLUTION SUBCUTANEOUS
Qty: 2 ML | Refills: 0 | Status: CANCELLED | OUTPATIENT
Start: 2025-04-11

## 2025-04-14 DIAGNOSIS — E66.01 CLASS 3 SEVERE OBESITY WITH BODY MASS INDEX (BMI) OF 50.0 TO 59.9 IN ADULT, UNSPECIFIED OBESITY TYPE, UNSPECIFIED WHETHER SERIOUS COMORBIDITY PRESENT: ICD-10-CM

## 2025-04-14 DIAGNOSIS — E66.813 CLASS 3 SEVERE OBESITY WITH BODY MASS INDEX (BMI) OF 50.0 TO 59.9 IN ADULT, UNSPECIFIED OBESITY TYPE, UNSPECIFIED WHETHER SERIOUS COMORBIDITY PRESENT: ICD-10-CM

## 2025-04-14 DIAGNOSIS — E11.9 TYPE 2 DIABETES MELLITUS WITHOUT COMPLICATION, WITHOUT LONG-TERM CURRENT USE OF INSULIN: ICD-10-CM

## 2025-04-21 ENCOUNTER — OFFICE VISIT (OUTPATIENT)
Dept: FAMILY MEDICINE | Facility: HOSPITAL | Age: 40
End: 2025-04-21
Payer: COMMERCIAL

## 2025-04-21 VITALS
WEIGHT: 293 LBS | HEIGHT: 66 IN | BODY MASS INDEX: 47.09 KG/M2 | HEART RATE: 82 BPM | DIASTOLIC BLOOD PRESSURE: 87 MMHG | SYSTOLIC BLOOD PRESSURE: 137 MMHG

## 2025-04-21 DIAGNOSIS — E66.01 CLASS 3 SEVERE OBESITY WITH BODY MASS INDEX (BMI) OF 50.0 TO 59.9 IN ADULT, UNSPECIFIED OBESITY TYPE, UNSPECIFIED WHETHER SERIOUS COMORBIDITY PRESENT: Primary | ICD-10-CM

## 2025-04-21 DIAGNOSIS — E66.813 CLASS 3 SEVERE OBESITY WITH BODY MASS INDEX (BMI) OF 50.0 TO 59.9 IN ADULT, UNSPECIFIED OBESITY TYPE, UNSPECIFIED WHETHER SERIOUS COMORBIDITY PRESENT: Primary | ICD-10-CM

## 2025-04-21 DIAGNOSIS — E11.9 TYPE 2 DIABETES MELLITUS WITHOUT COMPLICATION, WITHOUT LONG-TERM CURRENT USE OF INSULIN: ICD-10-CM

## 2025-04-21 PROCEDURE — 99213 OFFICE O/P EST LOW 20 MIN: CPT

## 2025-04-21 NOTE — LETTER
Pt Name: Ursula Smallwood  YOB: 1985   Employer: [employer] reference to record [id number]  Job Title:        Doctors comments: Please advise status of patients ability to [id number].  {04/22/2025} No restrictions      Additional comments:            Provider Signature/Printed Name:Vianney Gunn MD Date:04/21/2025     [facility]  [address]  [address] [address]  [phone]

## 2025-04-21 NOTE — PROGRESS NOTES
[facility]    Subjective:     Ursula Smallwood is a 39 y.o. year old female with PMHx of 39 y.o. year old female with PMHx of Type 2 diabetes mellitus, without long-term current use of insulin, Class 3 severe obesity with body mass index (BMI) of 50.0 to 59.9 , Nonischemic cardiomyopathy, Chronic systolic congestive heart failure, CHF diagnosed in March 2021. LVEF 45% (March 2021) decreased to 20% in 2022 and HTN  who presents to clinic for   follow-up four weeks after her dose of tirzepatide (Mounjaro) was increased to 5 mg . She reports good tolerance to the medication and no significant side effects. Since the dose adjustment, she has made notable lifestyle changes, including engaging in regular physical activity, such as brisk walking for at least 30 minutes on most days and incorporating light strength training exercises into her routine. She acknowledges that during the past week, due to Easter celebrations, she has been eating more than usual, but she remains motivated and committed to returning to her healthy habits. Overall, she is optimistic about continuing her progress and adhering to the treatment plan.    Patient Active Problem List    Diagnosis Date Noted    Ureteral stone with hydronephrosis 11/05/2022    Acute cystitis with hematuria 11/05/2022    Type 2 diabetes mellitus, without long-term current use of insulin 11/05/2022    Morbid obesity with BMI of 40.0-44.9, adult 09/07/2022    Nonischemic cardiomyopathy 05/01/2022    Hypertension     Chronic systolic congestive heart failure 03/08/2022    H/O abdominal hysterectomy 01/26/2022    COVID-19 01/26/2022    H/O cardiac radiofrequency ablation 08/13/2021    Uterine fibroid 04/26/2021    Menorrhagia with irregular cycle 04/26/2021    Morbid obesity 04/22/2021    Symptomatic anemia 03/25/2021        Review of patient's allergies indicates:  No Known Allergies     Past Medical History:   Diagnosis Date    Anemia     Hypertension     Kidney stone      SVT (supraventricular tachycardia)     Uterine fibroid       Past Surgical History:   Procedure Laterality Date    ABLATION N/A 06/04/2021    Procedure: Ablation;  Surgeon: NICHELLE Zepeda MD;  Location: [facility] EP LAB;  Service: Cardiology;  Laterality: N/A;  SVT, RFA, Carto, anes, EH, 3prep    CYSTOSCOPY N/A 08/03/2021    Procedure: CYSTOSCOPY;  Surgeon: Jamarcus Ventura MD;  Location: [facility];  Service: OB/GYN;  Laterality: N/A;    CYSTOSCOPY W/ RETROGRADES Bilateral 11/30/2022    Procedure: CYSTOSCOPY, WITH RETROGRADE PYELOGRAM;  Surgeon: Yanira Alexander MD;  Location: [facility] OR;  Service: Urology;  Laterality: Bilateral;    CYSTOURETEROSCOPY WITH RETROGRADE PYELOGRAPHY AND INSERTION OF STENT INTO URETER Bilateral 11/6/2022    Procedure: CYSTOURETEROSCOPY, WITH BILATERAL RETROGRADE PYELOGRAM AND URETERAL STENT INSERTION;  Surgeon: Yanira Alexander MD;  Location: [facility];  Service: Urology;  Laterality: Bilateral;    FLUOROSCOPY  11/6/2022    Procedure: FLUOROSCOPY;  Surgeon: Yanira Alexander MD;  Location: [facility];  Service: Urology;;    HYSTERECTOMY      ROBOT-ASSISTED LAPAROSCOPIC HYSTERECTOMY N/A 08/03/2021    Procedure: ROBOTIC HYSTERECTOMY;  Surgeon: Jamarcus Ventura MD;  Location: [facility];  Service: OB/GYN;  Laterality: N/A;    ROBOT-ASSISTED SALPINGECTOMY Bilateral 08/03/2021    Procedure: ROBOTIC SALPINGECTOMY;  Surgeon: Jamarcus Ventura MD;  Location: [facility];  Service: OB/GYN;  Laterality: Bilateral;    URETEROSCOPY Left 11/6/2022    Procedure: URETEROSCOPY;  Surgeon: Yanira Alexander MD;  Location: [facility];  Service: Urology;  Laterality: Left;      No family history on file.   Social History     Tobacco Use    Smoking status: Never    Smokeless tobacco: Never   Substance Use Topics    Alcohol use: No      Review of Systems   Constitutional: Negative.    HENT: Negative.     Eyes: Negative.    Respiratory: Negative.     Cardiovascular: Negative.    Gastrointestinal: Negative.    Genitourinary:  "Negative.    Musculoskeletal: Negative.    Skin: Negative.    Neurological: Negative.    Endo/Heme/Allergies: Negative.    Psychiatric/Behavioral: Negative.      Objective:     Vitals:    04/21/25 1307   BP: 137/87   BP Location: Right arm   Patient Position: Sitting   Pulse: 82   Weight: (!) 144 kg (317 lb 7.4 oz)   Height: 5' 6" (1.676 m)     BMI: 51.24    Physical Exam  Vitals and nursing note reviewed.   Constitutional:       Appearance: She is obese.   HENT:      Head: Normocephalic and atraumatic.   Eyes:      Conjunctiva/sclera: Conjunctivae normal.   Cardiovascular:      Rate and Rhythm: Normal rate and regular rhythm.      Pulses: Normal pulses.      Heart sounds: Normal heart sounds.   Pulmonary:      Effort: Pulmonary effort is normal.      Breath sounds: Normal breath sounds.   Abdominal:      General: Bowel sounds are normal.      Palpations: Abdomen is soft.   Musculoskeletal:         General: Normal range of motion.      Cervical back: Normal range of motion and neck supple.   Skin:     General: Skin is warm.      Capillary Refill: Capillary refill takes less than 2 seconds.   Neurological:      General: No focal deficit present.      Mental Status: She is alert and oriented to person, place, and time.   Psychiatric:         Mood and Affect: Mood normal.         Behavior: Behavior normal.            Assessment/Plan:     Ursula Smallwood is a 39 y.o. year old female who presents to clinic for follow up after medication adjustments     1. Class 3 severe obesity with body mass index (BMI) of 50.0 to 59.9 in adult, unspecified obesity type, unspecified whether serious comorbidity present (Primary)    The patient continues to meet criteria for class 3 severe obesity with a BMI >50. She has demonstrated good motivation and lifestyle changes, including regular physical activity and strength training. She admitted to increased intake over the past week due to Easter but is committed to resuming her structured " dietary habits.     Plan   Continue lifestyle modifications including diet, exercise, and behavior changes.   Encouraged the patient to track progress and maintain consistency with her physical activity.   Ordered a Lipid Panel to further assess cardiovascular risk.   Will continue to monitor weight, BMI, and metabolic markers at follow-up.    - LIPID PANEL; Future    2. Type 2 diabetes mellitus without complication, without long-term current use of insulin    The patient has tolerated tirzepatide well and has been compliant. In this visit, we increased the dose to 7.5 mg weekly for better glycemic and weight control.     Plan   Start tirzepatide 7.5 mg/0.5 mL subcutaneous injection once weekly.   Dispense 2 mL, no refills.   Ordered Microalbumin/Creatinine Ratio to screen for diabetic nephropathy.   Ordered Urinalysis with reflex to culture to evaluate renal and urinary health.   Reinforced the importance of adherence to medication and regular glucose monitoring.    - tirzepatide 7.5 mg/0.5 mL PnIj; Inject 7.5 mg into the skin every 7 days.  Dispense: 2 mL; Refill: 0      Follow-up:4 weeks    A total of 35 minutes was spent on patient care during this encounter which included chart review, examining the patient, formulating a treatment plan and documentation.      Complex medical decision making performed due to multiple medical problems addressed during the visit.      Medical decision making straight forward and not complex during this visit.     Case discussed with staff: Nico Gunn MD  [facility], PGY-1  04/21/2025

## 2025-04-22 RX ORDER — TIRZEPATIDE 5 MG/.5ML
5 INJECTION, SOLUTION SUBCUTANEOUS
Qty: 2 ML | Refills: 0 | OUTPATIENT
Start: 2025-04-22

## 2025-05-01 ENCOUNTER — TELEPHONE (OUTPATIENT)
Dept: TRANSPLANT | Facility: CLINIC | Age: 40
End: 2025-05-01
Payer: COMMERCIAL

## 2025-05-01 NOTE — TELEPHONE ENCOUNTER
Called Patient to remind him//her of their appointment with Dr Trujillo on Monday 5/5/25 with/without tests. Patient confirmed.

## 2025-05-05 ENCOUNTER — OFFICE VISIT (OUTPATIENT)
Dept: TRANSPLANT | Facility: CLINIC | Age: 40
End: 2025-05-05
Payer: COMMERCIAL

## 2025-05-05 ENCOUNTER — LAB VISIT (OUTPATIENT)
Dept: LAB | Facility: HOSPITAL | Age: 40
End: 2025-05-05
Payer: COMMERCIAL

## 2025-05-05 VITALS
DIASTOLIC BLOOD PRESSURE: 89 MMHG | BODY MASS INDEX: 47.09 KG/M2 | SYSTOLIC BLOOD PRESSURE: 133 MMHG | HEIGHT: 66 IN | WEIGHT: 293 LBS | HEART RATE: 82 BPM

## 2025-05-05 DIAGNOSIS — I50.22 CHRONIC SYSTOLIC CONGESTIVE HEART FAILURE: ICD-10-CM

## 2025-05-05 DIAGNOSIS — E66.01 MORBID OBESITY: Chronic | ICD-10-CM

## 2025-05-05 DIAGNOSIS — E11.00 TYPE 2 DIABETES MELLITUS WITH HYPEROSMOLARITY WITHOUT COMA, WITHOUT LONG-TERM CURRENT USE OF INSULIN: ICD-10-CM

## 2025-05-05 DIAGNOSIS — I50.22 CHRONIC SYSTOLIC CONGESTIVE HEART FAILURE: Primary | ICD-10-CM

## 2025-05-05 LAB
ANION GAP (OHS): 10 MMOL/L (ref 8–16)
BUN SERPL-MCNC: 11 MG/DL (ref 6–20)
CALCIUM SERPL-MCNC: 9 MG/DL (ref 8.7–10.5)
CHLORIDE SERPL-SCNC: 106 MMOL/L (ref 95–110)
CO2 SERPL-SCNC: 22 MMOL/L (ref 23–29)
CREAT SERPL-MCNC: 0.9 MG/DL (ref 0.5–1.4)
GFR SERPLBLD CREATININE-BSD FMLA CKD-EPI: >60 ML/MIN/1.73/M2
GLUCOSE SERPL-MCNC: 92 MG/DL (ref 70–110)
POTASSIUM SERPL-SCNC: 4.5 MMOL/L (ref 3.5–5.1)
SODIUM SERPL-SCNC: 138 MMOL/L (ref 136–145)

## 2025-05-05 PROCEDURE — 3066F NEPHROPATHY DOC TX: CPT | Mod: CPTII,S$GLB,, | Performed by: INTERNAL MEDICINE

## 2025-05-05 PROCEDURE — 3079F DIAST BP 80-89 MM HG: CPT | Mod: CPTII,S$GLB,, | Performed by: INTERNAL MEDICINE

## 2025-05-05 PROCEDURE — 99999 PR PBB SHADOW E&M-EST. PATIENT-LVL IV: CPT | Mod: PBBFAC,,, | Performed by: INTERNAL MEDICINE

## 2025-05-05 PROCEDURE — 3044F HG A1C LEVEL LT 7.0%: CPT | Mod: CPTII,S$GLB,, | Performed by: INTERNAL MEDICINE

## 2025-05-05 PROCEDURE — 4010F ACE/ARB THERAPY RXD/TAKEN: CPT | Mod: CPTII,S$GLB,, | Performed by: INTERNAL MEDICINE

## 2025-05-05 PROCEDURE — 82947 ASSAY GLUCOSE BLOOD QUANT: CPT

## 2025-05-05 PROCEDURE — 3075F SYST BP GE 130 - 139MM HG: CPT | Mod: CPTII,S$GLB,, | Performed by: INTERNAL MEDICINE

## 2025-05-05 PROCEDURE — 3060F POS MICROALBUMINURIA REV: CPT | Mod: CPTII,S$GLB,, | Performed by: INTERNAL MEDICINE

## 2025-05-05 PROCEDURE — 3008F BODY MASS INDEX DOCD: CPT | Mod: CPTII,S$GLB,, | Performed by: INTERNAL MEDICINE

## 2025-05-05 PROCEDURE — 1159F MED LIST DOCD IN RCRD: CPT | Mod: CPTII,S$GLB,, | Performed by: INTERNAL MEDICINE

## 2025-05-05 PROCEDURE — 99214 OFFICE O/P EST MOD 30 MIN: CPT | Mod: S$GLB,,, | Performed by: INTERNAL MEDICINE

## 2025-05-05 PROCEDURE — 83880 ASSAY OF NATRIURETIC PEPTIDE: CPT

## 2025-05-05 PROCEDURE — 36415 COLL VENOUS BLD VENIPUNCTURE: CPT

## 2025-05-05 RX ORDER — TORSEMIDE 20 MG/1
20 TABLET ORAL DAILY
Qty: 90 TABLET | Refills: 3 | Status: SHIPPED | OUTPATIENT
Start: 2025-05-05 | End: 2026-05-05

## 2025-05-05 NOTE — PATIENT INSTRUCTIONS
You have extra fluid on you.  Please adhere to a low sodium diet (no more than 1.5 grams of sodium in 24h).  3.   Follow fluid restriction of  1. no more than 2 liters in 24 hours.  4. Increase torsemide from 20 mg every other day to 20 mg daily.  5. F/u blood test next Monday.  6. F/u in 6 months with labs..
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
## 2025-05-06 ENCOUNTER — OFFICE VISIT (OUTPATIENT)
Dept: FAMILY MEDICINE | Facility: HOSPITAL | Age: 40
End: 2025-05-06
Payer: COMMERCIAL

## 2025-05-06 VITALS
WEIGHT: 293 LBS | HEIGHT: 66 IN | HEART RATE: 71 BPM | BODY MASS INDEX: 47.09 KG/M2 | SYSTOLIC BLOOD PRESSURE: 128 MMHG | DIASTOLIC BLOOD PRESSURE: 86 MMHG

## 2025-05-06 DIAGNOSIS — E11.9 TYPE 2 DIABETES MELLITUS WITHOUT COMPLICATION, WITHOUT LONG-TERM CURRENT USE OF INSULIN: Primary | ICD-10-CM

## 2025-05-06 DIAGNOSIS — E66.01 OBESITY, CLASS III, BMI 40-49.9 (MORBID OBESITY): ICD-10-CM

## 2025-05-06 LAB — NT-PROBNP SERPL IA-MCNC: 1591 PG/ML

## 2025-05-06 PROCEDURE — 99213 OFFICE O/P EST LOW 20 MIN: CPT

## 2025-05-06 RX ORDER — ATORVASTATIN CALCIUM 20 MG/1
20 TABLET, FILM COATED ORAL DAILY
Qty: 90 TABLET | Refills: 3 | Status: SHIPPED | OUTPATIENT
Start: 2025-05-06 | End: 2026-05-06

## 2025-05-07 NOTE — PROGRESS NOTES
[facility]    Subjective:     Ursula Smallwood is a 39 y.o. year old female with PMHx of Type 2 diabetes mellitus, without long-term current use of insulin, Class 3 severe obesity with body mass index (BMI) of 50.0 to 59.9 , Nonischemic cardiomyopathy, Chronic systolic congestive heart failure, CHF diagnosed in March 2021. LVEF 45% (March 2021) decreased to 20% in 2022 and HTN  who presents to clinic for   follow-up four weeks after her dose of tirzepatide (Mounjaro) was increased to 7.5. She reports full compliance with the medication and remains highly motivated: she follows a pescatarian diet (primarily fish and seafood), exercises daily by walking, and feels encouraged by her progress. Her cardiologist initiated guideline-directed medical therapy for heart failure at yesterdays visit.  She notes mild constipation since the dose increase, which she attributes to a significant decrease in appetite: after consuming her protein portion, she feels full and is skipping vegetables and her usual morning cereal. She denies nausea, abdominal pain, vomiting, diarrhea, or any hypoglycemic episodes.    Patient Active Problem List    Diagnosis Date Noted    Ureteral stone with hydronephrosis 11/05/2022    Acute cystitis with hematuria 11/05/2022    Type 2 diabetes mellitus, without long-term current use of insulin 11/05/2022    Morbid obesity with BMI of 40.0-44.9, adult 09/07/2022    Nonischemic cardiomyopathy 05/01/2022    Hypertension     Chronic systolic congestive heart failure 03/08/2022    H/O abdominal hysterectomy 01/26/2022    COVID-19 01/26/2022    H/O cardiac radiofrequency ablation 08/13/2021    Uterine fibroid 04/26/2021    Menorrhagia with irregular cycle 04/26/2021    Morbid obesity 04/22/2021    Symptomatic anemia 03/25/2021        Review of patient's allergies indicates:  No Known Allergies     Past Medical History:   Diagnosis Date    Anemia     Hypertension     Kidney stone     SVT (supraventricular  tachycardia)     Uterine fibroid       Past Surgical History:   Procedure Laterality Date    ABLATION N/A 06/04/2021    Procedure: Ablation;  Surgeon: NICHELLE Zepeda MD;  Location: [facility] EP LAB;  Service: Cardiology;  Laterality: N/A;  SVT, RFA, Carto, anes, EH, 3prep    CYSTOSCOPY N/A 08/03/2021    Procedure: CYSTOSCOPY;  Surgeon: Jamarcus Ventura MD;  Location: [facility] OR;  Service: OB/GYN;  Laterality: N/A;    CYSTOSCOPY W/ RETROGRADES Bilateral 11/30/2022    Procedure: CYSTOSCOPY, WITH RETROGRADE PYELOGRAM;  Surgeon: Yanira Alexander MD;  Location: [facility] OR;  Service: Urology;  Laterality: Bilateral;    CYSTOURETEROSCOPY WITH RETROGRADE PYELOGRAPHY AND INSERTION OF STENT INTO URETER Bilateral 11/6/2022    Procedure: CYSTOURETEROSCOPY, WITH BILATERAL RETROGRADE PYELOGRAM AND URETERAL STENT INSERTION;  Surgeon: Yanira Alexander MD;  Location: [facility];  Service: Urology;  Laterality: Bilateral;    FLUOROSCOPY  11/6/2022    Procedure: FLUOROSCOPY;  Surgeon: Yanira Alexander MD;  Location: [facility];  Service: Urology;;    HYSTERECTOMY      ROBOT-ASSISTED LAPAROSCOPIC HYSTERECTOMY N/A 08/03/2021    Procedure: ROBOTIC HYSTERECTOMY;  Surgeon: Jamarcus Ventura MD;  Location: [facility];  Service: OB/GYN;  Laterality: N/A;    ROBOT-ASSISTED SALPINGECTOMY Bilateral 08/03/2021    Procedure: ROBOTIC SALPINGECTOMY;  Surgeon: Jamarcus Ventura MD;  Location: [facility];  Service: OB/GYN;  Laterality: Bilateral;    URETEROSCOPY Left 11/6/2022    Procedure: URETEROSCOPY;  Surgeon: Yanira Alexander MD;  Location: [facility];  Service: Urology;  Laterality: Left;      No family history on file.   Social History     Tobacco Use    Smoking status: Never    Smokeless tobacco: Never   Substance Use Topics    Alcohol use: No      Review of Systems   Constitutional: Negative.    HENT: Negative.     Eyes: Negative.    Respiratory: Negative.     Cardiovascular: Negative.    Gastrointestinal:  Positive for constipation.   Genitourinary: Negative.   "  Musculoskeletal: Negative.    Skin: Negative.    Neurological: Negative.    Endo/Heme/Allergies: Negative.    Psychiatric/Behavioral: Negative.        Objective:     Vitals:    05/06/25 1452   BP: 128/86   Patient Position: Sitting   Pulse: 71   Weight: (!) 139.9 kg (308 lb 6.8 oz)   Height: 5' 6" (1.676 m)     BMI: 49.78    Physical Exam  Vitals and nursing note reviewed.   Constitutional:       Appearance: She is obese.   HENT:      Head: Normocephalic and atraumatic.   Eyes:      Conjunctiva/sclera: Conjunctivae normal.   Cardiovascular:      Rate and Rhythm: Normal rate and regular rhythm.   Pulmonary:      Effort: Pulmonary effort is normal.      Breath sounds: Normal breath sounds.   Abdominal:      General: Bowel sounds are normal.      Palpations: Abdomen is soft.   Musculoskeletal:         General: Normal range of motion.      Cervical back: Normal range of motion and neck supple.   Skin:     General: Skin is warm.      Capillary Refill: Capillary refill takes less than 2 seconds.   Neurological:      General: No focal deficit present.      Mental Status: She is alert and oriented to person, place, and time.   Psychiatric:         Mood and Affect: Mood normal.         Behavior: Behavior normal.              Assessment/Plan:     Ursula Smallwood is a 39 y.o. year old female who presents to clinic for the following     1. Type 2 diabetes mellitus without complication, without long-term current use of insulin (Primary)    Patient tolerating tirzepatide (Mounjaro) 7.5 mg with good compliance and appetite suppression. No hypoglycemia reported.    Plan:  -Increase tirzepatide to 10 mg subcutaneously once weekly.  -Continue home blood glucose monitoring and bring logs to next visit.  -Check HbA1c, fasting glucose, and CMP  to assess glycemic response and renal/hepatic function.    2.Morbid Obesity BMI 40-49.9    Highly motivated; following pescatarian diet and daily walking routine.    Plan:  -Reinforce dietary " counseling: ensure balanced meals--even with reduced appetite--incorporating vegetables, fiber, and whole grains; consider referral to a registered dietitian for personalized meal planning.  -Exercise prescription: continue daily walking, aiming for >=150 minutes/week of moderate-intensity activity; consider adding light resistance training as tolerated.  -Monitor weight and BMI monthly.  tirzepatide 10 mg/0.5 mL PnIj; Inject 10 mg (one pen) into the skin every 7 days.  Dispense: 2 mL; Refill: 0    Diabetic Nephropathy (Elevated Urine Microalbumin/Creatinine Ratio)  Last urine microalbumin/creatinine ratio was elevated.    Plan:  -Repeat urine microalbumin/creatinine ratio today.  -Monitor renal function and electrolytes with CMP.     Elevated ASCVD Risk  -Given her age, diabetes, hypertension, heart failure, and obesity, her ASCVD risk score indicates benefit from moderate-intensity statin therapy.  -Initiate atorvastatin 20 mg PO once daily.  - on lifestyle measures: reduce saturated fats, increase omega-3 sources, and maintain exercise.  -Recheck lipid panel and liver enzymes in 8 weeks.  - atorvastatin (LIPITOR) 20 MG tablet; Take 1 tablet (20 mg total) by mouth once daily.  Dispense: 90 tablet; Refill: 3      Follow-up:4 weeks    A total of 35 minutes was spent on patient care during this encounter which included chart review, examining the patient, formulating a treatment plan and documentation.      Complex medical decision making performed due to multiple medical problems addressed during the visit.      Medical decision making straight forward and not complex during this visit.     Case discussed with staff: Marjorie Gunn MD  [facility], PGY-1  05/07/2025

## 2025-05-13 ENCOUNTER — LAB VISIT (OUTPATIENT)
Dept: LAB | Facility: HOSPITAL | Age: 40
End: 2025-05-13
Attending: INTERNAL MEDICINE
Payer: COMMERCIAL

## 2025-05-13 DIAGNOSIS — I50.22 CHRONIC SYSTOLIC CONGESTIVE HEART FAILURE: ICD-10-CM

## 2025-05-13 LAB
ABSOLUTE EOSINOPHIL (OHS): 0.11 K/UL
ABSOLUTE MONOCYTE (OHS): 0.5 K/UL (ref 0.3–1)
ABSOLUTE NEUTROPHIL COUNT (OHS): 2.86 K/UL (ref 1.8–7.7)
ALBUMIN SERPL BCP-MCNC: 4.1 G/DL (ref 3.5–5.2)
ALP SERPL-CCNC: 58 UNIT/L (ref 38–126)
ALT SERPL W/O P-5'-P-CCNC: 13 UNIT/L (ref 10–44)
ANION GAP (OHS): 11 MMOL/L (ref 8–16)
AST SERPL-CCNC: 12 UNIT/L (ref 15–46)
BASOPHILS # BLD AUTO: 0.04 K/UL
BASOPHILS NFR BLD AUTO: 0.6 %
BILIRUB SERPL-MCNC: 0.4 MG/DL (ref 0.1–1)
BUN SERPL-MCNC: 16 MG/DL (ref 7–17)
CALCIUM SERPL-MCNC: 9.4 MG/DL (ref 8.7–10.5)
CHLORIDE SERPL-SCNC: 103 MMOL/L (ref 95–110)
CO2 SERPL-SCNC: 24 MMOL/L (ref 23–29)
CREAT SERPL-MCNC: 1 MG/DL (ref 0.5–1.4)
ERYTHROCYTE [DISTWIDTH] IN BLOOD BY AUTOMATED COUNT: 14.8 % (ref 11.5–14.5)
GFR SERPLBLD CREATININE-BSD FMLA CKD-EPI: >60 ML/MIN/1.73/M2
GLUCOSE SERPL-MCNC: 86 MG/DL (ref 70–110)
HCT VFR BLD AUTO: 42.3 % (ref 37–48.5)
HGB BLD-MCNC: 13.4 GM/DL (ref 12–16)
IMM GRANULOCYTES # BLD AUTO: 0.01 K/UL (ref 0–0.04)
IMM GRANULOCYTES NFR BLD AUTO: 0.2 % (ref 0–0.5)
LYMPHOCYTES # BLD AUTO: 2.8 K/UL (ref 1–4.8)
MCH RBC QN AUTO: 25.6 PG (ref 27–31)
MCHC RBC AUTO-ENTMCNC: 31.7 G/DL (ref 32–36)
MCV RBC AUTO: 81 FL (ref 82–98)
NT-PROBNP SERPL-MCNC: 863 PG/ML (ref 5–450)
NUCLEATED RBC (/100WBC) (OHS): 0 /100 WBC
PLATELET # BLD AUTO: 409 K/UL (ref 150–450)
PMV BLD AUTO: 10.4 FL (ref 9.2–12.9)
POTASSIUM SERPL-SCNC: 4.1 MMOL/L (ref 3.5–5.1)
PROT SERPL-MCNC: 7.5 GM/DL (ref 6–8.4)
RBC # BLD AUTO: 5.24 M/UL (ref 4–5.4)
RELATIVE EOSINOPHIL (OHS): 1.7 %
RELATIVE LYMPHOCYTE (OHS): 44.3 % (ref 18–48)
RELATIVE MONOCYTE (OHS): 7.9 % (ref 4–15)
RELATIVE NEUTROPHIL (OHS): 45.3 % (ref 38–73)
SODIUM SERPL-SCNC: 138 MMOL/L (ref 136–145)
WBC # BLD AUTO: 6.32 K/UL (ref 3.9–12.7)

## 2025-05-13 PROCEDURE — 85025 COMPLETE CBC W/AUTO DIFF WBC: CPT | Mod: PN

## 2025-05-13 PROCEDURE — 36415 COLL VENOUS BLD VENIPUNCTURE: CPT | Mod: PN

## 2025-05-13 PROCEDURE — 83880 ASSAY OF NATRIURETIC PEPTIDE: CPT | Mod: PN

## 2025-05-13 PROCEDURE — 84460 ALANINE AMINO (ALT) (SGPT): CPT | Mod: PN

## 2025-05-14 ENCOUNTER — TELEPHONE (OUTPATIENT)
Dept: TRANSPLANT | Facility: CLINIC | Age: 40
End: 2025-05-14
Payer: COMMERCIAL

## 2025-05-14 DIAGNOSIS — I50.22 CHRONIC SYSTOLIC CONGESTIVE HEART FAILURE: Primary | ICD-10-CM

## 2025-05-14 NOTE — TELEPHONE ENCOUNTER
5/14/25 - Received below bolded written orders from NAZARIO Sullivan M.D.  See entire thread for complete details.    ----- Message from Cory Sullivan MD sent at 5/13/2025  5:40 PM CDT -----  I recommend repeat albs next week.NO changes on meds.Thx    Called/spoke with patient and informed her of no changes to medical regimen, and of need to repeat labs, next Tuesday.  Patient repeated all instructions back correctly and verbalized understanding.      Lab appt scheduled at [facility] and remind me entered.      ----- Message -----  From: Kristen Amaro RN  Sent: 5/13/2025   1:48 PM CDT  To: MD Cory Sorensen,You saw the above patient last week in clinic and increased her torsemide to 20 mg daily, with repeat labs this week.Labs today:  Na/K+ 138/4.1, BUN/Cr - 16/1.0, NT-Pro - 863.Called/spoke with patient who confirmed she increased her torsemide to 20 mg daily and is taking her spironolactone.  Patient reports she is feeling good and is urinating a lot.  Weight today is 300 lb's.  She is also taking tirzepatide.Please let me know if you want to make any changes.Thanks,Kristen

## 2025-05-20 ENCOUNTER — LAB VISIT (OUTPATIENT)
Dept: LAB | Facility: HOSPITAL | Age: 40
End: 2025-05-20
Attending: INTERNAL MEDICINE
Payer: COMMERCIAL

## 2025-05-20 ENCOUNTER — TELEPHONE (OUTPATIENT)
Dept: TRANSPLANT | Facility: CLINIC | Age: 40
End: 2025-05-20
Payer: COMMERCIAL

## 2025-05-20 DIAGNOSIS — I50.22 CHRONIC SYSTOLIC CONGESTIVE HEART FAILURE: ICD-10-CM

## 2025-05-20 LAB
ANION GAP (OHS): 8 MMOL/L (ref 8–16)
BUN SERPL-MCNC: 14 MG/DL (ref 7–17)
CALCIUM SERPL-MCNC: 9.1 MG/DL (ref 8.7–10.5)
CHLORIDE SERPL-SCNC: 103 MMOL/L (ref 95–110)
CO2 SERPL-SCNC: 28 MMOL/L (ref 23–29)
CREAT SERPL-MCNC: 1 MG/DL (ref 0.5–1.4)
GFR SERPLBLD CREATININE-BSD FMLA CKD-EPI: >60 ML/MIN/1.73/M2
GLUCOSE SERPL-MCNC: 88 MG/DL (ref 70–110)
NT-PROBNP SERPL-MCNC: 678 PG/ML (ref 5–450)
POTASSIUM SERPL-SCNC: 4.4 MMOL/L (ref 3.5–5.1)
SODIUM SERPL-SCNC: 139 MMOL/L (ref 136–145)

## 2025-05-20 PROCEDURE — 83880 ASSAY OF NATRIURETIC PEPTIDE: CPT | Mod: PN

## 2025-05-20 PROCEDURE — 36415 COLL VENOUS BLD VENIPUNCTURE: CPT | Mod: PN

## 2025-05-20 PROCEDURE — 82435 ASSAY OF BLOOD CHLORIDE: CPT | Mod: PN

## 2025-05-20 NOTE — TELEPHONE ENCOUNTER
5/20/25  1:00 pm: f/U on todays lab reminder   See NN by MEHUL Castro  5/14 w/ reason for this lab I am copying here:  Message from Cory Sullivan MD sent at 5/13/2025  5:40 PM CDT -----  I recommend repeat albs next week.NO changes on meds.Thx      Bmp and nt probnp results in epic and stable  Nt prob bnp improved to 678 from 863  K 4.4   Cr 1.1      Called pt at this time   Informed of good lab results and improved fluid marker number and explained  Pt confirmed she is still taking torsemide 20 mg once daily  but missed the past 2 days due to her dtr graduating and having very early  mornings and late evenings, but is back to taking it once daily   Asked and pt said she feels good    Today 298  Does not weigh daily  Asked to weigh every and write it down and explained why  Asked pt to notify this office for a wt gain > 3 ds overnight or > 5 pds in a week or any other signs of fluid such as swelling to legs/ankles/feet or abdomen of which she says she does not have at this time  Also denies sob or heavy breathing and aware to report this    Encouraged pt to continue to closely follow he liquid and sodium restrictions

## 2025-06-02 ENCOUNTER — OFFICE VISIT (OUTPATIENT)
Dept: FAMILY MEDICINE | Facility: HOSPITAL | Age: 40
End: 2025-06-02
Payer: COMMERCIAL

## 2025-06-02 VITALS
RESPIRATION RATE: 18 BRPM | SYSTOLIC BLOOD PRESSURE: 123 MMHG | OXYGEN SATURATION: 98 % | HEART RATE: 89 BPM | BODY MASS INDEX: 47.09 KG/M2 | HEIGHT: 66 IN | DIASTOLIC BLOOD PRESSURE: 74 MMHG | WEIGHT: 293 LBS

## 2025-06-02 DIAGNOSIS — E66.01 OBESITY, CLASS III, BMI 40-49.9 (MORBID OBESITY): Primary | ICD-10-CM

## 2025-06-02 DIAGNOSIS — E11.9 TYPE 2 DIABETES MELLITUS WITHOUT COMPLICATION, WITHOUT LONG-TERM CURRENT USE OF INSULIN: ICD-10-CM

## 2025-06-02 PROCEDURE — 99214 OFFICE O/P EST MOD 30 MIN: CPT

## 2025-06-04 PROBLEM — E66.813 OBESITY, CLASS III, BMI 40-49.9 (MORBID OBESITY): Status: ACTIVE | Noted: 2022-09-07

## 2025-06-21 DIAGNOSIS — E11.9 TYPE 2 DIABETES MELLITUS WITHOUT COMPLICATION, WITHOUT LONG-TERM CURRENT USE OF INSULIN: ICD-10-CM

## 2025-06-21 RX ORDER — TIRZEPATIDE 10 MG/.5ML
10 INJECTION, SOLUTION SUBCUTANEOUS
Qty: 2 ML | Refills: 0 | Status: CANCELLED | OUTPATIENT
Start: 2025-06-21

## 2025-06-24 DIAGNOSIS — E11.9 TYPE 2 DIABETES MELLITUS WITHOUT COMPLICATION, WITHOUT LONG-TERM CURRENT USE OF INSULIN: ICD-10-CM

## 2025-06-24 RX ORDER — TIRZEPATIDE 10 MG/.5ML
10 INJECTION, SOLUTION SUBCUTANEOUS
Qty: 2 ML | Refills: 0 | Status: CANCELLED | OUTPATIENT
Start: 2025-06-21

## 2025-06-30 DIAGNOSIS — E11.9 TYPE 2 DIABETES MELLITUS WITHOUT COMPLICATION, WITHOUT LONG-TERM CURRENT USE OF INSULIN: ICD-10-CM

## 2025-07-21 DIAGNOSIS — E11.9 TYPE 2 DIABETES MELLITUS WITHOUT COMPLICATION, WITHOUT LONG-TERM CURRENT USE OF INSULIN: ICD-10-CM

## 2025-07-22 RX ORDER — TIRZEPATIDE 10 MG/.5ML
10 INJECTION, SOLUTION SUBCUTANEOUS
Qty: 2 ML | Refills: 0 | Status: SHIPPED | OUTPATIENT
Start: 2025-07-22

## 2025-08-12 DIAGNOSIS — E11.9 TYPE 2 DIABETES MELLITUS WITHOUT COMPLICATION, WITHOUT LONG-TERM CURRENT USE OF INSULIN: ICD-10-CM

## 2025-08-12 RX ORDER — TIRZEPATIDE 10 MG/.5ML
10 INJECTION, SOLUTION SUBCUTANEOUS
Qty: 2 ML | Refills: 0 | OUTPATIENT
Start: 2025-08-12

## 2025-09-02 DIAGNOSIS — E11.9 TYPE 2 DIABETES MELLITUS WITHOUT COMPLICATION, WITHOUT LONG-TERM CURRENT USE OF INSULIN: ICD-10-CM

## 2025-09-02 RX ORDER — TIRZEPATIDE 10 MG/.5ML
10 INJECTION, SOLUTION SUBCUTANEOUS
Qty: 2 ML | Refills: 0 | Status: SHIPPED | OUTPATIENT
Start: 2025-09-02

## (undated) DEVICE — SEE MEDLINE ITEM 152532

## (undated) DEVICE — SEE MEDLINE ITEM 154981

## (undated) DEVICE — TOWEL OR DISP STRL BLUE 4/PK

## (undated) DEVICE — TRAY FOLEY 16FR INFECTION CONT

## (undated) DEVICE — KIT PROBE COVER WITH GEL

## (undated) DEVICE — GLOVE SURGICAL LATEX SZ 6.5

## (undated) DEVICE — SUT 0 VICRYL / CT-1

## (undated) DEVICE — TIP RUMI BLUE DISPOSABLE 5/BX

## (undated) DEVICE — IRRIGATOR ENDOWRIST XI SUCTION

## (undated) DEVICE — WIRE GUIDE TFLN CT SPR STFF ST

## (undated) DEVICE — GUIDE WIRE MOTION .035 X 150CM

## (undated) DEVICE — BAG LINGEMAN DRAIN UROLOGY

## (undated) DEVICE — GAUZE SPONGE 4X4 12PLY

## (undated) DEVICE — NDL INSUF ULTRA VERESS 120MM

## (undated) DEVICE — FIBER LASER HOLMIUM 365 MICRON

## (undated) DEVICE — DRESSING TEGADERM 4.4X5IN

## (undated) DEVICE — INTRODUCER HEMOSTASIS 7.5F

## (undated) DEVICE — DRAPE T CYSTOSCOPY STERILE

## (undated) DEVICE — PAD PREP 50/CA

## (undated) DEVICE — CATH DCAPLR STEER LG 6FR 2-5-2

## (undated) DEVICE — SYR 50CC LL

## (undated) DEVICE — OCCLUDER COLPO-PNEUMO STERILE

## (undated) DEVICE — SEAL UNIVERSAL 5MM-8MM XI

## (undated) DEVICE — SYR ONLY LUER LOCK 20CC

## (undated) DEVICE — Device

## (undated) DEVICE — SYR 10CC LUER LOCK

## (undated) DEVICE — SOL IRR STRL WATER 500ML

## (undated) DEVICE — SET CYSTO IRRIGATION UNIV SPIK

## (undated) DEVICE — ELECTRODE REM PLYHSV RETURN 9

## (undated) DEVICE — SUT 4/0 18IN COATED VICRYL

## (undated) DEVICE — CATH URETERAL DUAL LUMEN 10FR

## (undated) DEVICE — DRAPE LAVH LAPAROSCOPY W/FLUID

## (undated) DEVICE — DRAPE ARM DAVINCI XI

## (undated) DEVICE — PAD RADI FEMORAL

## (undated) DEVICE — SEE MEDLINE ITEM 156952

## (undated) DEVICE — COVER TIP CURVED SCISSORS XI

## (undated) DEVICE — GOWN POLY REINF BRTH SLV XL

## (undated) DEVICE — SEE MEDLINE ITEM 157117

## (undated) DEVICE — SOL IRR NACL .9% 3000ML

## (undated) DEVICE — PAD DEFIB CADENCE ADULT R2

## (undated) DEVICE — SET IRR URLGY 2LINE UNIV SPIKE

## (undated) DEVICE — PACK EP DRAPE

## (undated) DEVICE — CATH RESPONSE QPLR JSN 6F 120

## (undated) DEVICE — GLOVE BIOGEL PI MICRO INDIC 7

## (undated) DEVICE — SEE MEDLINE ITEM 146355

## (undated) DEVICE — SEALER VESSEL EXTEND

## (undated) DEVICE — CATH NAV THERMOCOUPLE 7F 4MM

## (undated) DEVICE — BAG PRESSURE INFUSER 3000CC

## (undated) DEVICE — SEE MEDLINE ITEM 157116

## (undated) DEVICE — GLOVE SURG BIOGEL LATEX SZ 7.5

## (undated) DEVICE — PANTIES FEMININE NAPKIN LG/XLG

## (undated) DEVICE — CATH DS NAV THERMOCOUPLE 7FR

## (undated) DEVICE — INTRO 8.5FR 63CM SRO

## (undated) DEVICE — INTRODUCER HEMOSTASIS 6.5FR

## (undated) DEVICE — CONTAINER PATHOLOGY W/LID 32OZ

## (undated) DEVICE — SUT VICRYL 0 27 CT-2

## (undated) DEVICE — JELLY LUBRICANT STERILE 4 OZ

## (undated) DEVICE — SHEATH HEMOSTASIS 8.5FR

## (undated) DEVICE — BANDAGE ADHESIVE

## (undated) DEVICE — OBTURATOR BLADELESS 8MM XI CLR

## (undated) DEVICE — PATCH CARTO REFERENCE

## (undated) DEVICE — GOWN SURGICAL XX LARGE X LONG

## (undated) DEVICE — CATH URTRL OPEN END STR TIP 5F

## (undated) DEVICE — KIT WING PAD POSITIONING

## (undated) DEVICE — APPLICATOR CHLORAPREP ORN 26ML

## (undated) DEVICE — SUT V-LOC 90 GS22 2-0 VIO 23CM

## (undated) DEVICE — CLOSURE SKIN STERI STRIP 1/4X4

## (undated) DEVICE — IRRIGATOR ENDOSCOPY DISP.